# Patient Record
Sex: MALE | Race: BLACK OR AFRICAN AMERICAN | NOT HISPANIC OR LATINO | Employment: OTHER | URBAN - METROPOLITAN AREA
[De-identification: names, ages, dates, MRNs, and addresses within clinical notes are randomized per-mention and may not be internally consistent; named-entity substitution may affect disease eponyms.]

---

## 2020-10-19 ENCOUNTER — HISTORICAL (OUTPATIENT)
Dept: ADMINISTRATIVE | Facility: HOSPITAL | Age: 50
End: 2020-10-19

## 2021-01-15 ENCOUNTER — HISTORICAL (OUTPATIENT)
Dept: RADIOLOGY | Facility: HOSPITAL | Age: 51
End: 2021-01-15

## 2021-03-03 ENCOUNTER — HISTORICAL (OUTPATIENT)
Dept: RADIOLOGY | Facility: HOSPITAL | Age: 51
End: 2021-03-03

## 2021-03-19 ENCOUNTER — HISTORICAL (OUTPATIENT)
Dept: RESPIRATORY THERAPY | Facility: HOSPITAL | Age: 51
End: 2021-03-19

## 2021-04-20 LAB
APPEARANCE, UA: CLEAR
BACTERIA #/AREA URNS AUTO: ABNORMAL /HPF
BILIRUB UR QL STRIP: NEGATIVE
BUN SERPL-MCNC: 13.4 MG/DL (ref 8.4–25.7)
CALCIUM SERPL-MCNC: 9.4 MG/DL (ref 8.4–10.2)
CHLORIDE SERPL-SCNC: 108 MMOL/L (ref 98–107)
CO2 SERPL-SCNC: 25 MMOL/L (ref 22–29)
COLOR UR: YELLOW
CREAT SERPL-MCNC: 0.81 MG/DL (ref 0.73–1.18)
CREAT/UREA NIT SERPL: 17
ERYTHROCYTE [DISTWIDTH] IN BLOOD BY AUTOMATED COUNT: 12.3 % (ref 11.5–17)
GLUCOSE (UA): NEGATIVE
GLUCOSE SERPL-MCNC: 115 MG/DL (ref 74–100)
HCT VFR BLD AUTO: 47.2 % (ref 42–52)
HGB BLD-MCNC: 15.4 GM/DL (ref 14–18)
HGB UR QL STRIP: NEGATIVE
KETONES UR QL STRIP: NEGATIVE
LEUKOCYTE ESTERASE UR QL STRIP: NEGATIVE
MCH RBC QN AUTO: 29.9 PG (ref 27–31)
MCHC RBC AUTO-ENTMCNC: 32.6 GM/DL (ref 33–36)
MCV RBC AUTO: 91.7 FL (ref 80–94)
NITRITE UR QL STRIP.AUTO: NEGATIVE
PH UR STRIP: 6 [PH] (ref 5–9)
PLATELET # BLD AUTO: 239 X10(3)/MCL (ref 130–400)
PMV BLD AUTO: 10.4 FL (ref 9.4–12.4)
POTASSIUM SERPL-SCNC: 4.5 MMOL/L (ref 3.5–5.1)
PROT UR QL STRIP: ABNORMAL
RBC # BLD AUTO: 5.15 X10(6)/MCL (ref 4.7–6.1)
RBC #/AREA URNS HPF: ABNORMAL /[HPF]
SODIUM SERPL-SCNC: 140 MMOL/L (ref 136–145)
SP GR UR STRIP: 1.02 (ref 1–1.03)
SQUAMOUS EPITHELIAL, UA: ABNORMAL /HPF (ref 0–4)
UROBILINOGEN UR STRIP-ACNC: 0.2
WBC # SPEC AUTO: 8.2 X10(3)/MCL (ref 4.5–11.5)
WBC #/AREA URNS AUTO: ABNORMAL /HPF (ref 0–3)

## 2021-04-22 LAB — FINAL CULTURE: NO GROWTH

## 2021-04-29 LAB — SARS-COV-2 RNA RESP QL NAA+PROBE: NOT DETECTED

## 2021-05-04 ENCOUNTER — HOSPITAL ENCOUNTER (OUTPATIENT)
Dept: OCCUPATIONAL THERAPY | Facility: HOSPITAL | Age: 51
End: 2021-05-05
Attending: UROLOGY | Admitting: UROLOGY

## 2021-05-04 LAB
ABS NEUT (OLG): 11.38 X10(3)/MCL (ref 2.1–9.2)
BASOPHILS # BLD AUTO: 0 X10(3)/MCL (ref 0–0.2)
BASOPHILS NFR BLD AUTO: 0 %
EOSINOPHIL # BLD AUTO: 0 X10(3)/MCL (ref 0–0.9)
EOSINOPHIL NFR BLD AUTO: 0 %
ERYTHROCYTE [DISTWIDTH] IN BLOOD BY AUTOMATED COUNT: 12.5 % (ref 11.5–17)
GROUP & RH: NORMAL
HCT VFR BLD AUTO: 42.6 % (ref 42–52)
HGB BLD-MCNC: 14.3 GM/DL (ref 14–18)
LYMPHOCYTES # BLD AUTO: 1.3 X10(3)/MCL (ref 0.6–4.6)
LYMPHOCYTES NFR BLD AUTO: 10 %
MCH RBC QN AUTO: 30.7 PG (ref 27–31)
MCHC RBC AUTO-ENTMCNC: 33.6 GM/DL (ref 33–36)
MCV RBC AUTO: 91.4 FL (ref 80–94)
MONOCYTES # BLD AUTO: 0.1 X10(3)/MCL (ref 0.1–1.3)
MONOCYTES NFR BLD AUTO: 1 %
NEUTROPHILS # BLD AUTO: 11.38 X10(3)/MCL (ref 2.1–9.2)
NEUTROPHILS NFR BLD AUTO: 88 %
PLATELET # BLD AUTO: 202 X10(3)/MCL (ref 130–400)
PMV BLD AUTO: 9.6 FL (ref 9.4–12.4)
RBC # BLD AUTO: 4.66 X10(6)/MCL (ref 4.7–6.1)
WBC # SPEC AUTO: 12.8 X10(3)/MCL (ref 4.5–11.5)

## 2021-05-05 LAB
ABS NEUT (OLG): 8.42 X10(3)/MCL (ref 2.1–9.2)
BASOPHILS # BLD AUTO: 0 X10(3)/MCL (ref 0–0.2)
BASOPHILS NFR BLD AUTO: 0 %
BUN SERPL-MCNC: 9.8 MG/DL (ref 8.4–25.7)
CALCIUM SERPL-MCNC: 8.5 MG/DL (ref 8.4–10.2)
CHLORIDE SERPL-SCNC: 104 MMOL/L (ref 98–107)
CO2 SERPL-SCNC: 21 MMOL/L (ref 22–29)
CREAT SERPL-MCNC: 0.82 MG/DL (ref 0.73–1.18)
CREAT/UREA NIT SERPL: 12
EOSINOPHIL # BLD AUTO: 0 X10(3)/MCL (ref 0–0.9)
EOSINOPHIL NFR BLD AUTO: 0 %
ERYTHROCYTE [DISTWIDTH] IN BLOOD BY AUTOMATED COUNT: 12.5 % (ref 11.5–17)
GLUCOSE SERPL-MCNC: 128 MG/DL (ref 74–100)
HCT VFR BLD AUTO: 41.8 % (ref 42–52)
HGB BLD-MCNC: 13.9 GM/DL (ref 14–18)
LYMPHOCYTES # BLD AUTO: 1.4 X10(3)/MCL (ref 0.6–4.6)
LYMPHOCYTES NFR BLD AUTO: 13 %
MCH RBC QN AUTO: 30.2 PG (ref 27–31)
MCHC RBC AUTO-ENTMCNC: 33.3 GM/DL (ref 33–36)
MCV RBC AUTO: 90.9 FL (ref 80–94)
MONOCYTES # BLD AUTO: 0.6 X10(3)/MCL (ref 0.1–1.3)
MONOCYTES NFR BLD AUTO: 6 %
NEUTROPHILS # BLD AUTO: 8.42 X10(3)/MCL (ref 2.1–9.2)
NEUTROPHILS NFR BLD AUTO: 81 %
PLATELET # BLD AUTO: 218 X10(3)/MCL (ref 130–400)
PMV BLD AUTO: 10.2 FL (ref 9.4–12.4)
POTASSIUM SERPL-SCNC: 4.4 MMOL/L (ref 3.5–5.1)
RBC # BLD AUTO: 4.6 X10(6)/MCL (ref 4.7–6.1)
SODIUM SERPL-SCNC: 135 MMOL/L (ref 136–145)
WBC # SPEC AUTO: 10.4 X10(3)/MCL (ref 4.5–11.5)

## 2021-05-26 ENCOUNTER — HISTORICAL (OUTPATIENT)
Dept: ADMINISTRATIVE | Facility: HOSPITAL | Age: 51
End: 2021-05-26

## 2021-05-26 LAB
APPEARANCE, UA: CLEAR
BACTERIA SPEC CULT: ABNORMAL /HPF
BILIRUB UR QL STRIP: NEGATIVE
COLOR UR: YELLOW
GLUCOSE (UA): NEGATIVE MG/DL
HGB UR QL STRIP: ABNORMAL
KETONES UR QL STRIP: NEGATIVE MG/DL
LEUKOCYTE ESTERASE UR QL STRIP: ABNORMAL
NITRITE UR QL STRIP: NEGATIVE
PH UR STRIP: 6 [PH] (ref 4.6–8)
PROT UR QL STRIP: ABNORMAL
RBC #/AREA URNS HPF: ABNORMAL /HPF
SP GR UR STRIP: 1.02 (ref 1–1.03)
SQUAMOUS EPITHELIAL, UA: ABNORMAL /LPF
UROBILINOGEN UR STRIP-ACNC: 0.2 EU/DL
WBC #/AREA URNS HPF: ABNORMAL /HPF

## 2021-09-08 ENCOUNTER — HISTORICAL (OUTPATIENT)
Dept: RADIOLOGY | Facility: HOSPITAL | Age: 51
End: 2021-09-08

## 2021-10-20 ENCOUNTER — HISTORICAL (OUTPATIENT)
Dept: LAB | Facility: HOSPITAL | Age: 51
End: 2021-10-20

## 2021-11-09 ENCOUNTER — HISTORICAL (OUTPATIENT)
Dept: SURGERY | Facility: HOSPITAL | Age: 51
End: 2021-11-09

## 2021-11-09 LAB
ABS NEUT (OLG): 4.6 X10(3)/MCL (ref 1.5–6.9)
ALBUMIN SERPL-MCNC: 3.8 GM/DL (ref 3.5–5)
ALBUMIN/GLOB SERPL: 1.2 RATIO (ref 1.1–2)
ALP SERPL-CCNC: 72 UNIT/L (ref 40–150)
ALT SERPL-CCNC: 20 UNIT/L (ref 0–55)
APTT PPP: 34.6 SEC (ref 23.4–34.9)
AST SERPL-CCNC: 14 UNIT/L (ref 5–34)
BASOPHILS # BLD AUTO: 0.1 X10(3)/MCL (ref 0–0.1)
BASOPHILS NFR BLD AUTO: 1 % (ref 0–1)
BILIRUB SERPL-MCNC: 0.4 MG/DL
BILIRUBIN DIRECT+TOT PNL SERPL-MCNC: 0.1 MG/DL (ref 0–0.5)
BILIRUBIN DIRECT+TOT PNL SERPL-MCNC: 0.3 MG/DL (ref 0–0.8)
BUN SERPL-MCNC: 11 MG/DL (ref 8.4–25.7)
CALCIUM SERPL-MCNC: 9.8 MG/DL (ref 8.7–10.5)
CHLORIDE SERPL-SCNC: 104 MMOL/L (ref 98–107)
CO2 SERPL-SCNC: 27 MMOL/L (ref 22–29)
CREAT SERPL-MCNC: 0.82 MG/DL (ref 0.73–1.18)
EOSINOPHIL # BLD AUTO: 0.5 X10(3)/MCL (ref 0–0.6)
EOSINOPHIL NFR BLD AUTO: 5 % (ref 0–5)
ERYTHROCYTE [DISTWIDTH] IN BLOOD BY AUTOMATED COUNT: 12.8 % (ref 11.5–17)
GLOBULIN SER-MCNC: 3.3 GM/DL (ref 2.4–3.5)
GLUCOSE SERPL-MCNC: 100 MG/DL (ref 74–100)
HCT VFR BLD AUTO: 43.8 % (ref 42–52)
HGB BLD-MCNC: 14.6 GM/DL (ref 14–18)
IMM GRANULOCYTES # BLD AUTO: 0.03 10*3/UL (ref 0–0.02)
IMM GRANULOCYTES NFR BLD AUTO: 0.3 % (ref 0–0.43)
INR PPP: 1.1 (ref 2–3)
LYMPHOCYTES # BLD AUTO: 2.9 X10(3)/MCL (ref 0.5–4.1)
LYMPHOCYTES NFR BLD AUTO: 34 % (ref 15–40)
MCH RBC QN AUTO: 30 PG (ref 27–34)
MCHC RBC AUTO-ENTMCNC: 33 GM/DL (ref 31–36)
MCV RBC AUTO: 90 FL (ref 80–99)
MONOCYTES # BLD AUTO: 0.6 X10(3)/MCL (ref 0–1.1)
MONOCYTES NFR BLD AUTO: 6 % (ref 4–12)
NEUTROPHILS # BLD AUTO: 4.6 X10(3)/MCL (ref 1.5–6.9)
NEUTROPHILS NFR BLD AUTO: 53 % (ref 43–75)
PLATELET # BLD AUTO: 242 X10(3)/MCL (ref 140–400)
PMV BLD AUTO: 9.7 FL (ref 6.8–10)
POTASSIUM SERPL-SCNC: 4.2 MMOL/L (ref 3.5–5.1)
PROT SERPL-MCNC: 7.1 GM/DL (ref 6.4–8.3)
PROTHROMBIN TIME: 13.7 SEC (ref 11.7–14.5)
RBC # BLD AUTO: 4.86 X10(6)/MCL (ref 4.7–6.1)
SODIUM SERPL-SCNC: 138 MMOL/L (ref 136–145)
WBC # SPEC AUTO: 8.7 X10(3)/MCL (ref 4.5–11.5)

## 2021-11-15 ENCOUNTER — HISTORICAL (OUTPATIENT)
Dept: ANESTHESIOLOGY | Facility: HOSPITAL | Age: 51
End: 2021-11-15

## 2021-12-09 ENCOUNTER — HISTORICAL (OUTPATIENT)
Dept: ADMINISTRATIVE | Facility: HOSPITAL | Age: 51
End: 2021-12-09

## 2022-04-30 NOTE — OP NOTE
Patient:   Alex Reynoso             MRN: [id number]            FIN: [id number]               Age:   51 years     Sex:  Male     :  1970   Associated Diagnoses:   None   Author:   Demarcus Shaver MD          PRE PROCEDURE DIAGNOSIS:  Elevated prostate-specific antigen.    POSTPROCEDURE DIAGNOSIS:  Elevated prostate-specific antigen.    PROCEDURES:  1. Transrectal ultrasound of the prostate.  2. Ultrasound-guided needle biopsy of prostate.    SURGEON:  Demarcus Shaver MD.    ANESTHESIA:  Local.    FINDINGS:  Prostate measured   total volume 22 cubic  centimeters. The prostate, the seminal vesicles, and bladder had otherwise unremarkable  sonographic appearance.    SPECIMENS:  14 cores sent for permanent section.    COMPLICATIONS:  None.    DRAINS:  None.    FLUIDS REPLACED:  None.    BLOOD LOSS:  None.    MEDICATIONS:  1. Cipro 500 mg p.o. b.i.d. x6 doses, the first dose taken 1 day prior to the start of the  procedure.  2. Rocephin 1 g IM just prior to the start of procedure.    DESCRIPTION OF PROCEDURE:  After informed consent was obtained, the patient was brought to the [facility] treatment  room, placed in the left lateral decubitus position. The transrectal ultrasound probe  was inserted in the rectum without difficulty and the prostate was imaged in the axial and  sagittal planes. A periprosthetic block was achieved in standard fashion with a total of 20 mL of  1% plain lidocaine. After adequate anesthesia, we undertook prostate needle biopsy using an  18-gauge Crowd[employer] biopsy gun. We sampled 2 cores from each of the following respective  locations. Left base, left mid, left apex, , right mid, right apex. 4 cores were taken from the Right Base becuase of the MRI findings. The probe was  removed. The patient tolerated procedure well. He was given routine pre and post biopsy  instructions and prophylaxis,

## 2022-04-30 NOTE — OP NOTE
DATE OF SURGERY:        SURGEON:  Samuel Cruz M.D.    PREOPERATIVE DIAGNOSES:    1. Need for age-appropriate screening colonoscopy with blood in his stool.  2. Family history of colon cancer, his father had it at the age of 76.    3. No history of previous colonoscopy.    PROCEDURES:    1. Colonoscopy to the cecum with intubation of ileocecal valve up to 10 cm.  2. Polypectomy with a hot loop snare at 30 cm, clear margin obtained, no ink spot necessary, consistent with a benign adenoma on gross exam.  3. Polypectomy at 15 cm with a cold biopsy forceps, clear margin obtained, consistent with a benign adenoma.    POSTOPERATIVE DIAGNOSES:    1. Normal terminal ileum up to 10 cm.  2. Normal cecum, ascending colon, transverse colon, descending colon.  3. Mild diverticulosis of the rectosigmoid colon with a polyp removed with a hot loop snare about 1 to 2 cm in size consistent with a benign adenoma, clear margin obtained, no ink spot necessary.    4. Polyp at 15 cm removed with a cold biopsy forceps, clear margin obtained, no ink spot necessary, consistent with a benign adenoma on gross exam.    PLAN:    1. Follow up in the office to discuss polypectomy results.  2. Follow up in 2 years recheck stools for blood.  3. Follow up in 3 to 5 years to repeat colonoscopy due to history of polyp formation.    INDICATIONS:  The patient is a 51-year-old,  male with a history of hypercholesterolemia, hypertension, osteoarthritis of both knees and chronic neck and back pain.  The patient needed a colonoscopy, had blood in the stool and a family history of colorectal carcinoma, father had it at the age of 76.  He has had no prior colonoscopy done.    DESCRIPTION OF PROCEDURE:  Patient was brought to the GI suite, underwent sedation, and examination of the colon all the way to the cecum, and intubation of the ileocecal valve with examination of the terminal ileum.     The terminal ileum was normal up  to 10 cm.  Cecum, ascending colon, transverse colon, descending colon were normal.  Rectosigmoid had mild diverticulosis.  There was a polyp at 30 cm.  There was a 1 to 2 cm benign adenoma that was removed with a hot loop snare.  No ink spot was necessary, clear margin was obtained.  The patient then had a 2nd polyp at 15 cm that was removed with cold biopsy forceps.  Clear margin was obtained, no ink spot was necessary.  Also appeared to be consistent with a benign adenoma, less than 1 cm in size.  Overall, the patient did very well, had no problems or difficulties, was awakened and sent to recovery in good condition.     I appreciate the consultation referral from Mr. Glen Rossi, and will notify him of my findings.        ELLIE/SUSIE   DD: 11/15/2021 1102   DT: 11/15/2021 1159  Job # [id number]    cc: Mr. Glen Rossi

## 2022-04-30 NOTE — OP NOTE
Patient:   Alex Reynoso             MRN: [id number]            FIN: [id number]               Age:   51 years     Sex:  Male     :  1970   Associated Diagnoses:   None   Author:   Demarcus Shaver MD      PREOPERATIVE DIAGNOSIS(ES):  Prostate cancer,  [place] 3+4 7=  PSA 4  Clinical stage T1c.    PROCEDURE(S)/OPERATION(S) PERFORMED:  1. Robot assist laparoscopic radical prostatectomy.  2. Robot assist bilateral pelvic lymph node dissection.    FINDINGS:  1. There was no evidence of prostate cancer outside the prostate.  2. ROLLY revealed normal  3. He had a bladder neck sparing procedure.  4. His urethral bladder anastomosis was tested with 120 mL of normal saline. There was no  extravasation seen.  5. Bilateral neurovascular bundles were spared  6. Endopelvic fascia was spared.    SPECIMENS:  1. Prostate plus seminal vesicles en bloc.  2. Anterior bladder neck lymph nodes  3. External iliac lymph nodes, and obturator lymph nodes Bilaterally    FLUIDS:  1500 mL crystalloid.    ESTIMATED BLOOD LOSS:  150 ml     DRAINS:  1. 19-Bah[place] Duong drain.  2. 18-[place] Fleming catheter.    CONDITION:  Stable to PACU.    INDICATION FOR PROCEDURE:  This is a 51 Male, who was found to have an elevated PSA. He then underwent a  prostate biopsy, which confirmed the above mentioned [place] score and clinical stage.  Transrectal ultrasound prostate volume was 25 cubic centimeters. He was apprised of his  options. He elected to proceed with robotic-assisted laparoscopic prostatectomy. Consents  were signed. He understands the risk and complications and would like to proceed with surgery.  PCDs and heparin were used for DVT prophylaxis. Appropriate antibiotics were used for  antibiotic prophylaxis.    SUMMARY:  After adequate general anesthetic, he was carefully positioned in low lithotomy. His arms were  tucked at his sides. All pressure points were carefully padded to prevent neuromuscular injury.  The table was placed in a  steep Trendelenburg position. The abdomen and genitalia were  shaved and prepped and draped sterile fashion. A time-out was performed with two patient identifiers.  A 16-English 2-way Fleming catheter was placed in the bladder with 10 mL of sterile water in the  balloon, and the bladder was drained.    A Veress needle was used to access the peritoneal cavity at the umbilicus. Saline drop test and  advancement tests were negative. The abdomen was insufflated at low insufflation pressures of  CO2 gas. We insufflated with low flow and initial pressures below 4 mmHg and gradually  insufflated up to 15 mmHg. A 1 cm incision was made just above the belly button horizontally  and a 10-mm trocar camera trocar was inserted. Initial laparoscopy revealed findings  as  noted above.    We then placed 2 robotic trocars on either side of the midline measuring 18 cm from the midline  at the pubic bone up to the port sites which were 9 cm lateral of the umbilicus and another robot  trocar was placed in the left lateral abdomen two finger breaths off the left ASIC. Two assistant  ports were placed, one 12 mm Surgiquest trocar in the right lateral abdomen two finger breaths  off the right ASIC and another 5mm trocar in the right subcostal position. All ports were placed  under direct vision.    The robot was then docked.  We then took down some adhesions of the sigmoid colon in  order to fully retract the rectum out of the pelvis.After this, the peritoneum between the bladder   and the rectum was incised to expose the seminal vesicles and vas deferens. The seminal vesicles   were then swept up off denovillier's fascia. We then clipped and divided the vas deferens at the tip of the  seminal vesicles and then clipped and divided the vascular structures on the posterior lateral  edges of the seminal vesicles. The SVs and ampulla of the Vas were then dissected down into  their insertion into the prostate. We then incised through  Denonvilliers fascia with cold scissors  and swept the prostate up off the rectum exposing the vascular bundles laterally and the apex of  the prostate in the midline.    After this was done, we then performed a bilateral extended pelvic lymph node dissection on  both the left and right-hand side. The superior borders of dissection were the lateral border of  the external iliac arter, medially was where the ureter crosses over the external iliac artery, and  lateral where the circumflex iliac vein crosses over the external iliac artery. The distal medial  boundary was the bladder and the distal lateral boundary was the obturator fossa. The inferior  boundary was the endopelvic fascia. During the course of the dissection, the obturator nerve  was identified and kept out of harm's way. The bilateral mercedes packets taken were the external  iliac lymph nodes, internal iliac lymph nodes and obturator lymph nodes. A piece of Nuknit  hemostatic material was placed into the obturator fossa bilaterally.    The bladder was then dropped entering the space of Retzius by incising lateral to the medial  umbilical ligament on either side up until where they joined. The vas deferens were then  clipped and divided. Endopelvic fascia was exposed but not divided. We then cauterized and  divided the superficial dorsal vein and removed the anterior bladder neck fat and lymph nodes  overlying the junction of the bladder and the prostate.    We then switched to a 30-degree telescope and performed the bladder neck dissection by  entering the retrovesical space through the space of Mingo on both the right and left-hand  sides. The bladder neck was then disected away from the prostate in a lateral to medial and  posterior to anterior fashion. The urethra and bladder neck were then isolated, where it funnels  into the prostate. It was then divided. The bladder neck was spared to the size of the 16fr coto.  We then switched back to a 0-degree  telescope and completed the posterior dissection, better  exposing the apex of the prostate and the vascular bundles laterally.    After taking down the prostate pedicle between clips, a Bilateral intrafascial nerve sparing  procedure was performed.   The anterior veil structure were taken down at 10 and 2 O'clock and the DVC was then  exposed. We then stapled the DVC with one application of an Ethicon 45-mm stapling device  using a blue load making sure the coto catheter moved easily to protect the urethra. We then  performed the apical dissection and prepared the urethra.    We then divided the urethra anteriorly and placed a 6 o'clock 2-0 Vicryl stitch into the urethra.  After this was done, we then divided the posterior urethra. The prostate was then bagged and  placed out of harm&#39;s way in the right upper quadrant. We then used two baby laps to hold  pressure in the pelvis for a minute. After this, we then assessed for Hemostasis.Hemostasis was adequate    We then performed a Grant posterior reconstruction with a 3-0 V lock suture. After this was  done, we then placed a 6 o'clock urethral stitch into the 6 o'clock position on the bladder and  then tied this down. We then performed the vesicourethral anastomosis using two 3-0 V lock  sutures looped together running them from the 5 and 7 o'clock position on each side up to the  12 o'clock position. The two were then tied together.We then tested the anastomosis with 120 mL   of normal saline. There was no extravasation seen. The 16-[place] Coto catheter was removed   and a new 18-[place] Coto catheter was placed with 10 mL in the balloon.    The robot was then undocked, and a drain was placed in the pelvis through the left iliac port.  The drain was secured with 3-0 nylon. The right lower quadrant 12-mm port site was closed  with 0-Vicryl interrupted suture using the Avinash-Dc fascial closure device under vision.  We then visualized all the ports as they  were removed. There was no bleeding seen from any of  the sites. We then widened the midline fascia of the camera port site, removed the specimen  and inspected it and sent it to Pathology.    The fascia was reapproximated with a running 0-Vicryl suture. All wounds were infiltrated with  0.5% Marcaine and experell a total of 50 mL. The skin edges were  reapproximated using a running subcuticular 4-0 Monocryl suture. Dermabond dressing was  applied to the skin incisions. All sponge and needle counts were correct x2.    The patient tolerated the procedure well. Catheter and drain were secured to the left leg. He  was extubated and transported to the recovery room in stable condition. His family was  informed of the outcome following the procedure.

## 2022-05-03 NOTE — HISTORICAL [facility] CERNER
This is a historical note converted from Lucia. Formatting and pictures may have been removed.  Please reference Lucia for original formatting and attached multimedia. Mr. Alex Reynoso is a 51-year-old -American male that was admitted to the hospital?5/4/2021 for a scheduled?about assisted laparoscopic radical prostatectomy?and bilateral pelvic lymph node dissection. ?Surgery went?as planned, no complications.? Hospital stay has been unremarkable.??Reports good pain control?with oral pain medications. ?Tolerating?diet without nausea. ?Vital signs remained stable, afebrile.? H&H 13.9 41.8,?BUN/creatinine 9.8?and 0.82.? Preop biopsy results show adenocarcinoma?Edith score 7.? He will go home with a Fleming catheter?and follow-up with Dr. Demarcus Shaver?on 5/13/2021 at 1045. ?He has been provided?pain medication?preoperatively.? Instructed patient to?take stool softener?as long as he is taking?narcotic?and also to increase water intake and?ambulate at home.  ?  Admitting diagnosis: Prostate cancer, [place] 3+4, PSA 4, clinical stage T1c  Discharge diagnosis:?Same   ?  I saw and evaluated the patient.  ?  ?I agree with the findings and the plan of care as documented in the Madie Payne note.

## 2022-09-29 DIAGNOSIS — R22.1 NECK MASS: Primary | ICD-10-CM

## 2022-10-03 ENCOUNTER — HOSPITAL ENCOUNTER (OUTPATIENT)
Dept: RADIOLOGY | Facility: HOSPITAL | Age: 52
Discharge: HOME | End: 2022-10-03
Attending: NURSE PRACTITIONER
Payer: COMMERCIAL

## 2022-10-03 DIAGNOSIS — R22.1 NECK MASS: ICD-10-CM

## 2022-10-03 PROCEDURE — 76536 US EXAM OF HEAD AND NECK: CPT | Mod: TC

## 2022-11-03 ENCOUNTER — HOSPITAL ENCOUNTER (OUTPATIENT)
Dept: RADIOLOGY | Facility: HOSPITAL | Age: 52
Discharge: HOME | End: 2022-11-03
Attending: NURSE PRACTITIONER
Payer: COMMERCIAL

## 2022-11-03 DIAGNOSIS — I10 HYPERTENSION, ESSENTIAL: ICD-10-CM

## 2022-11-03 PROCEDURE — 75571 CT HRT W/O DYE W/CA TEST: CPT | Mod: TC

## 2022-11-04 DIAGNOSIS — R31.9 HEMATURIA: Primary | ICD-10-CM

## 2022-11-07 ENCOUNTER — HOSPITAL ENCOUNTER (OUTPATIENT)
Dept: RADIOLOGY | Facility: HOSPITAL | Age: 52
Discharge: HOME | End: 2022-11-07
Attending: NURSE PRACTITIONER
Payer: COMMERCIAL

## 2022-11-07 DIAGNOSIS — R31.9 HEMATURIA: ICD-10-CM

## 2022-11-07 PROCEDURE — 76770 US EXAM ABDO BACK WALL COMP: CPT | Mod: TC

## 2022-11-10 ENCOUNTER — HOSPITAL ENCOUNTER (OUTPATIENT)
Dept: RADIOLOGY | Facility: HOSPITAL | Age: 52
Discharge: HOME | End: 2022-11-10
Attending: SURGERY
Payer: COMMERCIAL

## 2022-11-10 DIAGNOSIS — Z01.811 PRE-OP CHEST EXAM: ICD-10-CM

## 2022-11-10 PROCEDURE — 71046 X-RAY EXAM CHEST 2 VIEWS: CPT | Mod: TC

## 2022-11-11 ENCOUNTER — HOSPITAL ENCOUNTER (OUTPATIENT)
Dept: RADIOLOGY | Facility: HOSPITAL | Age: 52
Discharge: HOME | End: 2022-11-11
Attending: NURSE PRACTITIONER
Payer: COMMERCIAL

## 2022-11-11 DIAGNOSIS — R51.9 HEADACHE: ICD-10-CM

## 2022-11-11 PROCEDURE — 70220 X-RAY EXAM OF SINUSES: CPT | Mod: TC

## 2022-11-17 ENCOUNTER — HOSPITAL ENCOUNTER (EMERGENCY)
Facility: HOSPITAL | Age: 52
Discharge: HOME | End: 2022-11-17
Attending: INTERNAL MEDICINE
Payer: COMMERCIAL

## 2022-11-17 VITALS
RESPIRATION RATE: 18 BRPM | DIASTOLIC BLOOD PRESSURE: 85 MMHG | BODY MASS INDEX: 37.23 KG/M2 | OXYGEN SATURATION: 97 % | HEART RATE: 67 BPM | HEIGHT: 69 IN | SYSTOLIC BLOOD PRESSURE: 150 MMHG | TEMPERATURE: 98 F | WEIGHT: 251.38 LBS

## 2022-11-17 DIAGNOSIS — R07.9 CHEST PAIN: ICD-10-CM

## 2022-11-17 DIAGNOSIS — K29.70 GASTRITIS WITHOUT BLEEDING, UNSPECIFIED CHRONICITY, UNSPECIFIED GASTRITIS TYPE: Primary | ICD-10-CM

## 2022-11-17 DIAGNOSIS — R07.9 CHEST PAIN, UNSPECIFIED TYPE: ICD-10-CM

## 2022-11-17 LAB
ALBUMIN SERPL-MCNC: 3.4 GM/DL (ref 3.5–5)
ALBUMIN/GLOB SERPL: 1.3 RATIO (ref 1.1–2)
ALP SERPL-CCNC: 78 UNIT/L (ref 40–150)
ALT SERPL-CCNC: 81 UNIT/L (ref 0–55)
AMYLASE SERPL-CCNC: 45 UNIT/L (ref 25–125)
AST SERPL-CCNC: 36 UNIT/L (ref 5–34)
BASOPHILS # BLD AUTO: 0.05 X10(3)/MCL (ref 0–0.2)
BASOPHILS NFR BLD AUTO: 0.6 %
BILIRUBIN DIRECT+TOT PNL SERPL-MCNC: 0.3 MG/DL
BNP BLD-MCNC: 54 PG/ML
BUN SERPL-MCNC: 14 MG/DL (ref 8.4–25.7)
CALCIUM SERPL-MCNC: 8.9 MG/DL (ref 8.4–10.2)
CHLORIDE SERPL-SCNC: 108 MMOL/L (ref 98–107)
CO2 SERPL-SCNC: 26 MMOL/L (ref 22–29)
CREAT SERPL-MCNC: 0.76 MG/DL (ref 0.73–1.18)
EOSINOPHIL # BLD AUTO: 0.35 X10(3)/MCL (ref 0–0.9)
EOSINOPHIL NFR BLD AUTO: 4.3 %
ERYTHROCYTE [DISTWIDTH] IN BLOOD BY AUTOMATED COUNT: 12.3 % (ref 11.5–17)
GFR SERPLBLD CREATININE-BSD FMLA CKD-EPI: >60 MLS/MIN/1.73/M2
GLOBULIN SER-MCNC: 2.7 GM/DL (ref 2.4–3.5)
GLUCOSE SERPL-MCNC: 111 MG/DL (ref 74–100)
HCT VFR BLD AUTO: 40.2 % (ref 42–52)
HGB BLD-MCNC: 13.2 GM/DL (ref 14–18)
IMM GRANULOCYTES # BLD AUTO: 0.03 X10(3)/MCL (ref 0–0.04)
IMM GRANULOCYTES NFR BLD AUTO: 0.4 %
LIPASE SERPL-CCNC: 15 U/L
LYMPHOCYTES # BLD AUTO: 1.64 X10(3)/MCL (ref 0.6–4.6)
LYMPHOCYTES NFR BLD AUTO: 20.2 %
MAGNESIUM SERPL-MCNC: 2 MG/DL (ref 1.6–2.6)
MCH RBC QN AUTO: 30.2 PG (ref 27–31)
MCHC RBC AUTO-ENTMCNC: 32.8 MG/DL (ref 33–36)
MCV RBC AUTO: 92 FL (ref 80–94)
MONOCYTES # BLD AUTO: 0.56 X10(3)/MCL (ref 0.1–1.3)
MONOCYTES NFR BLD AUTO: 6.9 %
NEUTROPHILS # BLD AUTO: 5.5 X10(3)/MCL (ref 2.1–9.2)
NEUTROPHILS NFR BLD AUTO: 67.6 %
PLATELET # BLD AUTO: 203 X10(3)/MCL (ref 130–400)
PMV BLD AUTO: 9.5 FL (ref 7.4–10.4)
POTASSIUM SERPL-SCNC: 4 MMOL/L (ref 3.5–5.1)
PROT SERPL-MCNC: 6.1 GM/DL (ref 6.4–8.3)
RBC # BLD AUTO: 4.37 X10(6)/MCL (ref 4.7–6.1)
SODIUM SERPL-SCNC: 140 MMOL/L (ref 136–145)
TROPONIN I SERPL-MCNC: <0.01 NG/ML (ref 0–0.04)
WBC # SPEC AUTO: 8.1 X10(3)/MCL (ref 4.5–11.5)

## 2022-11-17 PROCEDURE — 85025 COMPLETE CBC W/AUTO DIFF WBC: CPT | Performed by: INTERNAL MEDICINE

## 2022-11-17 PROCEDURE — 93005 ELECTROCARDIOGRAM TRACING: CPT

## 2022-11-17 PROCEDURE — 83880 ASSAY OF NATRIURETIC PEPTIDE: CPT | Performed by: INTERNAL MEDICINE

## 2022-11-17 PROCEDURE — 84484 ASSAY OF TROPONIN QUANT: CPT | Performed by: INTERNAL MEDICINE

## 2022-11-17 PROCEDURE — 93010 EKG 12-LEAD: ICD-10-PCS | Mod: ,,, | Performed by: INTERNAL MEDICINE

## 2022-11-17 PROCEDURE — 82150 ASSAY OF AMYLASE: CPT | Performed by: INTERNAL MEDICINE

## 2022-11-17 PROCEDURE — 80053 COMPREHEN METABOLIC PANEL: CPT | Performed by: INTERNAL MEDICINE

## 2022-11-17 PROCEDURE — 93010 ELECTROCARDIOGRAM REPORT: CPT | Mod: ,,, | Performed by: INTERNAL MEDICINE

## 2022-11-17 PROCEDURE — 99285 EMERGENCY DEPT VISIT HI MDM: CPT | Mod: 25

## 2022-11-17 PROCEDURE — 83690 ASSAY OF LIPASE: CPT | Performed by: INTERNAL MEDICINE

## 2022-11-17 PROCEDURE — 83735 ASSAY OF MAGNESIUM: CPT | Performed by: INTERNAL MEDICINE

## 2022-11-17 PROCEDURE — 25000003 PHARM REV CODE 250: Performed by: INTERNAL MEDICINE

## 2022-11-17 RX ORDER — MAG HYDROX/ALUMINUM HYD/SIMETH 200-200-20
30 SUSPENSION, ORAL (FINAL DOSE FORM) ORAL ONCE
Status: COMPLETED | OUTPATIENT
Start: 2022-11-17 | End: 2022-11-17

## 2022-11-17 RX ORDER — LIDOCAINE HYDROCHLORIDE 20 MG/ML
15 SOLUTION OROPHARYNGEAL ONCE
Status: COMPLETED | OUTPATIENT
Start: 2022-11-17 | End: 2022-11-17

## 2022-11-17 RX ADMIN — LIDOCAINE HYDROCHLORIDE 15 ML: 20 SOLUTION ORAL at 04:11

## 2022-11-17 RX ADMIN — ALUMINUM HYDROXIDE, MAGNESIUM HYDROXIDE, AND DIMETHICONE 30 ML: 200; 20; 200 SUSPENSION ORAL at 04:11

## 2022-11-17 NOTE — ED PROVIDER NOTES
Encounter Date: 11/17/2022       History     Chief Complaint   Patient presents with    Chest Pain     Chest pain and SOB starting last night. Pain radiated to left shoulder. Recently had cardiology appointment, reports some valve blockage     52-year-old black male presents emergency department complaining of chest pain.  He states he was at rest trying to go to sleep when he began having substernal chest pain the lower part of his chest that radiated up the left side of his chest to shoulder down his arm and he was told he had some problems with his heart see wanted to come get checked out.  Currently in the ER he states his chest pain is improved but is still hurting him it is down to a 2/10    Review of patient's allergies indicates:  No Known Allergies  Past Medical History:   Diagnosis Date    Arthritis     Back pain     High cholesterol     HTN (hypertension)     Prostate cancer     Seasonal allergies      Past Surgical History:   Procedure Laterality Date    CHOLECYSTECTOMY      PROSTATECTOMY       Family History   Problem Relation Age of Onset    Hypertension Mother     Diabetes Mother     Prostate cancer Father      Social History     Tobacco Use    Smoking status: Every Day     Packs/day: 0.50     Types: Cigarettes    Smokeless tobacco: Never   Substance Use Topics    Alcohol use: Yes     Review of Systems   Constitutional: Negative.  Negative for activity change, appetite change, chills, diaphoresis, fatigue, fever and unexpected weight change.   HENT: Negative.  Negative for congestion, dental problem, drooling, ear discharge, ear pain, facial swelling, hearing loss, mouth sores, nosebleeds, postnasal drip, rhinorrhea, sinus pressure, sinus pain, sneezing, sore throat, tinnitus, trouble swallowing and voice change.    Eyes: Negative.  Negative for photophobia, pain, discharge, redness, itching and visual disturbance.   Respiratory: Negative.  Negative for apnea, cough, choking, chest tightness,  shortness of breath, wheezing and stridor.    Cardiovascular:  Positive for chest pain. Negative for palpitations and leg swelling.   Gastrointestinal: Negative.  Negative for abdominal distention, abdominal pain, anal bleeding, blood in stool, constipation, diarrhea, nausea, rectal pain and vomiting.   Endocrine: Negative.  Negative for cold intolerance, heat intolerance, polydipsia, polyphagia and polyuria.   Genitourinary: Negative.  Negative for decreased urine volume, difficulty urinating, dysuria, enuresis, flank pain, frequency, genital sores, hematuria, penile discharge, penile pain, penile swelling, scrotal swelling, testicular pain and urgency.   Musculoskeletal: Negative.  Negative for arthralgias, back pain, gait problem, joint swelling, myalgias, neck pain and neck stiffness.   Skin: Negative.  Negative for color change, pallor, rash and wound.   Allergic/Immunologic: Negative.  Negative for environmental allergies, food allergies and immunocompromised state.   Neurological: Negative.  Negative for dizziness, tremors, seizures, syncope, facial asymmetry, speech difficulty, weakness, light-headedness, numbness and headaches.   Hematological: Negative.  Negative for adenopathy. Does not bruise/bleed easily.   Psychiatric/Behavioral: Negative.  Negative for agitation, behavioral problems, confusion, decreased concentration, dysphoric mood, hallucinations, self-injury, sleep disturbance and suicidal ideas. The patient is not nervous/anxious and is not hyperactive.    All other systems reviewed and are negative.    Physical Exam     Initial Vitals [11/17/22 0409]   BP Pulse Resp Temp SpO2   (!) 150/85 67 18 98.1 °F (36.7 °C) 97 %      MAP       --         Physical Exam    Nursing note and vitals reviewed.  Constitutional: He appears well-developed and well-nourished.   HENT:   Head: Normocephalic and atraumatic.   Eyes: Conjunctivae and EOM are normal. Pupils are equal, round, and reactive to light.   Neck:  Neck supple.   Normal range of motion.  Cardiovascular:  Normal rate and regular rhythm.           Pulmonary/Chest: Breath sounds normal.   Abdominal: Abdomen is soft. Bowel sounds are normal.   Tender in the midepigastric region in the substernal xiphoid area   Musculoskeletal:         General: Normal range of motion.      Cervical back: Normal range of motion and neck supple.     Neurological: He is alert and oriented to person, place, and time.   Skin: Skin is warm and dry. Capillary refill takes less than 2 seconds.   Psychiatric: He has a normal mood and affect. His behavior is normal. Judgment and thought content normal.       ED Course   Procedures  Admission on 11/17/2022   Component Date Value Ref Range Status    Sodium Level 11/17/2022 140  136 - 145 mmol/L Final    Potassium Level 11/17/2022 4.0  3.5 - 5.1 mmol/L Final    Chloride 11/17/2022 108 (H)  98 - 107 mmol/L Final    Carbon Dioxide 11/17/2022 26  22 - 29 mmol/L Final    Glucose Level 11/17/2022 111 (H)  74 - 100 mg/dL Final    Blood Urea Nitrogen 11/17/2022 14.0  8.4 - 25.7 mg/dL Final    Creatinine 11/17/2022 0.76  0.73 - 1.18 mg/dL Final    Calcium Level Total 11/17/2022 8.9  8.4 - 10.2 mg/dL Final    Protein Total 11/17/2022 6.1 (L)  6.4 - 8.3 gm/dL Final    Albumin Level 11/17/2022 3.4 (L)  3.5 - 5.0 gm/dL Final    Globulin 11/17/2022 2.7  2.4 - 3.5 gm/dL Final    Albumin/Globulin Ratio 11/17/2022 1.3  1.1 - 2.0 ratio Final    Bilirubin Total 11/17/2022 0.3  <=1.5 mg/dL Final    Alkaline Phosphatase 11/17/2022 78  40 - 150 unit/L Final    Alanine Aminotransferase 11/17/2022 81 (H)  0 - 55 unit/L Final    Aspartate Aminotransferase 11/17/2022 36 (H)  5 - 34 unit/L Final    eGFR 11/17/2022 >60  mls/min/1.73/m2 Final    Troponin-I 11/17/2022 <0.010  0.000 - 0.045 ng/mL Final    Natriuretic Peptide 11/17/2022 54.0  <=100.0 pg/mL Final    Amylase Level 11/17/2022 45  25 - 125 unit/L Final    Lipase Level 11/17/2022 15  <=60 U/L Final    Magnesium  Level 11/17/2022 2.00  1.60 - 2.60 mg/dL Final    WBC 11/17/2022 8.1  4.5 - 11.5 x10(3)/mcL Final    RBC 11/17/2022 4.37 (L)  4.70 - 6.10 x10(6)/mcL Final    Hgb 11/17/2022 13.2 (L)  14.0 - 18.0 gm/dL Final    Hct 11/17/2022 40.2 (L)  42.0 - 52.0 % Final    MCV 11/17/2022 92.0  80.0 - 94.0 fL Final    MCH 11/17/2022 30.2  27.0 - 31.0 pg Final    MCHC 11/17/2022 32.8 (L)  33.0 - 36.0 mg/dL Final    RDW 11/17/2022 12.3  11.5 - 17.0 % Final    Platelet 11/17/2022 203  130 - 400 x10(3)/mcL Final    MPV 11/17/2022 9.5  7.4 - 10.4 fL Final    Neut % 11/17/2022 67.6  % Final    Lymph % 11/17/2022 20.2  % Final    Mono % 11/17/2022 6.9  % Final    Eos % 11/17/2022 4.3  % Final    Basophil % 11/17/2022 0.6  % Final    Lymph # 11/17/2022 1.64  0.6 - 4.6 x10(3)/mcL Final    Neut # 11/17/2022 5.5  2.1 - 9.2 x10(3)/mcL Final    Mono # 11/17/2022 0.56  0.1 - 1.3 x10(3)/mcL Final    Eos # 11/17/2022 0.35  0 - 0.9 x10(3)/mcL Final    Baso # 11/17/2022 0.05  0 - 0.2 x10(3)/mcL Final    IG# 11/17/2022 0.03  0 - 0.04 x10(3)/mcL Final    IG% 11/17/2022 0.4  % Final       Labs Reviewed   COMPREHENSIVE METABOLIC PANEL - Abnormal; Notable for the following components:       Result Value    Chloride 108 (*)     Glucose Level 111 (*)     Protein Total 6.1 (*)     Albumin Level 3.4 (*)     Alanine Aminotransferase 81 (*)     Aspartate Aminotransferase 36 (*)     All other components within normal limits   CBC WITH DIFFERENTIAL - Abnormal; Notable for the following components:    RBC 4.37 (*)     Hgb 13.2 (*)     Hct 40.2 (*)     MCHC 32.8 (*)     All other components within normal limits   TROPONIN I - Normal   B-TYPE NATRIURETIC PEPTIDE - Normal   AMYLASE - Normal   LIPASE - Normal   MAGNESIUM - Normal   CBC W/ AUTO DIFFERENTIAL    Narrative:     The following orders were created for panel order CBC auto differential.  Procedure                               Abnormality         Status                     ---------                                -----------         ------                     CBC with Differential[[id number]]        Abnormal            Final result                 Please view results for these tests on the individual orders.     EKG Readings: (Independently Interpreted)   EKG done at for 7:00 p.m. on November 17, 2022 shows normal sinus rhythm with a rate of 62 beats per minute this is a normal ECG   ECG Results              EKG 12-lead (In process)  Result time 11/17/22 05:00:38      In process by Interface, Lab In [facility] (11/17/22 05:00:38)                   Narrative:    Test Reason : R07.9,    Vent. Rate : 062 BPM     Atrial Rate : 062 BPM     P-R Int : 198 ms          QRS Dur : 082 ms      QT Int : 416 ms       P-R-T Axes : 064 072 012 degrees     QTc Int : 422 ms    Normal sinus rhythm  Normal ECG  No previous ECGs available    Referred By: [email or web address]   SELF           Confirmed By:                                   Imaging Results              X-Ray Chest AP Portable (In process)                      Medications   aluminum-magnesium hydroxide-simethicone 200-200-20 mg/5 mL suspension 30 mL (30 mLs Oral Given 11/17/22 0452)     And   LIDOcaine HCl 2% oral solution 15 mL (15 mLs Oral Given 11/17/22 0452)                 ED Course as of 11/17/22 0523   Thu Nov 17, 2022   0521 Workup for acute coronary syndrome is in negative and patient has complete resolution of symptoms after GI cocktail.  Discussed with him to seek his primary care physician and get a full workup for gastritis [PL]      ED Course User Index  [PL] Andrey Su MD                 Clinical Impression:   Final diagnoses:  [R07.9] Chest pain  [K29.70] Gastritis without bleeding, unspecified chronicity, unspecified gastritis type (Primary)  [R07.9] Chest pain, unspecified type        ED Disposition Condition    Discharge Stable          ED Prescriptions    None       Follow-up Information       Follow up With Specialties Details Why Contact Info    Glen Duffy  NP Family Medicine In 3 days  [address] [place] [id number]  [phone]            Ann-Marie Vang is a certified MA and was present during the entire interaction with this patient      Andrey Su MD  11/17/22 8036

## 2022-11-30 RX ORDER — DIAZEPAM 5 MG/1
10 TABLET ORAL EVERY 6 HOURS PRN
Status: CANCELLED | OUTPATIENT
Start: 2022-12-01

## 2022-12-01 ENCOUNTER — HOSPITAL ENCOUNTER (OUTPATIENT)
Facility: HOSPITAL | Age: 52
Discharge: HOME | End: 2022-12-01
Attending: INTERNAL MEDICINE | Admitting: INTERNAL MEDICINE
Payer: COMMERCIAL

## 2022-12-01 VITALS
HEART RATE: 73 BPM | WEIGHT: 243.81 LBS | OXYGEN SATURATION: 98 % | SYSTOLIC BLOOD PRESSURE: 124 MMHG | HEIGHT: 66 IN | TEMPERATURE: 98 F | BODY MASS INDEX: 39.18 KG/M2 | DIASTOLIC BLOOD PRESSURE: 72 MMHG

## 2022-12-01 DIAGNOSIS — R07.9 CHEST PAIN, UNSPECIFIED: ICD-10-CM

## 2022-12-01 PROCEDURE — 93458 L HRT ARTERY/VENTRICLE ANGIO: CPT | Performed by: INTERNAL MEDICINE

## 2022-12-01 PROCEDURE — 25500020 PHARM REV CODE 255: Performed by: INTERNAL MEDICINE

## 2022-12-01 PROCEDURE — C1769 GUIDE WIRE: HCPCS | Performed by: INTERNAL MEDICINE

## 2022-12-01 PROCEDURE — 25000003 PHARM REV CODE 250: Performed by: INTERNAL MEDICINE

## 2022-12-01 PROCEDURE — 27201423 OPTIME MED/SURG SUP & DEVICES STERILE SUPPLY: Performed by: INTERNAL MEDICINE

## 2022-12-01 PROCEDURE — C1894 INTRO/SHEATH, NON-LASER: HCPCS | Performed by: INTERNAL MEDICINE

## 2022-12-01 PROCEDURE — 99152 MOD SED SAME PHYS/QHP 5/>YRS: CPT | Performed by: INTERNAL MEDICINE

## 2022-12-01 PROCEDURE — 93799 UNLISTED CV SVC/PROCEDURE: CPT | Performed by: INTERNAL MEDICINE

## 2022-12-01 PROCEDURE — C1887 CATHETER, GUIDING: HCPCS | Performed by: INTERNAL MEDICINE

## 2022-12-01 PROCEDURE — 63600175 PHARM REV CODE 636 W HCPCS: Performed by: INTERNAL MEDICINE

## 2022-12-01 RX ORDER — ASPIRIN 81 MG/1
81 TABLET ORAL EVERY MORNING
COMMUNITY

## 2022-12-01 RX ORDER — MIDAZOLAM HYDROCHLORIDE 1 MG/ML
INJECTION INTRAMUSCULAR; INTRAVENOUS
Status: DISCONTINUED | OUTPATIENT
Start: 2022-12-01 | End: 2022-12-01 | Stop reason: HOSPADM

## 2022-12-01 RX ORDER — SODIUM CHLORIDE 0.9 % (FLUSH) 0.9 %
10 SYRINGE (ML) INJECTION
Status: ACTIVE | OUTPATIENT
Start: 2022-12-01

## 2022-12-01 RX ORDER — VERAPAMIL HYDROCHLORIDE 2.5 MG/ML
INJECTION, SOLUTION INTRAVENOUS
Status: DISCONTINUED | OUTPATIENT
Start: 2022-12-01 | End: 2022-12-01 | Stop reason: HOSPADM

## 2022-12-01 RX ORDER — DIAZEPAM 5 MG/1
10 TABLET ORAL
Status: DISCONTINUED | OUTPATIENT
Start: 2022-12-01 | End: 2022-12-01 | Stop reason: HOSPADM

## 2022-12-01 RX ORDER — NITROGLYCERIN 20 MG/100ML
INJECTION INTRAVENOUS
Status: DISCONTINUED | OUTPATIENT
Start: 2022-12-01 | End: 2022-12-01 | Stop reason: HOSPADM

## 2022-12-01 RX ORDER — LIDOCAINE HYDROCHLORIDE 10 MG/ML
INJECTION INFILTRATION; PERINEURAL
Status: DISCONTINUED | OUTPATIENT
Start: 2022-12-01 | End: 2022-12-01 | Stop reason: HOSPADM

## 2022-12-01 RX ORDER — DIPHENHYDRAMINE HCL 25 MG
50 CAPSULE ORAL ONCE
Status: COMPLETED | OUTPATIENT
Start: 2022-12-01 | End: 2022-12-01

## 2022-12-01 RX ORDER — HEPARIN SODIUM 1000 [USP'U]/ML
INJECTION, SOLUTION INTRAVENOUS; SUBCUTANEOUS
Status: DISCONTINUED | OUTPATIENT
Start: 2022-12-01 | End: 2022-12-01 | Stop reason: HOSPADM

## 2022-12-01 RX ORDER — SODIUM CHLORIDE 9 MG/ML
INJECTION, SOLUTION INTRAVENOUS ONCE
Status: COMPLETED | OUTPATIENT
Start: 2022-12-01 | End: 2022-12-01

## 2022-12-01 RX ORDER — FENTANYL CITRATE 50 UG/ML
INJECTION, SOLUTION INTRAMUSCULAR; INTRAVENOUS
Status: DISCONTINUED | OUTPATIENT
Start: 2022-12-01 | End: 2022-12-01 | Stop reason: HOSPADM

## 2022-12-01 RX ADMIN — SODIUM CHLORIDE: 9 INJECTION, SOLUTION INTRAVENOUS at 07:12

## 2022-12-01 RX ADMIN — DIAZEPAM 10 MG: 5 TABLET ORAL at 08:12

## 2022-12-01 RX ADMIN — DIPHENHYDRAMINE HYDROCHLORIDE 50 MG: 25 CAPSULE ORAL at 08:12

## 2022-12-01 NOTE — Clinical Note
The catheter was inserted into the ostium   right coronary artery. Hemodynamics were performed.  An angiography was performed of the right coronary arteries. Multiple views were taken. The angiography was performed via power injection.

## 2022-12-01 NOTE — Clinical Note
The catheter was repositioned into the ostium   left main. Hemodynamics were performed.  An angiography was performed of the left coronary arteries. Multiple views were taken. The angiography was performed via power injection.

## 2022-12-01 NOTE — INTERVAL H&P NOTE
Patient name: Alex Reynoso  MRN: [id number]  : 1970  Cath Lab Procedure H&P Update    Pre-Procedure Assessment:    I saw and examined the patient face to face. The patient has been re-evaluated and his condition is unchanged. The reason for admission, procedure and care is still present.  Based on the patients H&P, pre-procedure physical exam, relevant diagnostic studies, NPO status and information obtained from the patient, I determine the patient is an appropriate candidate for the proposed procedure and anesthesia planned. I further certify the anesthesia risks, benefits and options have been explained to the patient to which he agrees as documented on the procedural consent.

## 2022-12-01 NOTE — DISCHARGE SUMMARY
[facility] - Cath Lab Services  Discharge Note  Short Stay    Procedure(s) (LRB):  CATHETERIZATION, HEART, LEFT (Left)      OUTCOME: Patient tolerated treatment/procedure well without complication and is now ready for discharge.    DISPOSITION: Home or Self Care    FINAL DIAGNOSIS: CAD    FOLLOWUP: In clinic    DISCHARGE INSTRUCTIONS:  No discharge procedures on file.     TIME SPENT ON DISCHARGE: 31 minutes

## 2023-03-27 ENCOUNTER — CLINICAL SUPPORT (OUTPATIENT)
Dept: RESPIRATORY THERAPY | Facility: HOSPITAL | Age: 53
End: 2023-03-27
Attending: SURGERY
Payer: COMMERCIAL

## 2023-03-27 ENCOUNTER — HOSPITAL ENCOUNTER (OUTPATIENT)
Dept: RADIOLOGY | Facility: HOSPITAL | Age: 53
Discharge: HOME | End: 2023-03-27
Attending: SURGERY
Payer: COMMERCIAL

## 2023-03-27 DIAGNOSIS — D17.0 LIPOMA OF SKIN AND SUBCUTANEOUS TISSUE OF FACE: ICD-10-CM

## 2023-03-27 DIAGNOSIS — Z01.811 PRE-OP CHEST EXAM: ICD-10-CM

## 2023-03-27 DIAGNOSIS — D17.0 LIPOMA OF SKIN AND SUBCUTANEOUS TISSUE OF FACE: Primary | ICD-10-CM

## 2023-03-27 PROCEDURE — 93010 ELECTROCARDIOGRAM REPORT: CPT | Mod: ,,, | Performed by: STUDENT IN AN ORGANIZED HEALTH CARE EDUCATION/TRAINING PROGRAM

## 2023-03-27 PROCEDURE — 93005 ELECTROCARDIOGRAM TRACING: CPT

## 2023-03-27 PROCEDURE — 93010 EKG 12-LEAD: ICD-10-PCS | Mod: ,,, | Performed by: STUDENT IN AN ORGANIZED HEALTH CARE EDUCATION/TRAINING PROGRAM

## 2023-03-27 PROCEDURE — 71046 X-RAY EXAM CHEST 2 VIEWS: CPT | Mod: TC

## 2023-03-27 RX ORDER — TAMSULOSIN HYDROCHLORIDE 0.4 MG/1
1 CAPSULE ORAL NIGHTLY
COMMUNITY
End: 2023-05-30 | Stop reason: ALTCHOICE

## 2023-03-27 RX ORDER — PREGABALIN 100 MG/1
100 CAPSULE ORAL 2 TIMES DAILY PRN
COMMUNITY
Start: 2023-03-01

## 2023-03-27 RX ORDER — METOPROLOL SUCCINATE 25 MG/1
12.5 TABLET, EXTENDED RELEASE ORAL EVERY MORNING
Status: ON HOLD | COMMUNITY
Start: 2023-01-16 | End: 2023-07-01 | Stop reason: HOSPADM

## 2023-03-27 NOTE — DISCHARGE INSTRUCTIONS
Nothing to eat or drink after midnight. Stop Aspirin. Take Metoprolol, Lisinopril, and Amlodipine AM of procedure with small sip of water.

## 2023-03-29 ENCOUNTER — ANESTHESIA EVENT (OUTPATIENT)
Dept: SURGERY | Facility: HOSPITAL | Age: 53
End: 2023-03-29
Payer: COMMERCIAL

## 2023-03-29 NOTE — ANESTHESIA PREPROCEDURE EVALUATION
03/29/2023  Alex Reynoso is a 53 y.o., male.      Pre-op Assessment    I have reviewed the Patient Summary Reports.     I have reviewed the Nursing Notes. I have reviewed the NPO Status.   I have reviewed the Medications.     Review of Systems  Anesthesia Hx:  Denies Family Hx of Anesthesia complications.   Denies Personal Hx of Anesthesia complications.   Hematology/Oncology:  Hematology Normal   Oncology Normal     EENT/Dental:EENT/Dental Normal   Cardiovascular:   Hypertension, well controlled    Pulmonary:  Pulmonary Normal    Renal/:  Renal/ Normal     Hepatic/GI:  Hepatic/GI Normal    Musculoskeletal:  Musculoskeletal Normal    Neurological:  Neurology Normal    Endocrine:  Endocrine Normal    Dermatological:  Skin Normal    Psych:  Psychiatric Normal           Physical Exam  General: Cooperative, Alert and Oriented    Airway:  Mallampati: II   Mouth Opening: Normal  TM Distance: Normal  Tongue: Normal  Neck ROM: Normal ROM    Dental:  Intact        Anesthesia Plan  Type of Anesthesia, risks & benefits discussed:    Anesthesia Type: Gen ETT  Intra-op Monitoring Plan: Standard ASA Monitors  Post Op Pain Control Plan: multimodal analgesia  Induction:  IV  Airway Plan: Direct  Informed Consent: Informed consent signed with the Patient and all parties understand the risks and agree with anesthesia plan.  All questions answered. Patient consented to blood products? Yes  ASA Score: 3    Ready For Surgery From Anesthesia Perspective.     .

## 2023-03-30 ENCOUNTER — ANESTHESIA (OUTPATIENT)
Dept: SURGERY | Facility: HOSPITAL | Age: 53
End: 2023-03-30
Payer: COMMERCIAL

## 2023-03-30 ENCOUNTER — HOSPITAL ENCOUNTER (OUTPATIENT)
Facility: HOSPITAL | Age: 53
Discharge: HOME | End: 2023-03-30
Attending: SURGERY | Admitting: SURGERY
Payer: MEDICARE

## 2023-03-30 VITALS
TEMPERATURE: 96 F | SYSTOLIC BLOOD PRESSURE: 164 MMHG | HEART RATE: 65 BPM | WEIGHT: 262 LBS | RESPIRATION RATE: 18 BRPM | OXYGEN SATURATION: 100 % | BODY MASS INDEX: 42.29 KG/M2 | DIASTOLIC BLOOD PRESSURE: 96 MMHG

## 2023-03-30 DIAGNOSIS — D17.0 LIPOMA OF NECK: Primary | ICD-10-CM

## 2023-03-30 PROCEDURE — 37000009 HC ANESTHESIA EA ADD 15 MINS: Performed by: SURGERY

## 2023-03-30 PROCEDURE — 37000008 HC ANESTHESIA 1ST 15 MINUTES: Performed by: SURGERY

## 2023-03-30 PROCEDURE — 25000003 PHARM REV CODE 250: Performed by: SURGERY

## 2023-03-30 PROCEDURE — 63600175 PHARM REV CODE 636 W HCPCS: Performed by: ANESTHESIOLOGY

## 2023-03-30 PROCEDURE — 63600175 PHARM REV CODE 636 W HCPCS

## 2023-03-30 PROCEDURE — 36000706: Performed by: SURGERY

## 2023-03-30 PROCEDURE — 25000003 PHARM REV CODE 250: Performed by: NURSE ANESTHETIST, CERTIFIED REGISTERED

## 2023-03-30 PROCEDURE — 36000707: Performed by: SURGERY

## 2023-03-30 PROCEDURE — 71000033 HC RECOVERY, INTIAL HOUR: Performed by: SURGERY

## 2023-03-30 PROCEDURE — 71000016 HC POSTOP RECOV ADDL HR: Performed by: SURGERY

## 2023-03-30 PROCEDURE — 71000015 HC POSTOP RECOV 1ST HR: Performed by: SURGERY

## 2023-03-30 PROCEDURE — 63600175 PHARM REV CODE 636 W HCPCS: Performed by: NURSE ANESTHETIST, CERTIFIED REGISTERED

## 2023-03-30 PROCEDURE — C1729 CATH, DRAINAGE: HCPCS | Performed by: SURGERY

## 2023-03-30 RX ORDER — SODIUM CHLORIDE 9 MG/ML
INJECTION, SOLUTION INTRAVENOUS CONTINUOUS
Status: DISCONTINUED | OUTPATIENT
Start: 2023-03-30 | End: 2023-03-30 | Stop reason: HOSPADM

## 2023-03-30 RX ORDER — CEFAZOLIN SODIUM 2 G/50ML
2 SOLUTION INTRAVENOUS
Status: DISPENSED | OUTPATIENT
Start: 2023-03-30 | End: 2023-03-30

## 2023-03-30 RX ORDER — ONDANSETRON 2 MG/ML
4 INJECTION INTRAMUSCULAR; INTRAVENOUS EVERY 12 HOURS PRN
Status: DISCONTINUED | OUTPATIENT
Start: 2023-03-30 | End: 2023-03-30 | Stop reason: HOSPADM

## 2023-03-30 RX ORDER — ONDANSETRON HYDROCHLORIDE 2 MG/ML
INJECTION, SOLUTION INTRAMUSCULAR; INTRAVENOUS
Status: DISCONTINUED | OUTPATIENT
Start: 2023-03-30 | End: 2023-03-30

## 2023-03-30 RX ORDER — DEXAMETHASONE SODIUM PHOSPHATE 4 MG/ML
INJECTION, SOLUTION INTRA-ARTICULAR; INTRALESIONAL; INTRAMUSCULAR; INTRAVENOUS; SOFT TISSUE
Status: DISCONTINUED | OUTPATIENT
Start: 2023-03-30 | End: 2023-03-30

## 2023-03-30 RX ORDER — FENTANYL CITRATE 50 UG/ML
INJECTION, SOLUTION INTRAMUSCULAR; INTRAVENOUS
Status: DISCONTINUED | OUTPATIENT
Start: 2023-03-30 | End: 2023-03-30

## 2023-03-30 RX ORDER — SODIUM CHLORIDE, SODIUM LACTATE, POTASSIUM CHLORIDE, CALCIUM CHLORIDE 600; 310; 30; 20 MG/100ML; MG/100ML; MG/100ML; MG/100ML
INJECTION, SOLUTION INTRAVENOUS CONTINUOUS
Status: ACTIVE | OUTPATIENT
Start: 2023-03-30

## 2023-03-30 RX ORDER — PHENYLEPHRINE HYDROCHLORIDE 10 MG/ML
INJECTION INTRAVENOUS
Status: DISCONTINUED | OUTPATIENT
Start: 2023-03-30 | End: 2023-03-30

## 2023-03-30 RX ORDER — SODIUM CHLORIDE 0.9 % (FLUSH) 0.9 %
10 SYRINGE (ML) INJECTION
Status: ACTIVE | OUTPATIENT
Start: 2023-03-30

## 2023-03-30 RX ORDER — HYDROMORPHONE HYDROCHLORIDE 2 MG/ML
0.2 INJECTION, SOLUTION INTRAMUSCULAR; INTRAVENOUS; SUBCUTANEOUS EVERY 5 MIN PRN
Status: ACTIVE | OUTPATIENT
Start: 2023-03-30

## 2023-03-30 RX ORDER — IBUPROFEN 600 MG/1
600 TABLET ORAL EVERY 6 HOURS PRN
Status: DISCONTINUED | OUTPATIENT
Start: 2023-03-30 | End: 2023-03-30 | Stop reason: HOSPADM

## 2023-03-30 RX ORDER — FENTANYL CITRATE 50 UG/ML
25 INJECTION, SOLUTION INTRAMUSCULAR; INTRAVENOUS EVERY 5 MIN PRN
Status: ACTIVE | OUTPATIENT
Start: 2023-03-30

## 2023-03-30 RX ORDER — HYDROMORPHONE HYDROCHLORIDE 2 MG/ML
INJECTION, SOLUTION INTRAMUSCULAR; INTRAVENOUS; SUBCUTANEOUS
Status: DISCONTINUED | OUTPATIENT
Start: 2023-03-30 | End: 2023-03-30

## 2023-03-30 RX ORDER — ROCURONIUM BROMIDE 10 MG/ML
INJECTION, SOLUTION INTRAVENOUS
Status: DISCONTINUED | OUTPATIENT
Start: 2023-03-30 | End: 2023-03-30

## 2023-03-30 RX ORDER — PROPOFOL 10 MG/ML
VIAL (ML) INTRAVENOUS
Status: DISCONTINUED | OUTPATIENT
Start: 2023-03-30 | End: 2023-03-30

## 2023-03-30 RX ORDER — LIDOCAINE HYDROCHLORIDE 20 MG/ML
INJECTION INTRAVENOUS
Status: DISCONTINUED | OUTPATIENT
Start: 2023-03-30 | End: 2023-03-30

## 2023-03-30 RX ADMIN — HYDROMORPHONE HYDROCHLORIDE 0.4 MG: 2 INJECTION INTRAMUSCULAR; INTRAVENOUS; SUBCUTANEOUS at 11:03

## 2023-03-30 RX ADMIN — SODIUM CHLORIDE, POTASSIUM CHLORIDE, SODIUM LACTATE AND CALCIUM CHLORIDE: 600; 310; 30; 20 INJECTION, SOLUTION INTRAVENOUS at 09:03

## 2023-03-30 RX ADMIN — LIDOCAINE HYDROCHLORIDE 100 MG: 20 INJECTION, SOLUTION INTRAVENOUS at 10:03

## 2023-03-30 RX ADMIN — DEXAMETHASONE SODIUM PHOSPHATE 8 MG: 4 INJECTION, SOLUTION INTRA-ARTICULAR; INTRALESIONAL; INTRAMUSCULAR; INTRAVENOUS; SOFT TISSUE at 11:03

## 2023-03-30 RX ADMIN — PROPOFOL 150 MG: 10 INJECTION, EMULSION INTRAVENOUS at 10:03

## 2023-03-30 RX ADMIN — SODIUM CHLORIDE 2 G: 9 INJECTION, SOLUTION INTRAVENOUS at 11:03

## 2023-03-30 RX ADMIN — SODIUM CHLORIDE, POTASSIUM CHLORIDE, SODIUM LACTATE AND CALCIUM CHLORIDE: 600; 310; 30; 20 INJECTION, SOLUTION INTRAVENOUS at 11:03

## 2023-03-30 RX ADMIN — ONDANSETRON HYDROCHLORIDE 4 MG: 2 INJECTION, SOLUTION INTRAMUSCULAR; INTRAVENOUS at 11:03

## 2023-03-30 RX ADMIN — FENTANYL CITRATE 100 MCG: 50 INJECTION, SOLUTION INTRAMUSCULAR; INTRAVENOUS at 10:03

## 2023-03-30 RX ADMIN — PHENYLEPHRINE HYDROCHLORIDE 100 MCG: 10 INJECTION INTRAVENOUS at 11:03

## 2023-03-30 RX ADMIN — ROCURONIUM BROMIDE 50 MG: 50 INJECTION INTRAVENOUS at 10:03

## 2023-03-30 RX ADMIN — SUGAMMADEX 200 MG: 100 INJECTION, SOLUTION INTRAVENOUS at 11:03

## 2023-03-30 RX ADMIN — IBUPROFEN 600 MG: 600 TABLET, FILM COATED ORAL at 01:03

## 2023-03-30 NOTE — DISCHARGE SUMMARY
[facility] - Periop Services  Discharge Note  Short Stay    Procedure(s) (LRB):  EXCISION, LIPOMA (excision of post neck lipoma) (N/A)      OUTCOME: Patient tolerated treatment/procedure well without complication and is now ready for discharge.    DISPOSITION: Home or Self Care    FINAL DIAGNOSIS:  Lipoma of neck    FOLLOWUP: In clinic 1 week    DISCHARGE INSTRUCTIONS:    Discharge Procedure Orders   Diet general        TIME SPENT ON DISCHARGE: 5 minutes

## 2023-03-30 NOTE — OP NOTE
[facility] - Periop Services  Operative Note      Date of Procedure: 3/30/2023     Procedure: Procedure(s) (LRB):  EXCISION, LIPOMA (excision of post neck lipoma) (N/A)     Surgeon(s) and Role:     * Samuel Cruz MD - Primary    Assisting Surgeon: None    Pre-Operative Diagnosis: Lipoma of neck [D17.0]    Post-Operative Diagnosis: Post-Op Diagnosis Codes:     * Lipoma of neck [D17.0] 10 x 5 cm posterior neck area of excision    Anesthesia: General    Operative Findings (including complications, if any):  Patient is a 53-year-old  male with a history of hypercholesterolemia, hypertension, osteoarthritis of both knees and chronic neck pain and back pain.  Patient is on hydrocodone and smokes about a half pack a day for 20 years.  Patient was noted to have a large lipoma on the posterior neck 8 or 9 cm in size ultrasound showed it to be 6.7 x 3.5 x 3.8 cm in size on 10/03/2022 patient was scheduled for excision.      Patient was brought to the operating room underwent excision with a 15 blade scalpel hemostasis Bovie cautery and reapproximation deep layer with 3-0 Vicryl skin was closed with 4 plain gut suture Penrose drain was placed in the deep space for drainage patient area of excision was 10 x 5 x 5 cm deep .  Patient tolerated procedure well had no difficulties.            Description of Technical Procedures:  Noted above       Significant Surgical Tasks Conducted by the Assistant(s), if Applicable:  None       Estimated Blood Loss (EBL): * No values recorded between 3/30/2023 11:28 AM and 3/30/2023 11:57 AM *           Implants: * No implants in log *    Specimens:   Specimen (24h ago, onward)       Start     Ordered    03/30/23 1131  Specimen to Pathology  RELEASE UPON ORDERING        References:    Click here for ordering Quick Tip   Question:  Release to patient  Answer:  Immediate    03/30/23 1131                            Condition: Good    Disposition: PACU -  hemodynamically stable.    Attestation: I was present and scrubbed for the entire procedure.    Discharge Note    OUTCOME: Patient tolerated treatment/procedure well without complication and is now ready for discharge.    DISPOSITION: Home or Self Care    FINAL DIAGNOSIS:  Lipoma of neck posterior neck    FOLLOWUP: In clinic one-week    DISCHARGE INSTRUCTIONS:    Discharge Procedure Orders   Diet general

## 2023-03-30 NOTE — ANESTHESIA PROCEDURE NOTES
Intubation    Date/Time: 3/30/2023 11:01 AM  Performed by: Chris Morales CRNA  Authorized by: Noah Dumont DO     Intubation:     Induction:  Intravenous    Intubated:  Postinduction    Mask Ventilation:  Easy mask    Attempts:  1    Attempted By:  CRNA    Method of Intubation:  Direct    Blade:  Glidescope 3    Laryngeal View Grade: Grade I - full view of cords      Difficult Airway Encountered?: No      Complications:  None    Airway Device:  Oral endotracheal tube    Airway Device Size:  7.5    Style/Cuff Inflation:  Cuffed (inflated to minimal occlusive pressure)    Inflation Amount (mL):  8    Tube secured:  22    Secured at:  The lips    Placement Verified By:  Capnometry and Colorimetric ETCO2 device    Complicating Factors:  None    Findings Post-Intubation:  BS equal bilateral and atraumatic/condition of teeth unchanged  Notes:      Uneventful intubation and prone placement. Once patient was prone, and audible leak was heard coming from around the ETT. Tidal voumes unchanged, oxygen saturations stable. Decision was made to return patient supine and change out ETT. Replacement of ETT was uneventful. Patient prone. Airway stable.

## 2023-03-30 NOTE — ANESTHESIA POSTPROCEDURE EVALUATION
Anesthesia Post Evaluation    Patient: Alex Reynoso    Procedure(s) Performed: Procedure(s) (LRB):  EXCISION, LIPOMA (excision of post neck lipoma) (N/A)    Final Anesthesia Type: general      Patient location during evaluation: PACU  Patient participation: Yes- Able to Participate  Level of consciousness: awake and alert  Post-procedure vital signs: reviewed and stable  Pain management: adequate  Airway patency: patent  DEANA mitigation strategies: Multimodal analgesia, Verification of full reversal of neuromuscular block and Extubation and recovery carried out in lateral, semiupright, or other nonsupine position  PONV status at discharge: No PONV  Anesthetic complications: no      Cardiovascular status: hemodynamically stable  Respiratory status: unassisted, spontaneous ventilation and room air  Hydration status: euvolemic  Follow-up not needed.          Vitals Value Taken Time   /69 03/30/23 1210     03/30/23 1210   Pulse 67 03/30/23 1210   Resp 14 03/30/23 1210   SpO2 97 % 03/30/23 1210   Vitals shown include unvalidated device data.      No case tracking events are documented in the log.      Pain/Sil Score: No data recorded

## 2023-03-30 NOTE — TRANSFER OF CARE
Anesthesia Transfer of Care Note    Patient: Alex Reynoso    Procedure(s) Performed: Procedure(s) (LRB):  EXCISION, LIPOMA (excision of post neck lipoma) (N/A)    Patient location: PACU    Anesthesia Type: general    Transport from OR: Transported from OR on room air with adequate spontaneous ventilation    Post pain: adequate analgesia    Post assessment: no apparent anesthetic complications    Post vital signs: stable    Level of consciousness: responds to stimulation and sedated    Nausea/Vomiting: no nausea/vomiting    Complications: none    Transfer of care protocol was followed      Last vitals:   Visit Vitals  /72   Pulse 63   Temp 36.5 °C (97.7 °F) (Oral)   Wt 118.8 kg (262 lb)   SpO2 98%   BMI 42.29 kg/m²

## 2023-04-03 LAB — PSYCHE PATHOLOGY RESULT: NORMAL

## 2023-04-23 ENCOUNTER — HOSPITAL ENCOUNTER (EMERGENCY)
Facility: HOSPITAL | Age: 53
Discharge: HOME | End: 2023-04-23
Attending: EMERGENCY MEDICINE
Payer: COMMERCIAL

## 2023-04-23 VITALS
SYSTOLIC BLOOD PRESSURE: 124 MMHG | OXYGEN SATURATION: 98 % | RESPIRATION RATE: 16 BRPM | HEART RATE: 67 BPM | WEIGHT: 260 LBS | TEMPERATURE: 98 F | DIASTOLIC BLOOD PRESSURE: 70 MMHG | BODY MASS INDEX: 41.97 KG/M2

## 2023-04-23 DIAGNOSIS — K21.9 GASTROESOPHAGEAL REFLUX DISEASE, UNSPECIFIED WHETHER ESOPHAGITIS PRESENT: Primary | ICD-10-CM

## 2023-04-23 DIAGNOSIS — Z85.46 PERSONAL HISTORY OF PROSTATE CANCER: ICD-10-CM

## 2023-04-23 DIAGNOSIS — E78.00 HYPERCHOLESTEROLEMIA: ICD-10-CM

## 2023-04-23 DIAGNOSIS — R07.9 CHEST PAIN: ICD-10-CM

## 2023-04-23 DIAGNOSIS — Z72.0 TOBACCO USE: ICD-10-CM

## 2023-04-23 DIAGNOSIS — I25.10 CORONARY ARTERY DISEASE INVOLVING NATIVE HEART WITHOUT ANGINA PECTORIS, UNSPECIFIED VESSEL OR LESION TYPE: ICD-10-CM

## 2023-04-23 LAB
ALBUMIN SERPL-MCNC: 3.8 G/DL (ref 3.5–5)
ALBUMIN/GLOB SERPL: 1.1 RATIO (ref 1.1–2)
ALP SERPL-CCNC: 96 UNIT/L (ref 40–150)
ALT SERPL-CCNC: 30 UNIT/L (ref 0–55)
AST SERPL-CCNC: 21 UNIT/L (ref 5–34)
BASOPHILS # BLD AUTO: 0.09 X10(3)/MCL (ref 0–0.2)
BASOPHILS NFR BLD AUTO: 1.2 %
BILIRUBIN DIRECT+TOT PNL SERPL-MCNC: 0.2 MG/DL
BNP BLD-MCNC: 11.8 PG/ML
BUN SERPL-MCNC: 16 MG/DL (ref 8.4–25.7)
CALCIUM SERPL-MCNC: 10.1 MG/DL (ref 8.4–10.2)
CHLORIDE SERPL-SCNC: 108 MMOL/L (ref 98–107)
CO2 SERPL-SCNC: 24 MMOL/L (ref 22–29)
CREAT SERPL-MCNC: 0.85 MG/DL (ref 0.73–1.18)
EOSINOPHIL # BLD AUTO: 0.49 X10(3)/MCL (ref 0–0.9)
EOSINOPHIL NFR BLD AUTO: 6.6 %
ERYTHROCYTE [DISTWIDTH] IN BLOOD BY AUTOMATED COUNT: 12.5 % (ref 11.5–17)
GFR SERPLBLD CREATININE-BSD FMLA CKD-EPI: >60 MLS/MIN/1.73/M2
GLOBULIN SER-MCNC: 3.6 GM/DL (ref 2.4–3.5)
GLUCOSE SERPL-MCNC: 117 MG/DL (ref 74–100)
HCT VFR BLD AUTO: 43.6 % (ref 42–52)
HGB BLD-MCNC: 14.5 G/DL (ref 14–18)
IMM GRANULOCYTES # BLD AUTO: 0.02 X10(3)/MCL (ref 0–0.04)
IMM GRANULOCYTES NFR BLD AUTO: 0.3 %
LYMPHOCYTES # BLD AUTO: 1.92 X10(3)/MCL (ref 0.6–4.6)
LYMPHOCYTES NFR BLD AUTO: 25.7 %
MAGNESIUM SERPL-MCNC: 2.2 MG/DL (ref 1.6–2.6)
MCH RBC QN AUTO: 30.1 PG (ref 27–31)
MCHC RBC AUTO-ENTMCNC: 33.3 G/DL (ref 33–36)
MCV RBC AUTO: 90.5 FL (ref 80–94)
MONOCYTES # BLD AUTO: 0.65 X10(3)/MCL (ref 0.1–1.3)
MONOCYTES NFR BLD AUTO: 8.7 %
NEUTROPHILS # BLD AUTO: 4.31 X10(3)/MCL (ref 2.1–9.2)
NEUTROPHILS NFR BLD AUTO: 57.5 %
PLATELET # BLD AUTO: 253 X10(3)/MCL (ref 130–400)
PMV BLD AUTO: 9.8 FL (ref 7.4–10.4)
POTASSIUM SERPL-SCNC: 4.5 MMOL/L (ref 3.5–5.1)
PROT SERPL-MCNC: 7.4 GM/DL (ref 6.4–8.3)
RBC # BLD AUTO: 4.82 X10(6)/MCL (ref 4.7–6.1)
SODIUM SERPL-SCNC: 140 MMOL/L (ref 136–145)
TROPONIN I SERPL-MCNC: 0.02 NG/ML (ref 0–0.04)
TROPONIN I SERPL-MCNC: 0.03 NG/ML (ref 0–0.04)
TSH SERPL-ACNC: 1.13 UIU/ML (ref 0.35–4.94)
WBC # SPEC AUTO: 7.5 X10(3)/MCL (ref 4.5–11.5)

## 2023-04-23 PROCEDURE — 85025 COMPLETE CBC W/AUTO DIFF WBC: CPT | Performed by: EMERGENCY MEDICINE

## 2023-04-23 PROCEDURE — 25000003 PHARM REV CODE 250: Performed by: EMERGENCY MEDICINE

## 2023-04-23 PROCEDURE — 93010 ELECTROCARDIOGRAM REPORT: CPT | Mod: ,,, | Performed by: INTERNAL MEDICINE

## 2023-04-23 PROCEDURE — 80053 COMPREHEN METABOLIC PANEL: CPT | Performed by: EMERGENCY MEDICINE

## 2023-04-23 PROCEDURE — 84443 ASSAY THYROID STIM HORMONE: CPT | Performed by: EMERGENCY MEDICINE

## 2023-04-23 PROCEDURE — 96374 THER/PROPH/DIAG INJ IV PUSH: CPT

## 2023-04-23 PROCEDURE — 84484 ASSAY OF TROPONIN QUANT: CPT | Performed by: EMERGENCY MEDICINE

## 2023-04-23 PROCEDURE — 93010 EKG 12-LEAD: ICD-10-PCS | Mod: ,,, | Performed by: INTERNAL MEDICINE

## 2023-04-23 PROCEDURE — 25000242 PHARM REV CODE 250 ALT 637 W/ HCPCS: Performed by: EMERGENCY MEDICINE

## 2023-04-23 PROCEDURE — 93005 ELECTROCARDIOGRAM TRACING: CPT

## 2023-04-23 PROCEDURE — 83880 ASSAY OF NATRIURETIC PEPTIDE: CPT | Performed by: EMERGENCY MEDICINE

## 2023-04-23 PROCEDURE — 99285 EMERGENCY DEPT VISIT HI MDM: CPT | Mod: 25

## 2023-04-23 PROCEDURE — 83735 ASSAY OF MAGNESIUM: CPT | Performed by: EMERGENCY MEDICINE

## 2023-04-23 PROCEDURE — 63600175 PHARM REV CODE 636 W HCPCS: Performed by: EMERGENCY MEDICINE

## 2023-04-23 RX ORDER — NAPROXEN SODIUM 220 MG/1
243 TABLET, FILM COATED ORAL ONCE
Status: COMPLETED | OUTPATIENT
Start: 2023-04-23 | End: 2023-04-23

## 2023-04-23 RX ORDER — LIDOCAINE HYDROCHLORIDE 20 MG/ML
15 SOLUTION OROPHARYNGEAL ONCE
Status: COMPLETED | OUTPATIENT
Start: 2023-04-23 | End: 2023-04-23

## 2023-04-23 RX ORDER — MAG HYDROX/ALUMINUM HYD/SIMETH 200-200-20
30 SUSPENSION, ORAL (FINAL DOSE FORM) ORAL ONCE
Status: COMPLETED | OUTPATIENT
Start: 2023-04-23 | End: 2023-04-23

## 2023-04-23 RX ORDER — LISINOPRIL 10 MG/1
20 TABLET ORAL
Status: COMPLETED | OUTPATIENT
Start: 2023-04-23 | End: 2023-04-23

## 2023-04-23 RX ORDER — ONDANSETRON 2 MG/ML
4 INJECTION INTRAMUSCULAR; INTRAVENOUS
Status: COMPLETED | OUTPATIENT
Start: 2023-04-23 | End: 2023-04-23

## 2023-04-23 RX ORDER — NITROGLYCERIN 0.4 MG/1
0.4 TABLET SUBLINGUAL EVERY 5 MIN PRN
Status: DISCONTINUED | OUTPATIENT
Start: 2023-04-23 | End: 2023-04-23 | Stop reason: HOSPADM

## 2023-04-23 RX ORDER — PANTOPRAZOLE SODIUM 40 MG/1
40 TABLET, DELAYED RELEASE ORAL DAILY
Qty: 15 TABLET | Refills: 0 | Status: SHIPPED | OUTPATIENT
Start: 2023-04-23 | End: 2023-05-30 | Stop reason: ALTCHOICE

## 2023-04-23 RX ORDER — AMLODIPINE BESYLATE 5 MG/1
10 TABLET ORAL
Status: COMPLETED | OUTPATIENT
Start: 2023-04-23 | End: 2023-04-23

## 2023-04-23 RX ADMIN — ASPIRIN 81 MG 243 MG: 81 TABLET ORAL at 08:04

## 2023-04-23 RX ADMIN — LISINOPRIL 20 MG: 10 TABLET ORAL at 08:04

## 2023-04-23 RX ADMIN — LIDOCAINE HYDROCHLORIDE 15 ML: 20 SOLUTION ORAL; TOPICAL at 08:04

## 2023-04-23 RX ADMIN — NITROGLYCERIN 0.4 MG: 0.4 TABLET SUBLINGUAL at 08:04

## 2023-04-23 RX ADMIN — NITROGLYCERIN 1 INCH: 20 OINTMENT TOPICAL at 08:04

## 2023-04-23 RX ADMIN — AMLODIPINE BESYLATE 10 MG: 5 TABLET ORAL at 08:04

## 2023-04-23 RX ADMIN — ONDANSETRON 4 MG: 2 INJECTION INTRAMUSCULAR; INTRAVENOUS at 08:04

## 2023-04-23 RX ADMIN — ALUMINUM HYDROXIDE, MAGNESIUM HYDROXIDE, AND SIMETHICONE 30 ML: 200; 200; 20 SUSPENSION ORAL at 08:04

## 2023-04-23 NOTE — ED PROVIDER NOTES
Encounter Date: 4/23/2023       History     Chief Complaint   Patient presents with    Chest Pain     C/o chest pain & burning that started around 0400 this morning. Pt also reports Lt arm numbness.     Results Dec 22 Heart Cat[facility]  Chest pain, unspecified [R07.9 (ICD-10-CM)]    Summary       ·  Moderate noncritical multivessel CAD with stenosis up to 60%  ·  Ejection fraction was 70% with EDP of 10-15 mmHg.  ·  Right radial approach.  ·  The estimated blood loss was less than 10cc.     The procedure was documented by Documenter: Tacos Cobian and verified by Shaheen Felder MD.     Date: 12/1/2022  Time: 10:02 AM    53-year-old obese male who presents emergency department complaining of a burning pain in his chest sometime between 2 and 4 in the morning.  Said he would [place] he had not gone to sleep yet he said at 3:00 a.m. he took 1 baby aspirin did not take any of his other heart or cardiac medications this morning he has a history of hypertension prostate cancer status post prostatectomy hypercholesterolemia and noncritical coronary artery disease without stents or balloons or myocardial infarction in the past.  Most recent catheterizations 1 December 2022 please see results above.  Patient says that this burning down his left arm and numbness down his left arm associated with the burning in his chest he said it made him short of breath but no nausea or vomiting said there has been no significant change in it is still about the same did not seek care from when it started till now.  Past medical history as above the prostate cancer the hypertension high cholesterol the coronary noncritical stenosis coronary artery disease.    Surgical history heart catheterization 1 January lipoma removed without complications 03/30/2023.  Prostatectomy for prostate cancer cholecystectomy.      Social history still smokes since vital being told that that was bad for his heart occasional alcohol had 2  beers last night at the [place] he says.  No street drugs.  Vaccinations patient says he is had COVID shots no pneumonia no flu no tetanus shots.      He sees Glen reina at the [facility].  He sees Dr. Francesco Cruz is a local surgeon Wilfred Felder's the cardiologist's he seen.      He is disabled  lives home with the wife.  Mother is dead with diabetes and hypertension.  Father is dead with myocardial infarction.    Patient denies any recent weight loss or weight gain he says occasionally this bright red blood when he wipes himself.  After a bowel movement.  He denies any melena or hematochezia.  Patient denies chest pain with exertion or unusual shortness of breath unusual fatigability her any recent changes in his exercise tolerance.        Review of patient's allergies indicates:  No Known Allergies  Past Medical History:   Diagnosis Date    Arthritis     Back pain     High cholesterol     HTN (hypertension)     Prostate cancer     Seasonal allergies      Past Surgical History:   Procedure Laterality Date    CHOLECYSTECTOMY      LEFT HEART CATHETERIZATION Left 12/1/2022    Procedure: CATHETERIZATION, HEART, LEFT;  Surgeon: Shaheen Felder MD;  Location: [facility] CATH LAB;  Service: Cardiology;  Laterality: Left;  [facility] +/- PCI    LIPOMA RESECTION N/A 3/30/2023    Procedure: EXCISION, LIPOMA (excision of post neck lipoma);  Surgeon: Samuel Cruz MD;  Location: [facility];  Service: General;  Laterality: N/A;  10 x 5 lipoma     PROSTATECTOMY       Family History   Problem Relation Age of Onset    Hypertension Mother     Diabetes Mother     Prostate cancer Father      Social History     Tobacco Use    Smoking status: Every Day     Packs/day: 0.50     Types: Cigarettes    Smokeless tobacco: Never   Substance Use Topics    Alcohol use: Yes     Review of Systems   Constitutional:  Negative for fever.   HENT:  Negative for dental problem, sinus pain and sore throat.    Eyes: Negative.    Respiratory:   Positive for shortness of breath.    Cardiovascular:  Positive for chest pain (Burning type chest discomfort and numbness and burning in the left arm).   Gastrointestinal:  Positive for anal bleeding and constipation. Negative for nausea and vomiting.   Endocrine: Negative.    Genitourinary:  Positive for flank pain. Negative for dysuria and hematuria.   Musculoskeletal:  Negative for back pain and neck stiffness.   Skin: Negative.  Negative for rash.   Allergic/Immunologic: Negative.    Neurological:  Negative for weakness.   Hematological:  Does not bruise/bleed easily.   Psychiatric/Behavioral: Negative.     All other systems reviewed and are negative.    Physical Exam     Initial Vitals [04/23/23 0815]   BP Pulse Resp Temp SpO2   (!) 158/82 71 20 97.9 °F (36.6 °C) 98 %      MAP       --         Physical Exam    Nursing note and vitals reviewed.  Constitutional: He appears well-developed and well-nourished.   Obese male protuberant abdomen very flat affect awake oriented wife at bedside   HENT:   Head: Normocephalic and atraumatic.   Right Ear: Tympanic membrane and external ear normal.   Left Ear: Tympanic membrane and external ear normal.   Nose: Nose normal.   Mouth/Throat: Oropharynx is clear and moist and mucous membranes are normal. Oral lesions: moist muc memb.   Eyes: Conjunctivae and EOM are normal. Pupils are equal, round, and reactive to light.   Neck: Neck supple. No thyromegaly present. No tracheal deviation present. No JVD present.   Normal range of motion.  Cardiovascular:  Normal rate, regular rhythm, normal heart sounds and intact distal pulses.     Exam reveals no gallop and no friction rub.       No murmur heard.  Pulmonary/Chest: Breath sounds normal. No stridor. No respiratory distress. He has no wheezes. He has no rhonchi. He has no rales. He exhibits no tenderness.   Lungs are clear no dullness to percussion or auscultation   Abdominal: Abdomen is soft. Bowel sounds are normal. He exhibits  no distension and no mass. No signs of injury. There is no abdominal tenderness.   Genitourinary:    Genitourinary Comments: No CVA tenderness     Musculoskeletal:         General: No tenderness or edema (No peripheral edema hands, pedal or pretibial). Normal range of motion.      Cervical back: Normal range of motion and neck supple.     Lymphadenopathy:     He has no cervical adenopathy.   Neurological: He is alert and oriented to person, place, and time. He has normal strength. No cranial nerve deficit or sensory deficit. GCS score is 15. GCS eye subscore is 4. GCS verbal subscore is 5. GCS motor subscore is 6.   Skin: Skin is warm and dry. Capillary refill takes 2 to 3 seconds. No rash noted.   Psychiatric: His behavior is normal. Judgment and thought content normal.   Somewhat of a flat affect       ED Course   Procedures  Labs Reviewed   COMPREHENSIVE METABOLIC PANEL - Abnormal; Notable for the following components:       Result Value    Chloride 108 (*)     Glucose Level 117 (*)     Globulin 3.6 (*)     All other components within normal limits   TROPONIN I - Normal   B-TYPE NATRIURETIC PEPTIDE - Normal   TSH - Normal   MAGNESIUM - Normal   TROPONIN I - Normal   CBC W/ AUTO DIFFERENTIAL    Narrative:     The following orders were created for panel order CBC auto differential.  Procedure                               Abnormality         Status                     ---------                               -----------         ------                     CBC with Differential[[id number]]                            Final result                 Please view results for these tests on the individual orders.   CBC WITH DIFFERENTIAL     EKG Readings: (Independently Interpreted)   Initial Reading: No STEMI. Previous EKG: Compared with most recent EKG Previous EKG Date: 3/27/2023. Heart Rate: 69. Ectopy: No Ectopy. T Waves Flipped: II and III. Other Impression: Subtle changes compared to an EKG done 03/27/2023 no diagnostic  elevation for myocardial infarction   Heart rate 69 per.  Normal sinus rhythm there is T-wave abnormalities inversion in leads V3 V4 V5 in 6 this is this same as it was on an EKG done 03/27/2023 compared to that EKG T-waves now flat in leads 2 3 and AVF.  And the V3 T wave is somewhat different than it was then.  There is no marked ST segment elevation no diagnostic ST segment elevation myocardial infarction changes     Imaging Results              X-Ray Chest AP Portable (Final result)  Result time 04/23/23 09:30:46      Final result by Bernardino Chilel MD (04/23/23 09:30:46)                   Impression:      No acute cardiopulmonary process      Electronically signed by: Bernardino Chilel MD  Date:    04/23/2023  Time:    09:30               Narrative:    EXAMINATION:  Chest one view    CLINICAL HISTORY:  Chest pain    COMPARISON:  03/27/2023    FINDINGS:  Cardiac silhouette is normal size.  Central vessels are within normal limits.  No confluent airspace disease.  No visible pneumothorax or pleural effusion.  No acute osseous abnormality                                       Medications   nitroGLYCERIN SL tablet 0.4 mg (0.4 mg Sublingual Given 4/23/23 0858)   nitroGLYCERIN 2% TD oint ointment 1 inch (1 inch Topical (Top) Given 4/23/23 0848)   ondansetron injection 4 mg (4 mg Intravenous Given 4/23/23 0840)   aspirin chewable tablet 243 mg (243 mg Oral Given 4/23/23 0841)   amLODIPine tablet 10 mg (10 mg Oral Given 4/23/23 0841)   lisinopriL tablet 20 mg (20 mg Oral Given 4/23/23 0841)   aluminum-magnesium hydroxide-simethicone 200-200-20 mg/5 mL suspension 30 mL (30 mLs Oral Given 4/23/23 0847)     And   LIDOcaine HCl 2% oral solution 15 mL (15 mLs Oral Given 4/23/23 0847)     Medical Decision Making:   Initial Assessment:   53-year-old male with diffuse coronary disease but noncritical heart catheterization done 1 December 2022 presents complaining of a burning-type chest pain onset perhaps up to 6 hours ago now.   At least 4 hours ago.  Pain does radiate down left arm with numbness and burning some shortness of breath no nausea no vomiting no incontinence no syncope.  Patient is still smokes patient had a couple of beers last night patient [place] when all this initiated.  No change in the pain over these 4 - 6  hours only medication he took was 181 mg aspirin at the house 3:00 a.m. he states  Differential Diagnosis:   Unstable angina, STEMI, non-STEMI, esophagitis, esophageal spasm, noncardiac chest pain, angina pain,  Independently Interpreted Test(s):   I have ordered and independently interpreted EKG Reading(s) - see summary below       <> Summary of EKG Reading(s): Compared to 27 of March there subtle changes in leads 2 3 and AVF.  There is no ST segment elevation no evidence of ST segment elevation myocardial infarction  ED Management:  Sublingual nitroglycerin as well as topical nitrates ordered his dose of amlodipine and lisinopril is ordered.  Cardiac enzymes with a delta troponin ordered and 3 hours.  Chest x-ray.  Maintained on monitor.  Saline lock ordered to be established.  MDM  Problems addressed  Co-morbidities and/or factors adding to the complexity or risk for the patient:  Ongoing tobacco abuse nonobstructive coronary artery disease obesity hypertension hypercholesterolemia  Problems addressed:  Burning type chest pain radiating into left arm  Acute problem/illness or progression/exacerbation of chronic problem with potential threat to life/bodily dysfunction?:  Chest pain in a man who smokes has high blood pressure and high cholesterol noncritical coronary stenosis diagnose heart catheterization approximately 5 months ago  Differential diagnoses/problems considered: see above     Amount and/or Complexity of Data Reviewed  Independent Historian: none (see above for summary)  External Data Reviewed: notes from previous admissions, notes from previous ED visits, prior EKGs, and prior  imaging (see above for summary)  Risk and benefits of testing: discussed   Labs: Labs: ordered and reviewed  Radiology:Radiology: ordered and independent interpretation performed (see above or ED course)  ECG/medicine tests:Radiology: ordered and independent interpretation performed (see above or ED course)  none    Risk  Prescription drug management   Shared decision making     Critical Care  none            ED Course as of 04/23/23 1158   Sun Apr 23, 2023   0845 I did discuss with the patient and the wife that we would need to work this up that his heart catheterization was not bad back on the 1st of December.  We would go from there [DM]   1147 Discussed with the patient and the wife the negative troponins.  Troponin actually went down slightly on the delta troponin.  I feel he can safely go home and follow-up with his regular doctors this week.  I did talk to them in regard to the PP inhibitors.  They were agreeable to try him for about 2 weeks instructions to return for any emergency emergency problems they understand ready to go home he has been resting comfortably since the GI cocktail [DM]      ED Course User Index  [DM] Valeriy Herman MD                   Clinical Impression:   Final diagnoses:  [R07.9] Chest pain  [K21.9] Gastroesophageal reflux disease, unspecified whether esophagitis present (Primary)  [Z72.0] Tobacco use  [E78.00] Hypercholesterolemia  [Z85.46] Personal history of prostate cancer  [I25.10] Coronary artery disease involving native heart without angina pectoris, unspecified vessel or lesion type - Nonobstructing by heart catheterization done 1 December 2022        ED Disposition Condition    Discharge Stable          ED Prescriptions       Medication Sig Dispense Start Date End Date Auth. Provider    pantoprazole (PROTONIX) 40 MG tablet Take 1 tablet (40 mg total) by mouth once daily. for 15 days 15 tablet 4/23/2023 5/8/2023 Valeriy Herman MD          Follow-up Information        Follow up With Specialties Details Why Contact Info    Glen Duffy NP Family Medicine In 2 days If symptoms worsen [address] [id number] [phone]               Valeriy Herman MD  04/23/23 2767

## 2023-04-23 NOTE — DISCHARGE INSTRUCTIONS
If symptoms worsen do not hesitate to return    Take the 2 weeks of antacid medication see if we can prevent this    Be smart with what you eat    Given your history of nonobstructive coronary artery disease high blood pressure  Please HELP YOURSELF  by stopping smoking

## 2023-04-30 ENCOUNTER — HOSPITAL ENCOUNTER (EMERGENCY)
Facility: HOSPITAL | Age: 53
Discharge: HOME | End: 2023-05-01
Attending: INTERNAL MEDICINE
Payer: COMMERCIAL

## 2023-04-30 DIAGNOSIS — R07.9 CHEST PAIN, UNSPECIFIED TYPE: Primary | ICD-10-CM

## 2023-04-30 DIAGNOSIS — R07.9 CHEST PAIN: ICD-10-CM

## 2023-04-30 LAB
ALBUMIN SERPL-MCNC: 3.8 G/DL (ref 3.5–5)
ALBUMIN/GLOB SERPL: 1.2 RATIO (ref 1.1–2)
ALP SERPL-CCNC: 80 UNIT/L (ref 40–150)
ALT SERPL-CCNC: 55 UNIT/L (ref 0–55)
AST SERPL-CCNC: 29 UNIT/L (ref 5–34)
BASOPHILS # BLD AUTO: 0.07 X10(3)/MCL (ref 0–0.2)
BASOPHILS NFR BLD AUTO: 0.8 %
BILIRUBIN DIRECT+TOT PNL SERPL-MCNC: 0.2 MG/DL
BNP BLD-MCNC: 11.4 PG/ML
BUN SERPL-MCNC: 20 MG/DL (ref 8.4–25.7)
CALCIUM SERPL-MCNC: 9.8 MG/DL (ref 8.4–10.2)
CHLORIDE SERPL-SCNC: 104 MMOL/L (ref 98–107)
CO2 SERPL-SCNC: 25 MMOL/L (ref 22–29)
CREAT SERPL-MCNC: 0.9 MG/DL (ref 0.73–1.18)
EOSINOPHIL # BLD AUTO: 0.34 X10(3)/MCL (ref 0–0.9)
EOSINOPHIL NFR BLD AUTO: 3.7 %
ERYTHROCYTE [DISTWIDTH] IN BLOOD BY AUTOMATED COUNT: 12.4 % (ref 11.5–17)
GFR SERPLBLD CREATININE-BSD FMLA CKD-EPI: >60 MLS/MIN/1.73/M2
GLOBULIN SER-MCNC: 3.2 GM/DL (ref 2.4–3.5)
GLUCOSE SERPL-MCNC: 152 MG/DL (ref 74–100)
HCT VFR BLD AUTO: 40.3 % (ref 42–52)
HGB BLD-MCNC: 13.6 G/DL (ref 14–18)
IMM GRANULOCYTES # BLD AUTO: 0.03 X10(3)/MCL (ref 0–0.04)
IMM GRANULOCYTES NFR BLD AUTO: 0.3 %
LYMPHOCYTES # BLD AUTO: 1.17 X10(3)/MCL (ref 0.6–4.6)
LYMPHOCYTES NFR BLD AUTO: 12.7 %
MCH RBC QN AUTO: 30.4 PG (ref 27–31)
MCHC RBC AUTO-ENTMCNC: 33.7 G/DL (ref 33–36)
MCV RBC AUTO: 90.2 FL (ref 80–94)
MONOCYTES # BLD AUTO: 0.53 X10(3)/MCL (ref 0.1–1.3)
MONOCYTES NFR BLD AUTO: 5.7 %
NEUTROPHILS # BLD AUTO: 7.08 X10(3)/MCL (ref 2.1–9.2)
NEUTROPHILS NFR BLD AUTO: 76.8 %
PLATELET # BLD AUTO: 210 X10(3)/MCL (ref 130–400)
PMV BLD AUTO: 9.7 FL (ref 7.4–10.4)
POTASSIUM SERPL-SCNC: 3.9 MMOL/L (ref 3.5–5.1)
PROT SERPL-MCNC: 7 GM/DL (ref 6.4–8.3)
RBC # BLD AUTO: 4.47 X10(6)/MCL (ref 4.7–6.1)
SODIUM SERPL-SCNC: 138 MMOL/L (ref 136–145)
TROPONIN I SERPL-MCNC: 0.03 NG/ML (ref 0–0.04)
WBC # SPEC AUTO: 9.2 X10(3)/MCL (ref 4.5–11.5)

## 2023-04-30 PROCEDURE — 93010 ELECTROCARDIOGRAM REPORT: CPT | Mod: ,,, | Performed by: INTERNAL MEDICINE

## 2023-04-30 PROCEDURE — 83880 ASSAY OF NATRIURETIC PEPTIDE: CPT | Performed by: INTERNAL MEDICINE

## 2023-04-30 PROCEDURE — 25000003 PHARM REV CODE 250: Performed by: INTERNAL MEDICINE

## 2023-04-30 PROCEDURE — 93005 ELECTROCARDIOGRAM TRACING: CPT

## 2023-04-30 PROCEDURE — 93010 EKG 12-LEAD: ICD-10-PCS | Mod: ,,, | Performed by: INTERNAL MEDICINE

## 2023-04-30 PROCEDURE — 85025 COMPLETE CBC W/AUTO DIFF WBC: CPT | Performed by: INTERNAL MEDICINE

## 2023-04-30 PROCEDURE — 84484 ASSAY OF TROPONIN QUANT: CPT | Performed by: INTERNAL MEDICINE

## 2023-04-30 PROCEDURE — 80053 COMPREHEN METABOLIC PANEL: CPT | Performed by: INTERNAL MEDICINE

## 2023-04-30 PROCEDURE — 99285 EMERGENCY DEPT VISIT HI MDM: CPT | Mod: 25

## 2023-04-30 RX ORDER — NAPROXEN SODIUM 220 MG/1
324 TABLET, FILM COATED ORAL
Status: COMPLETED | OUTPATIENT
Start: 2023-04-30 | End: 2023-04-30

## 2023-04-30 RX ADMIN — ASPIRIN 81 MG 324 MG: 81 TABLET ORAL at 10:04

## 2023-05-01 VITALS
DIASTOLIC BLOOD PRESSURE: 81 MMHG | SYSTOLIC BLOOD PRESSURE: 142 MMHG | RESPIRATION RATE: 18 BRPM | OXYGEN SATURATION: 98 % | TEMPERATURE: 98 F | BODY MASS INDEX: 43 KG/M2 | WEIGHT: 266.38 LBS | HEART RATE: 70 BPM

## 2023-05-01 NOTE — DISCHARGE INSTRUCTIONS
See a cardiologist/Heart Doctor to do a stress test and evaluate further as soon as possible,    Avoid any strenuous activity till you see the cardiologist.    If you do not have a cardiologist talk to your family doctor to refer you to one today.    Take an aspirin every day if not allergic to it.      Take medicines as prescribed    See your family doctor in one to 2 days for further evaluation, workup, and treatment as necessary    Avoid driving or operating machinery while taking medicines as some medicines might cause drowsiness and may cause problems. Also pain medicines have potential of being addictive  so use Pain meds specially Narcotics Sparingly.    The exam and treatment you received in Emergency Room was for an urgent problem and NOT INTENDED AS COMPLETE CARE. It is important that you FOLLOW UP with a doctor for ongoing care. If your symptoms become WORSE or you DO NOT IMPROVE and you are unable to reach your health care provider, you should RETURN to the emergency department. The Emergency Room doctor has provided a PRELIMINARY INTERPRETATION of all your STUDIES. A final interpretation may be done after you are discharged. IF A CHANGE in your diagnosis or treatment is needed WE WILL CONTACT YOU. It is critical that we have a CURRENT PHONE NUMBER FOR YOU.

## 2023-05-01 NOTE — ED PROVIDER NOTES
05/01/2023         10:51 PM    Source of History:  History obtained from the patient.     Chief complaint:  From Nurse Triage:  Chest Pain (C/o continued mid chest pain. Seen in this ED this week and dx with acid reflux. )    HISTORY OF PRESENT ILLNES:  Alex Reynoso is a 53 y.o. male  has a past medical history of Acid reflux, Arthritis, Back pain, High cholesterol, HTN (hypertension), Prostate cancer, and Seasonal allergies. presenting with Chest Pain (C/o continued mid chest pain. Seen in this ED this week and dx with acid reflux. )      REVIEW OF SYSTEMS:   Constitutional symptoms:  No Fever. No Chills    Skin symptoms:  No Rash.    Eye symptoms:  No Visual disturbance reported.   ENMT symptoms:  No Sore throat,    Respiratory symptoms:  No Shortness of Breath, no Cough, no Wheezing.    Cardiovascular symptoms:  Chest Pain, No Palpitations.   Gastrointestinal symptoms:  No Abdominal Pain, No Nausea, No Vomiting, No Diarrhea, No Constipation.    Genitourinary symptoms:  No Dysuria,    Musculoskeletal symptoms:  No Back pain,    Neurologic symptoms:  No Headache, No Dizziness.    Psychiatric symptoms:  No Anxiety, No Depression, No Substance Abuse.              Additional review of systems information: Patient Denies Any Other Complaints.    All Other Systems Reviewed With Patient And Negative.    ALLEGIES:  Review of patient's allergies indicates:  No Known Allergies    MEDICINE LIST:  Current Outpatient Medications   Medication Instructions    albuterol (PROVENTIL/VENTOLIN HFA) 90 mcg/actuation inhaler 2 puffs, Inhalation, Every 6 hours PRN, Rescue    amLODIPine (NORVASC) 10 mg, Oral, Every morning    aspirin (ECOTRIN) 81 mg, Oral, Every morning, Stopped 3/26/23    atorvastatin (LIPITOR) 80 mg, Oral, Nightly    diclofenac (VOLTAREN) 50 mg, Oral, 2 times daily PRN    ezetimibe (ZETIA) 10 mg, Oral, Nightly    fluticasone propionate (FLONASE) 50 mcg/actuation nasal spray 1 spray, Each Nostril, Daily PRN     hydroCHLOROthiazide (HYDRODIURIL) 12.5 mg, Oral, Every morning    lisinopriL (PRINIVIL,ZESTRIL) 20 mg, Oral, Every morning    loratadine (CLARITIN) 10 mg, Oral, Every morning    metoprolol succinate (TOPROL-XL) 12.5 mg, Oral, Every morning    oxyCODONE-acetaminophen (PERCOCET)  mg per tablet 1 tablet, Oral, Every 6 hours PRN    pantoprazole (PROTONIX) 40 mg, Oral, Daily    pregabalin (LYRICA) 100 MG capsule 1 capsule, Oral, 2 times daily PRN    tamsulosin (FLOMAX) 0.4 mg Cap 1 capsule, Oral, Nightly    tiZANidine (ZANAFLEX) 4 mg, Oral, 3 times daily PRN        PMH:  As per HPI and below:    Reviewed and updated in chart.    PAST MEDICAL HISTORY:  Past Medical History:   Diagnosis Date    Acid reflux     Arthritis     Back pain     High cholesterol     HTN (hypertension)     Prostate cancer     Seasonal allergies         PAST SURGICAL HISTORY:  Past Surgical History:   Procedure Laterality Date    CHOLECYSTECTOMY      LEFT HEART CATHETERIZATION Left 12/01/2022      Moderate noncritical multivessel CAD with stenosis up to 60%    LIPOMA RESECTION N/A 03/30/2023    Procedure: EXCISION, LIPOMA (excision of post neck lipoma);  Surgeon: Samuel Cruz MD;  Location: [facility] OR;  Service: General;  Laterality: N/A;  10 x 5 lipoma     PROSTATECTOMY         SOCIAL HISTORY:  Social History     Tobacco Use    Smoking status: Every Day     Packs/day: 0.50     Types: Cigarettes    Smokeless tobacco: Never   Substance Use Topics    Alcohol use: Yes       FAMILY HISTORY:  Family History   Problem Relation Age of Onset    Hypertension Mother     Diabetes Mother     Prostate cancer Father         PROBLEM LIST:  Patient Active Problem List   Diagnosis    Lipoma of neck        PHYSICAL EXAM:      ED Triage Vitals [04/30/23 2242]   BP (!) 160/100   Pulse 77   Resp 18   Temp 98.1 °F (36.7 °C)   SpO2 98 %        Vital Signs: Reviewed As In Chart.  General:  Alert, No Cardiorespiratory Distress Noted.   Eye:  Extraocular Movements  Are Intact.   ENT: Mucus membranes are moist.   Cardiovascular:  Regular Rate And Rhythm, No Murmur, No Pedal Edema.    Respiratory:  Respirations Nonlabored, No Respiratory Distress, Good Bilateral Air Entry, No Rales, No Rhonchi.    Gastrointestinal:  Soft, Non Distended, Non Tenderness, Normal Bowel Sounds.    Neurological:  Alert And Oriented To Person, Place, Time, And Situation, Normal Motor Observed, Normal Speech Observed.  Musculoskeletal:  No Gross Deformity Noted.     Psychiatric:  Cooperative.      ED WORKUP FOR MEDICAL DECISION MAKING:    ED ORDERS:  Orders Placed This Encounter   Procedures    X-ray Chest AP Portable    Comprehensive metabolic panel    CBC auto differential    Troponin I    Brain natriuretic peptide    CBC with Differential    Diet NPO    Vital signs    Cardiac Monitoring - Adult    Oxygen Continuous    Pulse Oximetry Continuous    EKG 12-LEAD    Insert saline lock       ED MEDICINES:  Medications   aspirin chewable tablet 324 mg (324 mg Oral Given 4/30/23 2253)            ECG Results              EKG 12-LEAD (Preliminary result)  Result time 04/30/23 22:53:50      Wet Read by Jordan Gamez MD (04/30/23 22:53:50, [facility] - Emergency Dept, Emergency Medicine)    EKG: Independently reviewed and / or Interpreted by Jordan Gamez MD. independently as Normal Sinus Rhythm, Rate 71, Normal Axis, Normal Intervals., Non Specific T wave Changes, No STEMI,  small Q-waves in inferior leads                                     ED LABS ORDERED AND REVIEWED:  Admission on 04/30/2023   Component Date Value Ref Range Status    Sodium Level 04/30/2023 138  136 - 145 mmol/L Final    Potassium Level 04/30/2023 3.9  3.5 - 5.1 mmol/L Final    Chloride 04/30/2023 104  98 - 107 mmol/L Final    Carbon Dioxide 04/30/2023 25  22 - 29 mmol/L Final    Glucose Level 04/30/2023 152 (H)  74 - 100 mg/dL Final    Blood Urea Nitrogen 04/30/2023 20.0  8.4 - 25.7 mg/dL Final    Creatinine 04/30/2023  0.90  0.73 - 1.18 mg/dL Final    Calcium Level Total 04/30/2023 9.8  8.4 - 10.2 mg/dL Final    Protein Total 04/30/2023 7.0  6.4 - 8.3 gm/dL Final    Albumin Level 04/30/2023 3.8  3.5 - 5.0 g/dL Final    Globulin 04/30/2023 3.2  2.4 - 3.5 gm/dL Final    Albumin/Globulin Ratio 04/30/2023 1.2  1.1 - 2.0 ratio Final    Bilirubin Total 04/30/2023 0.2  <=1.5 mg/dL Final    Alkaline Phosphatase 04/30/2023 80  40 - 150 unit/L Final    Alanine Aminotransferase 04/30/2023 55  0 - 55 unit/L Final    Aspartate Aminotransferase 04/30/2023 29  5 - 34 unit/L Final    eGFR 04/30/2023 >60  mls/min/1.73/m2 Final    Troponin-I 04/30/2023 0.032  0.000 - 0.045 ng/mL Final    Natriuretic Peptide 04/30/2023 11.4  <=100.0 pg/mL Final    WBC 04/30/2023 9.2  4.5 - 11.5 x10(3)/mcL Final    RBC 04/30/2023 4.47 (L)  4.70 - 6.10 x10(6)/mcL Final    Hgb 04/30/2023 13.6 (L)  14.0 - 18.0 g/dL Final    Hct 04/30/2023 40.3 (L)  42.0 - 52.0 % Final    MCV 04/30/2023 90.2  80.0 - 94.0 fL Final    MCH 04/30/2023 30.4  27.0 - 31.0 pg Final    MCHC 04/30/2023 33.7  33.0 - 36.0 g/dL Final    RDW 04/30/2023 12.4  11.5 - 17.0 % Final    Platelet 04/30/2023 210  130 - 400 x10(3)/mcL Final    MPV 04/30/2023 9.7  7.4 - 10.4 fL Final    Neut % 04/30/2023 76.8  % Final    Lymph % 04/30/2023 12.7  % Final    Mono % 04/30/2023 5.7  % Final    Eos % 04/30/2023 3.7  % Final    Basophil % 04/30/2023 0.8  % Final    Lymph # 04/30/2023 1.17  0.6 - 4.6 x10(3)/mcL Final    Neut # 04/30/2023 7.08  2.1 - 9.2 x10(3)/mcL Final    Mono # 04/30/2023 0.53  0.1 - 1.3 x10(3)/mcL Final    Eos # 04/30/2023 0.34  0 - 0.9 x10(3)/mcL Final    Baso # 04/30/2023 0.07  0 - 0.2 x10(3)/mcL Final    IG# 04/30/2023 0.03  0 - 0.04 x10(3)/mcL Final    IG% 04/30/2023 0.3  % Final       RADIOLOGY STUDIES ORDERED AND REVIEWED:  Imaging Results              X-ray Chest AP Portable (In process)                     MEDICAL DECISION MAKING:      Reviewed Nurses Note. Reviewed Vital Signs.      Reviewed Pertinent old records, History and updated as necessary.    Vitals:    04/30/23 2242   BP: (!) 160/100   Pulse: 77   Resp: 18   Temp: 98.1 °F (36.7 °C)        Medical Decision Making  53 y.o. male  has a past medical history of Acid reflux, Arthritis, Back pain, High cholesterol, HTN (hypertension), Prostate cancer, and Seasonal allergies. presenting with Chest Pain (C/o continued mid chest pain. Seen in this ED this week and dx with acid reflux. )    After eating fish, salad and drinking lemonade it started having burning in the chest, he says that he had a catheterization done and they told him that he does not have an of blockage to put the stent in there but he has problem with the heart, so he decided to come to the emergency room.  He was seen last week also in the emergency room but he has not gone back to see the cardiologist yet.    On chart review of found out that patient Had a left heart catheterization on 12/1/2022, with some effort I was able to find the result of the cardiac catheterization     ·  Moderate noncritical multivessel CAD with stenosis up to 60%  ·  Ejection fraction was 70% with EDP of 10-15 mmHg.    Exact report of the cath is impossible to find in this system.        Amount and/or Complexity of Data Reviewed  Labs: ordered. Decision-making details documented in ED Course.  Radiology: ordered and independent interpretation performed. Decision-making details documented in ED Course.  ECG/medicine tests: ordered and independent interpretation performed. Decision-making details documented in ED Course.              ED Course as of 05/01/23 0005   Mon May 01, 2023   0004 Patient's workup in the emergency room is negative for acute coronary syndrome, I will discharge him to go and see his cardiologist.  He says he has an appointment day after tomorrow with the cardiologist. [GQ]      ED Course User Index  [GQ] Jordan Gamez MD            PROCEDURES PERFORMED IN  ED:  Procedures    DIAGNOSTIC IMPRESSION:        ICD-10-CM ICD-9-CM   1. Chest pain, unspecified type  R07.9 786.50   2. Chest pain  R07.9 786.50         ED Disposition Condition    Discharge Stable               Medication List        CONTINUE taking these medications      albuterol 90 mcg/actuation inhaler  Commonly known as: PROVENTIL/VENTOLIN HFA     amLODIPine 10 MG tablet  Commonly known as: NORVASC     aspirin 81 MG EC tablet  Commonly known as: ECOTRIN     atorvastatin 80 MG tablet  Commonly known as: LIPITOR     diclofenac 50 MG EC tablet  Commonly known as: VOLTAREN     ezetimibe 10 mg tablet  Commonly known as: ZETIA     fluticasone propionate 50 mcg/actuation nasal spray  Commonly known as: FLONASE     hydroCHLOROthiazide 12.5 MG Tab  Commonly known as: HYDRODIURIL     lisinopriL 20 MG tablet  Commonly known as: PRINIVIL,ZESTRIL     loratadine 10 mg tablet  Commonly known as: CLARITIN     metoprolol succinate 25 MG 24 hr tablet  Commonly known as: TOPROL-XL     oxyCODONE-acetaminophen  mg per tablet  Commonly known as: PERCOCET     pantoprazole 40 MG tablet  Commonly known as: PROTONIX  Take 1 tablet (40 mg total) by mouth once daily. for 15 days     pregabalin 100 MG capsule  Commonly known as: LYRICA     tamsulosin 0.4 mg Cap  Commonly known as: FLOMAX     tiZANidine 4 MG tablet  Commonly known as: ZANAFLEX                Follow-up Information       Glen Duffy NP In 1 day.    Specialty: Family Medicine  Contact information:  [address] [place] [address]  [phone]               Cardiologist.                              ED Prescriptions    None       Follow-up Information       Follow up With Specialties Details Why Contact Info    Glen Duffy NP Family Medicine In 1 day  [address] [address]  [phone]      Cardiologist                   Jordan Gamez MD  05/01/23 0005

## 2023-06-20 ENCOUNTER — PATIENT MESSAGE (OUTPATIENT)
Dept: ADMINISTRATIVE | Facility: OTHER | Age: 53
End: 2023-06-20
Payer: MEDICARE

## 2023-06-21 PROBLEM — I20.89 STABLE ANGINA PECTORIS: Status: ACTIVE | Noted: 2023-06-21

## 2023-06-21 PROBLEM — R07.9 CHEST PAIN, UNSPECIFIED: Status: ACTIVE | Noted: 2023-06-21

## 2023-06-22 DIAGNOSIS — I25.10 CORONARY ARTERY DISEASE, UNSPECIFIED VESSEL OR LESION TYPE, UNSPECIFIED WHETHER ANGINA PRESENT, UNSPECIFIED WHETHER NATIVE OR TRANSPLANTED HEART: Primary | ICD-10-CM

## 2023-06-22 DIAGNOSIS — Z51.81 ENCOUNTER FOR MONITORING NSAID THERAPY: ICD-10-CM

## 2023-06-22 DIAGNOSIS — Z79.1 ENCOUNTER FOR MONITORING NSAID THERAPY: ICD-10-CM

## 2023-06-22 RX ORDER — HYDROCODONE BITARTRATE AND ACETAMINOPHEN 500; 5 MG/1; MG/1
TABLET ORAL
Status: CANCELLED | OUTPATIENT
Start: 2023-06-22

## 2023-06-22 RX ORDER — MUPIROCIN 20 MG/G
OINTMENT TOPICAL
Status: CANCELLED | OUTPATIENT
Start: 2023-06-22 | End: 2023-06-22

## 2023-06-23 ENCOUNTER — ANESTHESIA EVENT (OUTPATIENT)
Dept: SURGERY | Facility: HOSPITAL | Age: 53
DRG: 234 | End: 2023-06-23
Payer: MEDICARE

## 2023-06-23 ENCOUNTER — HOSPITAL ENCOUNTER (INPATIENT)
Facility: HOSPITAL | Age: 53
LOS: 9 days | Discharge: HOME HEALTH | DRG: 234 | End: 2023-07-02
Attending: EMERGENCY MEDICINE | Admitting: THORACIC SURGERY (CARDIOTHORACIC VASCULAR SURGERY)
Payer: MEDICARE

## 2023-06-23 DIAGNOSIS — R53.1 WEAKNESS: ICD-10-CM

## 2023-06-23 DIAGNOSIS — I25.10 CAD (CORONARY ARTERY DISEASE): ICD-10-CM

## 2023-06-23 DIAGNOSIS — I25.10 CORONARY ARTERY DISEASE: ICD-10-CM

## 2023-06-23 DIAGNOSIS — I25.10 CORONARY ARTERY DISEASE, UNSPECIFIED VESSEL OR LESION TYPE, UNSPECIFIED WHETHER ANGINA PRESENT, UNSPECIFIED WHETHER NATIVE OR TRANSPLANTED HEART: ICD-10-CM

## 2023-06-23 DIAGNOSIS — I24.9 ACUTE CORONARY SYNDROME: Primary | ICD-10-CM

## 2023-06-23 DIAGNOSIS — I25.10 CORONARY ARTERY DISEASE, UNSPECIFIED VESSEL OR LESION TYPE, UNSPECIFIED WHETHER ANGINA PRESENT, UNSPECIFIED WHETHER NATIVE OR TRANSPLANTED HEART: Primary | ICD-10-CM

## 2023-06-23 DIAGNOSIS — I25.10 CORONARY ARTERY DISEASE INVOLVING NATIVE CORONARY ARTERY OF NATIVE HEART, UNSPECIFIED WHETHER ANGINA PRESENT: ICD-10-CM

## 2023-06-23 DIAGNOSIS — R07.9 CHEST PAIN: ICD-10-CM

## 2023-06-23 LAB
ALBUMIN SERPL-MCNC: 3.6 G/DL (ref 3.5–5)
ALBUMIN/GLOB SERPL: 1.3 RATIO (ref 1.1–2)
ALP SERPL-CCNC: 89 UNIT/L (ref 40–150)
ALT SERPL-CCNC: 49 UNIT/L (ref 0–55)
APTT PPP: 32.8 SECONDS (ref 23.2–33.7)
APTT PPP: 72.2 SECONDS (ref 23.2–33.7)
AST SERPL-CCNC: 22 UNIT/L (ref 5–34)
BASOPHILS # BLD AUTO: 0.05 X10(3)/MCL
BASOPHILS # BLD AUTO: 0.05 X10(3)/MCL
BASOPHILS NFR BLD AUTO: 0.6 %
BASOPHILS NFR BLD AUTO: 0.7 %
BILIRUBIN DIRECT+TOT PNL SERPL-MCNC: 0.2 MG/DL
BNP BLD-MCNC: 15.5 PG/ML
BUN SERPL-MCNC: 11.5 MG/DL (ref 8.4–25.7)
CALCIUM SERPL-MCNC: 9.4 MG/DL (ref 8.4–10.2)
CHLORIDE SERPL-SCNC: 107 MMOL/L (ref 98–107)
CO2 SERPL-SCNC: 26 MMOL/L (ref 22–29)
CREAT SERPL-MCNC: 0.81 MG/DL (ref 0.73–1.18)
EOSINOPHIL # BLD AUTO: 0.32 X10(3)/MCL (ref 0–0.9)
EOSINOPHIL # BLD AUTO: 0.33 X10(3)/MCL (ref 0–0.9)
EOSINOPHIL NFR BLD AUTO: 4.1 %
EOSINOPHIL NFR BLD AUTO: 4.6 %
ERYTHROCYTE [DISTWIDTH] IN BLOOD BY AUTOMATED COUNT: 12.8 % (ref 11.5–17)
ERYTHROCYTE [DISTWIDTH] IN BLOOD BY AUTOMATED COUNT: 12.8 % (ref 11.5–17)
GFR SERPLBLD CREATININE-BSD FMLA CKD-EPI: >60 MLS/MIN/1.73/M2
GLOBULIN SER-MCNC: 2.8 GM/DL (ref 2.4–3.5)
GLUCOSE SERPL-MCNC: 112 MG/DL (ref 74–100)
HCT VFR BLD AUTO: 40.4 % (ref 42–52)
HCT VFR BLD AUTO: 41 % (ref 42–52)
HGB BLD-MCNC: 13.6 G/DL (ref 14–18)
HGB BLD-MCNC: 13.7 G/DL (ref 14–18)
IMM GRANULOCYTES # BLD AUTO: 0.02 X10(3)/MCL (ref 0–0.04)
IMM GRANULOCYTES # BLD AUTO: 0.02 X10(3)/MCL (ref 0–0.04)
IMM GRANULOCYTES NFR BLD AUTO: 0.3 %
IMM GRANULOCYTES NFR BLD AUTO: 0.3 %
INR BLD: 0.99 (ref 0–1.3)
LYMPHOCYTES # BLD AUTO: 1.25 X10(3)/MCL (ref 0.6–4.6)
LYMPHOCYTES # BLD AUTO: 1.55 X10(3)/MCL (ref 0.6–4.6)
LYMPHOCYTES NFR BLD AUTO: 15.7 %
LYMPHOCYTES NFR BLD AUTO: 22.2 %
MCH RBC QN AUTO: 30.1 PG (ref 27–31)
MCH RBC QN AUTO: 30.6 PG (ref 27–31)
MCHC RBC AUTO-ENTMCNC: 33.4 G/DL (ref 33–36)
MCHC RBC AUTO-ENTMCNC: 33.7 G/DL (ref 33–36)
MCV RBC AUTO: 89.4 FL (ref 80–94)
MCV RBC AUTO: 91.5 FL (ref 80–94)
MONOCYTES # BLD AUTO: 0.52 X10(3)/MCL (ref 0.1–1.3)
MONOCYTES # BLD AUTO: 0.61 X10(3)/MCL (ref 0.1–1.3)
MONOCYTES NFR BLD AUTO: 7.4 %
MONOCYTES NFR BLD AUTO: 7.7 %
NEUTROPHILS # BLD AUTO: 4.52 X10(3)/MCL (ref 2.1–9.2)
NEUTROPHILS # BLD AUTO: 5.71 X10(3)/MCL (ref 2.1–9.2)
NEUTROPHILS NFR BLD AUTO: 64.8 %
NEUTROPHILS NFR BLD AUTO: 71.6 %
NRBC BLD AUTO-RTO: 0 %
NRBC BLD AUTO-RTO: 0 %
PLATELET # BLD AUTO: 219 X10(3)/MCL (ref 130–400)
PLATELET # BLD AUTO: 222 X10(3)/MCL (ref 130–400)
PMV BLD AUTO: 9.4 FL (ref 7.4–10.4)
PMV BLD AUTO: 9.6 FL (ref 7.4–10.4)
POTASSIUM SERPL-SCNC: 4.2 MMOL/L (ref 3.5–5.1)
PROT SERPL-MCNC: 6.4 GM/DL (ref 6.4–8.3)
PROTHROMBIN TIME: 13 SECONDS (ref 12.5–14.5)
RBC # BLD AUTO: 4.48 X10(6)/MCL (ref 4.7–6.1)
RBC # BLD AUTO: 4.52 X10(6)/MCL (ref 4.7–6.1)
SODIUM SERPL-SCNC: 140 MMOL/L (ref 136–145)
TROPONIN I SERPL-MCNC: 0.01 NG/ML (ref 0–0.04)
TROPONIN I SERPL-MCNC: <0.01 NG/ML (ref 0–0.04)
WBC # SPEC AUTO: 6.98 X10(3)/MCL (ref 4.5–11.5)
WBC # SPEC AUTO: 7.97 X10(3)/MCL (ref 4.5–11.5)

## 2023-06-23 PROCEDURE — 85610 PROTHROMBIN TIME: CPT | Performed by: EMERGENCY MEDICINE

## 2023-06-23 PROCEDURE — 84484 ASSAY OF TROPONIN QUANT: CPT | Performed by: EMERGENCY MEDICINE

## 2023-06-23 PROCEDURE — 25000003 PHARM REV CODE 250: Performed by: THORACIC SURGERY (CARDIOTHORACIC VASCULAR SURGERY)

## 2023-06-23 PROCEDURE — 63600175 PHARM REV CODE 636 W HCPCS: Performed by: EMERGENCY MEDICINE

## 2023-06-23 PROCEDURE — 93010 EKG 12-LEAD: ICD-10-PCS | Mod: ,,, | Performed by: INTERNAL MEDICINE

## 2023-06-23 PROCEDURE — 85025 COMPLETE CBC W/AUTO DIFF WBC: CPT | Performed by: EMERGENCY MEDICINE

## 2023-06-23 PROCEDURE — 85730 THROMBOPLASTIN TIME PARTIAL: CPT | Performed by: EMERGENCY MEDICINE

## 2023-06-23 PROCEDURE — 80053 COMPREHEN METABOLIC PANEL: CPT | Performed by: EMERGENCY MEDICINE

## 2023-06-23 PROCEDURE — 25000003 PHARM REV CODE 250: Performed by: EMERGENCY MEDICINE

## 2023-06-23 PROCEDURE — 85730 THROMBOPLASTIN TIME PARTIAL: CPT | Performed by: THORACIC SURGERY (CARDIOTHORACIC VASCULAR SURGERY)

## 2023-06-23 PROCEDURE — 93005 ELECTROCARDIOGRAM TRACING: CPT

## 2023-06-23 PROCEDURE — 93010 ELECTROCARDIOGRAM REPORT: CPT | Mod: ,,, | Performed by: INTERNAL MEDICINE

## 2023-06-23 PROCEDURE — 21400001 HC TELEMETRY ROOM

## 2023-06-23 PROCEDURE — 99291 CRITICAL CARE FIRST HOUR: CPT

## 2023-06-23 PROCEDURE — 27000221 HC OXYGEN, UP TO 24 HOURS

## 2023-06-23 PROCEDURE — 25000242 PHARM REV CODE 250 ALT 637 W/ HCPCS: Performed by: EMERGENCY MEDICINE

## 2023-06-23 PROCEDURE — 96374 THER/PROPH/DIAG INJ IV PUSH: CPT

## 2023-06-23 PROCEDURE — 83880 ASSAY OF NATRIURETIC PEPTIDE: CPT | Performed by: EMERGENCY MEDICINE

## 2023-06-23 RX ORDER — NITROGLYCERIN 20 MG/100ML
0-400 INJECTION INTRAVENOUS CONTINUOUS
Status: DISCONTINUED | OUTPATIENT
Start: 2023-06-23 | End: 2023-06-27

## 2023-06-23 RX ORDER — MUPIROCIN 20 MG/G
OINTMENT TOPICAL 2 TIMES DAILY
Qty: 1 EACH | Refills: 0 | Status: ON HOLD | OUTPATIENT
Start: 2023-06-23 | End: 2023-07-01 | Stop reason: HOSPADM

## 2023-06-23 RX ORDER — HEPARIN SODIUM,PORCINE/D5W 25000/250
0-40 INTRAVENOUS SOLUTION INTRAVENOUS CONTINUOUS
Status: DISPENSED | OUTPATIENT
Start: 2023-06-23 | End: 2023-06-26

## 2023-06-23 RX ORDER — METOPROLOL TARTRATE 1 MG/ML
5 INJECTION, SOLUTION INTRAVENOUS
Status: DISPENSED | OUTPATIENT
Start: 2023-06-23 | End: 2023-06-23

## 2023-06-23 RX ORDER — NITROGLYCERIN 0.4 MG/1
0.4 TABLET SUBLINGUAL EVERY 5 MIN PRN
Status: DISCONTINUED | OUTPATIENT
Start: 2023-06-23 | End: 2023-07-02 | Stop reason: HOSPADM

## 2023-06-23 RX ORDER — MUPIROCIN 20 MG/G
OINTMENT TOPICAL 2 TIMES DAILY
COMMUNITY
End: 2023-06-23 | Stop reason: SDUPTHER

## 2023-06-23 RX ORDER — ASPIRIN 325 MG
325 TABLET ORAL
Status: ACTIVE | OUTPATIENT
Start: 2023-06-23 | End: 2023-06-23

## 2023-06-23 RX ORDER — OXYCODONE AND ACETAMINOPHEN 10; 325 MG/1; MG/1
1 TABLET ORAL EVERY 6 HOURS PRN
Status: DISCONTINUED | OUTPATIENT
Start: 2023-06-23 | End: 2023-07-02 | Stop reason: HOSPADM

## 2023-06-23 RX ADMIN — NITROGLYCERIN 0.4 MG: 0.4 TABLET, ORALLY DISINTEGRATING SUBLINGUAL at 11:06

## 2023-06-23 RX ADMIN — HEPARIN SODIUM 12 UNITS/KG/HR: 10000 INJECTION, SOLUTION INTRAVENOUS at 02:06

## 2023-06-23 RX ADMIN — NITROGLYCERIN 5 MCG/MIN: 20 INJECTION INTRAVENOUS at 02:06

## 2023-06-23 RX ADMIN — OXYCODONE AND ACETAMINOPHEN 1 TABLET: 10; 325 TABLET ORAL at 06:06

## 2023-06-23 RX ADMIN — NITROGLYCERIN 5 MCG/MIN: 20 INJECTION INTRAVENOUS at 01:06

## 2023-06-23 NOTE — NURSING
Nurses Note -- 4 Eyes      6/23/2023   6:47 PM      Skin assessed during: Admit      [x] No Altered Skin Integrity Present    [x]Prevention Measures Documented      [] Yes- Altered Skin Integrity Present or Discovered   [] LDA Added if Not in Epic (Describe Wound)   [] New Altered Skin Integrity was Present on Admit and Documented in LDA   [] Wound Image Taken    Wound Care Consulted? No    Attending Nurse:  Surekha Carson RN     Second RN/Staff Member:  Alicia Laureano RN

## 2023-06-23 NOTE — ED PROVIDER NOTES
Encounter Date: 6/23/2023    SCRIBE #1 NOTE: I, Avinash Sagastume, am scribing for, and in the presence of,  Dr. Cruz. I have scribed the following portions of the note - the EKG reading. Other sections scribed: HPI, ROS, Physical Exam, MDM, Attending.     History     Chief Complaint   Patient presents with    Chest Pain     Pt to ER via [facility] for CP.  Pt with recent angiogram, blockage requiring CABG that is scheduled for next week.  EMS gave nitro x1,  and Fentanyl 125 mcg     52 y/o male with history of HTN, HLD and CAD presents to ED via EMS for achy/burning chest pain onset this morning.  Pt says he has had similar intermittent chest pain for a few months and had angiogram 2 days ago.  He is scheduled for CABG with Dr. Sharma next week.  Pt also complains of numbness and tingling to his L arm.  He was given 125mcg fentanyl, 324mg ASA and 1x NTG en route.  Pt is former tobacco smoker, but he has never used drugs.      The history is provided by the patient.   Chest Pain  Illness onset: this morning. Chest pain occurs constantly. The chest pain is unchanged. The quality of the pain is described as aching and burning.   Associated symptoms include numbness (L arm). He tried nitroglycerin, aspirin and narcotics for the symptoms. Risk factors include male gender, obesity and smoking/tobacco exposure.   His past medical history is significant for CAD, hyperlipidemia and hypertension.   Procedure history is positive for cardiac catheterization.   Review of patient's allergies indicates:  No Known Allergies  Past Medical History:   Diagnosis Date    Acid reflux     Arthritis     Back pain     CAD (coronary artery disease), native coronary artery     Coronary artery disease     Coronary artery disease involving native coronary artery of native heart, unspecified whether angina present     High cholesterol     HTN (hypertension)     Obesity     Prostate cancer     Seasonal allergies     Shortness of breath      Past  Surgical History:   Procedure Laterality Date    CHOLECYSTECTOMY      COLONOSCOPY      CORONARY ANGIOGRAPHY N/A 06/21/2023    Procedure: ANGIOGRAM, CORONARY ARTERY;  Surgeon: Raji Hughes MD;  Location: [facility] CATH LAB;  Service: Cardiology;  Laterality: N/A;    LEFT HEART CATHETERIZATION Left 12/01/2022      Moderate noncritical multivessel CAD with stenosis up to 60%    LEFT HEART CATHETERIZATION Left 06/21/2023    Procedure: Left heart cath;  Surgeon: Raji Hughes MD;  Location: [facility] CATH LAB;  Service: Cardiology;  Laterality: Left;  LHC +/- PCI    LIPOMA RESECTION N/A 03/30/2023    Procedure: EXCISION, LIPOMA (excision of post neck lipoma);  Surgeon: Samuel Cruz MD;  Location: [facility] OR;  Service: General;  Laterality: N/A;  10 x 5 lipoma     PROSTATECTOMY      VENTRICULOGRAM, LEFT  06/21/2023    Procedure: Ventriculogram, Left;  Surgeon: Raji Hughes MD;  Location: [facility] CATH LAB;  Service: Cardiology;;     Family History   Problem Relation Age of Onset    Hypertension Mother     Diabetes Mother     Prostate cancer Father      Social History     Tobacco Use    Smoking status: Every Day     Packs/day: 0.50     Types: Cigarettes    Smokeless tobacco: Never   Substance Use Topics    Alcohol use: Not Currently     Comment: socially    Drug use: Not Currently     Review of Systems   Cardiovascular:  Positive for chest pain.   Neurological:  Positive for numbness (L arm).     Physical Exam     Initial Vitals [06/23/23 1009]   BP Pulse Resp Temp SpO2   (!) 177/93 71 16 97.7 °F (36.5 °C) 96 %      MAP       --         Physical Exam    Nursing note and vitals reviewed.  Constitutional: No distress.   HENT:   Head: Normocephalic and atraumatic.   Neck: Trachea normal.   Cardiovascular:  Normal rate and regular rhythm.           No murmur heard.  Pulmonary/Chest: Breath sounds normal. No respiratory distress.   Abdominal: Abdomen is soft. Bowel sounds are normal. He exhibits no distension. There is no abdominal  tenderness.   Obese    Musculoskeletal:         General: Edema (1+ LE) present. Normal range of motion.      Lumbar back: Normal.     Neurological: He is alert and oriented to person, place, and time. He has normal strength. No cranial nerve deficit.   Neurovascularly intact    Skin: Skin is warm and dry. No rash noted.   Coban dressing to R wrist   Psychiatric: He has a normal mood and affect. Judgment normal.       ED Course   Critical Care    Date/Time: 6/23/2023 1:59 PM  Performed by: Edgar Cruz III, MD  Authorized by: Edgar Cruz III, MD   Direct patient critical care time: 25 minutes  Documentation critical care time: 5 minutes  Consulting other physicians critical care time: 5 minutes  Total critical care time (exclusive of procedural time) : 35 minutes  Critical care was necessary to treat or prevent imminent or life-threatening deterioration of the following conditions: cardiac failure.  Critical care was time spent personally by me on the following activities: discussions with primary provider, discussions with consultants, examination of patient, re-evaluation of patient's condition, review of old charts, ordering and review of laboratory studies and ordering and performing treatments and interventions.      Labs Reviewed   COMPREHENSIVE METABOLIC PANEL - Abnormal; Notable for the following components:       Result Value    Glucose Level 112 (*)     All other components within normal limits   CBC WITH DIFFERENTIAL - Abnormal; Notable for the following components:    RBC 4.52 (*)     Hgb 13.6 (*)     Hct 40.4 (*)     All other components within normal limits   CBC WITH DIFFERENTIAL - Abnormal; Notable for the following components:    RBC 4.48 (*)     Hgb 13.7 (*)     Hct 41.0 (*)     All other components within normal limits   TROPONIN I - Normal   TROPONIN I - Normal   B-TYPE NATRIURETIC PEPTIDE - Normal   APTT - Normal   PROTIME-INR - Normal   CBC W/ AUTO DIFFERENTIAL    Narrative:     The  following orders were created for panel order CBC auto differential.  Procedure                               Abnormality         Status                     ---------                               -----------         ------                     CBC with Differential[[id number]]        Abnormal            Final result                 Please view results for these tests on the individual orders.   CBC W/ AUTO DIFFERENTIAL    Narrative:     The following orders were created for panel order CBC auto differential.  Procedure                               Abnormality         Status                     ---------                               -----------         ------                     CBC with Differential[[id number]]        Abnormal            Final result                 Please view results for these tests on the individual orders.     EKG Readings: (Independently Interpreted)   Initial Reading: No STEMI. Rhythm: Normal Sinus Rhythm. Heart Rate: 63. Ectopy: No Ectopy. Conduction: Normal. ST Segments: Normal ST Segments. T Waves: Normal. Axis: Normal.   1017     Imaging Results    None          Medications   nitroGLYCERIN SL tablet 0.4 mg (0.4 mg Sublingual Given 6/23/23 1102)   aspirin tablet 325 mg (325 mg Oral Not Given 6/23/23 1045)   metoprolol injection 5 mg (has no administration in time range)   heparin 25,000 units in dextrose 5% (100 units/ml) IV bolus from bag INITIAL BOLUS (max bolus 4000 units) (has no administration in time range)   heparin 25,000 units in dextrose 5% 250 mL (100 units/mL) infusion LOW INTENSITY nomogram - LAF (has no administration in time range)   heparin 25,000 units in dextrose 5% (100 units/ml) IV bolus from bag - ADDITIONAL PRN BOLUS - 60 units/kg (max bolus 4000 units) (has no administration in time range)   heparin 25,000 units in dextrose 5% (100 units/ml) IV bolus from bag - ADDITIONAL PRN BOLUS - 30 units/kg (max bolus 4000 units) (has no administration in time range)    nitroGLYCERIN in 5 % dextrose 50 mg/250 mL (200 mcg/mL) infusion (5 mcg/min Intravenous New Bag 6/23/23 6988)              Scribe Attestation:   Scribe #1: I performed the above scribed service and the documentation accurately describes the services I performed. I attest to the accuracy of the note.    Attending Attestation:           Physician Attestation for Scribe:  Physician Attestation Statement for Scribe #1: I, reviewed documentation, as scribed by Avinash Sagastume in my presence, and it is both accurate and complete.         Medical Decision Making  Differential diagnoses include, but are not limited to: STEMI, NSTEMI, acute coronary syndrome     CBC chemistry without abnormality patient with active chest pain coronary angiogram done within last 48 hours showing need for coronary bypass.  Patient started on nitro patient declined metoprolol discussed case with CV surgery Dr. Medina who states admit to Dr. Sharma start on heparin drip and Tridil drip non titratable okay to go to the floor    Amount and/or Complexity of Data Reviewed  Labs: ordered.  Radiology: ordered.  ECG/medicine tests: ordered.            ED Course as of 06/23/23 3087   Fri Jun 23, 2023   1209 Pt says he does not have chest pain currently  [CY]   1209 Paged Dr. Sharma [CY]   9698 Discussed case with Dr. Medina will admit on heparin and tridil [FK]      ED Course User Index  [CY] Avinash Sagastume  [FK] Edgar Cruz III, MD                 Clinical Impression:   Final diagnoses:  [R07.9] Chest pain  [I24.9] Acute coronary syndrome (Primary)        ED Disposition Condition    Admit Stable                Edgar Cruz III, MD  06/23/23 3561

## 2023-06-24 LAB
APTT PPP: 64.4 SECONDS (ref 23.2–33.7)
APTT PPP: 67.9 SECONDS (ref 23.2–33.7)
BASOPHILS # BLD AUTO: 0.05 X10(3)/MCL
BASOPHILS NFR BLD AUTO: 0.7 %
EOSINOPHIL # BLD AUTO: 0.39 X10(3)/MCL (ref 0–0.9)
EOSINOPHIL NFR BLD AUTO: 5.7 %
ERYTHROCYTE [DISTWIDTH] IN BLOOD BY AUTOMATED COUNT: 12.8 % (ref 11.5–17)
HCT VFR BLD AUTO: 40.7 % (ref 42–52)
HGB BLD-MCNC: 13.8 G/DL (ref 14–18)
IMM GRANULOCYTES # BLD AUTO: 0.02 X10(3)/MCL (ref 0–0.04)
IMM GRANULOCYTES NFR BLD AUTO: 0.3 %
LYMPHOCYTES # BLD AUTO: 1.6 X10(3)/MCL (ref 0.6–4.6)
LYMPHOCYTES NFR BLD AUTO: 23.4 %
MCH RBC QN AUTO: 31.2 PG (ref 27–31)
MCHC RBC AUTO-ENTMCNC: 33.9 G/DL (ref 33–36)
MCV RBC AUTO: 91.9 FL (ref 80–94)
MONOCYTES # BLD AUTO: 0.48 X10(3)/MCL (ref 0.1–1.3)
MONOCYTES NFR BLD AUTO: 7 %
NEUTROPHILS # BLD AUTO: 4.3 X10(3)/MCL (ref 2.1–9.2)
NEUTROPHILS NFR BLD AUTO: 62.9 %
NRBC BLD AUTO-RTO: 0 %
PLATELET # BLD AUTO: 216 X10(3)/MCL (ref 130–400)
PMV BLD AUTO: 9.8 FL (ref 7.4–10.4)
RBC # BLD AUTO: 4.43 X10(6)/MCL (ref 4.7–6.1)
WBC # SPEC AUTO: 6.84 X10(3)/MCL (ref 4.5–11.5)

## 2023-06-24 PROCEDURE — 63600175 PHARM REV CODE 636 W HCPCS: Performed by: EMERGENCY MEDICINE

## 2023-06-24 PROCEDURE — 25000003 PHARM REV CODE 250: Performed by: THORACIC SURGERY (CARDIOTHORACIC VASCULAR SURGERY)

## 2023-06-24 PROCEDURE — 85025 COMPLETE CBC W/AUTO DIFF WBC: CPT | Performed by: EMERGENCY MEDICINE

## 2023-06-24 PROCEDURE — 85730 THROMBOPLASTIN TIME PARTIAL: CPT | Performed by: THORACIC SURGERY (CARDIOTHORACIC VASCULAR SURGERY)

## 2023-06-24 PROCEDURE — 21400001 HC TELEMETRY ROOM

## 2023-06-24 PROCEDURE — 85730 THROMBOPLASTIN TIME PARTIAL: CPT | Performed by: EMERGENCY MEDICINE

## 2023-06-24 RX ADMIN — OXYCODONE AND ACETAMINOPHEN 1 TABLET: 10; 325 TABLET ORAL at 10:06

## 2023-06-24 RX ADMIN — OXYCODONE AND ACETAMINOPHEN 1 TABLET: 10; 325 TABLET ORAL at 11:06

## 2023-06-24 RX ADMIN — HEPARIN SODIUM 12 UNITS/KG/HR: 10000 INJECTION, SOLUTION INTRAVENOUS at 11:06

## 2023-06-24 NOTE — H&P
Alex Reynoso is a 53 y.o. male  with PMH of CAD, HTN, HLD, who is scheduled for a CABG on Tuesday, 5/27/2023. He presented to the ED yesterday c/o chest pain and tingling in his left arm, similar to the intermittent chest pain he has had over the last few months.    Mr. Reynoso was admitted and is on a heparin and tridel gtt.  This morning he is awake, alert and sitting up in the chair. Denies any chest pain or shortness of breath.     1. Acute coronary syndrome    2. Chest pain      Past Medical History:   Diagnosis Date    Acid reflux     Arthritis     Back pain     CAD (coronary artery disease), native coronary artery     Coronary artery disease     Coronary artery disease involving native coronary artery of native heart, unspecified whether angina present     High cholesterol     HTN (hypertension)     Obesity     Prostate cancer     Seasonal allergies     Shortness of breath      No past surgical history pertinent negatives on file.  Scheduled Meds:  Continuous Infusions:   heparin (porcine) in D5W 12 Units/kg/hr (06/23/23 1409)    nitroGLYCERIN 5 mcg/min (06/23/23 1401)     PRN Meds:heparin (PORCINE), heparin (PORCINE), nitroGLYCERIN, oxyCODONE-acetaminophen    Review of patient's allergies indicates:  No Known Allergies  There are no hospital problems to display for this patient.    ROS:  Constitutional: Denies fatigue or fever  HENT: Negative  Heart: chest pain radiating to left arm  Lungs: denies sob  GI: denies n/v/d  : neg  Musculoskeletal: neg  Neuro: neg    Blood pressure (!) 142/79, pulse 64, temperature 97.9 °F (36.6 °C), temperature source Oral, resp. rate 18, weight 121.6 kg (268 lb), SpO2 98 %.    Physical Exam:  Constitutional: Awake. Alert. Nontoxic. In no distress  Heart: RRR  Lungs: respirations nonlabored, clear  Abd: soft, ntnd  Bilateral Les warm. Motor intact  Neuro: nonfocal  Psych: appropriate mood and affect    Assesment/Plan:    Severe CAD  HTN  HLD    Planning CAB on Tuesday,  5/27/2023  Continue heparin and tridel gtt            CASEY Alcantara  6/24/2023

## 2023-06-25 LAB
APTT PPP: 64.3 SECONDS (ref 23.2–33.7)
BASOPHILS # BLD AUTO: 0.05 X10(3)/MCL
BASOPHILS NFR BLD AUTO: 0.7 %
EOSINOPHIL # BLD AUTO: 0.41 X10(3)/MCL (ref 0–0.9)
EOSINOPHIL NFR BLD AUTO: 5.9 %
ERYTHROCYTE [DISTWIDTH] IN BLOOD BY AUTOMATED COUNT: 12.6 % (ref 11.5–17)
HCT VFR BLD AUTO: 40.5 % (ref 42–52)
HGB BLD-MCNC: 13.7 G/DL (ref 14–18)
IMM GRANULOCYTES # BLD AUTO: 0.03 X10(3)/MCL (ref 0–0.04)
IMM GRANULOCYTES NFR BLD AUTO: 0.4 %
LYMPHOCYTES # BLD AUTO: 1.64 X10(3)/MCL (ref 0.6–4.6)
LYMPHOCYTES NFR BLD AUTO: 23.5 %
MCH RBC QN AUTO: 30.5 PG (ref 27–31)
MCHC RBC AUTO-ENTMCNC: 33.8 G/DL (ref 33–36)
MCV RBC AUTO: 90.2 FL (ref 80–94)
MONOCYTES # BLD AUTO: 0.56 X10(3)/MCL (ref 0.1–1.3)
MONOCYTES NFR BLD AUTO: 8 %
NEUTROPHILS # BLD AUTO: 4.3 X10(3)/MCL (ref 2.1–9.2)
NEUTROPHILS NFR BLD AUTO: 61.5 %
NRBC BLD AUTO-RTO: 0 %
PLATELET # BLD AUTO: 220 X10(3)/MCL (ref 130–400)
PMV BLD AUTO: 9.6 FL (ref 7.4–10.4)
RBC # BLD AUTO: 4.49 X10(6)/MCL (ref 4.7–6.1)
WBC # SPEC AUTO: 6.99 X10(3)/MCL (ref 4.5–11.5)

## 2023-06-25 PROCEDURE — 85025 COMPLETE CBC W/AUTO DIFF WBC: CPT | Performed by: EMERGENCY MEDICINE

## 2023-06-25 PROCEDURE — 63600175 PHARM REV CODE 636 W HCPCS: Performed by: EMERGENCY MEDICINE

## 2023-06-25 PROCEDURE — 21400001 HC TELEMETRY ROOM

## 2023-06-25 PROCEDURE — 25000003 PHARM REV CODE 250: Performed by: THORACIC SURGERY (CARDIOTHORACIC VASCULAR SURGERY)

## 2023-06-25 PROCEDURE — 85730 THROMBOPLASTIN TIME PARTIAL: CPT | Performed by: EMERGENCY MEDICINE

## 2023-06-25 RX ADMIN — HEPARIN SODIUM 12 UNITS/KG/HR: 10000 INJECTION, SOLUTION INTRAVENOUS at 01:06

## 2023-06-25 RX ADMIN — DOCUSATE SODIUM 50 MG: 50 CAPSULE, LIQUID FILLED ORAL at 08:06

## 2023-06-25 RX ADMIN — DOCUSATE SODIUM 50 MG: 50 CAPSULE, LIQUID FILLED ORAL at 01:06

## 2023-06-25 NOTE — PROGRESS NOTES
"Alex Reynoso is a 53 y.o. male patient.   1. Acute coronary syndrome    2. Chest pain      Past Medical History:   Diagnosis Date    Acid reflux     Arthritis     Back pain     CAD (coronary artery disease), native coronary artery     Coronary artery disease     Coronary artery disease involving native coronary artery of native heart, unspecified whether angina present     High cholesterol     HTN (hypertension)     Obesity     Prostate cancer     Seasonal allergies     Shortness of breath      No past surgical history pertinent negatives on file.  Scheduled Meds:  Continuous Infusions:   heparin (porcine) in D5W 12 Units/kg/hr (06/24/23 1121)    nitroGLYCERIN 5 mcg/min (06/23/23 1401)     PRN Meds:heparin (PORCINE), heparin (PORCINE), nitroGLYCERIN, oxyCODONE-acetaminophen    Review of patient's allergies indicates:  No Known Allergies  There are no hospital problems to display for this patient.    Blood pressure (!) 143/81, pulse 62, temperature 97.9 °F (36.6 °C), temperature source Oral, resp. rate 18, height 5' 6" (1.676 m), weight 121.6 kg (268 lb), SpO2 96 %.    Subjective:    Awake. Alert.  Sitting up in chair visiting with friends  On heparin and tridel gtts  Denies chest pain or sob    Objective:   AFVSS  Heart: RRR  Lungs: respirations nonlabored, clear  Abd: soft, ntnd  Neuro: nonfocal; EFRAIN    Assesment/Plan:    A/p: severe multivessel CAD    Planning for CAB Tuesday, 6/27  Continue heparin and tridel gtts  Preop orders in            CASEY Alcantara  6/25/2023    "

## 2023-06-25 NOTE — PLAN OF CARE
Problem: Adult Inpatient Plan of Care  Goal: Plan of Care Review  Outcome: Ongoing, Progressing  Goal: Absence of Hospital-Acquired Illness or Injury  Outcome: Ongoing, Progressing  Goal: Optimal Comfort and Wellbeing  Outcome: Ongoing, Progressing  Goal: Readiness for Transition of Care  Outcome: Ongoing, Progressing     Problem: Bariatric Environmental Safety  Goal: Safety Maintained with Care  Outcome: Ongoing, Progressing     Problem: Fall Injury Risk  Goal: Absence of Fall and Fall-Related Injury  Outcome: Ongoing, Progressing

## 2023-06-25 NOTE — PLAN OF CARE
Problem: Adult Inpatient Plan of Care  Goal: Plan of Care Review  6/25/2023 1640 by Alicia Laureano RN  Outcome: Ongoing, Progressing  6/25/2023 0749 by Alicia Laureano RN  Outcome: Ongoing, Progressing  Goal: Absence of Hospital-Acquired Illness or Injury  6/25/2023 1640 by Alicia Laureano RN  Outcome: Ongoing, Progressing  6/25/2023 0749 by Alicia Laureano RN  Outcome: Ongoing, Progressing  Goal: Optimal Comfort and Wellbeing  6/25/2023 1640 by Alicia Laurenao RN  Outcome: Ongoing, Progressing  6/25/2023 0749 by Alicia Laureano RN  Outcome: Ongoing, Progressing  Goal: Readiness for Transition of Care  6/25/2023 1640 by Alicia Laureano RN  Outcome: Ongoing, Progressing  6/25/2023 0749 by Alicia Laureano RN  Outcome: Ongoing, Progressing     Problem: Bariatric Environmental Safety  Goal: Safety Maintained with Care  6/25/2023 1640 by Alicia Laureano RN  Outcome: Ongoing, Progressing  6/25/2023 0749 by Alicia Laureano RN  Outcome: Ongoing, Progressing     Problem: Fall Injury Risk  Goal: Absence of Fall and Fall-Related Injury  6/25/2023 1640 by Alicia Laureano RN  Outcome: Ongoing, Progressing  6/25/2023 0749 by Alicia Laureano RN  Outcome: Ongoing, Progressing  Intervention: Promote Injury-Free Environment  Flowsheets (Taken 6/25/2023 1640)  Safety Promotion/Fall Prevention:   assistive device/personal item within reach   Fall Risk reviewed with patient/family   Fall Risk signage in place   in recliner, wheels locked   side rails raised x 2   instructed to call staff for mobility   nonskid shoes/socks when out of bed

## 2023-06-26 LAB
APPEARANCE UR: ABNORMAL
APTT PPP: 30.8 SECONDS (ref 23.2–33.7)
APTT PPP: 59.4 SECONDS (ref 23.2–33.7)
APTT PPP: 64.6 SECONDS (ref 23.2–33.7)
BACTERIA #/AREA URNS AUTO: NORMAL /HPF
BASOPHILS # BLD AUTO: 0.05 X10(3)/MCL
BASOPHILS NFR BLD AUTO: 0.8 %
BILIRUB UR QL STRIP.AUTO: NEGATIVE MG/DL
COLOR UR: YELLOW
EOSINOPHIL # BLD AUTO: 0.37 X10(3)/MCL (ref 0–0.9)
EOSINOPHIL NFR BLD AUTO: 5.9 %
ERYTHROCYTE [DISTWIDTH] IN BLOOD BY AUTOMATED COUNT: 12.5 % (ref 11.5–17)
GLUCOSE UR QL STRIP.AUTO: NEGATIVE MG/DL
GROUP & RH: NORMAL
HCT VFR BLD AUTO: 40.1 % (ref 42–52)
HGB BLD-MCNC: 13.5 G/DL (ref 14–18)
IMM GRANULOCYTES # BLD AUTO: 0.02 X10(3)/MCL (ref 0–0.04)
IMM GRANULOCYTES NFR BLD AUTO: 0.3 %
INDIRECT COOMBS GEL: NORMAL
INR BLD: 0.92 (ref 0–1.3)
KETONES UR QL STRIP.AUTO: NEGATIVE MG/DL
LEUKOCYTE ESTERASE UR QL STRIP.AUTO: NEGATIVE UNIT/L
LYMPHOCYTES # BLD AUTO: 1.74 X10(3)/MCL (ref 0.6–4.6)
LYMPHOCYTES NFR BLD AUTO: 27.8 %
MCH RBC QN AUTO: 30.5 PG (ref 27–31)
MCHC RBC AUTO-ENTMCNC: 33.7 G/DL (ref 33–36)
MCV RBC AUTO: 90.5 FL (ref 80–94)
MONOCYTES # BLD AUTO: 0.45 X10(3)/MCL (ref 0.1–1.3)
MONOCYTES NFR BLD AUTO: 7.2 %
MRSA PCR SCRN (OHS): NOT DETECTED
NEUTROPHILS # BLD AUTO: 3.64 X10(3)/MCL (ref 2.1–9.2)
NEUTROPHILS NFR BLD AUTO: 58 %
NITRITE UR QL STRIP.AUTO: NEGATIVE
NRBC BLD AUTO-RTO: 0 %
PH UR STRIP.AUTO: 7 [PH]
PLATELET # BLD AUTO: 218 X10(3)/MCL (ref 130–400)
PMV BLD AUTO: 9.8 FL (ref 7.4–10.4)
PROT UR QL STRIP.AUTO: ABNORMAL MG/DL
PROTHROMBIN TIME: 12.3 SECONDS (ref 12.5–14.5)
RBC # BLD AUTO: 4.43 X10(6)/MCL (ref 4.7–6.1)
RBC #/AREA URNS AUTO: <5 /HPF
RBC UR QL AUTO: NEGATIVE UNIT/L
SP GR UR STRIP.AUTO: 1.02 (ref 1–1.03)
SPECIMEN OUTDATE: NORMAL
SQUAMOUS #/AREA URNS AUTO: <5 /HPF
UROBILINOGEN UR STRIP-ACNC: 1 MG/DL
WBC # SPEC AUTO: 6.27 X10(3)/MCL (ref 4.5–11.5)
WBC #/AREA URNS AUTO: <5 /HPF

## 2023-06-26 PROCEDURE — 81001 URINALYSIS AUTO W/SCOPE: CPT | Performed by: PHYSICIAN ASSISTANT

## 2023-06-26 PROCEDURE — 86923 COMPATIBILITY TEST ELECTRIC: CPT | Performed by: PHYSICIAN ASSISTANT

## 2023-06-26 PROCEDURE — 25000003 PHARM REV CODE 250: Performed by: THORACIC SURGERY (CARDIOTHORACIC VASCULAR SURGERY)

## 2023-06-26 PROCEDURE — 85730 THROMBOPLASTIN TIME PARTIAL: CPT | Performed by: EMERGENCY MEDICINE

## 2023-06-26 PROCEDURE — 85025 COMPLETE CBC W/AUTO DIFF WBC: CPT | Performed by: EMERGENCY MEDICINE

## 2023-06-26 PROCEDURE — 85610 PROTHROMBIN TIME: CPT | Performed by: PHYSICIAN ASSISTANT

## 2023-06-26 PROCEDURE — 85730 THROMBOPLASTIN TIME PARTIAL: CPT | Performed by: THORACIC SURGERY (CARDIOTHORACIC VASCULAR SURGERY)

## 2023-06-26 PROCEDURE — 87641 MR-STAPH DNA AMP PROBE: CPT | Performed by: THORACIC SURGERY (CARDIOTHORACIC VASCULAR SURGERY)

## 2023-06-26 PROCEDURE — 86900 BLOOD TYPING SEROLOGIC ABO: CPT | Performed by: PHYSICIAN ASSISTANT

## 2023-06-26 PROCEDURE — 63600175 PHARM REV CODE 636 W HCPCS: Performed by: THORACIC SURGERY (CARDIOTHORACIC VASCULAR SURGERY)

## 2023-06-26 PROCEDURE — 99024 POSTOP FOLLOW-UP VISIT: CPT | Mod: ,,, | Performed by: PHYSICIAN ASSISTANT

## 2023-06-26 PROCEDURE — 21400001 HC TELEMETRY ROOM

## 2023-06-26 PROCEDURE — 99024 PR POST-OP FOLLOW-UP VISIT: ICD-10-PCS | Mod: ,,, | Performed by: PHYSICIAN ASSISTANT

## 2023-06-26 RX ORDER — CEFAZOLIN SODIUM 2 G/50ML
2 SOLUTION INTRAVENOUS
Status: DISCONTINUED | OUTPATIENT
Start: 2023-06-26 | End: 2023-06-27 | Stop reason: HOSPADM

## 2023-06-26 RX ORDER — SODIUM CHLORIDE 0.9 % (FLUSH) 0.9 %
10 SYRINGE (ML) INJECTION
Status: DISCONTINUED | OUTPATIENT
Start: 2023-06-26 | End: 2023-07-02 | Stop reason: HOSPADM

## 2023-06-26 RX ORDER — MUPIROCIN 20 MG/G
OINTMENT TOPICAL
Status: COMPLETED | OUTPATIENT
Start: 2023-06-26 | End: 2023-06-26

## 2023-06-26 RX ORDER — ASPIRIN 325 MG
325 TABLET ORAL DAILY
Status: DISCONTINUED | OUTPATIENT
Start: 2023-06-27 | End: 2023-07-01

## 2023-06-26 RX ORDER — HYDROCODONE BITARTRATE AND ACETAMINOPHEN 500; 5 MG/1; MG/1
TABLET ORAL
Status: DISCONTINUED | OUTPATIENT
Start: 2023-06-26 | End: 2023-06-27 | Stop reason: HOSPADM

## 2023-06-26 RX ORDER — MESALAMINE 1000 MG/1
1000 SUPPOSITORY RECTAL NIGHTLY
Status: DISCONTINUED | OUTPATIENT
Start: 2023-06-26 | End: 2023-07-02 | Stop reason: HOSPADM

## 2023-06-26 RX ORDER — MUPIROCIN 20 MG/G
OINTMENT TOPICAL
Status: DISCONTINUED | OUTPATIENT
Start: 2023-06-26 | End: 2023-06-27 | Stop reason: HOSPADM

## 2023-06-26 RX ORDER — ONDANSETRON 2 MG/ML
4 INJECTION INTRAMUSCULAR; INTRAVENOUS EVERY 8 HOURS PRN
Status: DISCONTINUED | OUTPATIENT
Start: 2023-06-26 | End: 2023-06-27

## 2023-06-26 RX ADMIN — DOCUSATE SODIUM 50 MG: 50 CAPSULE, LIQUID FILLED ORAL at 10:06

## 2023-06-26 RX ADMIN — MUPIROCIN: 20 OINTMENT TOPICAL at 10:06

## 2023-06-26 RX ADMIN — HEPARIN SODIUM 12 UNITS/KG/HR: 10000 INJECTION, SOLUTION INTRAVENOUS at 08:06

## 2023-06-26 RX ADMIN — OXYCODONE AND ACETAMINOPHEN 1 TABLET: 10; 325 TABLET ORAL at 01:06

## 2023-06-26 RX ADMIN — DOCUSATE SODIUM 50 MG: 50 CAPSULE, LIQUID FILLED ORAL at 08:06

## 2023-06-26 RX ADMIN — OXYCODONE AND ACETAMINOPHEN 1 TABLET: 10; 325 TABLET ORAL at 10:06

## 2023-06-26 NOTE — CONSULTS
Consult Note    Reason for Consult:      We were consulted by Dr. Sharma to evaluate this patient for BRB in stool, clearance for CABG.    HPI:     53-year-old AA male unknown to our group with a PMH of HTN, HLD, obesity, prostate cancer s/p prostatectmy and radiation around 4/2022, CAD.  On ASA 81 mg.  Patient presented to ED on 06/23 with complaints of chest pain and tingling in his LEs arm, similar to intermittent chest pain that he is had for the last few months.  He was scheduled already for a CABG on Tuesday, 06/27/2023, after having an LHC on 06/19/2023 which found severe multivessel coronary artery disease.  Troponins wnl.  He was admitted and started on heparin drip.  CT surgery was planning for CABG tomorrow, however patient had an episode of hematochezia today.  Thus, heparin gtts was held and GI was consulted.     Patient states he had one episode of BRB today and one episode a couple of days ago during this admisssion.  He reports he has been constipated throughout the admit because at home he usually takes stool softeners twice daily but he did not take them for the first few days of the admission.  He got them yesterday or today.  His stool today was still hard when he passed the blood. He has no anorectal pain.  He has intermittent lower abdominal cramping.    Hgb was 13.6 on presentation and remains stable today at 13.5.    He has an isolated episode of hematochezia about a year ago.  No intervention was performed.      He reports having a colonoscopy about 2 years ago with Dr. Francesco Cruz and states he had polyps that were removed.  No records available to review.     Denies prior hx of anemia or transfusion.  No prior EGD.  No melena. No NSAIDs.    No fam hx of GI neoplasia.     Previous records reviewed. Collateral information obtained from family member present at bedside.    PCP:  Glen Duffy NP    Review of patient's allergies indicates:  No Known Allergies     Current  Facility-Administered Medications   Medication Dose Route Frequency Provider Last Rate Last Admin    docusate sodium capsule 50 mg  50 mg Oral BID Jhony Sharma MD   50 mg at 06/26/23 1016    heparin 25,000 units in dextrose 5% (100 units/ml) IV bolus from bag - ADDITIONAL PRN BOLUS - 30 units/kg (max bolus 4000 units)  30 Units/kg (Adjusted) Intravenous PRN Edgar Cruz III, MD        heparin 25,000 units in dextrose 5% (100 units/ml) IV bolus from bag - ADDITIONAL PRN BOLUS - 60 units/kg (max bolus 4000 units)  46 Units/kg (Adjusted) Intravenous PRN Edgar Cruz III, MD        heparin 25,000 units in dextrose 5% 250 mL (100 units/mL) infusion LOW INTENSITY nomogram - LAF  0-40 Units/kg/hr (Adjusted) Intravenous Continuous Jhony Sharma MD 10.4 mL/hr at 06/26/23 1026 12 Units/kg/hr at 06/26/23 1026    nitroGLYCERIN in 5 % dextrose 50 mg/250 mL (200 mcg/mL) infusion  0-400 mcg/min Intravenous Continuous Edgar Cruz III, MD 1.5 mL/hr at 06/23/23 1401 5 mcg/min at 06/23/23 1401    nitroGLYCERIN SL tablet 0.4 mg  0.4 mg Sublingual Q5 Min PRN Edgar Cruz III, MD   0.4 mg at 06/23/23 1102    oxyCODONE-acetaminophen  mg per tablet 1 tablet  1 tablet Oral Q6H PRN Jhony Sharma MD   1 tablet at 06/26/23 0119     Facility-Administered Medications Ordered in Other Encounters   Medication Dose Route Frequency Provider Last Rate Last Admin    diazePAM tablet 10 mg  10 mg Oral On Call Procedure Raji Hughes MD   10 mg at 06/21/23 0709    diphenhydrAMINE capsule 50 mg  50 mg Oral On Call Procedure Raji Hughes MD   50 mg at 06/21/23 0709    fentaNYL 50 mcg/mL injection 25 mcg  25 mcg Intravenous Q5 Min PRN Noah Dumont DO        HYDROmorphone (PF) injection 0.2 mg  0.2 mg Intravenous Q5 Min PRN Noah Dumont DO        lactated ringers infusion   Intravenous Continuous Noah Dumont DO   Stopped at 03/30/23 1158    sodium chloride 0.9% flush 10 mL  10 mL Intravenous PRN Shaheen Felder MD         sodium chloride 0.9% flush 10 mL  10 mL Intravenous PRN Noah Dumont, DO         Medications Prior to Admission   Medication Sig Dispense Refill Last Dose    amLODIPine (NORVASC) 10 MG tablet Take 10 mg by mouth every morning.       aspirin (ECOTRIN) 81 MG EC tablet Take 81 mg by mouth every morning. Stopped 3/26/23       atorvastatin (LIPITOR) 80 MG tablet Take 80 mg by mouth every evening.       ezetimibe (ZETIA) 10 mg tablet Take 10 mg by mouth every evening.       gabapentin (NEURONTIN) 100 MG capsule Take 100 mg by mouth 3 (three) times daily as needed.       hydroCHLOROthiazide (HYDRODIURIL) 12.5 MG Tab Take 12.5 mg by mouth once daily.       isosorbide mononitrate (IMDUR) 30 MG 24 hr tablet Take 30 mg by mouth once daily.       lisinopriL (PRINIVIL,ZESTRIL) 20 MG tablet Take 20 mg by mouth once daily.       metoprolol succinate (TOPROL-XL) 25 MG 24 hr tablet Take 12.5 mg by mouth every morning.       mupirocin (BACTROBAN) 2 % ointment Apply topically 2 (two) times daily. Apply to inside of both nostrils the night before surgery and the morning of surgery. 1 each 0     oxyCODONE-acetaminophen (PERCOCET)  mg per tablet Take 1 tablet by mouth every 6 (six) hours as needed.       pantoprazole (PROTONIX) 40 MG tablet Take 40 mg by mouth once daily.       pregabalin (LYRICA) 100 MG capsule Take 1 capsule by mouth 2 (two) times daily.          Past Medical History:  Past Medical History:   Diagnosis Date    Acid reflux     Arthritis     Back pain     CAD (coronary artery disease), native coronary artery     Coronary artery disease     Coronary artery disease involving native coronary artery of native heart, unspecified whether angina present     High cholesterol     HTN (hypertension)     Obesity     Prostate cancer     Seasonal allergies     Shortness of breath       Past Surgical History:  Past Surgical History:   Procedure Laterality Date    CHOLECYSTECTOMY      COLONOSCOPY      CORONARY ANGIOGRAPHY N/A  06/21/2023    Procedure: ANGIOGRAM, CORONARY ARTERY;  Surgeon: Raji Hughes MD;  Location: [facility] CATH LAB;  Service: Cardiology;  Laterality: N/A;    LEFT HEART CATHETERIZATION Left 12/01/2022      Moderate noncritical multivessel CAD with stenosis up to 60%    LEFT HEART CATHETERIZATION Left 06/21/2023    Procedure: Left heart cath;  Surgeon: Raji Hughes MD;  Location: [facility] CATH LAB;  Service: Cardiology;  Laterality: Left;  LHC +/- PCI    LIPOMA RESECTION N/A 03/30/2023    Procedure: EXCISION, LIPOMA (excision of post neck lipoma);  Surgeon: Samuel Cruz MD;  Location: [facility] OR;  Service: General;  Laterality: N/A;  10 x 5 lipoma     PROSTATECTOMY      VENTRICULOGRAM, LEFT  06/21/2023    Procedure: Ventriculogram, Left;  Surgeon: Raji Hughes MD;  Location: [facility] CATH LAB;  Service: Cardiology;;      Family History:  Family History   Problem Relation Age of Onset    Hypertension Mother     Diabetes Mother     Prostate cancer Father      Social History:  Social History     Tobacco Use    Smoking status: Every Day     Packs/day: 0.50     Types: Cigarettes    Smokeless tobacco: Never   Substance Use Topics    Alcohol use: Not Currently     Comment: socially       Review of Systems:     Review of Systems   Constitutional:  Negative for appetite change, chills, fatigue, fever and unexpected weight change.   HENT:  Negative for trouble swallowing.    Respiratory:  Negative for cough, shortness of breath and wheezing.    Cardiovascular:  Positive for chest pain. Negative for palpitations and leg swelling.   Gastrointestinal:  Positive for blood in stool and constipation. Negative for abdominal distention, abdominal pain, diarrhea, nausea and vomiting.   Musculoskeletal:  Negative for arthralgias and back pain.   Skin:  Negative for color change and pallor.   Neurological:  Negative for dizziness, syncope, weakness, light-headedness and headaches.   Psychiatric/Behavioral:  Negative for agitation and confusion.  The patient is not nervous/anxious.      Objective:     VITAL SIGNS: 24 HR MIN & MAX LAST    Temp  Min: 97.7 °F (36.5 °C)  Max: 98.9 °F (37.2 °C)  98.2 °F (36.8 °C)        BP  Min: 118/63  Max: 164/77  130/79     Pulse  Min: 60  Max: 70  61     Resp  Min: 16  Max: 21  (!) 21    SpO2  Min: 95 %  Max: 99 %  98 %        Intake/Output Summary (Last 24 hours) at 6/26/2023 1623  Last data filed at 6/26/2023 1410  Gross per 24 hour   Intake 1120 ml   Output --   Net 1120 ml       Physical Exam  Constitutional:       General: He is not in acute distress.     Appearance: He is obese. He is not ill-appearing.   HENT:      Head: Normocephalic and atraumatic.   Eyes:      General: No scleral icterus.     Extraocular Movements: Extraocular movements intact.   Cardiovascular:      Rate and Rhythm: Normal rate and regular rhythm.   Pulmonary:      Effort: Pulmonary effort is normal. No respiratory distress.   Abdominal:      General: Bowel sounds are normal. There is no distension.      Palpations: Abdomen is soft. There is no mass.      Tenderness: There is no abdominal tenderness. There is no guarding or rebound.   Genitourinary:     Comments: ROLLY: (chaperoned by nurse) no external hemorrhoids, no tenderness, no masses appreciated, no blood or stool in vault.   Musculoskeletal:      Right lower leg: No edema.      Left lower leg: No edema.   Skin:     General: Skin is warm and dry.   Neurological:      Mental Status: He is alert and oriented to person, place, and time.   Psychiatric:         Mood and Affect: Mood normal.         Behavior: Behavior normal.         Recent Results (from the past 48 hour(s))   APTT    Collection Time: 06/25/23  3:58 AM   Result Value Ref Range    PTT 64.3 (H) 23.2 - 33.7 seconds   CBC with Differential    Collection Time: 06/25/23  3:58 AM   Result Value Ref Range    WBC 6.99 4.50 - 11.50 x10(3)/mcL    RBC 4.49 (L) 4.70 - 6.10 x10(6)/mcL    Hgb 13.7 (L) 14.0 - 18.0 g/dL    Hct 40.5 (L) 42.0 - 52.0 %     MCV 90.2 80.0 - 94.0 fL    MCH 30.5 27.0 - 31.0 pg    MCHC 33.8 33.0 - 36.0 g/dL    RDW 12.6 11.5 - 17.0 %    Platelet 220 130 - 400 x10(3)/mcL    MPV 9.6 7.4 - 10.4 fL    Neut % 61.5 %    Lymph % 23.5 %    Mono % 8.0 %    Eos % 5.9 %    Basophil % 0.7 %    Lymph # 1.64 0.6 - 4.6 x10(3)/mcL    Neut # 4.30 2.1 - 9.2 x10(3)/mcL    Mono # 0.56 0.1 - 1.3 x10(3)/mcL    Eos # 0.41 0 - 0.9 x10(3)/mcL    Baso # 0.05 <=0.2 x10(3)/mcL    IG# 0.03 0 - 0.04 x10(3)/mcL    IG% 0.4 %    NRBC% 0.0 %   APTT    Collection Time: 06/26/23  4:09 AM   Result Value Ref Range    PTT 59.4 (H) 23.2 - 33.7 seconds   CBC with Differential    Collection Time: 06/26/23  4:09 AM   Result Value Ref Range    WBC 6.27 4.50 - 11.50 x10(3)/mcL    RBC 4.43 (L) 4.70 - 6.10 x10(6)/mcL    Hgb 13.5 (L) 14.0 - 18.0 g/dL    Hct 40.1 (L) 42.0 - 52.0 %    MCV 90.5 80.0 - 94.0 fL    MCH 30.5 27.0 - 31.0 pg    MCHC 33.7 33.0 - 36.0 g/dL    RDW 12.5 11.5 - 17.0 %    Platelet 218 130 - 400 x10(3)/mcL    MPV 9.8 7.4 - 10.4 fL    Neut % 58.0 %    Lymph % 27.8 %    Mono % 7.2 %    Eos % 5.9 %    Basophil % 0.8 %    Lymph # 1.74 0.6 - 4.6 x10(3)/mcL    Neut # 3.64 2.1 - 9.2 x10(3)/mcL    Mono # 0.45 0.1 - 1.3 x10(3)/mcL    Eos # 0.37 0 - 0.9 x10(3)/mcL    Baso # 0.05 <=0.2 x10(3)/mcL    IG# 0.02 0 - 0.04 x10(3)/mcL    IG% 0.3 %    NRBC% 0.0 %   PTT Heparin Monitoring    Collection Time: 06/26/23  8:41 AM   Result Value Ref Range    PTT Heparin Monitor 64.6 (H) 23.2 - 33.7 seconds       Cardiac catheterization    Result Date: 6/21/2023    Severe multivessel coronary artery disease (ostial/proximal LAD, proximal obtuse marginal 1, mid RCA and subtotally occluded rPDA which fills via left-to-right collaterals)   Preserved LV systolic function   Normal LVEDP The procedure log was verified by Raji Hughes MD. Date: 6/21/2023  Time: 10:55 AM Procedure: Selective coronary angiography Left ventriculography Left heart catheterization Moderate (Conscious) Sedation  Indication:  Escalating angina.  On GDMT.  Requiring multiple doses of nitroglycerin. Consent: The patient was brought to the cardiac catheterization lab. Was instructed and explained about the risk, benefit and alternatives of the procedure included but not limited to sudden cardiac death, myocardial infarction, bleeding, vascular injury, renal failure, stroke, contrast allergy, risk of conscious sedation and need for emergent bypass surgery.  the patient was agreeable to proceed.  Signed the consent form. Access:  The patient was prepped using the usual sterile fashion. Right radial artery  was accessed with micropuncture technique, ultrasound guidance.  An image of the ultrasound was put in the paper chart for purpose of documentation. Sheath size:6F Roman test:  Verified with pulse oximetry deemed to be adequate for radial artery procedure. Diagnostic catheters :  5 Micronesian JR4 diagnostic catheter.  Six Micronesian EBU 3.5 guide catheter.  Five Micronesian pigtail catheter Coronary findings: Dominance: right Left main: no obstructive disease with <10% epicardial stenosis Left anterior descending artery:  Ostial/proximal 80% stenosis.  Diffuse luminal irregularities in the mid to distal LAD.  Diagonal branch is free of any obstructive disease.  Distal LAD is noted to fill the right PDA. Circumflex artery:  70-80% disease in the proximal obtuse marginal 1 branch which is a medium to large caliber vessel. Right coronary artery:  95% mid RCA lesion.  The RPDA appears to be subtotally occluded with competitive flow noted.  Collaterals noted from the distal LAD that fills the RPDA. Left ventriculography:  EF 65% Hemodynamics:  LVEDP = 13 Access Closure :  At the end of the procedure the sheath was removed. A TR band applied to the right radial artery . Excellent hemostasis achieved. Impression - Multivessel coronary artery disease (ostial/proximal LAD, proximal obtuse marginal 1, mid RCA and subtotally occluded rPDA which fills  via left-to-right collaterals) - preserved LV systolic function - normal LVEDP Plan - referral to CT surgery for CABG evaluation Raji Hughes MD     Assessment / Plan:     53-year-old AA male unknown to our group with a PMH of HTN, HLD, obesity, prostate cancer s/p prostatectmy and radiation around 4/2022, and known severe CAD scheduled for CABG on 6/27/23.  Presented on 06/23 with complaints of chest pain and tingling in his LEs arm.  Started on heparin gtts.  GI consulted for hematochezia.     Hematochezia  Hgb stable at 13.6 --> 13.5  Associated with constipation.  H/O prostate radiation.  DDX: hemorrhoids vs radiation proctitis.   - monitor for bleeding.  - avoid constipation.   - start anusol.  - Ok to proceed with CABG as planned given risk/benefit ratio.  if patient has further bleeding, can perform colonoscopy at that time.        Thank you for allowing us to participate in this patient's care.

## 2023-06-26 NOTE — PLAN OF CARE
06/26/23 1526   Discharge Assessment   Assessment Type Discharge Planning Assessment   Confirmed/corrected address, phone number and insurance Yes   Confirmed Demographics Correct on Facesheet   Source of Information patient;family   Communicated EZEQUIEL with patient/caregiver Date not available/Unable to determine   Reason For Admission Acute Coronary Syndrome   People in Home spouse   Do you expect to return to your current living situation? Yes   Do you have help at home or someone to help you manage your care at home? Yes   Who are your caregiver(s) and their phone number(s)? Spouse Vanessachu Reynoso [phone]   Prior to hospitilization cognitive status: Alert/Oriented   Current cognitive status: Alert/Oriented   Home Accessibility stairs to enter home   Number of Stairs, Main Entrance four   Home Layout Able to live on 1st floor   Equipment Currently Used at Home cane, quad   Patient currently being followed by outpatient case management? No   Do you currently have service(s) that help you manage your care at home? No   Do you take prescription medications? Yes   Do you have prescription coverage? Yes   Coverage [facility] managed mcare   Do you have any problems affording any of your prescribed medications? No   Who is going to help you get home at discharge? Spouse Vanessa   How do you get to doctors appointments? family or friend will provide;car, drives self   Are you on dialysis? No   Do you take coumadin? No   Discharge Plan A Home Health   Discharge Plan B Home with family   DME Needed Upon Discharge  none   Discharge Plan discussed with: Spouse/sig other;Patient   Transition of Care Barriers None   Physical Activity   On average, how many days per week do you engage in moderate to strenuous exercise (like a brisk walk)? 0 days   On average, how many minutes do you engage in exercise at this level? 0 min   Financial Resource Strain   How hard is it for you to pay for the very basics like food, housing, medical care,  and heating? Not hard   Housing Stability   In the last 12 months, was there a time when you were not able to pay the mortgage or rent on time? N   In the last 12 months, how many places have you lived? 1   In the last 12 months, was there a time when you did not have a steady place to sleep or slept in a shelter (including now)? N   Transportation Needs   In the past 12 months, has lack of transportation kept you from medical appointments or from getting medications? no   In the past 12 months, has lack of transportation kept you from meetings, work, or from getting things needed for daily living? No   Food Insecurity   Within the past 12 months, you worried that your food would run out before you got the money to buy more. Never true   Within the past 12 months, the food you bought just didn't last and you didn't have money to get more. Never true   Stress   Do you feel stress - tense, restless, nervous, or anxious, or unable to sleep at night because your mind is troubled all the time - these days? Not at all   Social Connections   In a typical week, how many times do you talk on the phone with family, friends, or neighbors? More than 3   How often do you get together with friends or relatives? More than 3   How often do you attend Rastafarian or Jehovah's witness services? 1 to 4   Do you belong to any clubs or organizations such as Rastafarian groups, unions, fraternal or athletic groups, or school groups? No   How often do you attend meetings of the clubs or organizations you belong to? Never   Are you , , , , never , or living with a partner?    Alcohol Use   Q1: How often do you have a drink containing alcohol? Monthly or l   Q2: How many drinks containing alcohol do you have on a typical day when you are drinking? 1 or 2   Q3: How often do you have six or more drinks on one occasion? Never   OTHER   Name(s) of People in Home Vanessa Reynoso     Patient is expecting to have a CABG on  tomorrow. Will determine discharge destination closer to time of discharge. Will be looking for PT/OT recommendations in chart.

## 2023-06-26 NOTE — PROGRESS NOTES
CT SURGERY PROGRESS NOTE  Alex Reynoso  53 y.o.  1970    Patients Procedure: * No surgery found *    Subjective  Interval History: NAD. No CP    ROS    Medication List  Infusions   heparin (porcine) in D5W 12 Units/kg/hr (06/25/23 1346)    nitroGLYCERIN 5 mcg/min (06/23/23 1401)     Scheduled   docusate sodium  50 mg Oral BID       Objective:  Recent Vitals:  Temp:  [97.7 °F (36.5 °C)-98.9 °F (37.2 °C)] 98.9 °F (37.2 °C)  Pulse:  [60-70] 70  Resp:  [16-20] 18  SpO2:  [95 %-99 %] 95 %  BP: (118-148)/(62-87) 146/87    Physical Exam     I/O last 24 hrs:  Intake/Output - Last 3 Shifts         06/24 0700  06/25 0659 06/25 0700 06/26 0659 06/26 0700  06/27 0659    P.O. 240 1420     Total Intake(mL/kg) 240 (2) 1420 (11.7)     Net +240 +1420            Urine Occurrence 2 x 7 x     Stool Occurrence 0 x 0 x             Labs  ABGs: No results for input(s): PH, PCO2, PO2, HCO3, POCSATURATED, BE in the last 48 hours.  BMP: No results for input(s): GLU, NA, K, CL, CO2, BUN, CREATININE, CALCIUM, MG in the last 48 hours.  CBC:   Recent Labs   Lab 06/26/23  0409   WBC 6.27   RBC 4.43*   HGB 13.5*   HCT 40.1*      MCV 90.5   MCH 30.5   MCHC 33.7     CMP: No results for input(s): GLU, CALCIUM, ALBUMIN, PROT, NA, K, CO2, CL, BUN, CREATININE, ALKPHOS, ALT, AST, BILITOT in the last 48 hours.  Coagulation: No results for input(s): PT, INR, APTT in the last 48 hours.      Imaging:   CXR: No results found in the last 24 hours.        ASSESSMENT/PLAN:    DC IV heparin at [facility]  CABG in am Dr Sharma     Case and plan of care discussed with MD Jaswinder Jauregui, MARLENY

## 2023-06-27 ENCOUNTER — ANESTHESIA (OUTPATIENT)
Dept: SURGERY | Facility: HOSPITAL | Age: 53
DRG: 234 | End: 2023-06-27
Payer: MEDICARE

## 2023-06-27 LAB
ABO + RH BLD: NORMAL
ALLENS TEST BLOOD GAS (OHS): ABNORMAL
ALLENS TEST BLOOD GAS (OHS): ABNORMAL
ANION GAP SERPL CALC-SCNC: 10 MEQ/L
ANION GAP SERPL CALC-SCNC: 12 MEQ/L
ANION GAP SERPL CALC-SCNC: 8 MEQ/L
APTT PPP: 33 SECONDS (ref 23.2–33.7)
APTT PPP: 35.5 SECONDS (ref 23.2–33.7)
APTT PPP: 41.9 SECONDS (ref 23.2–33.7)
BASE EXCESS BLD CALC-SCNC: -1.4 MMOL/L
BASE EXCESS BLD CALC-SCNC: 0.5 MMOL/L (ref -2–2)
BASOPHILS # BLD AUTO: 0.04 X10(3)/MCL
BASOPHILS # BLD AUTO: 0.04 X10(3)/MCL
BASOPHILS NFR BLD AUTO: 0.4 %
BASOPHILS NFR BLD AUTO: 0.4 %
BLD PROD TYP BPU: NORMAL
BLOOD GAS SAMPLE TYPE (OHS): ABNORMAL
BLOOD GAS SAMPLE TYPE (OHS): ABNORMAL
BLOOD UNIT EXPIRATION DATE: NORMAL
BLOOD UNIT TYPE CODE: 2800
BSA FOR ECHO PROCEDURE: 2.38 M2
BUN SERPL-MCNC: 10 MG/DL (ref 8.4–25.7)
BUN SERPL-MCNC: 11 MG/DL (ref 8.4–25.7)
BUN SERPL-MCNC: 9 MG/DL (ref 8.4–25.7)
CA-I BLD-SCNC: 1.22 MMOL/L (ref 1.12–1.23)
CA-I BLD-SCNC: 1.28 MMOL/L (ref 1.12–1.23)
CALCIUM SERPL-MCNC: 8.7 MG/DL (ref 8.4–10.2)
CALCIUM SERPL-MCNC: 9 MG/DL (ref 8.4–10.2)
CALCIUM SERPL-MCNC: 9.5 MG/DL (ref 8.4–10.2)
CHLORIDE SERPL-SCNC: 104 MMOL/L (ref 98–107)
CHLORIDE SERPL-SCNC: 109 MMOL/L (ref 98–107)
CHLORIDE SERPL-SCNC: 110 MMOL/L (ref 98–107)
CO2 BLDA-SCNC: 26.7 MMOL/L
CO2 BLDA-SCNC: 27.2 MMOL/L
CO2 SERPL-SCNC: 21 MMOL/L (ref 22–29)
CO2 SERPL-SCNC: 23 MMOL/L (ref 22–29)
CO2 SERPL-SCNC: 23 MMOL/L (ref 22–29)
COHGB MFR BLDA: 1.6 % (ref 0.5–1.5)
CPAP BLOOD GAS (OHS): 5 CM H2O
CREAT SERPL-MCNC: 0.79 MG/DL (ref 0.73–1.18)
CREAT SERPL-MCNC: 0.81 MG/DL (ref 0.73–1.18)
CREAT SERPL-MCNC: 0.85 MG/DL (ref 0.73–1.18)
CREAT/UREA NIT SERPL: 11
CREAT/UREA NIT SERPL: 13
CREAT/UREA NIT SERPL: 14
CROSSMATCH INTERPRETATION: NORMAL
DISPENSE STATUS: NORMAL
DRAWN BY BLOOD GAS (OHS): ABNORMAL
DRAWN BY BLOOD GAS (OHS): ABNORMAL
EOSINOPHIL # BLD AUTO: 0.2 X10(3)/MCL (ref 0–0.9)
EOSINOPHIL # BLD AUTO: 0.25 X10(3)/MCL (ref 0–0.9)
EOSINOPHIL NFR BLD AUTO: 1.8 %
EOSINOPHIL NFR BLD AUTO: 2.5 %
ERYTHROCYTE [DISTWIDTH] IN BLOOD BY AUTOMATED COUNT: 12.6 % (ref 11.5–17)
ERYTHROCYTE [DISTWIDTH] IN BLOOD BY AUTOMATED COUNT: 12.6 % (ref 11.5–17)
FIO2 BLOOD GAS (OHS): 21 %
FIO2 BLOOD GAS (OHS): 40 %
FIO2: 100
GFR SERPLBLD CREATININE-BSD FMLA CKD-EPI: >60 MLS/MIN/1.73/M2
GLUCOSE SERPL-MCNC: 113 MG/DL (ref 70–110)
GLUCOSE SERPL-MCNC: 165 MG/DL (ref 70–110)
GLUCOSE SERPL-MCNC: 165 MG/DL (ref 70–110)
GLUCOSE SERPL-MCNC: 173 MG/DL (ref 74–100)
GLUCOSE SERPL-MCNC: 180 MG/DL (ref 70–110)
GLUCOSE SERPL-MCNC: 181 MG/DL (ref 74–100)
GLUCOSE SERPL-MCNC: 199 MG/DL (ref 74–100)
HCO3 BLDA-SCNC: 25.4 MMOL/L (ref 22–26)
HCO3 BLDA-SCNC: 25.6 MMOL/L
HCO3 UR-SCNC: 24 MMOL/L (ref 24–28)
HCO3 UR-SCNC: 24 MMOL/L (ref 24–28)
HCO3 UR-SCNC: 24.1 MMOL/L (ref 24–28)
HCO3 UR-SCNC: 25.8 MMOL/L (ref 24–28)
HCT VFR BLD AUTO: 29.5 % (ref 42–52)
HCT VFR BLD AUTO: 29.5 % (ref 42–52)
HCT VFR BLD AUTO: 34 % (ref 42–52)
HCT VFR BLD CALC: 26 %PCV (ref 36–54)
HCT VFR BLD CALC: 26 %PCV (ref 36–54)
HCT VFR BLD CALC: 28 %PCV (ref 36–54)
HCT VFR BLD CALC: 40 %PCV (ref 36–54)
HGB BLD-MCNC: 10 G/DL
HGB BLD-MCNC: 11.8 G/DL (ref 14–18)
HGB BLD-MCNC: 14 G/DL
HGB BLD-MCNC: 9 G/DL
HGB BLD-MCNC: 9 G/DL
HGB BLD-MCNC: 9.7 G/DL (ref 14–18)
HGB BLD-MCNC: 9.8 G/DL (ref 14–18)
IMM GRANULOCYTES # BLD AUTO: 0.07 X10(3)/MCL (ref 0–0.04)
IMM GRANULOCYTES # BLD AUTO: 0.07 X10(3)/MCL (ref 0–0.04)
IMM GRANULOCYTES NFR BLD AUTO: 0.6 %
IMM GRANULOCYTES NFR BLD AUTO: 0.7 %
INR BLD: 1.45 (ref 0–1.3)
INR BLD: 1.58 (ref 0–1.3)
LYMPHOCYTES # BLD AUTO: 1.26 X10(3)/MCL (ref 0.6–4.6)
LYMPHOCYTES # BLD AUTO: 1.63 X10(3)/MCL (ref 0.6–4.6)
LYMPHOCYTES NFR BLD AUTO: 12.6 %
LYMPHOCYTES NFR BLD AUTO: 14.8 %
MAGNESIUM SERPL-MCNC: 2 MG/DL (ref 1.6–2.6)
MCH RBC QN AUTO: 30.7 PG (ref 27–31)
MCH RBC QN AUTO: 31.1 PG (ref 27–31)
MCHC RBC AUTO-ENTMCNC: 32.9 G/DL (ref 33–36)
MCHC RBC AUTO-ENTMCNC: 33.2 G/DL (ref 33–36)
MCV RBC AUTO: 93.4 FL (ref 80–94)
MCV RBC AUTO: 93.7 FL (ref 80–94)
METHGB MFR BLDA: 0.9 % (ref 0.4–1.5)
MODE (OHS): ABNORMAL
MONOCYTES # BLD AUTO: 0.32 X10(3)/MCL (ref 0.1–1.3)
MONOCYTES # BLD AUTO: 0.56 X10(3)/MCL (ref 0.1–1.3)
MONOCYTES NFR BLD AUTO: 3.2 %
MONOCYTES NFR BLD AUTO: 5.1 %
NEUTROPHILS # BLD AUTO: 8.03 X10(3)/MCL (ref 2.1–9.2)
NEUTROPHILS # BLD AUTO: 8.48 X10(3)/MCL (ref 2.1–9.2)
NEUTROPHILS NFR BLD AUTO: 77.3 %
NEUTROPHILS NFR BLD AUTO: 80.6 %
NRBC BLD AUTO-RTO: 0 %
NRBC BLD AUTO-RTO: 0 %
O2 HB BLOOD GAS (OHS): 96.3 % (ref 94–97)
OXYGEN DEVICE BLOOD GAS (OHS): ABNORMAL
OXYHGB MFR BLDA: 13.9 G/DL (ref 12–16)
PCO2 BLDA: 41 MMHG (ref 35–45)
PCO2 BLDA: 46.4 MMHG (ref 35–45)
PCO2 BLDA: 46.6 MMHG (ref 35–45)
PCO2 BLDA: 51 MMHG (ref 35–45)
PCO2 BLDA: 52 MMHG (ref 35–45)
PCO2 BLDA: 52.1 MMHG (ref 35–45)
PH BLDA: 7.3 [PH] (ref 7.35–7.45)
PH BLDA: 7.4 [PH] (ref 7.35–7.45)
PH SMN: 7.28 [PH] (ref 7.35–7.45)
PH SMN: 7.3 [PH] (ref 7.35–7.45)
PH SMN: 7.32 [PH] (ref 7.35–7.45)
PH SMN: 7.32 [PH] (ref 7.35–7.45)
PHOSPHATE SERPL-MCNC: 2.8 MG/DL (ref 2.3–4.7)
PLATELET # BLD AUTO: 128 X10(3)/MCL (ref 130–400)
PLATELET # BLD AUTO: 153 X10(3)/MCL (ref 130–400)
PMV BLD AUTO: 9.5 FL (ref 7.4–10.4)
PMV BLD AUTO: 9.8 FL (ref 7.4–10.4)
PO2 BLDA: 244 MMHG (ref 80–100)
PO2 BLDA: 306 MMHG (ref 80–100)
PO2 BLDA: 42 MMHG (ref 40–60)
PO2 BLDA: 58 MMHG (ref 40–60)
PO2 BLDA: 90 MMHG (ref 80–100)
PO2 BLDA: 94 MMHG (ref 80–100)
POC BE: -1 MMOL/L
POC BE: -2 MMOL/L
POC BE: -2 MMOL/L
POC BE: -3 MMOL/L
POC IONIZED CALCIUM: 1.1 MMOL/L (ref 1.06–1.42)
POC IONIZED CALCIUM: 1.13 MMOL/L (ref 1.06–1.42)
POC IONIZED CALCIUM: 1.25 MMOL/L (ref 1.06–1.42)
POC IONIZED CALCIUM: 1.41 MMOL/L (ref 1.06–1.42)
POC PCO2 TEMP: 46.4 MMHG
POC PH TEMP: 7.32
POC PO2 TEMP: 244 MMHG
POC SATURATED O2: 100 % (ref 95–100)
POC SATURATED O2: 100 % (ref 95–100)
POC SATURATED O2: 71 % (ref 95–100)
POC SATURATED O2: 86 % (ref 95–100)
POC TCO2: 25 MMOL/L (ref 23–27)
POC TCO2: 25 MMOL/L (ref 23–27)
POC TCO2: 25 MMOL/L (ref 24–29)
POC TCO2: 27 MMOL/L (ref 24–29)
POC TEMPERATURE: ABNORMAL
POCT GLUCOSE: 133 MG/DL (ref 70–110)
POCT GLUCOSE: 136 MG/DL (ref 70–110)
POCT GLUCOSE: 153 MG/DL (ref 70–110)
POCT GLUCOSE: 165 MG/DL (ref 70–110)
POCT GLUCOSE: 166 MG/DL (ref 70–110)
POCT GLUCOSE: 176 MG/DL (ref 70–110)
POCT GLUCOSE: 180 MG/DL (ref 70–110)
POCT GLUCOSE: 188 MG/DL (ref 70–110)
POCT GLUCOSE: 98 MG/DL (ref 70–110)
POTASSIUM BLD-SCNC: 3.6 MMOL/L (ref 3.5–5.1)
POTASSIUM BLD-SCNC: 4.1 MMOL/L (ref 3.5–5.1)
POTASSIUM BLD-SCNC: 4.6 MMOL/L (ref 3.5–5.1)
POTASSIUM BLD-SCNC: 4.6 MMOL/L (ref 3.5–5.1)
POTASSIUM BLOOD GAS (OHS): 4.3 MMOL/L (ref 3.5–5)
POTASSIUM SERPL-SCNC: 3.8 MMOL/L (ref 3.5–5.1)
POTASSIUM SERPL-SCNC: 4.2 MMOL/L (ref 3.5–5.1)
POTASSIUM SERPL-SCNC: 4.3 MMOL/L (ref 3.5–5.1)
PROTHROMBIN TIME: 17.5 SECONDS (ref 12.5–14.5)
PROTHROMBIN TIME: 18.7 SECONDS (ref 12.5–14.5)
PS (OHS): 10 CMH2O
RBC # BLD AUTO: 3.15 X10(6)/MCL (ref 4.7–6.1)
RBC # BLD AUTO: 3.16 X10(6)/MCL (ref 4.7–6.1)
SAMPLE SITE BLOOD GAS (OHS): ABNORMAL
SAMPLE SITE BLOOD GAS (OHS): ABNORMAL
SAMPLE: ABNORMAL
SAO2 % BLDA: 96 %
SAO2 % BLDA: 97.3 %
SODIUM BLD-SCNC: 135 MMOL/L (ref 136–145)
SODIUM BLD-SCNC: 137 MMOL/L (ref 136–145)
SODIUM BLD-SCNC: 137 MMOL/L (ref 136–145)
SODIUM BLD-SCNC: 138 MMOL/L (ref 136–145)
SODIUM BLOOD GAS (OHS): 134 MMOL/L (ref 137–145)
SODIUM BLOOD GAS (OHS): 137 MMOL/L (ref 137–145)
SODIUM SERPL-SCNC: 137 MMOL/L (ref 136–145)
SODIUM SERPL-SCNC: 141 MMOL/L (ref 136–145)
SODIUM SERPL-SCNC: 142 MMOL/L (ref 136–145)
UNIT NUMBER: NORMAL
WBC # SPEC AUTO: 10.98 X10(3)/MCL (ref 4.5–11.5)
WBC # SPEC AUTO: 9.97 X10(3)/MCL (ref 4.5–11.5)

## 2023-06-27 PROCEDURE — 63600175 PHARM REV CODE 636 W HCPCS: Performed by: NURSE ANESTHETIST, CERTIFIED REGISTERED

## 2023-06-27 PROCEDURE — 99900035 HC TECH TIME PER 15 MIN (STAT)

## 2023-06-27 PROCEDURE — D9220A PRA ANESTHESIA: Mod: CRNA,,, | Performed by: NURSE ANESTHETIST, CERTIFIED REGISTERED

## 2023-06-27 PROCEDURE — C1768 GRAFT, VASCULAR: HCPCS | Performed by: THORACIC SURGERY (CARDIOTHORACIC VASCULAR SURGERY)

## 2023-06-27 PROCEDURE — 94002 VENT MGMT INPAT INIT DAY: CPT

## 2023-06-27 PROCEDURE — 37799 UNLISTED PX VASCULAR SURGERY: CPT

## 2023-06-27 PROCEDURE — 85025 COMPLETE CBC W/AUTO DIFF WBC: CPT | Performed by: THORACIC SURGERY (CARDIOTHORACIC VASCULAR SURGERY)

## 2023-06-27 PROCEDURE — 33508 ENDOSCOPIC VEIN HARVEST: CPT | Mod: 59,,, | Performed by: THORACIC SURGERY (CARDIOTHORACIC VASCULAR SURGERY)

## 2023-06-27 PROCEDURE — 63600175 PHARM REV CODE 636 W HCPCS

## 2023-06-27 PROCEDURE — 33518 PR CABG, ARTERY-VEIN, TWO: ICD-10-PCS | Mod: AS,,, | Performed by: PHYSICIAN ASSISTANT

## 2023-06-27 PROCEDURE — P9045 ALBUMIN (HUMAN), 5%, 250 ML: HCPCS | Mod: JG

## 2023-06-27 PROCEDURE — 85025 COMPLETE CBC W/AUTO DIFF WBC: CPT | Performed by: PHYSICIAN ASSISTANT

## 2023-06-27 PROCEDURE — 83735 ASSAY OF MAGNESIUM: CPT | Performed by: PHYSICIAN ASSISTANT

## 2023-06-27 PROCEDURE — C1729 CATH, DRAINAGE: HCPCS | Performed by: THORACIC SURGERY (CARDIOTHORACIC VASCULAR SURGERY)

## 2023-06-27 PROCEDURE — C1713 ANCHOR/SCREW BN/BN,TIS/BN: HCPCS | Performed by: THORACIC SURGERY (CARDIOTHORACIC VASCULAR SURGERY)

## 2023-06-27 PROCEDURE — 33533 CABG ARTERIAL SINGLE: CPT | Mod: AS,,, | Performed by: PHYSICIAN ASSISTANT

## 2023-06-27 PROCEDURE — 36620 INSERTION CATHETER ARTERY: CPT | Mod: 59,,, | Performed by: ANESTHESIOLOGY

## 2023-06-27 PROCEDURE — 82803 BLOOD GASES ANY COMBINATION: CPT

## 2023-06-27 PROCEDURE — P9037 PLATE PHERES LEUKOREDU IRRAD: HCPCS | Performed by: THORACIC SURGERY (CARDIOTHORACIC VASCULAR SURGERY)

## 2023-06-27 PROCEDURE — 80048 BASIC METABOLIC PNL TOTAL CA: CPT | Performed by: PHYSICIAN ASSISTANT

## 2023-06-27 PROCEDURE — D9220A PRA ANESTHESIA: ICD-10-PCS | Mod: CRNA,,, | Performed by: NURSE ANESTHETIST, CERTIFIED REGISTERED

## 2023-06-27 PROCEDURE — 84100 ASSAY OF PHOSPHORUS: CPT | Performed by: PHYSICIAN ASSISTANT

## 2023-06-27 PROCEDURE — 33533 PR CABG, ARTERIAL, SINGLE: ICD-10-PCS | Mod: AS,,, | Performed by: PHYSICIAN ASSISTANT

## 2023-06-27 PROCEDURE — 85730 THROMBOPLASTIN TIME PARTIAL: CPT | Performed by: EMERGENCY MEDICINE

## 2023-06-27 PROCEDURE — 36620 ARTERIAL: ICD-10-PCS | Mod: 59,,, | Performed by: ANESTHESIOLOGY

## 2023-06-27 PROCEDURE — 25000003 PHARM REV CODE 250

## 2023-06-27 PROCEDURE — 27201423 OPTIME MED/SURG SUP & DEVICES STERILE SUPPLY: Performed by: THORACIC SURGERY (CARDIOTHORACIC VASCULAR SURGERY)

## 2023-06-27 PROCEDURE — 63600175 PHARM REV CODE 636 W HCPCS: Performed by: THORACIC SURGERY (CARDIOTHORACIC VASCULAR SURGERY)

## 2023-06-27 PROCEDURE — S5010 5% DEXTROSE AND 0.45% SALINE: HCPCS | Performed by: PHYSICIAN ASSISTANT

## 2023-06-27 PROCEDURE — 36556 INSERT NON-TUNNEL CV CATH: CPT | Mod: 59,RT,, | Performed by: ANESTHESIOLOGY

## 2023-06-27 PROCEDURE — D9220A PRA ANESTHESIA: Mod: ANES,,, | Performed by: ANESTHESIOLOGY

## 2023-06-27 PROCEDURE — 37000009 HC ANESTHESIA EA ADD 15 MINS: Performed by: THORACIC SURGERY (CARDIOTHORACIC VASCULAR SURGERY)

## 2023-06-27 PROCEDURE — 25000003 PHARM REV CODE 250: Performed by: THORACIC SURGERY (CARDIOTHORACIC VASCULAR SURGERY)

## 2023-06-27 PROCEDURE — C1894 INTRO/SHEATH, NON-LASER: HCPCS | Performed by: THORACIC SURGERY (CARDIOTHORACIC VASCULAR SURGERY)

## 2023-06-27 PROCEDURE — 33508 PR ENDOSCOPY W/VIDEO-ASST VEIN HARVEST,CABG: ICD-10-PCS | Mod: 59,,, | Performed by: THORACIC SURGERY (CARDIOTHORACIC VASCULAR SURGERY)

## 2023-06-27 PROCEDURE — 33518 PR CABG, ARTERY-VEIN, TWO: ICD-10-PCS | Mod: ,,, | Performed by: THORACIC SURGERY (CARDIOTHORACIC VASCULAR SURGERY)

## 2023-06-27 PROCEDURE — 93312 PR ECHO HEART,TRANSESOPHAGEAL: ICD-10-PCS | Mod: 26,59,, | Performed by: ANESTHESIOLOGY

## 2023-06-27 PROCEDURE — 63600175 PHARM REV CODE 636 W HCPCS: Performed by: PHYSICIAN ASSISTANT

## 2023-06-27 PROCEDURE — 20000000 HC ICU ROOM

## 2023-06-27 PROCEDURE — C1751 CATH, INF, PER/CENT/MIDLINE: HCPCS | Performed by: THORACIC SURGERY (CARDIOTHORACIC VASCULAR SURGERY)

## 2023-06-27 PROCEDURE — 36000713 HC OR TIME LEV V EA ADD 15 MIN: Performed by: THORACIC SURGERY (CARDIOTHORACIC VASCULAR SURGERY)

## 2023-06-27 PROCEDURE — 33518 CABG ARTERY-VEIN TWO: CPT | Mod: AS,,, | Performed by: PHYSICIAN ASSISTANT

## 2023-06-27 PROCEDURE — 37000008 HC ANESTHESIA 1ST 15 MINUTES: Performed by: THORACIC SURGERY (CARDIOTHORACIC VASCULAR SURGERY)

## 2023-06-27 PROCEDURE — 85610 PROTHROMBIN TIME: CPT | Performed by: THORACIC SURGERY (CARDIOTHORACIC VASCULAR SURGERY)

## 2023-06-27 PROCEDURE — 93325 PR DOPPLER COLOR FLOW VELOCITY MAP: ICD-10-PCS | Mod: 26,59,, | Performed by: ANESTHESIOLOGY

## 2023-06-27 PROCEDURE — P9045 ALBUMIN (HUMAN), 5%, 250 ML: HCPCS | Mod: JZ,JG | Performed by: PHYSICIAN ASSISTANT

## 2023-06-27 PROCEDURE — 25000003 PHARM REV CODE 250: Performed by: INTERNAL MEDICINE

## 2023-06-27 PROCEDURE — 85014 HEMATOCRIT: CPT | Performed by: THORACIC SURGERY (CARDIOTHORACIC VASCULAR SURGERY)

## 2023-06-27 PROCEDURE — 85730 THROMBOPLASTIN TIME PARTIAL: CPT | Performed by: THORACIC SURGERY (CARDIOTHORACIC VASCULAR SURGERY)

## 2023-06-27 PROCEDURE — 36000712 HC OR TIME LEV V 1ST 15 MIN: Performed by: THORACIC SURGERY (CARDIOTHORACIC VASCULAR SURGERY)

## 2023-06-27 PROCEDURE — P9047 ALBUMIN (HUMAN), 25%, 50ML: HCPCS | Mod: JG

## 2023-06-27 PROCEDURE — 93325 DOPPLER ECHO COLOR FLOW MAPG: CPT | Mod: 26,59,, | Performed by: ANESTHESIOLOGY

## 2023-06-27 PROCEDURE — 25000003 PHARM REV CODE 250: Performed by: EMERGENCY MEDICINE

## 2023-06-27 PROCEDURE — 25000003 PHARM REV CODE 250: Performed by: PHYSICIAN ASSISTANT

## 2023-06-27 PROCEDURE — 33518 CABG ARTERY-VEIN TWO: CPT | Mod: ,,, | Performed by: THORACIC SURGERY (CARDIOTHORACIC VASCULAR SURGERY)

## 2023-06-27 PROCEDURE — 25000003 PHARM REV CODE 250: Performed by: NURSE ANESTHETIST, CERTIFIED REGISTERED

## 2023-06-27 PROCEDURE — P9047 ALBUMIN (HUMAN), 25%, 50ML: HCPCS | Mod: JG | Performed by: NURSE ANESTHETIST, CERTIFIED REGISTERED

## 2023-06-27 PROCEDURE — C9248 INJ, CLEVIDIPINE BUTYRATE: HCPCS | Mod: JZ,JG | Performed by: THORACIC SURGERY (CARDIOTHORACIC VASCULAR SURGERY)

## 2023-06-27 PROCEDURE — 33533 CABG ARTERIAL SINGLE: CPT | Mod: ,,, | Performed by: THORACIC SURGERY (CARDIOTHORACIC VASCULAR SURGERY)

## 2023-06-27 PROCEDURE — 94799 UNLISTED PULMONARY SVC/PX: CPT

## 2023-06-27 PROCEDURE — 33533 PR CABG, ARTERIAL, SINGLE: ICD-10-PCS | Mod: ,,, | Performed by: THORACIC SURGERY (CARDIOTHORACIC VASCULAR SURGERY)

## 2023-06-27 PROCEDURE — 36620 INSERTION CATHETER ARTERY: CPT | Performed by: NURSE ANESTHETIST, CERTIFIED REGISTERED

## 2023-06-27 PROCEDURE — 85610 PROTHROMBIN TIME: CPT | Performed by: PHYSICIAN ASSISTANT

## 2023-06-27 PROCEDURE — D9220A PRA ANESTHESIA: ICD-10-PCS | Mod: ANES,,, | Performed by: ANESTHESIOLOGY

## 2023-06-27 PROCEDURE — 80048 BASIC METABOLIC PNL TOTAL CA: CPT | Performed by: THORACIC SURGERY (CARDIOTHORACIC VASCULAR SURGERY)

## 2023-06-27 PROCEDURE — 94761 N-INVAS EAR/PLS OXIMETRY MLT: CPT

## 2023-06-27 PROCEDURE — 99900026 HC AIRWAY MAINTENANCE (STAT)

## 2023-06-27 PROCEDURE — 27000221 HC OXYGEN, UP TO 24 HOURS

## 2023-06-27 PROCEDURE — 93312 ECHO TRANSESOPHAGEAL: CPT | Mod: 26,59,, | Performed by: ANESTHESIOLOGY

## 2023-06-27 PROCEDURE — 36556 PR INSERT NON-TUNNEL CV CATH 5+ YRS OLD: ICD-10-PCS | Mod: 59,RT,, | Performed by: ANESTHESIOLOGY

## 2023-06-27 DEVICE — SCREW SD LOCKING 2.3X14MM: Type: IMPLANTABLE DEVICE | Site: STERNUM | Status: FUNCTIONAL

## 2023-06-27 DEVICE — PLATE LOW PROFILE X-PLATE: Type: IMPLANTABLE DEVICE | Site: STERNUM | Status: FUNCTIONAL

## 2023-06-27 DEVICE — PLATE MANUBRIUM LOW PROFILE: Type: IMPLANTABLE DEVICE | Site: STERNUM | Status: FUNCTIONAL

## 2023-06-27 DEVICE — PLATE BOX STERNAL LARGE: Type: IMPLANTABLE DEVICE | Site: STERNUM | Status: FUNCTIONAL

## 2023-06-27 DEVICE — SCREW SD LOCKING 2.3X12MM: Type: IMPLANTABLE DEVICE | Site: STERNUM | Status: FUNCTIONAL

## 2023-06-27 DEVICE — SCREW SD LOCKING 2.3X16MM: Type: IMPLANTABLE DEVICE | Site: STERNUM | Status: FUNCTIONAL

## 2023-06-27 RX ORDER — MAGNESIUM SULFATE HEPTAHYDRATE 40 MG/ML
2 INJECTION, SOLUTION INTRAVENOUS
Status: DISCONTINUED | OUTPATIENT
Start: 2023-06-27 | End: 2023-07-02 | Stop reason: HOSPADM

## 2023-06-27 RX ORDER — CALCIUM CHLORIDE INJECTION 100 MG/ML
INJECTION, SOLUTION INTRAVENOUS
Status: DISCONTINUED | OUTPATIENT
Start: 2023-06-27 | End: 2023-06-27 | Stop reason: HOSPADM

## 2023-06-27 RX ORDER — FOLIC ACID 1 MG/1
1 TABLET ORAL DAILY
Status: DISCONTINUED | OUTPATIENT
Start: 2023-06-28 | End: 2023-07-02 | Stop reason: HOSPADM

## 2023-06-27 RX ORDER — CEFAZOLIN SODIUM 2 G/50ML
2 SOLUTION INTRAVENOUS
Status: COMPLETED | OUTPATIENT
Start: 2023-06-27 | End: 2023-06-28

## 2023-06-27 RX ORDER — PROTAMINE SULFATE 10 MG/ML
INJECTION, SOLUTION INTRAVENOUS
Status: DISCONTINUED | OUTPATIENT
Start: 2023-06-27 | End: 2023-06-27

## 2023-06-27 RX ORDER — SUCRALFATE 1 G/1
1 TABLET ORAL
Status: DISCONTINUED | OUTPATIENT
Start: 2023-06-28 | End: 2023-07-02 | Stop reason: HOSPADM

## 2023-06-27 RX ORDER — LOPERAMIDE HYDROCHLORIDE 2 MG/1
2 CAPSULE ORAL CONTINUOUS PRN
Status: DISCONTINUED | OUTPATIENT
Start: 2023-06-27 | End: 2023-07-02 | Stop reason: HOSPADM

## 2023-06-27 RX ORDER — PAPAVERINE HYDROCHLORIDE 30 MG/ML
INJECTION INTRAMUSCULAR; INTRAVENOUS
Status: DISCONTINUED | OUTPATIENT
Start: 2023-06-27 | End: 2023-06-27 | Stop reason: HOSPADM

## 2023-06-27 RX ORDER — PROPOFOL 10 MG/ML
VIAL (ML) INTRAVENOUS
Status: DISCONTINUED | OUTPATIENT
Start: 2023-06-27 | End: 2023-06-27

## 2023-06-27 RX ORDER — DIPHENHYDRAMINE HYDROCHLORIDE 50 MG/ML
INJECTION INTRAMUSCULAR; INTRAVENOUS
Status: DISCONTINUED | OUTPATIENT
Start: 2023-06-27 | End: 2023-06-27

## 2023-06-27 RX ORDER — FENTANYL CITRATE 50 UG/ML
INJECTION, SOLUTION INTRAMUSCULAR; INTRAVENOUS
Status: DISCONTINUED | OUTPATIENT
Start: 2023-06-27 | End: 2023-06-27

## 2023-06-27 RX ORDER — METOPROLOL TARTRATE 25 MG/1
12.5 TABLET ORAL 2 TIMES DAILY
Status: DISCONTINUED | OUTPATIENT
Start: 2023-06-28 | End: 2023-06-28

## 2023-06-27 RX ORDER — DOCUSATE SODIUM 100 MG/1
100 CAPSULE, LIQUID FILLED ORAL 2 TIMES DAILY
Status: DISCONTINUED | OUTPATIENT
Start: 2023-06-28 | End: 2023-07-02 | Stop reason: HOSPADM

## 2023-06-27 RX ORDER — METOCLOPRAMIDE HYDROCHLORIDE 5 MG/ML
5 INJECTION INTRAMUSCULAR; INTRAVENOUS EVERY 6 HOURS PRN
Status: DISCONTINUED | OUTPATIENT
Start: 2023-06-27 | End: 2023-07-02 | Stop reason: HOSPADM

## 2023-06-27 RX ORDER — ACETAMINOPHEN 10 MG/ML
1000 INJECTION, SOLUTION INTRAVENOUS EVERY 8 HOURS
Status: COMPLETED | OUTPATIENT
Start: 2023-06-27 | End: 2023-06-28

## 2023-06-27 RX ORDER — LACTULOSE 10 G/15ML
20 SOLUTION ORAL EVERY 6 HOURS PRN
Status: DISCONTINUED | OUTPATIENT
Start: 2023-06-27 | End: 2023-07-02 | Stop reason: HOSPADM

## 2023-06-27 RX ORDER — CALCIUM GLUCONATE 20 MG/ML
1 INJECTION, SOLUTION INTRAVENOUS
Status: DISCONTINUED | OUTPATIENT
Start: 2023-06-27 | End: 2023-07-02 | Stop reason: HOSPADM

## 2023-06-27 RX ORDER — HEPARIN SODIUM 1000 [USP'U]/ML
INJECTION, SOLUTION INTRAVENOUS; SUBCUTANEOUS
Status: DISCONTINUED | OUTPATIENT
Start: 2023-06-27 | End: 2023-06-27

## 2023-06-27 RX ORDER — DEXTROSE MONOHYDRATE AND SODIUM CHLORIDE 5; .45 G/100ML; G/100ML
INJECTION, SOLUTION INTRAVENOUS CONTINUOUS
Status: DISCONTINUED | OUTPATIENT
Start: 2023-06-27 | End: 2023-06-28

## 2023-06-27 RX ORDER — DEXMEDETOMIDINE HYDROCHLORIDE 4 UG/ML
0-1.4 INJECTION, SOLUTION INTRAVENOUS CONTINUOUS
Status: DISCONTINUED | OUTPATIENT
Start: 2023-06-27 | End: 2023-06-29 | Stop reason: HOSPADM

## 2023-06-27 RX ORDER — MUPIROCIN 20 MG/G
OINTMENT TOPICAL 2 TIMES DAILY
Status: DISPENSED | OUTPATIENT
Start: 2023-06-27 | End: 2023-07-02

## 2023-06-27 RX ORDER — CALCIUM CHLORIDE INJECTION 100 MG/ML
INJECTION, SOLUTION INTRAVENOUS
Status: DISCONTINUED | OUTPATIENT
Start: 2023-06-27 | End: 2023-06-27

## 2023-06-27 RX ORDER — POTASSIUM CHLORIDE 14.9 MG/ML
60 INJECTION INTRAVENOUS
Status: DISCONTINUED | OUTPATIENT
Start: 2023-06-27 | End: 2023-07-02 | Stop reason: HOSPADM

## 2023-06-27 RX ORDER — LISINOPRIL 20 MG/1
20 TABLET ORAL DAILY
Status: DISCONTINUED | OUTPATIENT
Start: 2023-06-28 | End: 2023-06-28

## 2023-06-27 RX ORDER — DESMOPRESSIN ACETATE 4 UG/ML
INJECTION, SOLUTION INTRAVENOUS; SUBCUTANEOUS
Status: DISCONTINUED | OUTPATIENT
Start: 2023-06-27 | End: 2023-06-27

## 2023-06-27 RX ORDER — ALBUMIN HUMAN 250 G/1000ML
SOLUTION INTRAVENOUS CONTINUOUS PRN
Status: DISCONTINUED | OUTPATIENT
Start: 2023-06-27 | End: 2023-06-27

## 2023-06-27 RX ORDER — SODIUM CHLORIDE, SODIUM GLUCONATE, SODIUM ACETATE, POTASSIUM CHLORIDE AND MAGNESIUM CHLORIDE 30; 37; 368; 526; 502 MG/100ML; MG/100ML; MG/100ML; MG/100ML; MG/100ML
INJECTION, SOLUTION INTRAVENOUS CONTINUOUS
Status: DISCONTINUED | OUTPATIENT
Start: 2023-06-27 | End: 2023-07-02 | Stop reason: HOSPADM

## 2023-06-27 RX ORDER — POTASSIUM CHLORIDE 14.9 MG/ML
20 INJECTION INTRAVENOUS
Status: DISCONTINUED | OUTPATIENT
Start: 2023-06-27 | End: 2023-07-02 | Stop reason: HOSPADM

## 2023-06-27 RX ORDER — EPINEPHRINE 0.1 MG/ML
INJECTION INTRAVENOUS
Status: DISCONTINUED | OUTPATIENT
Start: 2023-06-27 | End: 2023-06-27

## 2023-06-27 RX ORDER — LIDOCAINE HYDROCHLORIDE 20 MG/ML
INJECTION, SOLUTION EPIDURAL; INFILTRATION; INTRACAUDAL; PERINEURAL
Status: DISCONTINUED | OUTPATIENT
Start: 2023-06-27 | End: 2023-06-27

## 2023-06-27 RX ORDER — ALBUMIN HUMAN 50 G/1000ML
12.5 SOLUTION INTRAVENOUS
Status: DISCONTINUED | OUTPATIENT
Start: 2023-06-27 | End: 2023-07-02 | Stop reason: HOSPADM

## 2023-06-27 RX ORDER — POTASSIUM CHLORIDE 14.9 MG/ML
40 INJECTION INTRAVENOUS
Status: DISCONTINUED | OUTPATIENT
Start: 2023-06-27 | End: 2023-07-02 | Stop reason: HOSPADM

## 2023-06-27 RX ORDER — LIDOCAINE HYDROCHLORIDE 10 MG/ML
1 INJECTION, SOLUTION EPIDURAL; INFILTRATION; INTRACAUDAL; PERINEURAL ONCE
Status: CANCELLED | OUTPATIENT
Start: 2023-06-27 | End: 2023-06-27

## 2023-06-27 RX ORDER — MORPHINE SULFATE 10 MG/ML
4 INJECTION INTRAMUSCULAR; INTRAVENOUS; SUBCUTANEOUS EVERY 4 HOURS PRN
Status: DISCONTINUED | OUTPATIENT
Start: 2023-06-27 | End: 2023-07-02 | Stop reason: HOSPADM

## 2023-06-27 RX ORDER — FAMOTIDINE 10 MG/ML
20 INJECTION INTRAVENOUS ONCE
Status: CANCELLED | OUTPATIENT
Start: 2023-06-27

## 2023-06-27 RX ORDER — MIDAZOLAM HYDROCHLORIDE 1 MG/ML
INJECTION INTRAMUSCULAR; INTRAVENOUS
Status: DISCONTINUED | OUTPATIENT
Start: 2023-06-27 | End: 2023-06-27

## 2023-06-27 RX ORDER — ROCURONIUM BROMIDE 10 MG/ML
INJECTION, SOLUTION INTRAVENOUS
Status: DISCONTINUED | OUTPATIENT
Start: 2023-06-27 | End: 2023-06-27

## 2023-06-27 RX ORDER — VASOPRESSIN 20 [USP'U]/ML
INJECTION, SOLUTION INTRAMUSCULAR; SUBCUTANEOUS
Status: DISCONTINUED | OUTPATIENT
Start: 2023-06-27 | End: 2023-06-27

## 2023-06-27 RX ORDER — ASPIRIN 81 MG/1
81 TABLET ORAL DAILY
Status: DISCONTINUED | OUTPATIENT
Start: 2023-06-28 | End: 2023-06-27

## 2023-06-27 RX ORDER — CALCIUM GLUCONATE 20 MG/ML
3 INJECTION, SOLUTION INTRAVENOUS
Status: DISCONTINUED | OUTPATIENT
Start: 2023-06-27 | End: 2023-07-02 | Stop reason: HOSPADM

## 2023-06-27 RX ORDER — MAGNESIUM SULFATE HEPTAHYDRATE 40 MG/ML
4 INJECTION, SOLUTION INTRAVENOUS
Status: DISCONTINUED | OUTPATIENT
Start: 2023-06-27 | End: 2023-07-02 | Stop reason: HOSPADM

## 2023-06-27 RX ORDER — FAMOTIDINE 10 MG/ML
20 INJECTION INTRAVENOUS DAILY
Status: DISCONTINUED | OUTPATIENT
Start: 2023-06-27 | End: 2023-07-02 | Stop reason: HOSPADM

## 2023-06-27 RX ORDER — OXYCODONE HYDROCHLORIDE 10 MG/1
10 TABLET ORAL EVERY 4 HOURS PRN
Status: DISCONTINUED | OUTPATIENT
Start: 2023-06-27 | End: 2023-07-02 | Stop reason: HOSPADM

## 2023-06-27 RX ORDER — ONDANSETRON 2 MG/ML
4 INJECTION INTRAMUSCULAR; INTRAVENOUS EVERY 4 HOURS PRN
Status: DISCONTINUED | OUTPATIENT
Start: 2023-06-27 | End: 2023-07-02 | Stop reason: HOSPADM

## 2023-06-27 RX ORDER — PHENYLEPHRINE HYDROCHLORIDE 10 MG/ML
INJECTION INTRAVENOUS
Status: DISCONTINUED | OUTPATIENT
Start: 2023-06-27 | End: 2023-06-27

## 2023-06-27 RX ORDER — METOPROLOL TARTRATE 1 MG/ML
INJECTION, SOLUTION INTRAVENOUS
Status: DISCONTINUED | OUTPATIENT
Start: 2023-06-27 | End: 2023-06-27

## 2023-06-27 RX ORDER — HYDROCODONE BITARTRATE AND ACETAMINOPHEN 5; 325 MG/1; MG/1
1 TABLET ORAL EVERY 4 HOURS PRN
Status: DISCONTINUED | OUTPATIENT
Start: 2023-06-27 | End: 2023-07-02 | Stop reason: HOSPADM

## 2023-06-27 RX ORDER — CALCIUM GLUCONATE 20 MG/ML
2 INJECTION, SOLUTION INTRAVENOUS
Status: DISCONTINUED | OUTPATIENT
Start: 2023-06-27 | End: 2023-07-02 | Stop reason: HOSPADM

## 2023-06-27 RX ORDER — HEPARIN SODIUM 5000 [USP'U]/ML
INJECTION, SOLUTION INTRAVENOUS; SUBCUTANEOUS
Status: DISCONTINUED | OUTPATIENT
Start: 2023-06-27 | End: 2023-06-27 | Stop reason: HOSPADM

## 2023-06-27 RX ORDER — AMLODIPINE BESYLATE 5 MG/1
10 TABLET ORAL DAILY
Status: DISCONTINUED | OUTPATIENT
Start: 2023-06-28 | End: 2023-06-28

## 2023-06-27 RX ORDER — TRANEXAMIC ACID 100 MG/ML
INJECTION, SOLUTION INTRAVENOUS
Status: DISCONTINUED | OUTPATIENT
Start: 2023-06-27 | End: 2023-06-27

## 2023-06-27 RX ORDER — ACETAMINOPHEN 10 MG/ML
INJECTION, SOLUTION INTRAVENOUS
Status: DISCONTINUED | OUTPATIENT
Start: 2023-06-27 | End: 2023-06-27

## 2023-06-27 RX ORDER — ACETAMINOPHEN 650 MG/20.3ML
650 LIQUID ORAL EVERY 6 HOURS PRN
Status: DISCONTINUED | OUTPATIENT
Start: 2023-06-27 | End: 2023-07-02 | Stop reason: HOSPADM

## 2023-06-27 RX ORDER — KETOROLAC TROMETHAMINE 30 MG/ML
30 INJECTION, SOLUTION INTRAMUSCULAR; INTRAVENOUS EVERY 6 HOURS PRN
Status: DISPENSED | OUTPATIENT
Start: 2023-06-27 | End: 2023-06-28

## 2023-06-27 RX ADMIN — ACETAMINOPHEN 1000 MG: 10 INJECTION, SOLUTION INTRAVENOUS at 02:06

## 2023-06-27 RX ADMIN — MIDAZOLAM HYDROCHLORIDE 3 MG: 1 INJECTION, SOLUTION INTRAMUSCULAR; INTRAVENOUS at 07:06

## 2023-06-27 RX ADMIN — PROPOFOL 50 MG: 10 INJECTION, EMULSION INTRAVENOUS at 07:06

## 2023-06-27 RX ADMIN — HEPARIN SODIUM 35000 UNITS: 1000 INJECTION, SOLUTION INTRAVENOUS; SUBCUTANEOUS at 08:06

## 2023-06-27 RX ADMIN — EPINEPHRINE 4 MCG: 0.1 INJECTION INTRAVENOUS at 08:06

## 2023-06-27 RX ADMIN — MUPIROCIN: 20 OINTMENT TOPICAL at 08:06

## 2023-06-27 RX ADMIN — LIDOCAINE HYDROCHLORIDE 80 MG: 20 INJECTION, SOLUTION EPIDURAL; INFILTRATION; INTRACAUDAL; PERINEURAL at 06:06

## 2023-06-27 RX ADMIN — ALBUMIN (HUMAN): 12.5 SOLUTION INTRAVENOUS at 10:06

## 2023-06-27 RX ADMIN — SUGAMMADEX 200 MG: 100 INJECTION, SOLUTION INTRAVENOUS at 10:06

## 2023-06-27 RX ADMIN — ROCURONIUM BROMIDE 100 MG: 10 SOLUTION INTRAVENOUS at 06:06

## 2023-06-27 RX ADMIN — ALBUMIN (HUMAN) 12.5 G: 2.5 SOLUTION INTRAVENOUS at 03:06

## 2023-06-27 RX ADMIN — TRANEXAMIC ACID 1000 MG: 100 INJECTION, SOLUTION INTRAVENOUS at 07:06

## 2023-06-27 RX ADMIN — ACETAMINOPHEN 1000 MG: 10 INJECTION, SOLUTION INTRAVENOUS at 09:06

## 2023-06-27 RX ADMIN — VASOPRESSIN 2 UNITS: 20 INJECTION INTRAVENOUS at 08:06

## 2023-06-27 RX ADMIN — CLEVIPIDINE 12 MG/HR: 0.5 EMULSION INTRAVENOUS at 08:06

## 2023-06-27 RX ADMIN — OXYCODONE HYDROCHLORIDE 10 MG: 5 TABLET ORAL at 06:06

## 2023-06-27 RX ADMIN — ROCURONIUM BROMIDE 50 MG: 10 SOLUTION INTRAVENOUS at 08:06

## 2023-06-27 RX ADMIN — KETOROLAC TROMETHAMINE 30 MG: 30 INJECTION, SOLUTION INTRAMUSCULAR; INTRAVENOUS at 01:06

## 2023-06-27 RX ADMIN — CALCIUM CHLORIDE INJECTION 250 MG: 100 INJECTION, SOLUTION INTRAVENOUS at 09:06

## 2023-06-27 RX ADMIN — HYDROCODONE BITARTRATE AND ACETAMINOPHEN 1 TABLET: 5; 325 TABLET ORAL at 04:06

## 2023-06-27 RX ADMIN — PHENYLEPHRINE HYDROCHLORIDE 200 MCG: 10 INJECTION INTRAVENOUS at 08:06

## 2023-06-27 RX ADMIN — METOPROLOL TARTRATE 2 MG: 1 INJECTION, SOLUTION INTRAVENOUS at 09:06

## 2023-06-27 RX ADMIN — PROPOFOL 150 MG: 10 INJECTION, EMULSION INTRAVENOUS at 06:06

## 2023-06-27 RX ADMIN — PHENYLEPHRINE HYDROCHLORIDE 100 MCG: 10 INJECTION INTRAVENOUS at 06:06

## 2023-06-27 RX ADMIN — DEXTROSE AND SODIUM CHLORIDE: 5; 450 INJECTION, SOLUTION INTRAVENOUS at 11:06

## 2023-06-27 RX ADMIN — PHENYLEPHRINE HYDROCHLORIDE 100 MCG: 10 INJECTION INTRAVENOUS at 09:06

## 2023-06-27 RX ADMIN — SODIUM CHLORIDE, SODIUM GLUCONATE, SODIUM ACETATE, POTASSIUM CHLORIDE AND MAGNESIUM CHLORIDE: 526; 502; 368; 37; 30 INJECTION, SOLUTION INTRAVENOUS at 02:06

## 2023-06-27 RX ADMIN — MESALAMINE 1000 MG: 1000 SUPPOSITORY RECTAL at 12:06

## 2023-06-27 RX ADMIN — MIDAZOLAM HYDROCHLORIDE 2 MG: 1 INJECTION, SOLUTION INTRAMUSCULAR; INTRAVENOUS at 06:06

## 2023-06-27 RX ADMIN — FENTANYL CITRATE 100 MCG: 50 INJECTION, SOLUTION INTRAMUSCULAR; INTRAVENOUS at 09:06

## 2023-06-27 RX ADMIN — SODIUM CHLORIDE 2 UNITS/HR: 9 INJECTION, SOLUTION INTRAVENOUS at 09:06

## 2023-06-27 RX ADMIN — MORPHINE SULFATE 4 MG: 10 INJECTION, SOLUTION INTRAMUSCULAR; INTRAVENOUS at 11:06

## 2023-06-27 RX ADMIN — DESMOPRESSIN ACETATE 32 MCG: 4 SOLUTION INTRAVENOUS at 09:06

## 2023-06-27 RX ADMIN — TRANEXAMIC ACID 1000 MG: 100 INJECTION, SOLUTION INTRAVENOUS at 09:06

## 2023-06-27 RX ADMIN — INSULIN HUMAN 6 UNITS/HR: 1 INJECTION, SOLUTION INTRAVENOUS at 11:06

## 2023-06-27 RX ADMIN — ALBUMIN (HUMAN): 12.5 SOLUTION INTRAVENOUS at 08:06

## 2023-06-27 RX ADMIN — NITROGLYCERIN 0.04 MCG/MIN: 20 INJECTION INTRAVENOUS at 06:06

## 2023-06-27 RX ADMIN — VASOPRESSIN 1 UNITS: 20 INJECTION INTRAVENOUS at 08:06

## 2023-06-27 RX ADMIN — PROTAMINE SULFATE 50 MG: 10 INJECTION, SOLUTION INTRAVENOUS at 10:06

## 2023-06-27 RX ADMIN — CEFAZOLIN SODIUM 2 G: 2 SOLUTION INTRAVENOUS at 06:06

## 2023-06-27 RX ADMIN — FENTANYL CITRATE 250 MCG: 50 INJECTION, SOLUTION INTRAMUSCULAR; INTRAVENOUS at 07:06

## 2023-06-27 RX ADMIN — CALCIUM CHLORIDE INJECTION 2500 MG: 100 INJECTION, SOLUTION INTRAVENOUS at 10:06

## 2023-06-27 RX ADMIN — OXYCODONE HYDROCHLORIDE 10 MG: 5 TABLET ORAL at 02:06

## 2023-06-27 RX ADMIN — METOPROLOL TARTRATE 3 MG: 1 INJECTION, SOLUTION INTRAVENOUS at 09:06

## 2023-06-27 RX ADMIN — DEXTROSE AND SODIUM CHLORIDE: 5; 450 INJECTION, SOLUTION INTRAVENOUS at 08:06

## 2023-06-27 RX ADMIN — ACETAMINOPHEN 1000 MG: 10 INJECTION, SOLUTION INTRAVENOUS at 07:06

## 2023-06-27 RX ADMIN — ROCURONIUM BROMIDE 25 MG: 10 SOLUTION INTRAVENOUS at 09:06

## 2023-06-27 RX ADMIN — DEXMEDETOMIDINE HYDROCHLORIDE 0.8 MCG/KG/HR: 400 INJECTION INTRAVENOUS at 09:06

## 2023-06-27 RX ADMIN — CLEVIPIDINE 12 MG/HR: 0.5 EMULSION INTRAVENOUS at 11:06

## 2023-06-27 RX ADMIN — PROTAMINE SULFATE 450 MG: 10 INJECTION, SOLUTION INTRAVENOUS at 09:06

## 2023-06-27 RX ADMIN — PHENYLEPHRINE HYDROCHLORIDE 100 MCG: 10 INJECTION INTRAVENOUS at 08:06

## 2023-06-27 RX ADMIN — DIPHENHYDRAMINE HYDROCHLORIDE 50 MG: 50 INJECTION INTRAMUSCULAR; INTRAVENOUS at 08:06

## 2023-06-27 RX ADMIN — SODIUM CHLORIDE: 9 INJECTION, SOLUTION INTRAVENOUS at 06:06

## 2023-06-27 RX ADMIN — CLEVIPIDINE 10 MG/HR: 0.5 EMULSION INTRAVENOUS at 06:06

## 2023-06-27 RX ADMIN — EPINEPHRINE 0.01 MCG/KG/MIN: 1 INJECTION INTRAMUSCULAR; INTRAVENOUS; SUBCUTANEOUS at 09:06

## 2023-06-27 RX ADMIN — FENTANYL CITRATE 250 MCG: 50 INJECTION, SOLUTION INTRAMUSCULAR; INTRAVENOUS at 06:06

## 2023-06-27 RX ADMIN — CALCIUM CHLORIDE INJECTION 500 MG: 100 INJECTION, SOLUTION INTRAVENOUS at 09:06

## 2023-06-27 RX ADMIN — CLEVIPIDINE 16 MG/HR: 0.5 EMULSION INTRAVENOUS at 06:06

## 2023-06-27 RX ADMIN — DEXTROSE MONOHYDRATE 1.5 G: 50 INJECTION, SOLUTION INTRAVENOUS at 06:06

## 2023-06-27 NOTE — BRIEF OP NOTE
[facility]  Cardiothoracic Surgery  Operative Note    SUMMARY     Date of Procedure: 6/27/2023     Procedure: Procedure(s) (LRB):  CORONARY ARTERY BYPASS GRAFT (CABG) (N/A)  PLATING, STERNAL (N/A)  SURGICAL PROCUREMENT, VEIN, ENDOSCOPIC (N/A)    CABG x3 LIMA- LAD, SV- OM, SV- RCA.  [facility]    Surgeon(s) and Role:     * Jhony Sharma MD - Primary     * Jaswinder Jauregui PA-C - Assisting    Assistant: Jaswinder Jauregui PA-C    Pre-Operative Diagnosis: Coronary artery disease involving native coronary artery of native heart, unspecified whether angina present [I25.10]    Post-Operative Diagnosis: Post-Op Diagnosis Codes:     * Coronary artery disease involving native coronary artery of native heart, unspecified whether angina present [I25.10]    Anesthesia: General    Operative Findings (including complications, if any):            Specimens:   Specimen (24h ago, onward)      None                    Condition: Good    Disposition: ICU - intubated and hemodynamically stable.

## 2023-06-27 NOTE — PLAN OF CARE
Problem: Adult Inpatient Plan of Care  Goal: Plan of Care Review  Outcome: Ongoing, Progressing  Flowsheets (Taken 6/26/2023 1920)  Plan of Care Reviewed With:   patient   spouse   family  Goal: Optimal Comfort and Wellbeing  Outcome: Ongoing, Progressing  Intervention: Monitor Pain and Promote Comfort  Flowsheets (Taken 6/26/2023 1920)  Pain Management Interventions:   pillow support provided   quiet environment facilitated   medication offered  Intervention: Provide Person-Centered Care  Flowsheets (Taken 6/26/2023 1920)  Trust Relationship/Rapport:   questions encouraged   care explained   choices provided   reassurance provided   emotional support provided   thoughts/feelings acknowledged   empathic listening provided   questions answered     Problem: Fall Injury Risk  Goal: Absence of Fall and Fall-Related Injury  Outcome: Ongoing, Progressing  Intervention: Promote Injury-Free Environment  Flowsheets (Taken 6/26/2023 1920)  Safety Promotion/Fall Prevention:   assistive device/personal item within reach   medications reviewed

## 2023-06-27 NOTE — ANESTHESIA PROCEDURE NOTES
Intubation    Date/Time: 6/27/2023 6:49 AM  Performed by: Justin Metcalf CRNA  Authorized by: Sandor Franco DO     Intubation:     Induction:  Intravenous    Intubated:  Postinduction    Mask Ventilation:  Easy with oral airway    Attempts:  1    Attempted By:  CRNA    Method of Intubation:  Direct    Blade:  Rincon 4    Laryngeal View Grade: Grade IIA - cords partially seen      Difficult Airway Encountered?: No      Complications:  None    Airway Device:  Oral endotracheal tube    Airway Device Size:  8.0    Style/Cuff Inflation:  Cuffed    Inflation Amount (mL):  7    Tube secured:  23    Secured at:  The lips    Placement Verified By:  Capnometry and other (see comments)    Complicating Factors:  None    Findings Post-Intubation:  BS equal bilateral and atraumatic/condition of teeth unchanged

## 2023-06-27 NOTE — ANESTHESIA PROCEDURE NOTES
Central Line    Diagnosis: Coronary artery disease  Patient location during procedure: done in OR  Timeout: 6/27/2023 7:04 AM    Staffing  Authorizing Provider: Sandor Franco DO  Performing Provider: Sandor Franco DO    Staffing  Performed: anesthesiologist   Anesthesiologist: Sandor Franco DO  Anesthesiologist was present at the time of the procedure.  Preanesthetic Checklist  Completed: patient identified, IV checked, site marked, risks and benefits discussed, surgical consent, monitors and equipment checked, pre-op evaluation, timeout performed and anesthesia consent given  Indication   Indication: hemodynamic monitoring, vascular access, med administration     Anesthesia   general anesthesia    Central Line   Skin Prep: skin prepped with ChloraPrep, skin prep agent completely dried prior to procedure  Sterile Barriers Followed: Yes    All five maximal barriers used- gloves, gown, cap, mask, and large sterile sheet    hand hygiene performed prior to central venous catheter insertion  Location: right internal jugular.   Catheter type: introducer  Catheter Size: 9 Fr  Inserted Catheter Length: 11.5 cm  Ultrasound: vascular probe with ultrasound   Vessel Caliber: medium, patent, compressibility normal  Needle advanced into vessel with real time Ultrasound guidance.  Guidewire confirmed in vessel.  sterile gel and probe cover used in ultrasound-guided central venous catheter insertion  Manometry: none  Insertion Attempts: 1   Securement:line sutured, sterile dressing applied, blood return through all ports and chlorhexidine patch    Post-Procedure   X-Ray: placement verified by x-ray   Adverse Events:none      Guidewire Guidewire removed intact.

## 2023-06-27 NOTE — ANESTHESIA PROCEDURE NOTES
Arterial    Diagnosis: Coronary Artery Disease    Patient location during procedure: holding area    Staffing  Authorizing Provider: Justin Metcalf CRNA  Performing Provider: Justin Metcalf CRNA    Anesthesiologist was present at the time of the procedure.    Preanesthetic Checklist  Completed: patient identified, IV checked, site marked, risks and benefits discussed, surgical consent, monitors and equipment checked, pre-op evaluation, timeout performed and anesthesia consent givenArterial  Skin Prep: chlorhexidine gluconate  Local Infiltration: lidocaine  Orientation: right  Location: radial    Catheter Size: 20 G  Catheter placement by Anatomical landmarks. Heme positive aspiration all ports. Insertion Attempts: 1  Assessment  Dressing: secured with tape and tegaderm  Patient: Tolerated well

## 2023-06-27 NOTE — H&P
Critical Care Medicine History and Physical Note   [facility] - [facility]      Patient Name: Alex Reynoso  MRN: [id number]  Admission Date: 6/23/2023  Hospital Length of Stay: 4 days  Code Status: Full Code  Attending Provider: Jhony Sharma MD  Primary Care Provider: Glen Duffy NP   Principal Problem: <principal problem not specified>        HPI:  Before year old gentleman with past medical history of coronary artery disease, hypertension, hyperlipidemia further patient also has history of prostate cancer status post prostatectomy and radiation admitted for left arm intermittent chest pain patient was already scheduled for CABG underwent left heart catheterization on the 19th and was found to severe multivessel coronary artery disease at admission patient was started on heparin drip now status post urgent coronary artery bypass grafting x3 with EVH, left internal mammary artery to LAD, reverse saphenous vein graft to obtuse marginal and reverse saphenous vein graft to PDA.        Past Medical History:   Diagnosis Date    Acid reflux     Arthritis     Back pain     CAD (coronary artery disease), native coronary artery     Coronary artery disease     Coronary artery disease involving native coronary artery of native heart, unspecified whether angina present     High cholesterol     HTN (hypertension)     Obesity     Prostate cancer     Seasonal allergies     Shortness of breath          Past Surgical History:   Procedure Laterality Date    CHOLECYSTECTOMY      COLONOSCOPY      CORONARY ANGIOGRAPHY N/A 06/21/2023    Procedure: ANGIOGRAM, CORONARY ARTERY;  Surgeon: Raji Hughes MD;  Location: [facility] CATH LAB;  Service: Cardiology;  Laterality: N/A;    LEFT HEART CATHETERIZATION Left 12/01/2022      Moderate noncritical multivessel CAD with stenosis up to 60%    LEFT HEART CATHETERIZATION Left 06/21/2023    Procedure: Left heart cath;  Surgeon: Raji Hughes MD;  Location: [facility] CATH LAB;  Service:  Cardiology;  Laterality: Left;  C +/- PCI    LIPOMA RESECTION N/A 03/30/2023    Procedure: EXCISION, LIPOMA (excision of post neck lipoma);  Surgeon: Samuel Cruz MD;  Location: [facility] OR;  Service: General;  Laterality: N/A;  10 x 5 lipoma     PROSTATECTOMY      VENTRICULOGRAM, LEFT  06/21/2023    Procedure: Ventriculogram, Left;  Surgeon: Raji Hughes MD;  Location: [facility] CATH LAB;  Service: Cardiology;;         Social History     Socioeconomic History    Marital status:    Tobacco Use    Smoking status: Every Day     Packs/day: 0.50     Types: Cigarettes    Smokeless tobacco: Never   Substance and Sexual Activity    Alcohol use: Not Currently     Comment: socially    Drug use: Not Currently    Sexual activity: Not Currently     Social Determinants of Health     Financial Resource Strain: Low Risk     Difficulty of Paying Living Expenses: Not hard at all   Food Insecurity: No Food Insecurity    Worried About Running Out of Food in the Last Year: Never true    Ran Out of Food in the Last Year: Never true   Transportation Needs: No Transportation Needs    Lack of Transportation (Medical): No    Lack of Transportation (Non-Medical): No   Physical Activity: Inactive    Days of Exercise per Week: 0 days    Minutes of Exercise per Session: 0 min   Stress: No Stress Concern Present    Feeling of Stress : Not at all   Social Connections: Moderately Integrated    Frequency of Communication with Friends and Family: More than three times a week    Frequency of Social Gatherings with Friends and Family: More than three times a week    Attends Orthodox Services: 1 to 4 times per year    Active Member of Clubs or Organizations: No    Attends Club or Organization Meetings: Never    Marital Status:    Housing Stability: Low Risk     Unable to Pay for Housing in the Last Year: No    Number of Places Lived in the Last Year: 1    Unstable Housing in the Last Year: No         Family History   Problem Relation  Age of Onset    Hypertension Mother     Diabetes Mother     Prostate cancer Father          Drug Allergies:   Review of patient's allergies indicates:  No Known Allergies      Current Infusions:   dexmedeTOMIDine (Precedex) infusion (titrating)      dextrose 5 % and 0.45 % NaCl      electrolyte-A      EPINEPHrine      insulin regular 1 units/mL infusion orderable (CTS POST-OP)      loperamide           Scheduled Medications:     acetaminophen  1,000 mg Intravenous Q8H    aspirin  325 mg Oral Daily    ceFAZolin (ANCEF) IVPB  2 g Intravenous Q12H    [START ON 6/28/2023] docusate sodium  100 mg Oral BID    famotidine (PF)  20 mg Intravenous Daily    [START ON 6/28/2023] folic acid  1 mg Oral Daily    mesalamine  1,000 mg Rectal Nightly    [START ON 6/28/2023] metoprolol tartrate  12.5 mg Oral BID    mupirocin   Nasal BID    [START ON 6/28/2023] sucralfate  1 g Oral QID (AC & HS)         PRN Medications:   acetaminophen, albumin human 5%, calcium gluconate IVPB, calcium gluconate IVPB, calcium gluconate IVPB, dextrose 10%, dextrose 10%, HYDROcodone-acetaminophen, ketorolac, lactulose 10 gram/15 ml, loperamide, magnesium sulfate IVPB, magnesium sulfate IVPB, metoclopramide HCl, morphine, nitroGLYCERIN, ondansetron, oxyCODONE, oxyCODONE-acetaminophen, potassium chloride in water, potassium chloride in water, potassium chloride in water, sodium chloride 0.9%, sodium phosphate IVPB, sodium phosphate IVPB, sodium phosphate IVPB      ROS      Vital Signs:    Vitals:    06/27/23 1430   BP: 112/87   Pulse: 83   Resp: 15   Temp:          Fluid Balance:     Intake/Output Summary (Last 24 hours) at 6/27/2023 1438  Last data filed at 6/27/2023 1045  Gross per 24 hour   Intake 2932.33 ml   Output 1275 ml   Net 1657.33 ml         Physical Exam    Awake , responsive   Coarse BS   S1s2  N/T,N/D  CN 2- 12 Intact       Ventilator  Vent Mode: CPAP (06/27/23 1120)  Ventilator Initiated: Yes (06/27/23 1058)  Set Rate: 20 BPM (06/27/23  1058)  Vt Set: 500 mL (06/27/23 1058)  Pressure Support: 10 cmH20 (06/27/23 1120)  PEEP/CPAP: 5 cmH20 (06/27/23 1120)  Oxygen Concentration (%): 40 (06/27/23 1120)  Peak Airway Pressure: 16 cmH20 (06/27/23 1120)  Total Ve: 10.8 L/m (06/27/23 1120)  F/VT Ratio<105 (RSBI): (!) 43.73 (06/27/23 1120)      Laboratory Studies:     Recent Labs   Lab 06/27/23  1023 06/27/23  1205   PH 7.321* 7.300*   PCO2 46.4*  --    PO2 244* 90.0   HCO3 24.0 25.6   POCSATURATED 100  --    BE -2  --        Recent Labs   Lab 06/27/23  1137   WBC 10.98   RBC 3.16*   HGB 9.7*   HCT 29.5*      MCV 93.4   MCH 30.7   MCHC 32.9*       Recent Labs   Lab 06/27/23  1137   GLUCOSE 173*      K 4.2   CO2 23   BUN 10.0   CREATININE 0.79   MG 2.00         Microbiology Data:   Microbiology Results (last 7 days)       Procedure Component Value Units Date/Time    MRSA Screen by PCR [[id number]] Collected: 06/26/23 1748    Order Status: Canceled Specimen: Nasopharyngeal Swab from Nasal Updated: 06/26/23 1748            Imaging reviewed:  X-Ray Chest AP Portable  Narrative: EXAMINATION:  XR CHEST AP PORTABLE    CLINICAL HISTORY:  Post-op;    COMPARISON:  Yesterday    FINDINGS:  Portable frontal view of the chest was obtained. Interval median sternotomy.  Endotracheal tube tip near the level of the clavicular heads.  Enteric tube extends into the stomach.  There is a right jugular sheath.  There are mediastinal drains.  Heart size upper limit normal.  Low lung volumes with some postoperative atelectasis and pulmonary vascular congestion suspected.  No convincing pneumothorax..  Impression: Expected post median sternotomy chest with some atelectasis and mild pulmonary vascular congestion suspected.    Electronically signed by: Eugene Saldivar  Date:    06/27/2023  Time:    13:46        2D ECHO Results    No results found in the last 24 hours.       Pulmonary Functions Testing Results:    No results found for: FEV1, FVC, AUO6UCM, TLC,  DLCO      Assessment and Plan:    Assessment:  CORONARY ARTERY BYPASS GRAFT (CABG) (N/A)  PLATING, STERNAL (N/A)  SURGICAL PROCUREMENT, VEIN, ENDOSCOPIC (N/A)  CABG x3 LIMA- LAD, SV- OM, SV- RCA.  EVH LLE  Severe CAD  Morbid Obesity   HTN  DM   0      Plan:  -Tolerated weaning protocol well, now status post extubation on nasal cannula patient with high [place] sleep score likely untreated sleep apnea recommend nocturnal CPAP  -lung reexpansion therapies  -on Cleviprex for blood pressure control  -glycemic control  -H&H stable   - Moniter and Replace lytes  - PT/OT       Keep HOB elevated > 30*      The patient remains at high risk of decompensation and death and will remain in ICU level care    32 min of critical care time was spent reviewing the patient's chart including medications, radiographs, labs, pertinent cultures and pathology data, other consultant notes/recomendations as well as titration of vasopressors, adjustment of mechanical ventilatory or NIPPV support, as well as discussion of goals of care with nursing staff, respiratory therapy at the bedside and with family at the bedside/via phone.      Adama Frazier MD  6/27/2023  Pulmonology/Critical Care

## 2023-06-27 NOTE — TRANSFER OF CARE
"Anesthesia Transfer of Care Note    Patient: Alex Reynoso    Procedure(s) Performed: Procedure(s) (LRB):  CORONARY ARTERY BYPASS GRAFT (CABG) (N/A)  PLATING, STERNAL (N/A)  SURGICAL PROCUREMENT, VEIN, ENDOSCOPIC (N/A)    Patient location: ICU    Anesthesia Type: general    Transport from OR: Transported from OR intubated on 100% O2 by AMBU with assisted ventilation. Continuous ECG monitoring in transport. Continuous SpO2 monitoring in transport. Continuos invasive BP monitoring in transport    Post pain: adequate analgesia    Post assessment: no apparent anesthetic complications    Post vital signs: stable    Level of consciousness: sedated    Nausea/Vomiting: no nausea/vomiting    Complications: none    Transfer of care protocol was followed      Last vitals:   Visit Vitals  BP (!) 93/53 (BP Location: Right arm, Patient Position: Lying)   Pulse 85   Temp 36.8 °C (98.2 °F) (Skin)   Resp 20   Ht 5' 6" (1.676 m)   Wt 118 kg (260 lb 1.6 oz)   SpO2 99%   BMI 41.98 kg/m²     "

## 2023-06-27 NOTE — OP NOTE
[facility]  Cardiothoracic Surgery  Operative Note    SUMMARY     Date of Procedure: 6/27/2023     Procedure:  1.  Urgent coronary artery bypass grafting x3 with EVH (R GSV)  -left internal mammary artery to the LAD  -reverse saphenous vein graft to the obtuse marginal   -reverse saphenous vein graft to the PDA  * it should be noted that the vein conduit was extremely poor with very small caliber and many branches in both legs  2. Plating of sternal defect with the "[employer] sternal plating system and stainless steel wires     Surgeon: NATANAEL Sharma     Assistant: Jaswinder Jauregui    Pre-Operative Diagnosis:  1.  Coronary artery disease  2.  Morbid obesity  3.  Hypertension  4.  Chest pain syndrome  5.     Post-Operative Diagnosis:  Same    Anesthesia: General    Operative Findings (including complications, if any):  Dictated    Description of Technical Procedures: Patient was delivered to the operating room, support lines were placed, general endotracheal anesthesia was induced.  Patient was prepped and draped in usual sterile fashion.  Saphenous vein was harvested from the lower extremity with the endoscopic saphenous vein harvesting technique.  Wounds were closed in layers with Vicryl.  Median sternotomy was made in the left internal mammary artery was harvested as a pedicle graft.  Heparin was administered, once the ACT was satisfactory the patient was cannulated via the ascending aorta and right atrial appendage and placed on cardiopulmonary bypass.  Aortic cross-clamp was applied and cardioplegia was given down the aortic root to promote diastolic arrest.  Topical cold saline was also used to promote hypothermia.  Distal target coronary arteries were incised in longitudinal fashion conduits were sewn in an end-to-side fashion using Prolene suture.  Proximal ends of the saphenous vein lengths were sewn to the anterior ascending aorta in an end-to-side fashion using Prolene  suture.  The patient was weaned and discontinued from cardiopulmonary bypass.  Right atrial pacing wires were placed as were 2-24 Telugu Duong drains, 1 in the left chest and 1 in the mediastinum.  Protamine was administered the patient was decannulated.  Sternum was closed with stainless steel wire in the [id number] sternal plating system.  Subcutaneous tissues were closed in layers with Vicryl.  Patient tolerated the procedure well will be delivered to the recovery room in stable condition.     Estimated Blood Loss (EBL): N/A          Specimens:   Specimen (24h ago, onward)      None                    Condition: Stable    Disposition: ICU - intubated and hemodynamically stable.

## 2023-06-27 NOTE — ANESTHESIA PREPROCEDURE EVALUATION
06/27/2023  Alex Reynoso is a 53 y.o., male.      Pre-op Assessment    I have reviewed the Patient Summary Reports.     I have reviewed the Nursing Notes. I have reviewed the NPO Status.   I have reviewed the Medications.     Review of Systems  Anesthesia Hx:  No problems with previous Anesthesia    Social:  Smoker    Hematology/Oncology:        Hematology Comments: H/H 13.5/40.1 plt 218    INR 0.92     Cardiovascular:   Hypertension Denies MI. CAD    Angina  Denies CHF. hyperlipidemia Cath 6/21/23    EF 65%    LMT 10%    LAD 80%    CIRC 70-80%    RCA 95%   Pulmonary:   Denies COPD.  Denies Asthma. Shortness of breath Sleep apnea: snoring.    Renal/:   Denies Chronic Renal Disease. no renal calculi  Cr 0.81   Hepatic/GI:   GERD, well controlled Denies Liver Disease. Denies Hepatitis.    Neurological:   Denies CVA. Denies Seizures.    Endocrine:   Denies Diabetes. Denies Hypothyroidism. Denies Hyperthyroidism.  Obesity / BMI > 30      Physical Exam  General: Well nourished, Cooperative, Alert and Oriented    Airway:  Mallampati: I   Mouth Opening: Normal  TM Distance: Normal  Tongue: Normal  Neck ROM: Normal ROM  Pre-Existing Airway: Oral Endotracheal tube    Dental:  Intact        Anesthesia Plan  Type of Anesthesia, risks & benefits discussed:    Anesthesia Type: Gen ETT  Intra-op Monitoring Plan: Standard ASA Monitors, Central Line, JOSLYN and Art Line  Induction:  IV  Airway Plan: Video  Informed Consent: Informed consent signed with the Patient and all parties understand the risks and agree with anesthesia plan.  All questions answered.   ASA Score: 3  Day of Surgery Review of History & Physical: H&P Update referred to the surgeon/provider.  Anesthesia Plan Notes: Mckenzie    Ready For Surgery From Anesthesia Perspective.     .

## 2023-06-27 NOTE — PROGRESS NOTES
Water not placed in water seal portion of chest tube by surgery staff. This was corrected by beside ICU staff

## 2023-06-28 LAB
ANION GAP SERPL CALC-SCNC: 8 MEQ/L
BASOPHILS # BLD AUTO: 0.05 X10(3)/MCL
BASOPHILS NFR BLD AUTO: 0.4 %
BUN SERPL-MCNC: 10.3 MG/DL (ref 8.4–25.7)
CALCIUM SERPL-MCNC: 9 MG/DL (ref 8.4–10.2)
CHLORIDE SERPL-SCNC: 103 MMOL/L (ref 98–107)
CO2 SERPL-SCNC: 22 MMOL/L (ref 22–29)
CREAT SERPL-MCNC: 0.82 MG/DL (ref 0.73–1.18)
CREAT/UREA NIT SERPL: 13
EOSINOPHIL # BLD AUTO: 0.01 X10(3)/MCL (ref 0–0.9)
EOSINOPHIL NFR BLD AUTO: 0.1 %
ERYTHROCYTE [DISTWIDTH] IN BLOOD BY AUTOMATED COUNT: 12.8 % (ref 11.5–17)
GFR SERPLBLD CREATININE-BSD FMLA CKD-EPI: >60 MLS/MIN/1.73/M2
GLUCOSE SERPL-MCNC: 151 MG/DL (ref 74–100)
HCT VFR BLD AUTO: 32.4 % (ref 42–52)
HGB BLD-MCNC: 11.3 G/DL (ref 14–18)
IMM GRANULOCYTES # BLD AUTO: 0.04 X10(3)/MCL (ref 0–0.04)
IMM GRANULOCYTES NFR BLD AUTO: 0.3 %
LYMPHOCYTES # BLD AUTO: 0.76 X10(3)/MCL (ref 0.6–4.6)
LYMPHOCYTES NFR BLD AUTO: 6.3 %
MCH RBC QN AUTO: 31.4 PG (ref 27–31)
MCHC RBC AUTO-ENTMCNC: 34.9 G/DL (ref 33–36)
MCV RBC AUTO: 90 FL (ref 80–94)
MONOCYTES # BLD AUTO: 0.89 X10(3)/MCL (ref 0.1–1.3)
MONOCYTES NFR BLD AUTO: 7.4 %
NEUTROPHILS # BLD AUTO: 10.23 X10(3)/MCL (ref 2.1–9.2)
NEUTROPHILS NFR BLD AUTO: 85.5 %
NRBC BLD AUTO-RTO: 0 %
PLATELET # BLD AUTO: 208 X10(3)/MCL (ref 130–400)
PMV BLD AUTO: 9.9 FL (ref 7.4–10.4)
POCT GLUCOSE: 104 MG/DL (ref 70–110)
POCT GLUCOSE: 108 MG/DL (ref 70–110)
POCT GLUCOSE: 124 MG/DL (ref 70–110)
POCT GLUCOSE: 129 MG/DL (ref 70–110)
POCT GLUCOSE: 156 MG/DL (ref 70–110)
POCT GLUCOSE: 99 MG/DL (ref 70–110)
POTASSIUM SERPL-SCNC: 4.1 MMOL/L (ref 3.5–5.1)
RBC # BLD AUTO: 3.6 X10(6)/MCL (ref 4.7–6.1)
SODIUM SERPL-SCNC: 133 MMOL/L (ref 136–145)
WBC # SPEC AUTO: 11.98 X10(3)/MCL (ref 4.5–11.5)

## 2023-06-28 PROCEDURE — 25000003 PHARM REV CODE 250: Performed by: EMERGENCY MEDICINE

## 2023-06-28 PROCEDURE — 80048 BASIC METABOLIC PNL TOTAL CA: CPT | Performed by: PHYSICIAN ASSISTANT

## 2023-06-28 PROCEDURE — 63600175 PHARM REV CODE 636 W HCPCS: Mod: JZ,JG | Performed by: THORACIC SURGERY (CARDIOTHORACIC VASCULAR SURGERY)

## 2023-06-28 PROCEDURE — 25000003 PHARM REV CODE 250: Performed by: INTERNAL MEDICINE

## 2023-06-28 PROCEDURE — 94799 UNLISTED PULMONARY SVC/PX: CPT

## 2023-06-28 PROCEDURE — 21400001 HC TELEMETRY ROOM

## 2023-06-28 PROCEDURE — 25000003 PHARM REV CODE 250: Performed by: PHYSICIAN ASSISTANT

## 2023-06-28 PROCEDURE — 99024 PR POST-OP FOLLOW-UP VISIT: ICD-10-PCS | Mod: ,,, | Performed by: PHYSICIAN ASSISTANT

## 2023-06-28 PROCEDURE — 99024 POSTOP FOLLOW-UP VISIT: CPT | Mod: ,,, | Performed by: PHYSICIAN ASSISTANT

## 2023-06-28 PROCEDURE — 27000221 HC OXYGEN, UP TO 24 HOURS

## 2023-06-28 PROCEDURE — 25000003 PHARM REV CODE 250: Performed by: THORACIC SURGERY (CARDIOTHORACIC VASCULAR SURGERY)

## 2023-06-28 PROCEDURE — 99900035 HC TECH TIME PER 15 MIN (STAT)

## 2023-06-28 PROCEDURE — 94761 N-INVAS EAR/PLS OXIMETRY MLT: CPT

## 2023-06-28 PROCEDURE — 63600175 PHARM REV CODE 636 W HCPCS: Performed by: PHYSICIAN ASSISTANT

## 2023-06-28 PROCEDURE — C9248 INJ, CLEVIDIPINE BUTYRATE: HCPCS | Mod: JZ,JG | Performed by: THORACIC SURGERY (CARDIOTHORACIC VASCULAR SURGERY)

## 2023-06-28 PROCEDURE — 85025 COMPLETE CBC W/AUTO DIFF WBC: CPT | Performed by: PHYSICIAN ASSISTANT

## 2023-06-28 RX ORDER — POLYETHYLENE GLYCOL 3350 17 G/17G
17 POWDER, FOR SOLUTION ORAL DAILY PRN
Status: CANCELLED | OUTPATIENT
Start: 2023-06-28

## 2023-06-28 RX ORDER — ENOXAPARIN SODIUM 100 MG/ML
40 INJECTION SUBCUTANEOUS EVERY 24 HOURS
Status: DISCONTINUED | OUTPATIENT
Start: 2023-06-28 | End: 2023-07-02 | Stop reason: HOSPADM

## 2023-06-28 RX ORDER — LISINOPRIL 10 MG/1
20 TABLET ORAL DAILY
Status: DISCONTINUED | OUTPATIENT
Start: 2023-06-28 | End: 2023-07-02 | Stop reason: HOSPADM

## 2023-06-28 RX ORDER — IBUPROFEN 200 MG
24 TABLET ORAL
Status: DISCONTINUED | OUTPATIENT
Start: 2023-06-28 | End: 2023-07-02 | Stop reason: HOSPADM

## 2023-06-28 RX ORDER — INSULIN ASPART 100 [IU]/ML
0-5 INJECTION, SOLUTION INTRAVENOUS; SUBCUTANEOUS
Status: DISCONTINUED | OUTPATIENT
Start: 2023-06-28 | End: 2023-07-02 | Stop reason: HOSPADM

## 2023-06-28 RX ORDER — GLUCAGON 1 MG
1 KIT INJECTION
Status: DISCONTINUED | OUTPATIENT
Start: 2023-06-28 | End: 2023-07-02 | Stop reason: HOSPADM

## 2023-06-28 RX ORDER — IBUPROFEN 200 MG
16 TABLET ORAL
Status: DISCONTINUED | OUTPATIENT
Start: 2023-06-28 | End: 2023-07-02 | Stop reason: HOSPADM

## 2023-06-28 RX ORDER — DOCUSATE SODIUM 100 MG/1
200 CAPSULE, LIQUID FILLED ORAL 2 TIMES DAILY
Status: CANCELLED | OUTPATIENT
Start: 2023-06-28

## 2023-06-28 RX ORDER — AMLODIPINE BESYLATE 5 MG/1
10 TABLET ORAL DAILY
Status: DISCONTINUED | OUTPATIENT
Start: 2023-06-28 | End: 2023-07-02 | Stop reason: HOSPADM

## 2023-06-28 RX ORDER — METOPROLOL TARTRATE 25 MG/1
12.5 TABLET ORAL 2 TIMES DAILY
Status: DISCONTINUED | OUTPATIENT
Start: 2023-06-28 | End: 2023-07-02 | Stop reason: HOSPADM

## 2023-06-28 RX ORDER — MUPIROCIN 20 MG/G
OINTMENT TOPICAL 2 TIMES DAILY
Status: CANCELLED | OUTPATIENT
Start: 2023-06-28 | End: 2023-07-03

## 2023-06-28 RX ORDER — BISACODYL 10 MG
10 SUPPOSITORY, RECTAL RECTAL DAILY PRN
Status: CANCELLED | OUTPATIENT
Start: 2023-06-28

## 2023-06-28 RX ORDER — ASPIRIN 81 MG/1
81 TABLET ORAL DAILY
Status: CANCELLED | OUTPATIENT
Start: 2023-06-28

## 2023-06-28 RX ADMIN — DOCUSATE SODIUM 100 MG: 100 CAPSULE, LIQUID FILLED ORAL at 08:06

## 2023-06-28 RX ADMIN — OXYCODONE HYDROCHLORIDE 10 MG: 5 TABLET ORAL at 08:06

## 2023-06-28 RX ADMIN — CLEVIPIDINE 10 MG/HR: 0.5 EMULSION INTRAVENOUS at 08:06

## 2023-06-28 RX ADMIN — MUPIROCIN: 20 OINTMENT TOPICAL at 08:06

## 2023-06-28 RX ADMIN — CLEVIPIDINE 14 MG/HR: 0.5 EMULSION INTRAVENOUS at 03:06

## 2023-06-28 RX ADMIN — ACETAMINOPHEN 1000 MG: 10 INJECTION, SOLUTION INTRAVENOUS at 05:06

## 2023-06-28 RX ADMIN — METOPROLOL TARTRATE 12.5 MG: 25 TABLET, FILM COATED ORAL at 08:06

## 2023-06-28 RX ADMIN — METOPROLOL TARTRATE 12.5 MG: 25 TABLET, FILM COATED ORAL at 07:06

## 2023-06-28 RX ADMIN — SUCRALFATE 1 G: 1 TABLET ORAL at 11:06

## 2023-06-28 RX ADMIN — SUCRALFATE 1 G: 1 TABLET ORAL at 08:06

## 2023-06-28 RX ADMIN — CEFAZOLIN SODIUM 2 G: 2 SOLUTION INTRAVENOUS at 05:06

## 2023-06-28 RX ADMIN — OXYCODONE HYDROCHLORIDE 10 MG: 5 TABLET ORAL at 06:06

## 2023-06-28 RX ADMIN — INSULIN HUMAN 7 UNITS/HR: 1 INJECTION, SOLUTION INTRAVENOUS at 04:06

## 2023-06-28 RX ADMIN — LISINOPRIL 20 MG: 20 TABLET ORAL at 07:06

## 2023-06-28 RX ADMIN — KETOROLAC TROMETHAMINE 30 MG: 30 INJECTION, SOLUTION INTRAMUSCULAR; INTRAVENOUS at 12:06

## 2023-06-28 RX ADMIN — CLEVIPIDINE 14 MG/HR: 0.5 EMULSION INTRAVENOUS at 04:06

## 2023-06-28 RX ADMIN — ENOXAPARIN SODIUM 40 MG: 40 INJECTION SUBCUTANEOUS at 04:06

## 2023-06-28 RX ADMIN — MESALAMINE 1000 MG: 1000 SUPPOSITORY RECTAL at 08:06

## 2023-06-28 RX ADMIN — CLEVIPIDINE 14 MG/HR: 0.5 EMULSION INTRAVENOUS at 06:06

## 2023-06-28 RX ADMIN — AMLODIPINE BESYLATE 10 MG: 5 TABLET ORAL at 07:06

## 2023-06-28 RX ADMIN — CLEVIPIDINE 14 MG/HR: 0.5 EMULSION INTRAVENOUS at 01:06

## 2023-06-28 RX ADMIN — FOLIC ACID 1 MG: 1 TABLET ORAL at 08:06

## 2023-06-28 RX ADMIN — FAMOTIDINE 20 MG: 10 INJECTION, SOLUTION INTRAVENOUS at 08:06

## 2023-06-28 RX ADMIN — ASPIRIN 325 MG ORAL TABLET 325 MG: 325 PILL ORAL at 08:06

## 2023-06-28 RX ADMIN — OXYCODONE HYDROCHLORIDE 10 MG: 5 TABLET ORAL at 03:06

## 2023-06-28 RX ADMIN — SUCRALFATE 1 G: 1 TABLET ORAL at 04:06

## 2023-06-28 RX ADMIN — HYDROCODONE BITARTRATE AND ACETAMINOPHEN 1 TABLET: 5; 325 TABLET ORAL at 08:06

## 2023-06-28 RX ADMIN — KETOROLAC TROMETHAMINE 30 MG: 30 INJECTION, SOLUTION INTRAMUSCULAR; INTRAVENOUS at 05:06

## 2023-06-28 NOTE — PROGRESS NOTES
[facility] - [facility] ICU  Pulmonary Critical Care Note    Patient Name: Alex Reynoso  MRN: [id number]  Admission Date: 6/23/2023  Hospital Length of Stay: 5 days  Code Status: Full Code  Attending Provider: Jhony Sharma MD  Primary Care Provider: Glen Duffy NP     Subjective:     HPI:     Alex Reynoso is a 53 y.o. male with past medical history of coronary artery disease, hypertension, hyperlipidemia, DM, prostate cancer status post prostatectomy and radiation admitted for intermittent chest pain patient. He was already scheduled for CABG underwent left heart catheterization on the 19th and was found to severe multivessel coronary artery disease at admission patient was started on heparin drip now status post urgent coronary artery bypass grafting x3 with EVH, left internal mammary artery to LAD, reverse saphenous vein graft to obtuse marginal and reverse saphenous vein graft to PDA on 6/27/23.    Hospital Course/Significant events:    6/27/23: 3v CABG, admitted to ICU    24 Hour Interval History:    No acute events. Patient extubated postoperatively. He is on room air this morning. Remains on cleviprex infusion. Also on insulin infusion. Chest tube output 300 mL since surgery. Hemoglobin stable. Reports some incisional chest pain but tolerable. No additional concerns or complaints this morning.    Past Medical History:   Diagnosis Date    Acid reflux     Arthritis     Back pain     CAD (coronary artery disease), native coronary artery     Coronary artery disease     Coronary artery disease involving native coronary artery of native heart, unspecified whether angina present     High cholesterol     HTN (hypertension)     Obesity     Prostate cancer     Seasonal allergies     Shortness of breath        Past Surgical History:   Procedure Laterality Date    CHOLECYSTECTOMY      COLONOSCOPY      CORONARY ANGIOGRAPHY N/A 06/21/2023    Procedure: ANGIOGRAM, CORONARY ARTERY;  Surgeon: Raji Hughes MD;   Location: [facility] CATH LAB;  Service: Cardiology;  Laterality: N/A;    LEFT HEART CATHETERIZATION Left 12/01/2022      Moderate noncritical multivessel CAD with stenosis up to 60%    LEFT HEART CATHETERIZATION Left 06/21/2023    Procedure: Left heart cath;  Surgeon: Raji Hughes MD;  Location: [facility] CATH LAB;  Service: Cardiology;  Laterality: Left;  LHC +/- PCI    LIPOMA RESECTION N/A 03/30/2023    Procedure: EXCISION, LIPOMA (excision of post neck lipoma);  Surgeon: Samuel Cruz MD;  Location: [facility] OR;  Service: General;  Laterality: N/A;  10 x 5 lipoma     PROSTATECTOMY      VENTRICULOGRAM, LEFT  06/21/2023    Procedure: Ventriculogram, Left;  Surgeon: Raji Hughes MD;  Location: [facility] CATH LAB;  Service: Cardiology;;       Social History     Socioeconomic History    Marital status:    Tobacco Use    Smoking status: Every Day     Packs/day: 0.50     Types: Cigarettes    Smokeless tobacco: Never   Substance and Sexual Activity    Alcohol use: Not Currently     Comment: socially    Drug use: Not Currently    Sexual activity: Not Currently     Social Determinants of Health     Financial Resource Strain: Low Risk     Difficulty of Paying Living Expenses: Not hard at all   Food Insecurity: No Food Insecurity    Worried About Running Out of Food in the Last Year: Never true    Ran Out of Food in the Last Year: Never true   Transportation Needs: No Transportation Needs    Lack of Transportation (Medical): No    Lack of Transportation (Non-Medical): No   Physical Activity: Inactive    Days of Exercise per Week: 0 days    Minutes of Exercise per Session: 0 min   Stress: No Stress Concern Present    Feeling of Stress : Not at all   Social Connections: Moderately Integrated    Frequency of Communication with Friends and Family: More than three times a week    Frequency of Social Gatherings with Friends and Family: More than three times a week    Attends Bahai Services: 1 to 4 times per year    Active Member  of Clubs or Organizations: No    Attends Club or Organization Meetings: Never    Marital Status:    Housing Stability: Low Risk     Unable to Pay for Housing in the Last Year: No    Number of Places Lived in the Last Year: 1    Unstable Housing in the Last Year: No           Current Outpatient Medications   Medication Instructions    amLODIPine (NORVASC) 10 mg, Oral, Every morning    aspirin (ECOTRIN) 81 mg, Oral, Every morning, Stopped 3/26/23    atorvastatin (LIPITOR) 80 mg, Oral, Nightly    ezetimibe (ZETIA) 10 mg, Oral, Nightly    gabapentin (NEURONTIN) 100 mg, Oral, 3 times daily PRN    hydroCHLOROthiazide (HYDRODIURIL) 12.5 mg, Oral, Daily    isosorbide mononitrate (IMDUR) 30 mg, Oral, Daily    lisinopriL (PRINIVIL,ZESTRIL) 20 mg, Oral, Daily    metoprolol succinate (TOPROL-XL) 12.5 mg, Oral, Every morning    mupirocin (BACTROBAN) 2 % ointment Topical (Top), 2 times daily, Apply to inside of both nostrils the night before surgery and the morning of surgery.    oxyCODONE-acetaminophen (PERCOCET)  mg per tablet 1 tablet, Oral, Every 6 hours PRN    pantoprazole (PROTONIX) 40 mg, Oral, Daily    pregabalin (LYRICA) 100 MG capsule 1 capsule, Oral, 2 times daily       Current Inpatient Medications   acetaminophen  1,000 mg Intravenous Q8H    amLODIPine  10 mg Oral Daily    aspirin  325 mg Oral Daily    ceFAZolin (ANCEF) IVPB  2 g Intravenous Q12H    docusate sodium  100 mg Oral BID    famotidine (PF)  20 mg Intravenous Daily    folic acid  1 mg Oral Daily    lisinopriL  20 mg Oral Daily    mesalamine  1,000 mg Rectal Nightly    metoprolol tartrate  12.5 mg Oral BID    mupirocin   Nasal BID    sucralfate  1 g Oral QID (AC & HS)       Current Intravenous Infusions   clevidipine 14 mg/hr (06/28/23 0450)    dexmedeTOMIDine (Precedex) infusion (titrating)      dextrose 5 % and 0.45 % NaCl 100 mL/hr at 06/27/23 2040    electrolyte-A 20 mL/hr at 06/27/23 1400    EPINEPHrine      insulin regular 1 units/mL  infusion orderable (CTS POST-OP) 7 Units/hr (06/28/23 0450)    loperamide         Objective:       Intake/Output Summary (Last 24 hours) at 6/28/2023 0457  Last data filed at 6/28/2023 0400  Gross per 24 hour   Intake 4432.33 ml   Output 3600 ml   Net 832.33 ml         Vital Signs (Most Recent):  Temp: 99.3 °F (37.4 °C) (06/28/23 0400)  Pulse: 90 (06/28/23 0430)  Resp: (!) 28 (06/28/23 0430)  BP: (!) 148/75 (06/28/23 0430)  SpO2: 95 % (06/28/23 0430)  Body mass index is 41.98 kg/m².  Weight: 118 kg (260 lb 1.6 oz) Vital Signs (24h Range):  Temp:  [96.8 °F (36 °C)-99.3 °F (37.4 °C)] 99.3 °F (37.4 °C)  Pulse:  [] 90  Resp:  [12-37] 28  SpO2:  [90 %-100 %] 95 %  BP: ()/() 148/75     Physical Exam  Constitutional:       General: He is not in acute distress.     Appearance: Normal appearance.   HENT:      Head: Normocephalic and atraumatic.   Eyes:      Extraocular Movements: Extraocular movements intact.      Conjunctiva/sclera: Conjunctivae normal.      Pupils: Pupils are equal, round, and reactive to light.   Cardiovascular:      Rate and Rhythm: Normal rate and regular rhythm.      Heart sounds: No murmur heard.    No friction rub. No gallop.   Pulmonary:      Breath sounds: No wheezing, rhonchi or rales.   Chest:      Comments: Mediastinal chest tubes in place  Abdominal:      General: There is no distension.      Palpations: Abdomen is soft.      Tenderness: There is no abdominal tenderness.   Skin:     General: Skin is warm and dry.   Neurological:      General: No focal deficit present.      Mental Status: He is alert and oriented to person, place, and time.         Lines/Drains/Airways       Central Venous Catheter Line  Duration              Introducer Single Lumen 06/27/23 0740 <1 day     Introducer with Double Lumen 06/27/23 0740 <1 day              Drain  Duration                  Urethral Catheter 06/27/23 0654 Temperature probe 16 Fr. <1 day         Y Chest Tube 1 and 2 06/27/23 0953 Left  Mediastinal 24 Fr. Right 24 Fr. <1 day              Arterial Line  Duration             Arterial Line 06/27/23 0632 Right Radial <1 day              Peripheral Intravenous Line  Duration                  Peripheral IV - Single Lumen 06/21/23 0718 20 G Left Antecubital 6 days         Peripheral IV - Single Lumen 06/27/23 0600 18 G Anterior;Distal;Right Upper Arm <1 day                    Significant Labs:    Lab Results   Component Value Date    WBC 11.98 (H) 06/28/2023    HGB 11.3 (L) 06/28/2023    HCT 32.4 (L) 06/28/2023    MCV 90.0 06/28/2023     06/28/2023           BMP  Lab Results   Component Value Date     (L) 06/28/2023    K 4.1 06/28/2023    CHLORIDE 103 06/28/2023    CO2 22 06/28/2023    BUN 10.3 06/28/2023    CREATININE 0.82 06/28/2023    CALCIUM 9.0 06/28/2023    AGAP 8.0 06/28/2023    EGFRNONAA >60 11/09/2021         ABG  Recent Labs   Lab 06/27/23  1023 06/27/23  1205   PH 7.321* 7.300*   PO2 244* 90.0   PCO2 46.4*  --    HCO3 24.0 25.6   POCBASEDEF  --  -1.40       Mechanical Ventilation Support:  Vent Mode: CPAP (06/27/23 1120)  Ventilator Initiated: Yes (06/27/23 1058)  Set Rate: 20 BPM (06/27/23 1058)  Vt Set: 500 mL (06/27/23 1058)  Pressure Support: 10 cmH20 (06/27/23 1120)  PEEP/CPAP: 5 cmH20 (06/27/23 1120)  Oxygen Concentration (%): 40 (06/27/23 1120)  Peak Airway Pressure: 16 cmH20 (06/27/23 1120)  Total Ve: 10.8 L/m (06/27/23 1120)  F/VT Ratio<105 (RSBI): (!) 43.73 (06/27/23 1120)      Significant Imaging:  I have reviewed the pertinent imaging within the past 24 hours.        Assessment/Plan:     Assessment  CAD s/p 3v CABG 6/27/23  LIMA-LAD, SVG-OM, SVG-RCA  Hx HTN, DM, HLD, prostate cancer      Plan  Continue post-operative management per CV surgery post op protocol  Wean off cleviprex as tolerated, may need to increase oral antihypertensives to assist   Monitor surgical drain output for signs of bleeding  Multimodal pain control  Continue ICU care    32 minutes of critical  care was time spent personally by me on the following activities: development of treatment plan with patient or surrogate and bedside caregivers, discussions with consultants, evaluation of patient's response to treatment, examination of patient, ordering and performing treatments and interventions, ordering and review of laboratory studies, ordering and review of radiographic studies, pulse oximetry, re-evaluation of patient's condition.  This critical care time did not overlap with that of any other provider or involve time for any procedures.     Eugene Castanon MD  Pulmonary Critical Care Medicine  [facility] - [facility] ICU

## 2023-06-28 NOTE — PROGRESS NOTES
CT SURGERY PROGRESS NOTE  Alex Reynoso  53 y.o.  1970    Patients Procedure: Procedure(s) (LRB):  CORONARY ARTERY BYPASS GRAFT (CABG) (N/A)  PLATING, STERNAL (N/A)  SURGICAL PROCUREMENT, VEIN, ENDOSCOPIC (N/A)    Subjective  Interval History: POD 1    ROS    Medication List  Infusions   clevidipine 14 mg/hr (06/28/23 0632)    dexmedeTOMIDine (Precedex) infusion (titrating)      dextrose 5 % and 0.45 % NaCl 100 mL/hr at 06/27/23 2040    electrolyte-A 20 mL/hr at 06/27/23 1400    EPINEPHrine      insulin regular 1 units/mL infusion orderable (CTS POST-OP) 7 Units/hr (06/28/23 0450)    loperamide       Scheduled   amLODIPine  10 mg Oral Daily    aspirin  325 mg Oral Daily    docusate sodium  100 mg Oral BID    famotidine (PF)  20 mg Intravenous Daily    folic acid  1 mg Oral Daily    lisinopriL  20 mg Oral Daily    mesalamine  1,000 mg Rectal Nightly    metoprolol tartrate  12.5 mg Oral BID    mupirocin   Nasal BID    sucralfate  1 g Oral QID (AC & HS)       Objective:  Recent Vitals:  Temp:  [96.8 °F (36 °C)-99.3 °F (37.4 °C)] 99.3 °F (37.4 °C)  Pulse:  [] 87  Resp:  [12-37] 18  SpO2:  [90 %-100 %] 93 %  BP: ()/() 132/72    Physical Exam     I/O last 24 hrs:  Intake/Output - Last 3 Shifts         06/26 0700 06/27 0659 06/27 0700 06/28 0659 06/28 0700 06/29 0659    P.O. 420      I.V. (mL/kg)  3781 (31.2)     Blood  632     IV Piggyback 50 2262.3     Total Intake(mL/kg) 470 (4) 6675.3 (55.2)     Urine (mL/kg/hr)  2550 (0.9)     Blood  800     Chest Tube  370     Total Output  3720     Net +470 +2955.3            Urine Occurrence 3 x      Stool Occurrence 1 x              Labs  ABGs:   Recent Labs   Lab 06/27/23  1023 06/27/23  1205   PH 7.321* 7.300*   PCO2 46.4*  --    PO2 244* 90.0   HCO3 24.0 25.6   POCSATURATED 100  --    BE -2  --      BMP:   Recent Labs   Lab 06/27/23  1137 06/27/23  1835 06/28/23  0133      < > 133*   K 4.2   < > 4.1   CO2 23   < > 22   BUN 10.0   < > 10.3    CREATININE 0.79   < > 0.82   CALCIUM 8.7   < > 9.0   MG 2.00  --   --     < > = values in this interval not displayed.     Cardiac markers: No results for input(s): CKMB, CPKMB, TROPONINT, TROPONINI, MYOGLOBIN in the last 48 hours.  CBC:   Recent Labs   Lab 06/28/23 0133   WBC 11.98*   RBC 3.60*   HGB 11.3*   HCT 32.4*      MCV 90.0   MCH 31.4*   MCHC 34.9     CMP:   Recent Labs   Lab 06/28/23 0133   CALCIUM 9.0   *   K 4.1   CO2 22   BUN 10.3   CREATININE 0.82     Coagulation:   Recent Labs   Lab 06/27/23  1137   INR 1.45*         Imaging:   CXR: X-Ray Chest AP Portable    Result Date: 6/28/2023  As above. Electronically signed by: Parish Kelley Date:    06/28/2023 Time:    06:24    X-Ray Chest AP Portable    Result Date: 6/27/2023  Expected post median sternotomy chest with some atelectasis and mild pulmonary vascular congestion suspected. Electronically signed by: Eugene Saldivar Date:    06/27/2023 Time:    13:46         ASSESSMENT/PLAN:    Stable POD 1  Mobilize   Telem     Case and plan of care discussed with MD Jaswinder Jauregui PA-C

## 2023-06-29 PROBLEM — Z95.1 HX OF CABG: Chronic | Status: ACTIVE | Noted: 2023-06-29

## 2023-06-29 LAB
ANION GAP SERPL CALC-SCNC: 6 MEQ/L
BASOPHILS # BLD AUTO: 0.04 X10(3)/MCL
BASOPHILS NFR BLD AUTO: 0.4 %
BUN SERPL-MCNC: 11.4 MG/DL (ref 8.4–25.7)
CALCIUM SERPL-MCNC: 9.1 MG/DL (ref 8.4–10.2)
CHLORIDE SERPL-SCNC: 100 MMOL/L (ref 98–107)
CO2 SERPL-SCNC: 27 MMOL/L (ref 22–29)
CREAT SERPL-MCNC: 0.89 MG/DL (ref 0.73–1.18)
CREAT/UREA NIT SERPL: 13
EOSINOPHIL # BLD AUTO: 0.06 X10(3)/MCL (ref 0–0.9)
EOSINOPHIL NFR BLD AUTO: 0.6 %
ERYTHROCYTE [DISTWIDTH] IN BLOOD BY AUTOMATED COUNT: 12.9 % (ref 11.5–17)
GFR SERPLBLD CREATININE-BSD FMLA CKD-EPI: >60 MLS/MIN/1.73/M2
GLUCOSE SERPL-MCNC: 127 MG/DL (ref 74–100)
HCT VFR BLD AUTO: 33.2 % (ref 42–52)
HGB BLD-MCNC: 11 G/DL (ref 14–18)
IMM GRANULOCYTES # BLD AUTO: 0.04 X10(3)/MCL (ref 0–0.04)
IMM GRANULOCYTES NFR BLD AUTO: 0.4 %
LYMPHOCYTES # BLD AUTO: 1.01 X10(3)/MCL (ref 0.6–4.6)
LYMPHOCYTES NFR BLD AUTO: 9.7 %
MCH RBC QN AUTO: 30.6 PG (ref 27–31)
MCHC RBC AUTO-ENTMCNC: 33.1 G/DL (ref 33–36)
MCV RBC AUTO: 92.5 FL (ref 80–94)
MONOCYTES # BLD AUTO: 0.88 X10(3)/MCL (ref 0.1–1.3)
MONOCYTES NFR BLD AUTO: 8.5 %
NEUTROPHILS # BLD AUTO: 8.35 X10(3)/MCL (ref 2.1–9.2)
NEUTROPHILS NFR BLD AUTO: 80.4 %
NRBC BLD AUTO-RTO: 0 %
PLATELET # BLD AUTO: 189 X10(3)/MCL (ref 130–400)
PMV BLD AUTO: 10.1 FL (ref 7.4–10.4)
POCT GLUCOSE: 113 MG/DL (ref 70–110)
POCT GLUCOSE: 118 MG/DL (ref 70–110)
POCT GLUCOSE: 120 MG/DL (ref 70–110)
POCT GLUCOSE: 126 MG/DL (ref 70–110)
POTASSIUM SERPL-SCNC: 4.8 MMOL/L (ref 3.5–5.1)
RBC # BLD AUTO: 3.59 X10(6)/MCL (ref 4.7–6.1)
SODIUM SERPL-SCNC: 133 MMOL/L (ref 136–145)
WBC # SPEC AUTO: 10.38 X10(3)/MCL (ref 4.5–11.5)

## 2023-06-29 PROCEDURE — 25000003 PHARM REV CODE 250: Performed by: THORACIC SURGERY (CARDIOTHORACIC VASCULAR SURGERY)

## 2023-06-29 PROCEDURE — 25000003 PHARM REV CODE 250: Performed by: PHYSICIAN ASSISTANT

## 2023-06-29 PROCEDURE — 25000003 PHARM REV CODE 250: Performed by: INTERNAL MEDICINE

## 2023-06-29 PROCEDURE — 97162 PT EVAL MOD COMPLEX 30 MIN: CPT

## 2023-06-29 PROCEDURE — 94760 N-INVAS EAR/PLS OXIMETRY 1: CPT

## 2023-06-29 PROCEDURE — 80048 BASIC METABOLIC PNL TOTAL CA: CPT | Performed by: PHYSICIAN ASSISTANT

## 2023-06-29 PROCEDURE — 25000003 PHARM REV CODE 250: Performed by: EMERGENCY MEDICINE

## 2023-06-29 PROCEDURE — 21400001 HC TELEMETRY ROOM

## 2023-06-29 PROCEDURE — 97110 THERAPEUTIC EXERCISES: CPT

## 2023-06-29 PROCEDURE — 85025 COMPLETE CBC W/AUTO DIFF WBC: CPT | Performed by: PHYSICIAN ASSISTANT

## 2023-06-29 PROCEDURE — 11000001 HC ACUTE MED/SURG PRIVATE ROOM

## 2023-06-29 PROCEDURE — 63600175 PHARM REV CODE 636 W HCPCS: Performed by: PHYSICIAN ASSISTANT

## 2023-06-29 PROCEDURE — 97166 OT EVAL MOD COMPLEX 45 MIN: CPT

## 2023-06-29 RX ADMIN — OXYCODONE AND ACETAMINOPHEN 1 TABLET: 10; 325 TABLET ORAL at 10:06

## 2023-06-29 RX ADMIN — FAMOTIDINE 20 MG: 10 INJECTION, SOLUTION INTRAVENOUS at 08:06

## 2023-06-29 RX ADMIN — MUPIROCIN: 20 OINTMENT TOPICAL at 08:06

## 2023-06-29 RX ADMIN — SUCRALFATE 1 G: 1 TABLET ORAL at 08:06

## 2023-06-29 RX ADMIN — OXYCODONE HYDROCHLORIDE 10 MG: 5 TABLET ORAL at 08:06

## 2023-06-29 RX ADMIN — OXYCODONE HYDROCHLORIDE 10 MG: 5 TABLET ORAL at 05:06

## 2023-06-29 RX ADMIN — AMLODIPINE BESYLATE 10 MG: 5 TABLET ORAL at 08:06

## 2023-06-29 RX ADMIN — METOPROLOL TARTRATE 12.5 MG: 25 TABLET, FILM COATED ORAL at 08:06

## 2023-06-29 RX ADMIN — OXYCODONE HYDROCHLORIDE 10 MG: 5 TABLET ORAL at 04:06

## 2023-06-29 RX ADMIN — LISINOPRIL 20 MG: 20 TABLET ORAL at 08:06

## 2023-06-29 RX ADMIN — DOCUSATE SODIUM 100 MG: 100 CAPSULE, LIQUID FILLED ORAL at 08:06

## 2023-06-29 RX ADMIN — MESALAMINE 1000 MG: 1000 SUPPOSITORY RECTAL at 08:06

## 2023-06-29 RX ADMIN — ENOXAPARIN SODIUM 40 MG: 40 INJECTION SUBCUTANEOUS at 05:06

## 2023-06-29 RX ADMIN — ASPIRIN 325 MG ORAL TABLET 325 MG: 325 PILL ORAL at 08:06

## 2023-06-29 RX ADMIN — OXYCODONE HYDROCHLORIDE 10 MG: 5 TABLET ORAL at 12:06

## 2023-06-29 RX ADMIN — SUCRALFATE 1 G: 1 TABLET ORAL at 06:06

## 2023-06-29 RX ADMIN — SUCRALFATE 1 G: 1 TABLET ORAL at 05:06

## 2023-06-29 RX ADMIN — OXYCODONE AND ACETAMINOPHEN 1 TABLET: 10; 325 TABLET ORAL at 01:06

## 2023-06-29 NOTE — PROGRESS NOTES
[facility] - 6th Floor Medical Telemetry  Cardiothoracic Surgery  Progress Note    Patient Name: Alex Reynoso  MRN: [id number]  Admission Date: 6/23/2023  Hospital Length of Stay: 6 days  Code Status: Full Code   Attending Physician: Jhony Sharma MD   Referring Provider: Self, Aaareferral  Principal Problem:Hx of CABG            Subjective:     Post-Op Info:  Procedure(s) (LRB):  CORONARY ARTERY BYPASS GRAFT (CABG) (N/A)  PLATING, STERNAL (N/A)  SURGICAL PROCUREMENT, VEIN, ENDOSCOPIC (N/A)   2 Days Post-Op     Interval History:  Postoperative day 2 from coronary artery bypass grafting x3.  Exhibiting normal progress.    Review of Systems   Constitutional: Negative. Negative for chills, diaphoresis, fever and night sweats.   HENT:  Negative for hoarse voice, sore throat and stridor.    Eyes: Negative.  Negative for blurred vision and double vision.   Cardiovascular:  Negative for chest pain, claudication, cyanosis, dyspnea on exertion, irregular heartbeat, leg swelling, orthopnea, palpitations, paroxysmal nocturnal dyspnea and syncope.   Respiratory:  Negative for cough, hemoptysis, shortness of breath, sputum production and wheezing.    Endocrine: Negative for polydipsia and polyuria.   Hematologic/Lymphatic: Negative for adenopathy and bleeding problem.   Gastrointestinal:  Negative for abdominal pain, diarrhea, heartburn, hematemesis, hematochezia, nausea and vomiting.   Neurological:  Negative for brief paralysis, disturbances in coordination, dizziness, focal weakness, headaches, numbness and paresthesias.   Medications:  Continuous Infusions:   electrolyte-A 20 mL/hr at 06/27/23 1400    insulin regular 1 units/mL infusion orderable (CTS POST-OP) 7 Units/hr (06/28/23 0450)    loperamide       Scheduled Meds:   amLODIPine  10 mg Oral Daily    aspirin  325 mg Oral Daily    docusate sodium  100 mg Oral BID    enoxparin  40 mg Subcutaneous Daily    famotidine (PF)  20 mg Intravenous Daily     folic acid  1 mg Oral Daily    lisinopriL  20 mg Oral Daily    mesalamine  1,000 mg Rectal Nightly    metoprolol tartrate  12.5 mg Oral BID    mupirocin   Nasal BID    sucralfate  1 g Oral QID (AC & HS)     PRN Meds:acetaminophen, albumin human 5%, calcium gluconate IVPB, calcium gluconate IVPB, calcium gluconate IVPB, dextrose 10%, dextrose 10%, dextrose 10%, dextrose 10%, glucagon (human recombinant), glucose, glucose, HYDROcodone-acetaminophen, insulin aspart U-100, lactulose 10 gram/15 ml, loperamide, magnesium sulfate IVPB, magnesium sulfate IVPB, metoclopramide HCl, morphine, nitroGLYCERIN, ondansetron, oxyCODONE, oxyCODONE-acetaminophen, potassium chloride in water, potassium chloride in water, potassium chloride in water, sodium chloride 0.9%, sodium phosphate IVPB, sodium phosphate IVPB, sodium phosphate IVPB     Objective:     Vital Signs (Most Recent):  Temp: 98 °F (36.7 °C) (06/29/23 0845)  Pulse: 99 (06/29/23 0845)  Resp: 20 (06/29/23 0809)  BP: (!) 145/77 (06/29/23 0845)  SpO2: (!) 94 % (06/29/23 0845) Vital Signs (24h Range):  Temp:  [98 °F (36.7 °C)-98.9 °F (37.2 °C)] 98 °F (36.7 °C)  Pulse:  [] 99  Resp:  [15-40] 20  SpO2:  [92 %-100 %] 94 %  BP: (100-167)/() 145/77     Weight: 120.6 kg (265 lb 14 oz)  Body mass index is 42.91 kg/m².    SpO2: (!) 94 %       Intake/Output - Last 3 Shifts         06/27 0700 06/28 0659 06/28 0700 06/29 0659 06/29 0700 06/30 0659    P.O.       I.V. (mL/kg) 3781 (31.2)      Blood 632      IV Piggyback 2262.3      Total Intake(mL/kg) 6675.3 (55.2)      Urine (mL/kg/hr) 2550 (0.9) 700 (0.2) 600 (1.5)    Blood 800      Chest Tube 370 270 30    Total Output 3720 970 630    Net +2955.3 -970 -630                   Lines/Drains/Airways       Drain  Duration                  Y Chest Tube 1 and 2 06/27/23 0953 Left Mediastinal 24 Fr. Right 24 Fr. 2 days              Peripheral Intravenous Line  Duration                  Peripheral IV - Single Lumen 06/21/23  0718 20 G Left Antecubital 8 days         Peripheral IV - Single Lumen 06/27/23 0600 18 G Anterior;Distal;Right Upper Arm 2 days                     Physical Exam  Vitals and nursing note reviewed.   Constitutional:       General: He is not in acute distress.     Appearance: Normal appearance.   HENT:      Head: Normocephalic and atraumatic.      Nose: Nose normal. No congestion.      Mouth/Throat:      Mouth: Mucous membranes are moist.   Eyes:      General: No scleral icterus.     Extraocular Movements: Extraocular movements intact.      Conjunctiva/sclera: Conjunctivae normal.      Pupils: Pupils are equal, round, and reactive to light.   Neck:      Thyroid: No thyroid mass or thyromegaly.      Vascular: No carotid bruit or JVD.   Cardiovascular:      Rate and Rhythm: Normal rate and regular rhythm.      Pulses: Normal pulses.           Carotid pulses are 2+ on the right side and 2+ on the left side.       Radial pulses are 2+ on the right side and 2+ on the left side.        Femoral pulses are 2+ on the right side and 2+ on the left side.       Dorsalis pedis pulses are 2+ on the right side and 2+ on the left side.        Posterior tibial pulses are 2+ on the right side and 2+ on the left side.      Heart sounds: No murmur heard.    No friction rub. No gallop.   Pulmonary:      Effort: Pulmonary effort is normal. No respiratory distress.      Breath sounds: Normal breath sounds. No stridor. No wheezing, rhonchi or rales.   Chest:      Chest wall: No tenderness.      Comments: Sternum stable.  Prevena dressing intact.  Chest tube output minimal  Abdominal:      General: Abdomen is flat. Bowel sounds are normal. There is no distension.      Palpations: Abdomen is soft. There is no hepatomegaly, splenomegaly, mass or pulsatile mass.      Tenderness: There is no abdominal tenderness. There is no rebound.   Musculoskeletal:         General: No swelling. Normal range of motion.      Cervical back: Normal range of  motion. No rigidity or tenderness.      Right lower leg: No edema.      Left lower leg: No edema.   Lymphadenopathy:      Cervical: No cervical adenopathy.      Upper Body:      Right upper body: No supraclavicular or axillary adenopathy.      Left upper body: No supraclavicular or axillary adenopathy.   Skin:     General: Skin is warm and dry.      Coloration: Pallor: Wounds CDI.   Neurological:      General: No focal deficit present.      Mental Status: He is alert and oriented to person, place, and time. Mental status is at baseline.      Cranial Nerves: No cranial nerve deficit.      Sensory: No sensory deficit.      Motor: No weakness.      Gait: Gait normal.   Psychiatric:         Mood and Affect: Mood normal.          Significant Labs:  BMP:   Recent Labs   Lab 06/27/23  1137 06/27/23  1835 06/29/23  0206      < > 133*   K 4.2   < > 4.8   CO2 23   < > 27   BUN 10.0   < > 11.4   CREATININE 0.79   < > 0.89   CALCIUM 8.7   < > 9.1   MG 2.00  --   --     < > = values in this interval not displayed.     CBC:   Recent Labs   Lab 06/29/23  0206   WBC 10.38   RBC 3.59*   HGB 11.0*   HCT 33.2*      MCV 92.5   MCH 30.6   MCHC 33.1     All pertinent labs from the last 24 hours have been reviewed.    Significant Diagnostics:  CXR: I have reviewed all pertinent results/findings within the past 24 hours  I have reviewed and interpreted all pertinent imaging results/findings within the past 24 hours.    Assessment/Plan:     * Hx of CABG  Normal progress.  Increase activity.  Discontinue chest tubes and pacing wires.  Transfer to telemetry.        Jhony Sharma MD  Cardiothoracic Surgery  [facility] - 6th Floor Medical Telemetry

## 2023-06-29 NOTE — NURSING
Nurses Note -- 4 Eyes      6/29/2023   4:19 AM      Skin assessed during: Q Shift Change      [x] No Altered Skin Integrity Present    []Prevention Measures Documented      [] Yes- Altered Skin Integrity Present or Discovered   [] LDA Added if Not in Epic (Describe Wound)   [] New Altered Skin Integrity was Present on Admit and Documented in LDA   [] Wound Image Taken    Wound Care Consulted? No    Attending Nurse:  Aaron Roach RN     Second RN/Staff Member:  LYNN Hurst

## 2023-06-29 NOTE — SUBJECTIVE & OBJECTIVE
Interval History:  Postoperative day 2 from coronary artery bypass grafting x3.  Exhibiting normal progress.    Review of Systems   Constitutional: Negative. Negative for chills, diaphoresis, fever and night sweats.   HENT:  Negative for hoarse voice, sore throat and stridor.    Eyes: Negative.  Negative for blurred vision and double vision.   Cardiovascular:  Negative for chest pain, claudication, cyanosis, dyspnea on exertion, irregular heartbeat, leg swelling, orthopnea, palpitations, paroxysmal nocturnal dyspnea and syncope.   Respiratory:  Negative for cough, hemoptysis, shortness of breath, sputum production and wheezing.    Endocrine: Negative for polydipsia and polyuria.   Hematologic/Lymphatic: Negative for adenopathy and bleeding problem.   Gastrointestinal:  Negative for abdominal pain, diarrhea, heartburn, hematemesis, hematochezia, nausea and vomiting.   Neurological:  Negative for brief paralysis, disturbances in coordination, dizziness, focal weakness, headaches, numbness and paresthesias.   Medications:  Continuous Infusions:   electrolyte-A 20 mL/hr at 06/27/23 1400    insulin regular 1 units/mL infusion orderable (CTS POST-OP) 7 Units/hr (06/28/23 0450)    loperamide       Scheduled Meds:   amLODIPine  10 mg Oral Daily    aspirin  325 mg Oral Daily    docusate sodium  100 mg Oral BID    enoxparin  40 mg Subcutaneous Daily    famotidine (PF)  20 mg Intravenous Daily    folic acid  1 mg Oral Daily    lisinopriL  20 mg Oral Daily    mesalamine  1,000 mg Rectal Nightly    metoprolol tartrate  12.5 mg Oral BID    mupirocin   Nasal BID    sucralfate  1 g Oral QID (AC & HS)     PRN Meds:acetaminophen, albumin human 5%, calcium gluconate IVPB, calcium gluconate IVPB, calcium gluconate IVPB, dextrose 10%, dextrose 10%, dextrose 10%, dextrose 10%, glucagon (human recombinant), glucose, glucose, HYDROcodone-acetaminophen, insulin aspart U-100, lactulose 10 gram/15 ml, loperamide, magnesium sulfate IVPB,  magnesium sulfate IVPB, metoclopramide HCl, morphine, nitroGLYCERIN, ondansetron, oxyCODONE, oxyCODONE-acetaminophen, potassium chloride in water, potassium chloride in water, potassium chloride in water, sodium chloride 0.9%, sodium phosphate IVPB, sodium phosphate IVPB, sodium phosphate IVPB     Objective:     Vital Signs (Most Recent):  Temp: 98 °F (36.7 °C) (06/29/23 0845)  Pulse: 99 (06/29/23 0845)  Resp: 20 (06/29/23 0809)  BP: (!) 145/77 (06/29/23 0845)  SpO2: (!) 94 % (06/29/23 0845) Vital Signs (24h Range):  Temp:  [98 °F (36.7 °C)-98.9 °F (37.2 °C)] 98 °F (36.7 °C)  Pulse:  [] 99  Resp:  [15-40] 20  SpO2:  [92 %-100 %] 94 %  BP: (100-167)/() 145/77     Weight: 120.6 kg (265 lb 14 oz)  Body mass index is 42.91 kg/m².    SpO2: (!) 94 %       Intake/Output - Last 3 Shifts         06/27 0700  06/28 0659 06/28 0700 06/29 0659 06/29 0700  06/30 0659    P.O.       I.V. (mL/kg) 3781 (31.2)      Blood 632      IV Piggyback 2262.3      Total Intake(mL/kg) 6675.3 (55.2)      Urine (mL/kg/hr) 2550 (0.9) 700 (0.2) 600 (1.5)    Blood 800      Chest Tube 370 270 30    Total Output 3720 970 630    Net +2955.3 -970 -630                   Lines/Drains/Airways       Drain  Duration                  Y Chest Tube 1 and 2 06/27/23 0953 Left Mediastinal 24 Fr. Right 24 Fr. 2 days              Peripheral Intravenous Line  Duration                  Peripheral IV - Single Lumen 06/21/23 0718 20 G Left Antecubital 8 days         Peripheral IV - Single Lumen 06/27/23 0600 18 G Anterior;Distal;Right Upper Arm 2 days                     Physical Exam  Vitals and nursing note reviewed.   Constitutional:       General: He is not in acute distress.     Appearance: Normal appearance.   HENT:      Head: Normocephalic and atraumatic.      Nose: Nose normal. No congestion.      Mouth/Throat:      Mouth: Mucous membranes are moist.   Eyes:      General: No scleral icterus.     Extraocular Movements: Extraocular movements intact.       Conjunctiva/sclera: Conjunctivae normal.      Pupils: Pupils are equal, round, and reactive to light.   Neck:      Thyroid: No thyroid mass or thyromegaly.      Vascular: No carotid bruit or JVD.   Cardiovascular:      Rate and Rhythm: Normal rate and regular rhythm.      Pulses: Normal pulses.           Carotid pulses are 2+ on the right side and 2+ on the left side.       Radial pulses are 2+ on the right side and 2+ on the left side.        Femoral pulses are 2+ on the right side and 2+ on the left side.       Dorsalis pedis pulses are 2+ on the right side and 2+ on the left side.        Posterior tibial pulses are 2+ on the right side and 2+ on the left side.      Heart sounds: No murmur heard.    No friction rub. No gallop.   Pulmonary:      Effort: Pulmonary effort is normal. No respiratory distress.      Breath sounds: Normal breath sounds. No stridor. No wheezing, rhonchi or rales.   Chest:      Chest wall: No tenderness.      Comments: Sternum stable.  Prevena dressing intact.  Chest tube output minimal  Abdominal:      General: Abdomen is flat. Bowel sounds are normal. There is no distension.      Palpations: Abdomen is soft. There is no hepatomegaly, splenomegaly, mass or pulsatile mass.      Tenderness: There is no abdominal tenderness. There is no rebound.   Musculoskeletal:         General: No swelling. Normal range of motion.      Cervical back: Normal range of motion. No rigidity or tenderness.      Right lower leg: No edema.      Left lower leg: No edema.   Lymphadenopathy:      Cervical: No cervical adenopathy.      Upper Body:      Right upper body: No supraclavicular or axillary adenopathy.      Left upper body: No supraclavicular or axillary adenopathy.   Skin:     General: Skin is warm and dry.      Coloration: Pallor: Wounds CDI.   Neurological:      General: No focal deficit present.      Mental Status: He is alert and oriented to person, place, and time. Mental status is at baseline.       Cranial Nerves: No cranial nerve deficit.      Sensory: No sensory deficit.      Motor: No weakness.      Gait: Gait normal.   Psychiatric:         Mood and Affect: Mood normal.          Significant Labs:  BMP:   Recent Labs   Lab 06/27/23  1137 06/27/23  1835 06/29/23  0206      < > 133*   K 4.2   < > 4.8   CO2 23   < > 27   BUN 10.0   < > 11.4   CREATININE 0.79   < > 0.89   CALCIUM 8.7   < > 9.1   MG 2.00  --   --     < > = values in this interval not displayed.     CBC:   Recent Labs   Lab 06/29/23  0206   WBC 10.38   RBC 3.59*   HGB 11.0*   HCT 33.2*      MCV 92.5   MCH 30.6   MCHC 33.1     All pertinent labs from the last 24 hours have been reviewed.    Significant Diagnostics:  CXR: I have reviewed all pertinent results/findings within the past 24 hours  I have reviewed and interpreted all pertinent imaging results/findings within the past 24 hours.

## 2023-06-29 NOTE — PLAN OF CARE
Problem: Adult Inpatient Plan of Care  Goal: Plan of Care Review  Outcome: Ongoing, Progressing  Goal: Patient-Specific Goal (Individualized)  Outcome: Ongoing, Progressing  Goal: Absence of Hospital-Acquired Illness or Injury  Outcome: Ongoing, Progressing  Goal: Optimal Comfort and Wellbeing  Outcome: Ongoing, Progressing  Goal: Readiness for Transition of Care  Outcome: Ongoing, Progressing     Problem: Bariatric Environmental Safety  Goal: Safety Maintained with Care  Outcome: Ongoing, Progressing     Problem: Fall Injury Risk  Goal: Absence of Fall and Fall-Related Injury  Outcome: Ongoing, Progressing     Problem: Infection  Goal: Absence of Infection Signs and Symptoms  Outcome: Ongoing, Progressing     Problem: Skin Injury Risk Increased  Goal: Skin Health and Integrity  Outcome: Ongoing, Progressing

## 2023-06-29 NOTE — PT/OT/SLP EVAL
Physical Therapy Evaluation    Patient Name:  Alex Reynoso   MRN:  [id number]    Recommendations:     Discharge Recommendations: home health PT   Discharge Equipment Recommendations: none   Barriers to discharge: none    Assessment:     Alex Reynoso is a 53 y.o. male admitted with a medical diagnosis of Hx of CABG.  He presents with the following impairments/functional limitations: impaired endurance, impaired functional mobility ,Pt needed a little help to stand ,but walked well with a rw. Will just need HHPT.    Rehab Prognosis: Good; patient would benefit from acute skilled PT services to address these deficits and reach maximum level of function.    Recent Surgery: Procedure(s) (LRB):  CORONARY ARTERY BYPASS GRAFT (CABG) (N/A)  PLATING, STERNAL (N/A)  SURGICAL PROCUREMENT, VEIN, ENDOSCOPIC (N/A) 2 Days Post-Op    Plan:     During this hospitalization, patient to be seen 6 x/week to address the identified rehab impairments via gait training, therapeutic activities and progress toward the following goals:    Plan of Care Expires:  07/05/23    Subjective     Chief Complaint:  Patient/Family Comments/goals:   Pain/Comfort:  Pain Rating 1: 4/10  Location 1: sternal    Patients cultural, spiritual, [employer]ian conflicts given the current situation:      Living Environment:  Pt lives alone in a house with 4 steps and dillon rails  Prior to admission, patients level of function was ind.  Equipment used at home: cane, quad.  DME owned (not currently used):   Upon discharge, patient will have assistance from family.    Objective:     Communicated with nurse prior to session.  Patient found up in chair with wound vac  upon PT entry to room.    General Precautions: Standard, fall, sternal  Orthopedic Precautions:    Braces: N/A  Respiratory Status: Room air  Blood Pressure:       Exams:  RLE ROM: WFL  RLE Strength: WFL  LLE ROM: WFL  LLE Strength: WFL  Skin integrity: Visible skin intact      Functional Mobility:  Transfers:      Sit to Stand:  minimum assistance with rolling walker  Bed to Chair: minimum assistance with  rolling walker  using  Step Transfer  Gait: pt ambulated 150ft with a rw with cga      AM-PAC 6 CLICK MOBILITY  Total Score:        Treatment & Education:      Patient provided with verbal education regarding sternal precautions.  Understanding was verbalized.     Patient left up in chair with all lines intact and call button in reach.    GOALS:   Multidisciplinary Problems       Physical Therapy Goals          Problem: Physical Therapy    Goal Priority Disciplines Outcome Goal Variances Interventions   Physical Therapy Goal     PT, PT/OT Ongoing, Progressing     Description: Pt will be seen for the following goals:  1. Pt will be sba with all transfers  2. Pt will ambulate without an AD 200ft sba                       History:     Past Medical History:   Diagnosis Date    Acid reflux     Arthritis     Back pain     CAD (coronary artery disease), native coronary artery     Coronary artery disease     Coronary artery disease involving native coronary artery of native heart, unspecified whether angina present     High cholesterol     HTN (hypertension)     Obesity     Prostate cancer     Seasonal allergies     Shortness of breath        Past Surgical History:   Procedure Laterality Date    CHOLECYSTECTOMY      COLONOSCOPY      CORONARY ANGIOGRAPHY N/A 06/21/2023    Procedure: ANGIOGRAM, CORONARY ARTERY;  Surgeon: Raji Hughes MD;  Location: [facility] CATH LAB;  Service: Cardiology;  Laterality: N/A;    CORONARY ARTERY BYPASS GRAFT (CABG) N/A 6/27/2023    Procedure: CORONARY ARTERY BYPASS GRAFT (CABG);  Surgeon: Jhony Sharma MD;  Location: [facility];  Service: Cardiovascular;  Laterality: N/A;  ECHO NOTIFIED    ENDOSCOPIC HARVEST OF VEIN N/A 6/27/2023    Procedure: SURGICAL PROCUREMENT, VEIN, ENDOSCOPIC;  Surgeon: Jhony Sharma MD;  Location: [facility] OR;  Service: Cardiovascular;  Laterality: N/A;    LEFT HEART CATHETERIZATION Left  12/01/2022      Moderate noncritical multivessel CAD with stenosis up to 60%    LEFT HEART CATHETERIZATION Left 06/21/2023    Procedure: Left heart cath;  Surgeon: Raji Hughes MD;  Location: [facility] CATH LAB;  Service: Cardiology;  Laterality: Left;  LHC +/- PCI    LIPOMA RESECTION N/A 03/30/2023    Procedure: EXCISION, LIPOMA (excision of post neck lipoma);  Surgeon: Samuel Cruz MD;  Location: [facility] OR;  Service: General;  Laterality: N/A;  10 x 5 lipoma     PLATING OF STERNUM N/A 6/27/2023    Procedure: PLATING, STERNAL;  Surgeon: Jhony Sharma MD;  Location: [facility] OR;  Service: Cardiovascular;  Laterality: N/A;    PROSTATECTOMY      VENTRICULOGRAM, LEFT  06/21/2023    Procedure: Ventriculogram, Left;  Surgeon: Raji Hughes MD;  Location: [facility] CATH LAB;  Service: Cardiology;;       Time Tracking:     PT Received On:    PT Start Time: 1350     PT Stop Time: 1415  PT Total Time (min): 25 min     Billable Minutes: Evaluation 25 06/29/2023

## 2023-06-29 NOTE — ASSESSMENT & PLAN NOTE
Normal progress.  Increase activity.  Discontinue chest tubes and pacing wires.  Transfer to telemetry.

## 2023-06-29 NOTE — PLAN OF CARE
Problem: Physical Therapy  Goal: Physical Therapy Goal  Description: Pt will be seen for the following goals:  1. Pt will be sba with all transfers  2. Pt will ambulate without an AD 200ft sba  Outcome: Ongoing, Progressing

## 2023-06-29 NOTE — PLAN OF CARE
Problem: Occupational Therapy  Goal: Occupational Therapy Goal  Description: Goals to be met by: 7/13/23     Patient will increase functional independence with ADLs by performing:    UE Dressing with Modified [place].  LE Dressing with Modified [place].  Grooming while standing at sink with Modified [place].  Toileting from toilet with Modified [place] for hygiene and clothing management.   Toilet transfer to toilet with Modified [place].    Outcome: Ongoing, Progressing

## 2023-06-29 NOTE — PT/OT/SLP EVAL
Occupational Therapy  Evaluation    Name: Alex Reynoso  MRN: [id number]  Admitting Diagnosis: CABG x3, hx: prostate cancer s/p prostatectomy  Recent Surgery: Procedure(s) (LRB):  CORONARY ARTERY BYPASS GRAFT (CABG) (N/A)  PLATING, STERNAL (N/A)  SURGICAL PROCUREMENT, VEIN, ENDOSCOPIC (N/A) 2 Days Post-Op    Recommendations:     Discharge Recommendations: home with home health vs rehab pending progress c therapy and family assistance available   Discharge Equipment Recommendations:  walker, rolling  Barriers to discharge:  None    Assessment:     Alex Reynoso is a 53 y.o. male with a medical diagnosis of CABG x3, hx: prostate cancer s/p prostatectomy. Performance deficits affecting function: weakness, impaired endurance, impaired self care skills.  Pt presented c family at bedside. Pt appeared motivated to participate in therapy today. Pt reported SOB at rest-O2 assessed to be 93%. Pt educated on sternal pxn and adhered to pxn throughout session. Pt would benefit from HH services vs rehab placement upon d/c pending progress c therapy and family assistance available.     Rehab Prognosis: Good; patient would benefit from acute skilled OT services to address these deficits and reach maximum level of function.       Plan:     Patient to be seen 5 x/week to address the above listed problems via self-care/home management, therapeutic activities, therapeutic exercises  Plan of Care Expires: 07/13/23  Plan of Care Reviewed with: patient    Subjective     Chief Complaint: SOB  Patient/Family Comments/goals: return home    Occupational Profile:  Living Environment: Pt lives alone in a H c 4 steps to enter c rails on both sides. Pt has a tub and walk in shower.   Previous level of function: Pt reported he received occasional help from his children at home for LBD and UBD. Pt walks c quad cane all the time  Roles and Routines: retired  Equipment Used at Home: cane, quad  Assistance upon Discharge: Pt reported his children  would provide assistance upon d/c as needed    Pain/Comfort:  Pain Rating 1: 6/10  Location 1: chest    Patients cultural, spiritual, [employer]ism conflicts given the current situation: no    Objective:     Communicated with: RN prior to session.  Patient found up in chair with chest tube, telemetry, wound vac upon OT entry to room.    General Precautions: Standard, fall, sternal  Orthopedic Precautions: N/A  Braces: N/A  Respiratory Status: Room air  Vital Signs: Blood Pressure: 108/73  HR: 98  Sp02: 93    Occupational Performance:    Functional Mobility/Transfers:  Patient completed Sit <> Stand Transfer with minimum assistance  with  rolling walker   Patient completed Toilet Transfer Step Transfer technique with minimum assistance with  rolling walker  Functional Mobility: Pt required CGA to mobilize through room and bathroom with RW    Activities of Daily Living:  Toileting: minimum assistance min A for clothing management at toilet    Cognitive/Visual Perceptual:  Cognitive/Psychosocial Skills:     -       Oriented to: Person and Situation   -       Follows Commands/attention:Follows one-step commands  -       Safety awareness/insight to disability: intact   -       Mood/Affect/Coping skills/emotional control: Appropriate to situation, Cooperative, and Pleasant    Physical Exam:  Upper Extremity Range of Motion:     -       Right Upper Extremity: WFL  -       Left Upper Extremity: WFL  MMT n/t d/t sternal pxn    Therapeutic Positioning  Risk for acquired pressure injuries is decreased due to ability to get to BSC/toilet with assist.    OT interventions performed during the course of today's session in an effort to prevent and/or reduce acquired pressure injuries:   Therapeutic positioning completed       OT recommendations for therapeutic positioning throughout hospitalization:   Follow [facility] Pressure Injury Prevention Protocol      Patient Education:  Patient provided with verbal education regarding OT  role/goals/POC, post op precautions, fall prevention, safety awareness, and Discharge/DME recommendations.  Understanding was verbalized.     Patient left up in chair with all lines intact and call button in reach    GOALS:   Multidisciplinary Problems       Occupational Therapy Goals          Problem: Occupational Therapy    Goal Priority Disciplines Outcome Interventions   Occupational Therapy Goal     OT, PT/OT Ongoing, Progressing    Description: Goals to be met by: 7/13/23     Patient will increase functional independence with ADLs by performing:    UE Dressing with Modified [place].  LE Dressing with Modified [place].  Grooming while standing at sink with Modified [place].  Toileting from toilet with Modified [place] for hygiene and clothing management.   Toilet transfer to toilet with Modified [place].                         History:     Past Medical History:   Diagnosis Date    Acid reflux     Arthritis     Back pain     CAD (coronary artery disease), native coronary artery     Coronary artery disease     Coronary artery disease involving native coronary artery of native heart, unspecified whether angina present     High cholesterol     HTN (hypertension)     Obesity     Prostate cancer     Seasonal allergies     Shortness of breath          Past Surgical History:   Procedure Laterality Date    CHOLECYSTECTOMY      COLONOSCOPY      CORONARY ANGIOGRAPHY N/A 06/21/2023    Procedure: ANGIOGRAM, CORONARY ARTERY;  Surgeon: Raji Hughes MD;  Location: [facility] CATH LAB;  Service: Cardiology;  Laterality: N/A;    CORONARY ARTERY BYPASS GRAFT (CABG) N/A 6/27/2023    Procedure: CORONARY ARTERY BYPASS GRAFT (CABG);  Surgeon: Jhony Sharma MD;  Location: [facility];  Service: Cardiovascular;  Laterality: N/A;  ECHO NOTIFIED    ENDOSCOPIC HARVEST OF VEIN N/A 6/27/2023    Procedure: SURGICAL PROCUREMENT, VEIN, ENDOSCOPIC;  Surgeon: Jhony Sharma MD;  Location: [facility];  Service: Cardiovascular;   Laterality: N/A;    LEFT HEART CATHETERIZATION Left 12/01/2022      Moderate noncritical multivessel CAD with stenosis up to 60%    LEFT HEART CATHETERIZATION Left 06/21/2023    Procedure: Left heart cath;  Surgeon: Raji Hughes MD;  Location: [facility] CATH LAB;  Service: Cardiology;  Laterality: Left;  LHC +/- PCI    LIPOMA RESECTION N/A 03/30/2023    Procedure: EXCISION, LIPOMA (excision of post neck lipoma);  Surgeon: Samule Cruz MD;  Location: [facility] OR;  Service: General;  Laterality: N/A;  10 x 5 lipoma     PLATING OF STERNUM N/A 6/27/2023    Procedure: PLATING, STERNAL;  Surgeon: Jhony Sharma MD;  Location: [facility] OR;  Service: Cardiovascular;  Laterality: N/A;    PROSTATECTOMY      VENTRICULOGRAM, LEFT  06/21/2023    Procedure: Ventriculogram, Left;  Surgeon: Raji Hughes MD;  Location: [facility] CATH LAB;  Service: Cardiology;;       Time Tracking:     OT Date of Treatment:    OT Start Time: 1111  OT Stop Time: 1137  OT Total Time (min): 26 min    Billable Minutes:Evaluation moderate complexity    6/29/2023

## 2023-06-29 NOTE — PROGRESS NOTES
06/29/23 1030   Pre Exercise Vitals   /77   Pulse 95  (NSR)   Supplemental O2? No   SpO2 95 %   During Exercise Vitals   Pulse 114  (ST)   Supplemental O2? No   SpO2 94 %   Distance Walked 100 ft   Post Exercise Vitals   /80   Pulse 106  (ST)   Supplemental O2? No   SpO2 96 %   Modality   Modality   (rollator)     Communicated with nurse prior and post activity. Minimal assist sit to stand, maintains sternal precautions. Encouraged increased activity and use of Incentive spirometry.

## 2023-06-29 NOTE — PROGRESS NOTES
"Inpatient Nutrition Evaluation    Admit Date: 6/23/2023   Total duration of encounter: 6 days    Nutrition Recommendation/Prescription     Continue heart healthy diet as tolerated  RD to monitor po intake and weight changes    Nutrition Assessment     Chart Review    Reason Seen: length of stay    Malnutrition Screening Tool Results   Have you recently lost weight without trying?: No  Have you been eating poorly because of a decreased appetite?: No   MST Score: 0     Diagnosis:  POD 2 s/p CABG    Relevant Medical History: CAD, HTN, HLD, DM, prostate cancer s/p prostatectomy and radiation    Nutrition-Related Medications:  electrolyte, insulin in NaCl, colace, pepcid, folic acid, carafate, lactulose prn, zofran prn    Nutrition-Related Labs: 6/29: RBC-3.59, H/H-11/33.2, Na-133, glu-127      Diet Order: Diet heart healthy  Oral Supplement Order: none  Appetite/Oral Intake: good/% of meals  Factors Affecting Nutritional Intake: none identified  Food/Episcopalian/Cultural Preferences: unable to obtain  Food Allergies: unable to obtain       Wound(s):       Comments    6/29: pt working with PT at time of visit, unable to speak with at this time. Per EMR, 100% intake documented on 6/28; no GI issues per notes. Per MST, no decreased appetite or weight loss prior admission. Unable to obtain weight history at this time. Weight of 120.6 (265 lb) on 6/29; possible fluid loss noted since admission. Per EMR weights, weight stable prior admission, with recent weight gain noted. Will continue to monitor.    Anthropometrics    Height: 5' 6" (167.6 cm)    Last Weight: 120.6 kg (265 lb 14 oz) (06/29/23 0548) Weight Method: Standard Scale  BMI (Calculated): 42.9  BMI Classification: obese grade III (BMI >/=40)        Ideal Body Weight (IBW), Male: 142 lb     % Ideal Body Weight, Male (lb): 188.73 %                          Usual Weight Provided By: EMR weight history    Wt Readings from Last 5 Encounters:   06/29/23 120.6 kg (265 " lb 14 oz)   06/21/23 121.6 kg (268 lb 1.3 oz)   05/30/23 117.9 kg (260 lb)   04/30/23 120.8 kg (266 lb 6.4 oz)   04/23/23 117.9 kg (260 lb)     Weight Change(s) Since Admission:  Admit Weight: 121.6 kg (268 lb) (06/23/23 1327)      Patient Education    Not applicable.    Monitoring & Evaluation     Dietitian will monitor food and beverage intake and weight change.  Nutrition Risk/Follow-Up: low (follow-up in 5-7 days)  Patients assigned 'low nutrition risk' status do not qualify for a full nutritional assessment but will be monitored and re-evaluated in a 5-7 day time period. Please consult if re-evaluation needed sooner.

## 2023-06-30 LAB
ABO + RH BLD: NORMAL
ANION GAP SERPL CALC-SCNC: 10 MEQ/L
BASOPHILS # BLD AUTO: 0.03 X10(3)/MCL
BASOPHILS NFR BLD AUTO: 0.3 %
BLD PROD TYP BPU: NORMAL
BLOOD UNIT EXPIRATION DATE: NORMAL
BLOOD UNIT TYPE CODE: 5100
BUN SERPL-MCNC: 14 MG/DL (ref 8.4–25.7)
CALCIUM SERPL-MCNC: 8.8 MG/DL (ref 8.4–10.2)
CHLORIDE SERPL-SCNC: 99 MMOL/L (ref 98–107)
CO2 SERPL-SCNC: 24 MMOL/L (ref 22–29)
CREAT SERPL-MCNC: 0.77 MG/DL (ref 0.73–1.18)
CREAT/UREA NIT SERPL: 18
CROSSMATCH INTERPRETATION: NORMAL
DISPENSE STATUS: NORMAL
EOSINOPHIL # BLD AUTO: 0.31 X10(3)/MCL (ref 0–0.9)
EOSINOPHIL NFR BLD AUTO: 3.6 %
ERYTHROCYTE [DISTWIDTH] IN BLOOD BY AUTOMATED COUNT: 12.8 % (ref 11.5–17)
GFR SERPLBLD CREATININE-BSD FMLA CKD-EPI: >60 MLS/MIN/1.73/M2
GLUCOSE SERPL-MCNC: 124 MG/DL (ref 74–100)
HCT VFR BLD AUTO: 31.2 % (ref 42–52)
HGB BLD-MCNC: 10.7 G/DL (ref 14–18)
IMM GRANULOCYTES # BLD AUTO: 0.04 X10(3)/MCL (ref 0–0.04)
IMM GRANULOCYTES NFR BLD AUTO: 0.5 %
LYMPHOCYTES # BLD AUTO: 1.18 X10(3)/MCL (ref 0.6–4.6)
LYMPHOCYTES NFR BLD AUTO: 13.8 %
MCH RBC QN AUTO: 31.5 PG (ref 27–31)
MCHC RBC AUTO-ENTMCNC: 34.3 G/DL (ref 33–36)
MCV RBC AUTO: 91.8 FL (ref 80–94)
MONOCYTES # BLD AUTO: 0.71 X10(3)/MCL (ref 0.1–1.3)
MONOCYTES NFR BLD AUTO: 8.3 %
NEUTROPHILS # BLD AUTO: 6.31 X10(3)/MCL (ref 2.1–9.2)
NEUTROPHILS NFR BLD AUTO: 73.5 %
NRBC BLD AUTO-RTO: 0 %
PLATELET # BLD AUTO: 211 X10(3)/MCL (ref 130–400)
PMV BLD AUTO: 9.9 FL (ref 7.4–10.4)
POCT GLUCOSE: 118 MG/DL (ref 70–110)
POCT GLUCOSE: 127 MG/DL (ref 70–110)
POCT GLUCOSE: 134 MG/DL (ref 70–110)
POCT GLUCOSE: 156 MG/DL (ref 70–110)
POTASSIUM SERPL-SCNC: 4 MMOL/L (ref 3.5–5.1)
RBC # BLD AUTO: 3.4 X10(6)/MCL (ref 4.7–6.1)
SODIUM SERPL-SCNC: 133 MMOL/L (ref 136–145)
UNIT NUMBER: NORMAL
WBC # SPEC AUTO: 8.58 X10(3)/MCL (ref 4.5–11.5)

## 2023-06-30 PROCEDURE — 97110 THERAPEUTIC EXERCISES: CPT

## 2023-06-30 PROCEDURE — 99024 POSTOP FOLLOW-UP VISIT: CPT | Mod: ,,, | Performed by: PHYSICIAN ASSISTANT

## 2023-06-30 PROCEDURE — 94760 N-INVAS EAR/PLS OXIMETRY 1: CPT

## 2023-06-30 PROCEDURE — 97530 THERAPEUTIC ACTIVITIES: CPT | Mod: CQ

## 2023-06-30 PROCEDURE — 25000003 PHARM REV CODE 250: Performed by: INTERNAL MEDICINE

## 2023-06-30 PROCEDURE — 80048 BASIC METABOLIC PNL TOTAL CA: CPT | Performed by: PHYSICIAN ASSISTANT

## 2023-06-30 PROCEDURE — 99024 PR POST-OP FOLLOW-UP VISIT: ICD-10-PCS | Mod: ,,, | Performed by: PHYSICIAN ASSISTANT

## 2023-06-30 PROCEDURE — 25000003 PHARM REV CODE 250: Performed by: PHYSICIAN ASSISTANT

## 2023-06-30 PROCEDURE — 97535 SELF CARE MNGMENT TRAINING: CPT | Mod: CO

## 2023-06-30 PROCEDURE — 21400001 HC TELEMETRY ROOM

## 2023-06-30 PROCEDURE — 85025 COMPLETE CBC W/AUTO DIFF WBC: CPT | Performed by: PHYSICIAN ASSISTANT

## 2023-06-30 PROCEDURE — 25000003 PHARM REV CODE 250: Performed by: THORACIC SURGERY (CARDIOTHORACIC VASCULAR SURGERY)

## 2023-06-30 PROCEDURE — 97116 GAIT TRAINING THERAPY: CPT | Mod: CQ

## 2023-06-30 PROCEDURE — 63600175 PHARM REV CODE 636 W HCPCS: Performed by: PHYSICIAN ASSISTANT

## 2023-06-30 PROCEDURE — 25000003 PHARM REV CODE 250: Performed by: EMERGENCY MEDICINE

## 2023-06-30 RX ORDER — POLYETHYLENE GLYCOL 3350 17 G/17G
17 POWDER, FOR SOLUTION ORAL DAILY
Status: DISCONTINUED | OUTPATIENT
Start: 2023-06-30 | End: 2023-07-02 | Stop reason: HOSPADM

## 2023-06-30 RX ADMIN — ACETAMINOPHEN 650 MG: 650 SOLUTION ORAL at 04:06

## 2023-06-30 RX ADMIN — ENOXAPARIN SODIUM 40 MG: 40 INJECTION SUBCUTANEOUS at 04:06

## 2023-06-30 RX ADMIN — ASPIRIN 325 MG ORAL TABLET 325 MG: 325 PILL ORAL at 09:06

## 2023-06-30 RX ADMIN — AMLODIPINE BESYLATE 10 MG: 5 TABLET ORAL at 09:06

## 2023-06-30 RX ADMIN — HYDROCODONE BITARTRATE AND ACETAMINOPHEN 1 TABLET: 5; 325 TABLET ORAL at 11:06

## 2023-06-30 RX ADMIN — FOLIC ACID 1 MG: 1 TABLET ORAL at 09:06

## 2023-06-30 RX ADMIN — SUCRALFATE 1 G: 1 TABLET ORAL at 09:06

## 2023-06-30 RX ADMIN — OXYCODONE HYDROCHLORIDE 10 MG: 5 TABLET ORAL at 09:06

## 2023-06-30 RX ADMIN — METOPROLOL TARTRATE 12.5 MG: 25 TABLET, FILM COATED ORAL at 09:06

## 2023-06-30 RX ADMIN — MESALAMINE 1000 MG: 1000 SUPPOSITORY RECTAL at 09:06

## 2023-06-30 RX ADMIN — POLYETHYLENE GLYCOL 3350 17 G: 17 POWDER, FOR SOLUTION ORAL at 02:06

## 2023-06-30 RX ADMIN — OXYCODONE HYDROCHLORIDE 10 MG: 5 TABLET ORAL at 02:06

## 2023-06-30 RX ADMIN — FAMOTIDINE 20 MG: 10 INJECTION, SOLUTION INTRAVENOUS at 09:06

## 2023-06-30 RX ADMIN — MUPIROCIN: 20 OINTMENT TOPICAL at 09:06

## 2023-06-30 RX ADMIN — OXYCODONE HYDROCHLORIDE 10 MG: 5 TABLET ORAL at 03:06

## 2023-06-30 RX ADMIN — SUCRALFATE 1 G: 1 TABLET ORAL at 04:06

## 2023-06-30 RX ADMIN — DOCUSATE SODIUM 100 MG: 100 CAPSULE, LIQUID FILLED ORAL at 09:06

## 2023-06-30 RX ADMIN — SUCRALFATE 1 G: 1 TABLET ORAL at 12:06

## 2023-06-30 RX ADMIN — LISINOPRIL 20 MG: 20 TABLET ORAL at 09:06

## 2023-06-30 NOTE — PLAN OF CARE
06/30/23 1106   Discharge Reassessment   Assessment Type Discharge Planning Reassessment   Discharge Plan discussed with: Spouse/sig other;Patient   Discharge Plan A Home Health   Discharge Plan B Home Health   DME Needed Upon Discharge  rollator   Post-Acute Status   Post-Acute Authorization Home Health   Hospital Resources/Appts/Education Provided Appointments scheduled and added to AVS;Post-Acute resouces added to AVS     Spouse will assist during recovery. List of home health providers given. FOC obtained for [facility](Randy Shi). Referral sent via [facility]. PT recommends a RW/rollator. List of providers given. FOC obtained for [facility].Referral sent via [facility].

## 2023-06-30 NOTE — PROGRESS NOTES
CT SURGERY PROGRESS NOTE  Alex Reynoso  53 y.o.  1970    Patients Procedure: Procedure(s) (LRB):  CORONARY ARTERY BYPASS GRAFT (CABG) (N/A)  PLATING, STERNAL (N/A)  SURGICAL PROCUREMENT, VEIN, ENDOSCOPIC (N/A)    Subjective  Interval History: NAD    ROS    Medication List  Infusions   electrolyte-A 20 mL/hr at 06/27/23 1400    loperamide       Scheduled   amLODIPine  10 mg Oral Daily    aspirin  325 mg Oral Daily    docusate sodium  100 mg Oral BID    enoxparin  40 mg Subcutaneous Daily    famotidine (PF)  20 mg Intravenous Daily    folic acid  1 mg Oral Daily    lisinopriL  20 mg Oral Daily    mesalamine  1,000 mg Rectal Nightly    metoprolol tartrate  12.5 mg Oral BID    mupirocin   Nasal BID    polyethylene glycol  17 g Oral Daily    sucralfate  1 g Oral QID (AC & HS)       Objective:  Recent Vitals:  Temp:  [98.2 °F (36.8 °C)-98.8 °F (37.1 °C)] 98.7 °F (37.1 °C)  Pulse:  [] 106  Resp:  [16-20] 18  SpO2:  [81 %-95 %] 81 %  BP: ()/(62-75) 99/63    Physical Exam     I/O last 24 hrs:  Intake/Output - Last 3 Shifts         06/28 0700 06/29 0659 06/29 0700 06/30 0659 06/30 0700  07/01 0659    P.O.  702     I.V. (mL/kg)       Blood       IV Piggyback       Total Intake(mL/kg)  702 (5.9)     Urine (mL/kg/hr) 700 (0.2) 900 (0.3)     Stool  0     Blood       Chest Tube 270 30     Total Output 970 930     Net -970 -228            Urine Occurrence  3 x     Stool Occurrence  0 x             Labs  ABGs: No results for input(s): PH, PCO2, PO2, HCO3, POCSATURATED, BE in the last 48 hours.  BMP:   Recent Labs   Lab 06/30/23  0712   *   K 4.0   CO2 24   BUN 14.0   CREATININE 0.77   CALCIUM 8.8     CBC:   Recent Labs   Lab 06/30/23  0712   WBC 8.58   RBC 3.40*   HGB 10.7*   HCT 31.2*      MCV 91.8   MCH 31.5*   MCHC 34.3     CMP:   Recent Labs   Lab 06/30/23  0712   CALCIUM 8.8   *   K 4.0   CO2 24   BUN 14.0   CREATININE 0.77     Coagulation: No results for input(s): PT, INR, APTT in the last  48 hours.      Imaging:   CXR: X-Ray Chest PA And Lateral    Result Date: 6/30/2023  Mild basilar atelectasis. Electronically signed by: Shae Delgado Date:    06/30/2023 Time:    06:47         ASSESSMENT/PLAN:    DC planning- 24?    Case and plan of care discussed with MD Jaswinder Jauregui PA-C

## 2023-06-30 NOTE — PT/OT/SLP PROGRESS
Physical Therapy Treatment    Patient Name:  Alex Reynoso   MRN:  [id number]    Recommendations:     Discharge Recommendations: home health PT  Discharge Equipment Recommendations: walker, rolling, rollator  Barriers to discharge: Impaired mobility    Assessment:     Alex Reynoso is a 53 y.o. male admitted with a medical diagnosis of Hx of CABG.  He presents with the following impairments/functional limitations: weakness, gait instability, impaired endurance, impaired balance, impaired cardiopulmonary response to activity, impaired self care skills, impaired functional mobility.    Pt sitting in recliner with multiple family members in room. Pt appears very hesitant with mobility and significantly slow cyrus. Discussed home environment, assistance at home and DME recommendations.     Rehab Prognosis: Good; patient would benefit from acute skilled PT services to address these deficits and reach maximum level of function.    Recent Surgery: Procedure(s) (LRB):  CORONARY ARTERY BYPASS GRAFT (CABG) (N/A)  PLATING, STERNAL (N/A)  SURGICAL PROCUREMENT, VEIN, ENDOSCOPIC (N/A) 3 Days Post-Op    Plan:     During this hospitalization, patient to be seen 6 x/week to address the identified rehab impairments via gait training, therapeutic activities and progress toward the following goals:    Plan of Care Expires:  07/05/23    Subjective     Chief Complaint: chest pain and incision at L leg   Patient/Family Comments/goals: to go home  Pain/Comfort:  Pain Rating 1: 3/10  Location - Orientation 1: midline  Location 1: chest  Pain Addressed 1: Pre-medicate for activity  Pain Rating 2: 5/10      Objective:     Communicated with RN prior to session.  Patient found up in chair with wound vac (prevena) upon PT entry to room.     General Precautions: Standard, fall, sternal  Orthopedic Precautions: N/A  Braces: N/A  Respiratory Status: Room air  HR: 126 with activity. SPO2: 93% with activity   Skin Integrity:     sternal incision  covered with wound vac, L leg graft site dry and open to air      Functional Mobility:  Transfers:    Sit<->stand with RW; SBA and cues to improve technique and reduce dependence on family/staff  Scooting to edge of recliner with SBA  Gait: 100' with significantly slow cyrus, NBOS and decreased step length ASHLEY, pt appears very guarded, 1 minor LOB self corrected when making turn through the doorway of room. Pt declined GB. Will bring bariatric GB at next tx session.     Therapeutic Activities/Exercises:      Education:  Patient and family provided with verbal education regarding POC, stair training at next tx session, sternal pxns and DME recommendations.  Understanding was verbalized, however additional teaching warranted.     Patient left up in chair with all lines intact, call button in reach, and family present..    GOALS:   Multidisciplinary Problems       Physical Therapy Goals          Problem: Physical Therapy    Goal Priority Disciplines Outcome Goal Variances Interventions   Physical Therapy Goal     PT, PT/OT Ongoing, Progressing     Description: Pt will be seen for the following goals:  1. Pt will be sba with all transfers  2. Pt will ambulate without an AD 200ft sba                       Time Tracking:     PT Received On: 06/30/23  PT Start Time: 1025     PT Stop Time: 1051  PT Total Time (min): 26 min     Billable Minutes: Gait Training 1 unit and Therapeutic Activity 1 unit    Treatment Type: Treatment  PT/PTA: PTA     Number of PTA visits since last PT visit: 1 06/30/2023

## 2023-06-30 NOTE — PROGRESS NOTES
06/30/23 1345   Pre Exercise Vitals   /76   Pulse 98   Supplemental O2? No   SpO2 96 %   During Exercise Vitals   Pulse 113   Supplemental O2? No   SpO2 95 %   Distance Walked 200feet   Post Exercise Vitals   /74   Pulse 109   Supplemental O2? No   SpO2 96 %   Modality   Modality   (rollator)     Communicated with nurse prior to activity.   Min assist sit to stand, maintains sternal precautions.  Gait slow but steady with rollator.  Encouraged increased activity and use of IS.

## 2023-06-30 NOTE — PT/OT/SLP PROGRESS
Occupational Therapy   Treatment    Name: Alex Reynoso  MRN: [id number]  Admitting Diagnosis:  Hx of CABG  3 Days Post-Op    Recommendations:     Discharge Recommendations: home with home health  Discharge Equipment Recommendations:  walker, rolling  Barriers to discharge:       Assessment:     Alex Reynoso is a 53 y.o. male with a medical diagnosis of Hx of CABG.  Performance deficits affecting function are weakness, impaired endurance, impaired self care skills.     Rehab Prognosis:  Fair; patient would benefit from acute skilled OT services to address these deficits and reach maximum level of function.       Plan:     Patient to be seen 5 x/week to address the above listed problems via self-care/home management, therapeutic activities, therapeutic exercises  Plan of Care Expires: 07/13/23  Plan of Care Reviewed with: patient    Subjective     Pain/Comfort:       Objective:     Communicated with: RN prior to session.  Patient found up in chair with   upon OT entry to room.    General Precautions: Standard, fall, sternal    Orthopedic Precautions:   Braces: N/A  Respiratory Status: Room air  Vital Signs: HR: 110  Sp02: 96  With Activity:  O2 94     Occupational Performance:       Functional Mobility/Transfers:  Patient completed Sit <> Stand Transfer with stand by assistance  with  no assistive device   Patient completed Bed <> Chair Transfer using Step Transfer technique with stand by assistance with no assistive device  Functional Mobility: no LOB during transfer    Activities of Daily Living:  Lower Body Dressing: minimum assistance patient requiring max rest breaks throughout activity 2/2 coughing, patient donning/doffing B socks in modified figure-4 position with knee externally rotated on bed.     Patient Education:  Patient provided with verbal education regarding OT role/goals/POC.  Understanding was verbalized.      Patient left HOB elevated with all lines intact and call button in reach    GOALS:    Multidisciplinary Problems       Occupational Therapy Goals          Problem: Occupational Therapy    Goal Priority Disciplines Outcome Interventions   Occupational Therapy Goal     OT, PT/OT Ongoing, Progressing    Description: Goals to be met by: 7/13/23     Patient will increase functional independence with ADLs by performing:    UE Dressing with Modified [place].  LE Dressing with Modified [place].  Grooming while standing at sink with Modified [place].  Toileting from toilet with Modified [place] for hygiene and clothing management.   Toilet transfer to toilet with Modified [place].                         Time Tracking:     OT Date of Treatment: 06/30/23  OT Start Time: 1441  OT Stop Time: 1504  OT Total Time (min): 23 min    Billable Minutes:Self Care/Home Management 2    OT/JAGDISH: JAGDISH     Number of JAGDISH visits since last OT visit: 1 6/30/2023

## 2023-07-01 LAB
POCT GLUCOSE: 115 MG/DL (ref 70–110)
POCT GLUCOSE: 122 MG/DL (ref 70–110)
POCT GLUCOSE: 143 MG/DL (ref 70–110)
POCT GLUCOSE: 150 MG/DL (ref 70–110)

## 2023-07-01 PROCEDURE — 25000003 PHARM REV CODE 250: Performed by: PHYSICIAN ASSISTANT

## 2023-07-01 PROCEDURE — 94760 N-INVAS EAR/PLS OXIMETRY 1: CPT

## 2023-07-01 PROCEDURE — 99024 POSTOP FOLLOW-UP VISIT: CPT | Mod: ,,, | Performed by: PHYSICIAN ASSISTANT

## 2023-07-01 PROCEDURE — 25000003 PHARM REV CODE 250: Performed by: INTERNAL MEDICINE

## 2023-07-01 PROCEDURE — 63600175 PHARM REV CODE 636 W HCPCS: Performed by: PHYSICIAN ASSISTANT

## 2023-07-01 PROCEDURE — 97110 THERAPEUTIC EXERCISES: CPT

## 2023-07-01 PROCEDURE — 25000003 PHARM REV CODE 250: Performed by: EMERGENCY MEDICINE

## 2023-07-01 PROCEDURE — 97530 THERAPEUTIC ACTIVITIES: CPT | Mod: CQ

## 2023-07-01 PROCEDURE — 25000003 PHARM REV CODE 250: Performed by: THORACIC SURGERY (CARDIOTHORACIC VASCULAR SURGERY)

## 2023-07-01 PROCEDURE — 99024 PR POST-OP FOLLOW-UP VISIT: ICD-10-PCS | Mod: ,,, | Performed by: PHYSICIAN ASSISTANT

## 2023-07-01 PROCEDURE — 21400001 HC TELEMETRY ROOM

## 2023-07-01 RX ORDER — OXYCODONE AND ACETAMINOPHEN 10; 325 MG/1; MG/1
1 TABLET ORAL EVERY 6 HOURS PRN
Qty: 28 TABLET | Refills: 0 | Status: ON HOLD | OUTPATIENT
Start: 2023-07-01 | End: 2024-01-30 | Stop reason: HOSPADM

## 2023-07-01 RX ORDER — METOPROLOL TARTRATE 25 MG/1
12.5 TABLET, FILM COATED ORAL 2 TIMES DAILY
Qty: 30 TABLET | Refills: 1 | Status: SHIPPED | OUTPATIENT
Start: 2023-07-01

## 2023-07-01 RX ORDER — SYRING-NEEDL,DISP,INSUL,0.3 ML 29 G X1/2"
296 SYRINGE, EMPTY DISPOSABLE MISCELLANEOUS ONCE
Status: DISCONTINUED | OUTPATIENT
Start: 2023-07-01 | End: 2023-07-02 | Stop reason: HOSPADM

## 2023-07-01 RX ORDER — DOCUSATE SODIUM 100 MG/1
100 CAPSULE, LIQUID FILLED ORAL 2 TIMES DAILY
Qty: 12 CAPSULE | Refills: 0 | Status: ON HOLD | OUTPATIENT
Start: 2023-07-01 | End: 2024-01-30 | Stop reason: HOSPADM

## 2023-07-01 RX ORDER — ASPIRIN 81 MG/1
81 TABLET ORAL DAILY
Status: DISCONTINUED | OUTPATIENT
Start: 2023-07-01 | End: 2023-07-02 | Stop reason: HOSPADM

## 2023-07-01 RX ADMIN — FOLIC ACID 1 MG: 1 TABLET ORAL at 08:07

## 2023-07-01 RX ADMIN — FAMOTIDINE 20 MG: 10 INJECTION, SOLUTION INTRAVENOUS at 08:07

## 2023-07-01 RX ADMIN — OXYCODONE HYDROCHLORIDE 10 MG: 5 TABLET ORAL at 04:07

## 2023-07-01 RX ADMIN — SUCRALFATE 1 G: 1 TABLET ORAL at 04:07

## 2023-07-01 RX ADMIN — DOCUSATE SODIUM 100 MG: 100 CAPSULE, LIQUID FILLED ORAL at 08:07

## 2023-07-01 RX ADMIN — ASPIRIN 81 MG: 81 TABLET, COATED ORAL at 04:07

## 2023-07-01 RX ADMIN — OXYCODONE HYDROCHLORIDE 10 MG: 5 TABLET ORAL at 07:07

## 2023-07-01 RX ADMIN — MUPIROCIN: 20 OINTMENT TOPICAL at 07:07

## 2023-07-01 RX ADMIN — MUPIROCIN: 20 OINTMENT TOPICAL at 08:07

## 2023-07-01 RX ADMIN — DOCUSATE SODIUM 100 MG: 100 CAPSULE, LIQUID FILLED ORAL at 07:07

## 2023-07-01 RX ADMIN — AMLODIPINE BESYLATE 10 MG: 5 TABLET ORAL at 08:07

## 2023-07-01 RX ADMIN — METOPROLOL TARTRATE 12.5 MG: 25 TABLET, FILM COATED ORAL at 07:07

## 2023-07-01 RX ADMIN — METOPROLOL TARTRATE 12.5 MG: 25 TABLET, FILM COATED ORAL at 08:07

## 2023-07-01 RX ADMIN — SUCRALFATE 1 G: 1 TABLET ORAL at 07:07

## 2023-07-01 RX ADMIN — LISINOPRIL 20 MG: 20 TABLET ORAL at 08:07

## 2023-07-01 RX ADMIN — OXYCODONE HYDROCHLORIDE 10 MG: 5 TABLET ORAL at 08:07

## 2023-07-01 RX ADMIN — ENOXAPARIN SODIUM 40 MG: 40 INJECTION SUBCUTANEOUS at 04:07

## 2023-07-01 RX ADMIN — POLYETHYLENE GLYCOL 3350 17 G: 17 POWDER, FOR SOLUTION ORAL at 08:07

## 2023-07-01 RX ADMIN — SUCRALFATE 1 G: 1 TABLET ORAL at 11:07

## 2023-07-01 RX ADMIN — MESALAMINE 1000 MG: 1000 SUPPOSITORY RECTAL at 07:07

## 2023-07-01 NOTE — PT/OT/SLP PROGRESS
Physical Therapy Treatment    Patient Name:  Alex Reynoso   MRN:  [id number]    Recommendations:     Discharge Recommendations: home with home health  Discharge Equipment Recommendations: rollator  Barriers to discharge: Impaired mobility    Assessment:     Alex Reynoso is a 53 y.o. male admitted with a medical diagnosis of Hx of CABG.  He presents with the following impairments/functional limitations: weakness, gait instability, impaired endurance, impaired balance, impaired cardiopulmonary response to activity, impaired self care skills, impaired functional mobility.    Pt sitting in recliner with family members present. Pt recently ambulated with CR but agreeable to stair training. Spouse present for family training. Pt may d/c home today. GB provided. Tolerated all activities well. Less hesitant and guarded with mobility today.     Rehab Prognosis: Good; patient would benefit from acute skilled PT services to address these deficits and reach maximum level of function.    Recent Surgery: Procedure(s) (LRB):  CORONARY ARTERY BYPASS GRAFT (CABG) (N/A)  PLATING, STERNAL (N/A)  SURGICAL PROCUREMENT, VEIN, ENDOSCOPIC (N/A) 4 Days Post-Op    Plan:     During this hospitalization, patient to be seen 6 x/week to address the identified rehab impairments via gait training, therapeutic activities and progress toward the following goals:    Plan of Care Expires:  07/05/23    Subjective     Chief Complaint: no reports  Patient/Family Comments/goals: to go home  Pain/Comfort:  Pain Rating 1: other (see comments) (did not rate pain)  Pain Addressed 1: Pre-medicate for activity      Objective:     Communicated with RN prior to session.  Patient found up in chair with wound vac, telemetry upon PT entry to room.     General Precautions: Standard, fall, sternal  Orthopedic Precautions: N/A  Braces: N/A  Respiratory Status: Room air  Skin Integrity:     sternal incision covered with wound vac, L leg graft site dry and open to  air      Functional Mobility:  Transfers:    Sit<->stand; SBA  Stair training with spouse for family training in gym:   Ascend with L handrail, CGA performing with a lateral approach  Descend with R handrail, CGA with a lateral approach. No LOB or unsteadiness  Spouse performed activity with proper technique and safely.     Therapeutic Activities/Exercises:  Reviewed how to use GB and safety with rollator and use of breaks.     Education:  Patient and family provided with verbal education regarding POC.  Understanding was verbalized, however additional teaching warranted.     Patient left up in chair with all lines intact, call button in reach, and family present.    GOALS:   Multidisciplinary Problems       Physical Therapy Goals          Problem: Physical Therapy    Goal Priority Disciplines Outcome Goal Variances Interventions   Physical Therapy Goal     PT, PT/OT Ongoing, Progressing     Description: Pt will be seen for the following goals:  1. Pt will be sba with all transfers  2. Pt will ambulate without an AD 200ft sba                       Time Tracking:     PT Received On: 07/01/23  PT Start Time: 1039     PT Stop Time: 1103  PT Total Time (min): 24 min     Billable Minutes: Therapeutic Activity 2 units    Treatment Type: Treatment  PT/PTA: PTA     Number of PTA visits since last PT visit: 2     07/01/2023

## 2023-07-01 NOTE — PROGRESS NOTES
07/01/23 0810   Pre Exercise Vitals   /86   Pulse 101  (ST)   Supplemental O2? No   SpO2 97 %   During Exercise Vitals   Pulse 113   Supplemental O2? No   SpO2 95 %   Distance Walked 200 feet   Post Exercise Vitals   /65   Pulse 100   Supplemental O2? No   SpO2 96 %   Modality   Modality   (rollator)     Communicated with nurse prior to activity.   Min assist sit to stand, maintains sternal precautions.  Up in chair pre and post activity.   Encouraged increased activity and use of IS.   Pt c/o of abdominal distention, bloating.  + flatus, + belching, needs BM--nurse aware.

## 2023-07-01 NOTE — PROGRESS NOTES
CT SURGERY PROGRESS NOTE  Alex Reynoso  53 y.o.  1970    Patients Procedure: Procedure(s) (LRB):  CORONARY ARTERY BYPASS GRAFT (CABG) (N/A)  PLATING, STERNAL (N/A)  SURGICAL PROCUREMENT, VEIN, ENDOSCOPIC (N/A)    Subjective  Interval History: NAD    ROS    Medication List  Infusions   electrolyte-A 20 mL/hr at 06/27/23 1400    loperamide       Scheduled   amLODIPine  10 mg Oral Daily    aspirin  81 mg Oral Daily    docusate sodium  100 mg Oral BID    enoxparin  40 mg Subcutaneous Daily    famotidine (PF)  20 mg Intravenous Daily    folic acid  1 mg Oral Daily    lisinopriL  20 mg Oral Daily    mesalamine  1,000 mg Rectal Nightly    metoprolol tartrate  12.5 mg Oral BID    mupirocin   Nasal BID    polyethylene glycol  17 g Oral Daily    sucralfate  1 g Oral QID (AC & HS)       Objective:  Recent Vitals:  Temp:  [97.7 °F (36.5 °C)-99.8 °F (37.7 °C)] 99.8 °F (37.7 °C)  Pulse:  [102-118] 106  Resp:  [16-20] 18  SpO2:  [92 %-97 %] 96 %  BP: (119-152)/(68-96) 133/68    Physical Exam     I/O last 24 hrs:  Intake/Output - Last 3 Shifts         06/29 0700  06/30 0659 06/30 0700  07/01 0659 07/01 0700  07/02 0659    P.O. 702 840 660    Total Intake(mL/kg) 702 (5.9) 840 (7.2) 660 (5.6)    Urine (mL/kg/hr) 900 (0.3)      Stool 0      Chest Tube 30      Total Output 930      Net -228 +840 +660           Urine Occurrence 3 x 3 x 2 x    Stool Occurrence 0 x 0 x             Labs  ABGs: No results for input(s): PH, PCO2, PO2, HCO3, POCSATURATED, BE in the last 48 hours.  BMP:   Recent Labs   Lab 06/30/23  0712   *   K 4.0   CO2 24   BUN 14.0   CREATININE 0.77   CALCIUM 8.8       CBC:   Recent Labs   Lab 06/30/23  0712   WBC 8.58   RBC 3.40*   HGB 10.7*   HCT 31.2*      MCV 91.8   MCH 31.5*   MCHC 34.3       CMP:   Recent Labs   Lab 06/30/23  0712   CALCIUM 8.8   *   K 4.0   CO2 24   BUN 14.0   CREATININE 0.77       Coagulation: No results for input(s): PT, INR, APTT in the last 48 hours.      Imaging:   CXR:  X-Ray Chest PA And Lateral    Result Date: 6/30/2023  Mild basilar atelectasis. Electronically signed by: Shae Delgado Date:    06/30/2023 Time:    06:47         ASSESSMENT/PLAN:    No BM - will cont treat  Pt doesn't fel ready for DC home today   DC planning- 24?    Case and plan of care discussed with MD Jaswinder Jauregui, PAANDREA

## 2023-07-02 VITALS
OXYGEN SATURATION: 96 % | WEIGHT: 259.06 LBS | TEMPERATURE: 98 F | DIASTOLIC BLOOD PRESSURE: 70 MMHG | SYSTOLIC BLOOD PRESSURE: 132 MMHG | HEIGHT: 66 IN | BODY MASS INDEX: 41.63 KG/M2 | HEART RATE: 92 BPM | RESPIRATION RATE: 18 BRPM

## 2023-07-02 LAB
POCT GLUCOSE: 119 MG/DL (ref 70–110)
POCT GLUCOSE: 169 MG/DL (ref 70–110)

## 2023-07-02 PROCEDURE — 94760 N-INVAS EAR/PLS OXIMETRY 1: CPT

## 2023-07-02 PROCEDURE — 25000003 PHARM REV CODE 250: Performed by: THORACIC SURGERY (CARDIOTHORACIC VASCULAR SURGERY)

## 2023-07-02 PROCEDURE — 99024 PR POST-OP FOLLOW-UP VISIT: ICD-10-PCS | Mod: ,,, | Performed by: PHYSICIAN ASSISTANT

## 2023-07-02 PROCEDURE — 97110 THERAPEUTIC EXERCISES: CPT

## 2023-07-02 PROCEDURE — 99024 POSTOP FOLLOW-UP VISIT: CPT | Mod: ,,, | Performed by: PHYSICIAN ASSISTANT

## 2023-07-02 PROCEDURE — 25000003 PHARM REV CODE 250: Performed by: PHYSICIAN ASSISTANT

## 2023-07-02 RX ADMIN — OXYCODONE AND ACETAMINOPHEN 1 TABLET: 10; 325 TABLET ORAL at 10:07

## 2023-07-02 RX ADMIN — SUCRALFATE 1 G: 1 TABLET ORAL at 04:07

## 2023-07-02 RX ADMIN — OXYCODONE HYDROCHLORIDE 10 MG: 5 TABLET ORAL at 03:07

## 2023-07-02 RX ADMIN — DOCUSATE SODIUM 100 MG: 100 CAPSULE, LIQUID FILLED ORAL at 08:07

## 2023-07-02 RX ADMIN — METOPROLOL TARTRATE 12.5 MG: 25 TABLET, FILM COATED ORAL at 08:07

## 2023-07-02 RX ADMIN — SUCRALFATE 1 G: 1 TABLET ORAL at 10:07

## 2023-07-02 RX ADMIN — LISINOPRIL 20 MG: 20 TABLET ORAL at 08:07

## 2023-07-02 RX ADMIN — AMLODIPINE BESYLATE 10 MG: 5 TABLET ORAL at 08:07

## 2023-07-02 RX ADMIN — FAMOTIDINE 20 MG: 10 INJECTION, SOLUTION INTRAVENOUS at 08:07

## 2023-07-02 RX ADMIN — ASPIRIN 81 MG: 81 TABLET, COATED ORAL at 08:07

## 2023-07-02 RX ADMIN — FOLIC ACID 1 MG: 1 TABLET ORAL at 08:07

## 2023-07-02 NOTE — ANESTHESIA PROCEDURE NOTES
JOSLYN    Diagnosis: coronary artery disease  Patient location during procedure: OR  Procedure start time: 6/27/2023 7:10 AM  Timeout: 6/27/2023 7:10 AM  Procedure end time: 6/27/2023 7:20 AM  Exam type: Baseline    Staffing  Performed: anesthesiologist     Anesthesiologist: Sandor Franco DO        Anesthesiologist Present  Yes      Setup & Induction  Patient preparation: bite block inserted  Probe Insertion: easy  Exam: completeDoppler Echo: 2D, continuous wave Doppler and color flow mapping.  Exam     Left Heart  Left Atruim: normal and normal (men 3.0-4.0; women 2.7-3.8)    Left Ventricle: cm, normal (men 4.2-5.9; women 3.8-5.2)  LV Wall Thickness (posterior wall):cm, normal (men 0.6-1.0 cm; women 0.6-0.9 cm)    LVAD  Estimated Ejection Fraction: > 55% normal  Regional Wall Abnormalities: no RWMA            Right Heart  Right Ventricle: normal  Right Ventricle Function: normal  Right Atria:  normal    Intra Atrial Septum  PFO: no shunt by color flow doppler  no IAS aneurysm  no lipomatous hypertrophy  no Atrial Septal Defect (Asd)    Right Ventricle  Size: normal    Aortic Valve:  Stenosis: none.  Morphology: trileaflet    Regurgitation: no aortic valve regurgitation      Mitral Valve:   Morphology:normal  Prolapse: none  Flail: no flail  Jet Description: noneStenosis: no significant stenosis.    Tricuspid Valve:  Morphology: normal  Regurgitation: none              Effusions none    SummaryFindings discussed with surgeon.    Other Findings

## 2023-07-02 NOTE — SUBJECTIVE & OBJECTIVE
Interval History: Doing well POD 5 CABG. Normal progress.  DC home    Review of Systems   Constitutional: Negative. Negative for chills, diaphoresis, fever and night sweats.   HENT:  Negative for hoarse voice, sore throat and stridor.    Eyes: Negative.  Negative for blurred vision and double vision.   Cardiovascular:  Negative for chest pain, claudication, cyanosis, dyspnea on exertion, irregular heartbeat, leg swelling, orthopnea, palpitations, paroxysmal nocturnal dyspnea and syncope.   Respiratory:  Negative for cough, hemoptysis, shortness of breath, sputum production and wheezing.    Endocrine: Negative for polydipsia and polyuria.   Hematologic/Lymphatic: Negative for adenopathy and bleeding problem.   Gastrointestinal:  Negative for abdominal pain, diarrhea, heartburn, hematemesis, hematochezia, nausea and vomiting.   Neurological:  Negative for brief paralysis, disturbances in coordination, dizziness, focal weakness, headaches, numbness and paresthesias.   Medications:  Continuous Infusions:   electrolyte-A 20 mL/hr at 06/27/23 1400    loperamide       Scheduled Meds:   amLODIPine  10 mg Oral Daily    aspirin  81 mg Oral Daily    docusate sodium  100 mg Oral BID    enoxparin  40 mg Subcutaneous Daily    famotidine (PF)  20 mg Intravenous Daily    folic acid  1 mg Oral Daily    lisinopriL  20 mg Oral Daily    magnesium citrate  296 mL Oral Once    mesalamine  1,000 mg Rectal Nightly    metoprolol tartrate  12.5 mg Oral BID    mupirocin   Nasal BID    polyethylene glycol  17 g Oral Daily    sucralfate  1 g Oral QID (AC & HS)     PRN Meds:acetaminophen, albumin human 5%, calcium gluconate IVPB, calcium gluconate IVPB, calcium gluconate IVPB, dextrose 10%, dextrose 10%, dextrose 10%, dextrose 10%, glucagon (human recombinant), glucose, glucose, HYDROcodone-acetaminophen, insulin aspart U-100, lactulose 10 gram/15 ml, loperamide, magnesium sulfate IVPB, magnesium sulfate IVPB, metoclopramide HCl, morphine,  nitroGLYCERIN, ondansetron, oxyCODONE, oxyCODONE-acetaminophen, potassium chloride in water, potassium chloride in water, potassium chloride in water, sodium chloride 0.9%, sodium phosphate IVPB, sodium phosphate IVPB, sodium phosphate IVPB     Objective:     Vital Signs (Most Recent):  Temp: 98.3 °F (36.8 °C) (07/02/23 0733)  Pulse: 92 (07/02/23 0733)  Resp: 18 (07/02/23 0733)  BP: 132/70 (07/02/23 0733)  SpO2: 96 % (07/02/23 0733) Vital Signs (24h Range):  Temp:  [98.1 °F (36.7 °C)-99.8 °F (37.7 °C)] 98.3 °F (36.8 °C)  Pulse:  [] 92  Resp:  [18-20] 18  SpO2:  [94 %-97 %] 96 %  BP: (106-137)/(65-74) 132/70     Weight: 116.9 kg (257 lb 11.5 oz)  Body mass index is 41.6 kg/m².    SpO2: 96 %       Intake/Output - Last 3 Shifts         06/30 0700  07/01 0659 07/01 0700 07/02 0659 07/02 0700  07/03 0659    P.O. 840 660     Total Intake(mL/kg) 840 (7.2) 660 (5.6)     Urine (mL/kg/hr)  200 (0.1)     Stool       Chest Tube       Total Output  200     Net +840 +460            Urine Occurrence 3 x 4 x     Stool Occurrence 0 x 1 x             Lines/Drains/Airways       Peripheral Intravenous Line  Duration                  Peripheral IV - Single Lumen 06/21/23 0718 20 G Left Antecubital 11 days                     Physical Exam  Vitals and nursing note reviewed.   Constitutional:       General: He is not in acute distress.     Appearance: Normal appearance.   HENT:      Head: Normocephalic and atraumatic.      Nose: Nose normal. No congestion.      Mouth/Throat:      Mouth: Mucous membranes are moist.   Eyes:      General: No scleral icterus.     Extraocular Movements: Extraocular movements intact.      Conjunctiva/sclera: Conjunctivae normal.      Pupils: Pupils are equal, round, and reactive to light.   Neck:      Thyroid: No thyroid mass or thyromegaly.      Vascular: No carotid bruit or JVD.   Cardiovascular:      Rate and Rhythm: Normal rate and regular rhythm.      Pulses: Normal pulses.           Carotid pulses  are 2+ on the right side and 2+ on the left side.       Radial pulses are 2+ on the right side and 2+ on the left side.        Femoral pulses are 2+ on the right side and 2+ on the left side.       Dorsalis pedis pulses are 2+ on the right side and 2+ on the left side.        Posterior tibial pulses are 2+ on the right side and 2+ on the left side.      Heart sounds: No murmur heard.    No friction rub. No gallop.   Pulmonary:      Effort: Pulmonary effort is normal. No respiratory distress.      Breath sounds: Normal breath sounds. No stridor. No wheezing, rhonchi or rales.   Chest:      Chest wall: No tenderness.      Comments: Wounds cdi  St Stable  Abdominal:      General: Abdomen is flat. Bowel sounds are normal. There is no distension.      Palpations: Abdomen is soft. There is no hepatomegaly, splenomegaly, mass or pulsatile mass.      Tenderness: There is no abdominal tenderness. There is no rebound.   Musculoskeletal:         General: No swelling. Normal range of motion.      Cervical back: Normal range of motion. No rigidity or tenderness.      Right lower leg: No edema.      Left lower leg: No edema.   Lymphadenopathy:      Cervical: No cervical adenopathy.      Upper Body:      Right upper body: No supraclavicular or axillary adenopathy.      Left upper body: No supraclavicular or axillary adenopathy.   Skin:     General: Skin is warm and dry.   Neurological:      General: No focal deficit present.      Mental Status: He is alert and oriented to person, place, and time. Mental status is at baseline.      Cranial Nerves: No cranial nerve deficit.      Sensory: No sensory deficit.      Motor: No weakness.      Gait: Gait normal.   Psychiatric:         Mood and Affect: Mood normal.          Significant Labs:  All pertinent labs from the last 24 hours have been reviewed.    Significant Diagnostics:  I have reviewed and interpreted all pertinent imaging results/findings within the past 24 hours.

## 2023-07-02 NOTE — DISCHARGE SUMMARY
[facility] 6th Floor Medical Telemetry  Cardiothoracic Surgery  Discharge Summary      Patient Name: Alex Reynoso  MRN: [id number]  Admission Date: 6/23/2023  Hospital Length of Stay: 9 days  Discharge Date and Time:  07/02/2023 9:25 AM  Attending Physician: Jhony Sharma MD   Discharging Provider: Jaswinder Jauregui PA-C  Primary Care Provider: Glen Duffy NP    HPI:   No notes on file    Procedure(s) (LRB):  CORONARY ARTERY BYPASS GRAFT (CABG) (N/A)  PLATING, STERNAL (N/A)  SURGICAL PROCUREMENT, VEIN, ENDOSCOPIC (N/A)      Indwelling Lines/Drains at time of discharge:   Lines/Drains/Airways     None               Hospital Course: No notes on file    Goals of Care Treatment Preferences:  Code Status: Full Code      Consults (From admission, onward)        Status Ordering Provider     Inpatient consult to Gastroenterology  Once        Provider:  Aaron Davis MD    Completed THAO DE GUZMAN          Significant Diagnostic Studies:     Pending Diagnostic Studies:     None          No new Assessment & Plan notes have been filed under this hospital service since the last note was generated.  Service: Cardiothoracic Surgery    Final Active Diagnoses:    Diagnosis Date Noted POA    PRINCIPAL PROBLEM:  Hx of CABG [Z95.1] 06/29/2023 Not Applicable     Chronic      Problems Resolved During this Admission:      Discharged Condition: good    Disposition: Home or Self Care    Follow Up:   Follow-up Information     [facility] Follow up.    Specialty: Home Health Services  Why: This is your home health agency.  Contact information:  [address] [place] [address] [phone]             [facility] Follow up.    Specialties: DME Provider, Wound Care  Why: [facility] will provide your with a rollator. [facility] will contact you for delivery.  Contact information:  [facility] [address] [phone]             Raji Hughes MD Follow up in 1 week(s).   "  Specialty: Cardiology  Contact information:  [address]  [address]  [place] [address]  [phone]             Jhony Sharma MD Follow up in 3 week(s).    Specialties: Cardiothoracic Surgery, Cardiology  Contact information:  [address]  [address]  [place] [address]  [phone]                       Patient Instructions:      WALKER FOR HOME USE     Order Specific Question Answer Comments   Type of Walker: Rollator with brakes and/or seat    With wheels? Yes    Height: 5' 6" (1.676 m)    Weight: 118.6 kg (261 lb 7.5 oz)    Length of need (1-99 months): 99 99months   Does patient have medical equipment at home? cane, quad    Please check all that apply: Patient's condition impairs ambulation.    Please check all that apply: Patient is unable to safely ambulate without equipment.      HOME HEALTH ORDERS   Order Comments: Home Health:SN, PT Eval and Treat     Order Specific Question Answer Comments   What Home Health Agency is the patient currently using? Other/External      Medications:  Reconciled Home Medications:      Medication List      START taking these medications    docusate sodium 100 MG capsule  Commonly known as: COLACE  Take 1 capsule (100 mg total) by mouth 2 (two) times daily.     metoprolol tartrate 25 MG tablet  Commonly known as: LOPRESSOR  Take 0.5 tablets (12.5 mg total) by mouth 2 (two) times daily.        CHANGE how you take these medications    oxyCODONE-acetaminophen  mg per tablet  Commonly known as: PERCOCET  Take 1 tablet by mouth every 6 (six) hours as needed for Pain.  What changed: reasons to take this        CONTINUE taking these medications    aspirin 81 MG EC tablet  Commonly known as: ECOTRIN  Take 81 mg by mouth every morning. Stopped 3/26/23     atorvastatin 80 MG tablet  Commonly known as: LIPITOR  Take 80 mg by mouth every evening.     ezetimibe 10 mg tablet  Commonly known as: ZETIA  Take 10 mg by mouth every evening.     gabapentin 100 MG capsule  Commonly " known as: NEURONTIN  Take 100 mg by mouth 3 (three) times daily as needed.     pantoprazole 40 MG tablet  Commonly known as: PROTONIX  Take 40 mg by mouth once daily.     pregabalin 100 MG capsule  Commonly known as: LYRICA  Take 1 capsule by mouth 2 (two) times daily.        STOP taking these medications    amLODIPine 10 MG tablet  Commonly known as: NORVASC     hydroCHLOROthiazide 12.5 MG Tab  Commonly known as: HYDRODIURIL     isosorbide mononitrate 30 MG 24 hr tablet  Commonly known as: IMDUR     lisinopriL 20 MG tablet  Commonly known as: PRINIVIL,ZESTRIL     metoprolol succinate 25 MG 24 hr tablet  Commonly known as: TOPROL-XL     mupirocin 2 % ointment  Commonly known as: BACTROBAN          Time spent on the discharge of patient: 30 minutes    Jaswinder Jauregui PA-C  Cardiothoracic Surgery  [facility] - 6th Floor Medical Telemetry

## 2023-07-02 NOTE — ANESTHESIA POSTPROCEDURE EVALUATION
Anesthesia Post Evaluation    Patient: Alex Reynoso    Procedure(s) Performed: Procedure(s) (LRB):  CORONARY ARTERY BYPASS GRAFT (CABG) (N/A)  PLATING, STERNAL (N/A)  SURGICAL PROCUREMENT, VEIN, ENDOSCOPIC (N/A)    Final Anesthesia Type: general      Patient location during evaluation: ICU  Patient participation: No - Unable to Participate, Intubation  Level of consciousness: sedated  Post-procedure vital signs: reviewed and stable  Pain management: adequate  Airway patency: patent    PONV status at discharge: No PONV  Anesthetic complications: no      Cardiovascular status: blood pressure returned to baseline  Respiratory status: intubated and ventilator  Hydration status: euvolemic  Follow-up needed           Vitals Value Taken Time   /70 07/02/23 0733   Temp 36.8 °C (98.3 °F) 07/02/23 0733   Pulse 92 07/02/23 0733   Resp 18 07/02/23 1049   SpO2 96 % 07/02/23 0733         No case tracking events are documented in the log.      Pain/Sil Score: Pain Rating Prior to Med Admin: 6 (7/2/2023 10:49 AM)  Pain Rating Post Med Admin: 2 (7/2/2023 11:49 AM)

## 2023-07-02 NOTE — PROGRESS NOTES
07/02/23 0830   Pre Exercise Vitals   /73   Pulse 95   Supplemental O2? No   SpO2 93 %   During Exercise Vitals   Pulse 108   Supplemental O2? No   SpO2 95 %   Distance Walked 200 feet   Post Exercise Vitals   /72   Pulse 101   Supplemental O2? No   SpO2 99 %   Modality   Modality   (rollator)     Communicated with nurse prior to activity.   Anticipate d/c home today.  Prevena d/c'd-incision intact.  Sit to stand independently, maintains sternal precautions.   Morning weight obtained and reported to nurse.  Gait steady with rollator.  Mild JEROME at end of ambulation, quickly recovers with brief rest.  Encouraged increased activity and use of IS.

## 2023-07-03 PROCEDURE — G0180 MD CERTIFICATION HHA PATIENT: HCPCS | Mod: ,,, | Performed by: THORACIC SURGERY (CARDIOTHORACIC VASCULAR SURGERY)

## 2023-07-03 PROCEDURE — G0180 PR HOME HEALTH MD CERTIFICATION: ICD-10-PCS | Mod: ,,, | Performed by: THORACIC SURGERY (CARDIOTHORACIC VASCULAR SURGERY)

## 2023-07-07 ENCOUNTER — PATIENT MESSAGE (OUTPATIENT)
Dept: ADMINISTRATIVE | Facility: CLINIC | Age: 53
End: 2023-07-07
Payer: MEDICARE

## 2023-07-07 ENCOUNTER — PATIENT OUTREACH (OUTPATIENT)
Dept: ADMINISTRATIVE | Facility: CLINIC | Age: 53
End: 2023-07-07
Payer: MEDICARE

## 2023-07-07 NOTE — PROGRESS NOTES
C3 nurse attempted to contact Alex Reynoso for a TCC post hospital discharge follow up call. No answer. Left voicemail with callback information. The patient has a scheduled HOSFU appointment with  Jhony Sharma MD (Cardiothoracic Surgery) 7/17/23 @ 10:20.

## 2023-07-17 ENCOUNTER — OFFICE VISIT (OUTPATIENT)
Dept: CARDIAC SURGERY | Facility: CLINIC | Age: 53
End: 2023-07-17
Payer: MEDICARE

## 2023-07-17 VITALS
HEIGHT: 66 IN | DIASTOLIC BLOOD PRESSURE: 68 MMHG | SYSTOLIC BLOOD PRESSURE: 117 MMHG | BODY MASS INDEX: 40.34 KG/M2 | WEIGHT: 251 LBS

## 2023-07-17 DIAGNOSIS — Z95.1 HX OF CABG: Chronic | ICD-10-CM

## 2023-07-17 PROCEDURE — 3008F BODY MASS INDEX DOCD: CPT | Mod: CPTII,,, | Performed by: THORACIC SURGERY (CARDIOTHORACIC VASCULAR SURGERY)

## 2023-07-17 PROCEDURE — 3074F PR MOST RECENT SYSTOLIC BLOOD PRESSURE < 130 MM HG: ICD-10-PCS | Mod: CPTII,,, | Performed by: THORACIC SURGERY (CARDIOTHORACIC VASCULAR SURGERY)

## 2023-07-17 PROCEDURE — 3008F PR BODY MASS INDEX (BMI) DOCUMENTED: ICD-10-PCS | Mod: CPTII,,, | Performed by: THORACIC SURGERY (CARDIOTHORACIC VASCULAR SURGERY)

## 2023-07-17 PROCEDURE — 3078F PR MOST RECENT DIASTOLIC BLOOD PRESSURE < 80 MM HG: ICD-10-PCS | Mod: CPTII,,, | Performed by: THORACIC SURGERY (CARDIOTHORACIC VASCULAR SURGERY)

## 2023-07-17 PROCEDURE — 4010F PR ACE/ARB THEARPY RXD/TAKEN: ICD-10-PCS | Mod: CPTII,,, | Performed by: THORACIC SURGERY (CARDIOTHORACIC VASCULAR SURGERY)

## 2023-07-17 PROCEDURE — 3074F SYST BP LT 130 MM HG: CPT | Mod: CPTII,,, | Performed by: THORACIC SURGERY (CARDIOTHORACIC VASCULAR SURGERY)

## 2023-07-17 PROCEDURE — 4010F ACE/ARB THERAPY RXD/TAKEN: CPT | Mod: CPTII,,, | Performed by: THORACIC SURGERY (CARDIOTHORACIC VASCULAR SURGERY)

## 2023-07-17 PROCEDURE — 1159F PR MEDICATION LIST DOCUMENTED IN MEDICAL RECORD: ICD-10-PCS | Mod: CPTII,,, | Performed by: THORACIC SURGERY (CARDIOTHORACIC VASCULAR SURGERY)

## 2023-07-17 PROCEDURE — 1160F PR REVIEW ALL MEDS BY PRESCRIBER/CLIN PHARMACIST DOCUMENTED: ICD-10-PCS | Mod: CPTII,,, | Performed by: THORACIC SURGERY (CARDIOTHORACIC VASCULAR SURGERY)

## 2023-07-17 PROCEDURE — 3078F DIAST BP <80 MM HG: CPT | Mod: CPTII,,, | Performed by: THORACIC SURGERY (CARDIOTHORACIC VASCULAR SURGERY)

## 2023-07-17 PROCEDURE — 1160F RVW MEDS BY RX/DR IN RCRD: CPT | Mod: CPTII,,, | Performed by: THORACIC SURGERY (CARDIOTHORACIC VASCULAR SURGERY)

## 2023-07-17 PROCEDURE — 99024 POSTOP FOLLOW-UP VISIT: CPT | Mod: ,,, | Performed by: THORACIC SURGERY (CARDIOTHORACIC VASCULAR SURGERY)

## 2023-07-17 PROCEDURE — 1159F MED LIST DOCD IN RCRD: CPT | Mod: CPTII,,, | Performed by: THORACIC SURGERY (CARDIOTHORACIC VASCULAR SURGERY)

## 2023-07-17 PROCEDURE — 99024 PR POST-OP FOLLOW-UP VISIT: ICD-10-PCS | Mod: ,,, | Performed by: THORACIC SURGERY (CARDIOTHORACIC VASCULAR SURGERY)

## 2023-07-17 RX ORDER — TAMSULOSIN HYDROCHLORIDE 0.4 MG/1
CAPSULE ORAL
COMMUNITY
End: 2023-10-06

## 2023-07-17 RX ORDER — BENZONATATE 100 MG/1
CAPSULE ORAL
Status: ON HOLD | COMMUNITY
End: 2024-01-30 | Stop reason: HOSPADM

## 2023-07-17 RX ORDER — LISINOPRIL 20 MG/1
1 TABLET ORAL DAILY
Status: ON HOLD | COMMUNITY
End: 2023-10-11 | Stop reason: HOSPADM

## 2023-07-17 RX ORDER — HYDROCODONE BITARTRATE AND ACETAMINOPHEN 5; 325 MG/1; MG/1
1 TABLET ORAL EVERY 12 HOURS PRN
COMMUNITY
Start: 2023-03-30 | End: 2023-10-06

## 2023-07-17 RX ORDER — EZETIMIBE 10 MG/1
1 TABLET ORAL NIGHTLY
Status: ON HOLD | COMMUNITY
End: 2023-10-11 | Stop reason: HOSPADM

## 2023-07-17 RX ORDER — METOPROLOL SUCCINATE 25 MG/1
0.5 TABLET, EXTENDED RELEASE ORAL DAILY
COMMUNITY
End: 2023-10-06

## 2023-07-17 RX ORDER — NITROGLYCERIN 0.4 MG/1
TABLET SUBLINGUAL
COMMUNITY
Start: 2023-06-07

## 2023-07-17 RX ORDER — AMLODIPINE BESYLATE 10 MG/1
1 TABLET ORAL DAILY
Status: ON HOLD | COMMUNITY
End: 2024-03-12

## 2023-07-17 RX ORDER — MUPIROCIN 20 MG/G
OINTMENT TOPICAL
Status: ON HOLD | COMMUNITY
End: 2024-01-30 | Stop reason: HOSPADM

## 2023-07-17 RX ORDER — OMEPRAZOLE 20 MG/1
1 CAPSULE, DELAYED RELEASE ORAL DAILY
Status: ON HOLD | COMMUNITY
Start: 2021-11-09 | End: 2024-03-12

## 2023-07-25 ENCOUNTER — TELEPHONE (OUTPATIENT)
Dept: CARDIAC SURGERY | Facility: CLINIC | Age: 53
End: 2023-07-25
Payer: MEDICARE

## 2023-07-25 ENCOUNTER — EXTERNAL HOME HEALTH (OUTPATIENT)
Dept: HOME HEALTH SERVICES | Facility: HOSPITAL | Age: 53
End: 2023-07-25
Payer: MEDICARE

## 2023-07-25 NOTE — TELEPHONE ENCOUNTER
Called Mr. Mendez and informed her that her [facility] papers were completed and faxed. She requested a copy of the completed papers. Informed her of our office hours and that copies would be left at the  for her to  at her convenience. RENEA Feliciano RN

## 2023-09-01 PROCEDURE — 99499 NO LOS: ICD-10-PCS | Mod: ,,, | Performed by: THORACIC SURGERY (CARDIOTHORACIC VASCULAR SURGERY)

## 2023-09-01 PROCEDURE — 99499 UNLISTED E&M SERVICE: CPT | Mod: ,,, | Performed by: THORACIC SURGERY (CARDIOTHORACIC VASCULAR SURGERY)

## 2023-09-05 DIAGNOSIS — K21.9 ESOPHAGEAL REFLUX: Primary | ICD-10-CM

## 2023-09-11 ENCOUNTER — HOSPITAL ENCOUNTER (EMERGENCY)
Facility: HOSPITAL | Age: 53
Discharge: HOME | End: 2023-09-11
Attending: INTERNAL MEDICINE
Payer: MEDICARE

## 2023-09-11 VITALS
WEIGHT: 263 LBS | DIASTOLIC BLOOD PRESSURE: 89 MMHG | TEMPERATURE: 97 F | HEIGHT: 66 IN | OXYGEN SATURATION: 98 % | RESPIRATION RATE: 10 BRPM | BODY MASS INDEX: 42.27 KG/M2 | SYSTOLIC BLOOD PRESSURE: 127 MMHG | HEART RATE: 67 BPM

## 2023-09-11 DIAGNOSIS — R07.9 CHEST PAIN, UNSPECIFIED TYPE: Primary | ICD-10-CM

## 2023-09-11 DIAGNOSIS — R07.9 CHEST PAIN: ICD-10-CM

## 2023-09-11 LAB
ALBUMIN SERPL-MCNC: 3.7 G/DL (ref 3.5–5)
ALBUMIN/GLOB SERPL: 1.1 RATIO (ref 1.1–2)
ALP SERPL-CCNC: 91 UNIT/L (ref 40–150)
ALT SERPL-CCNC: 38 UNIT/L (ref 0–55)
AST SERPL-CCNC: 22 UNIT/L (ref 5–34)
BASOPHILS # BLD AUTO: 0.05 X10(3)/MCL
BASOPHILS NFR BLD AUTO: 0.7 %
BILIRUB SERPL-MCNC: 0.3 MG/DL
BNP BLD-MCNC: 83.3 PG/ML
BUN SERPL-MCNC: 13 MG/DL (ref 8.4–25.7)
CALCIUM SERPL-MCNC: 10 MG/DL (ref 8.4–10.2)
CHLORIDE SERPL-SCNC: 106 MMOL/L (ref 98–107)
CO2 SERPL-SCNC: 29 MMOL/L (ref 22–29)
CREAT SERPL-MCNC: 0.83 MG/DL (ref 0.73–1.18)
EOSINOPHIL # BLD AUTO: 0.43 X10(3)/MCL (ref 0–0.9)
EOSINOPHIL NFR BLD AUTO: 5.7 %
ERYTHROCYTE [DISTWIDTH] IN BLOOD BY AUTOMATED COUNT: 12.4 % (ref 11.5–17)
FLUAV AG UPPER RESP QL IA.RAPID: NOT DETECTED
FLUBV AG UPPER RESP QL IA.RAPID: NOT DETECTED
GFR SERPLBLD CREATININE-BSD FMLA CKD-EPI: >60 MLS/MIN/1.73/M2
GLOBULIN SER-MCNC: 3.4 GM/DL (ref 2.4–3.5)
GLUCOSE SERPL-MCNC: 147 MG/DL (ref 74–100)
HCT VFR BLD AUTO: 41.9 % (ref 42–52)
HGB BLD-MCNC: 13.9 G/DL (ref 14–18)
IMM GRANULOCYTES # BLD AUTO: 0.03 X10(3)/MCL (ref 0–0.04)
IMM GRANULOCYTES NFR BLD AUTO: 0.4 %
LYMPHOCYTES # BLD AUTO: 1.86 X10(3)/MCL (ref 0.6–4.6)
LYMPHOCYTES NFR BLD AUTO: 24.8 %
MCH RBC QN AUTO: 29.4 PG (ref 27–31)
MCHC RBC AUTO-ENTMCNC: 33.2 G/DL (ref 33–36)
MCV RBC AUTO: 88.8 FL (ref 80–94)
MONOCYTES # BLD AUTO: 0.53 X10(3)/MCL (ref 0.1–1.3)
MONOCYTES NFR BLD AUTO: 7.1 %
NEUTROPHILS # BLD AUTO: 4.61 X10(3)/MCL (ref 2.1–9.2)
NEUTROPHILS NFR BLD AUTO: 61.3 %
PLATELET # BLD AUTO: 245 X10(3)/MCL (ref 130–400)
PMV BLD AUTO: 9.9 FL (ref 7.4–10.4)
POTASSIUM SERPL-SCNC: 4.1 MMOL/L (ref 3.5–5.1)
PROT SERPL-MCNC: 7.1 GM/DL (ref 6.4–8.3)
RBC # BLD AUTO: 4.72 X10(6)/MCL (ref 4.7–6.1)
SARS-COV-2 RNA RESP QL NAA+PROBE: NOT DETECTED
SODIUM SERPL-SCNC: 141 MMOL/L (ref 136–145)
TROPONIN I SERPL-MCNC: <0.01 NG/ML (ref 0–0.04)
WBC # SPEC AUTO: 7.51 X10(3)/MCL (ref 4.5–11.5)

## 2023-09-11 PROCEDURE — 85025 COMPLETE CBC W/AUTO DIFF WBC: CPT | Performed by: INTERNAL MEDICINE

## 2023-09-11 PROCEDURE — 83880 ASSAY OF NATRIURETIC PEPTIDE: CPT | Performed by: INTERNAL MEDICINE

## 2023-09-11 PROCEDURE — 93010 EKG 12-LEAD: ICD-10-PCS | Mod: ,,, | Performed by: STUDENT IN AN ORGANIZED HEALTH CARE EDUCATION/TRAINING PROGRAM

## 2023-09-11 PROCEDURE — 84484 ASSAY OF TROPONIN QUANT: CPT | Performed by: INTERNAL MEDICINE

## 2023-09-11 PROCEDURE — 80053 COMPREHEN METABOLIC PANEL: CPT | Performed by: INTERNAL MEDICINE

## 2023-09-11 PROCEDURE — 0240U COVID/FLU A&B PCR: CPT | Performed by: INTERNAL MEDICINE

## 2023-09-11 PROCEDURE — 93010 ELECTROCARDIOGRAM REPORT: CPT | Mod: ,,, | Performed by: STUDENT IN AN ORGANIZED HEALTH CARE EDUCATION/TRAINING PROGRAM

## 2023-09-11 PROCEDURE — 99285 EMERGENCY DEPT VISIT HI MDM: CPT | Mod: 25

## 2023-09-11 PROCEDURE — 93005 ELECTROCARDIOGRAM TRACING: CPT

## 2023-09-11 PROCEDURE — 25000003 PHARM REV CODE 250: Performed by: INTERNAL MEDICINE

## 2023-09-11 RX ORDER — NAPROXEN SODIUM 220 MG/1
324 TABLET, FILM COATED ORAL
Status: COMPLETED | OUTPATIENT
Start: 2023-09-11 | End: 2023-09-11

## 2023-09-11 RX ADMIN — ASPIRIN 81 MG CHEWABLE TABLET 324 MG: 81 TABLET CHEWABLE at 11:09

## 2023-09-11 NOTE — ED PROVIDER NOTES
Encounter Date: 9/11/2023  History from patient     History     Chief Complaint   Patient presents with    Chest Pain    Shortness of Breath     C/o chest pain and shortness of breath for the past 2-3 days. States he had bypass surgery in June of 2023.     HPI    53 y.o. male  has a past medical history of Acid reflux, Arthritis, Back pain, CAD (coronary artery disease), native coronary artery, Coronary artery disease, Coronary artery disease involving native coronary artery of native heart, unspecified whether angina present, High cholesterol, HTN (hypertension), Obesity, Prostate cancer, Seasonal allergies, and Shortness of breath.  Chest Pain and Shortness of Breath (C/o chest pain and shortness of breath for the past 2-3 days. States he had bypass surgery in June of 2023.)     Review of patient's allergies indicates:  No Known Allergies  Past Medical History:   Diagnosis Date    Acid reflux     Arthritis     Back pain     CAD (coronary artery disease), native coronary artery     Coronary artery disease     Coronary artery disease involving native coronary artery of native heart, unspecified whether angina present     High cholesterol     HTN (hypertension)     Obesity     Prostate cancer     Seasonal allergies     Shortness of breath      Past Surgical History:   Procedure Laterality Date    CHOLECYSTECTOMY      COLONOSCOPY      CORONARY ANGIOGRAPHY N/A 06/21/2023    Procedure: ANGIOGRAM, CORONARY ARTERY;  Surgeon: Raji Hughes MD;  Location: [facility] CATH LAB;  Service: Cardiology;  Laterality: N/A;    CORONARY ARTERY BYPASS GRAFT (CABG) N/A 6/27/2023    Procedure: CORONARY ARTERY BYPASS GRAFT (CABG);  Surgeon: Jhony Sharma MD;  Location: [facility];  Service: Cardiovascular;  Laterality: N/A;  ECHO NOTIFIED    ENDOSCOPIC HARVEST OF VEIN N/A 6/27/2023    Procedure: SURGICAL PROCUREMENT, VEIN, ENDOSCOPIC;  Surgeon: Jhony Sharma MD;  Location: [facility];  Service: Cardiovascular;  Laterality: N/A;    LEFT HEART  CATHETERIZATION Left 12/01/2022      Moderate noncritical multivessel CAD with stenosis up to 60%    LEFT HEART CATHETERIZATION Left 06/21/2023    Procedure: Left heart cath;  Surgeon: Raji Hughes MD;  Location: [facility] CATH LAB;  Service: Cardiology;  Laterality: Left;  LHC +/- PCI    LIPOMA RESECTION N/A 03/30/2023    Procedure: EXCISION, LIPOMA (excision of post neck lipoma);  Surgeon: Samuel Cruz MD;  Location: [facility] OR;  Service: General;  Laterality: N/A;  10 x 5 lipoma     PLATING OF STERNUM N/A 6/27/2023    Procedure: PLATING, STERNAL;  Surgeon: Jhony Sharma MD;  Location: [facility] OR;  Service: Cardiovascular;  Laterality: N/A;    PROSTATECTOMY      VENTRICULOGRAM, LEFT  06/21/2023    Procedure: Ventriculogram, Left;  Surgeon: Raji Hughes MD;  Location: [facility] CATH LAB;  Service: Cardiology;;     Family History   Problem Relation Age of Onset    Hypertension Mother     Diabetes Mother     Prostate cancer Father      Social History     Tobacco Use    Smoking status: Former     Current packs/day: 0.50     Types: Cigarettes    Smokeless tobacco: Never   Substance Use Topics    Alcohol use: Not Currently     Comment: socially    Drug use: Not Currently     Review of Systems   Constitutional:  Negative for fever.   HENT:  Negative for trouble swallowing and voice change.    Eyes:  Negative for visual disturbance.   Respiratory:  Positive for cough, chest tightness and shortness of breath.    Cardiovascular:  Positive for chest pain. Negative for palpitations and leg swelling.   Gastrointestinal:  Negative for abdominal pain, diarrhea and vomiting.   Genitourinary:  Negative for dysuria and hematuria.   Musculoskeletal:  Negative for gait problem.        No Pain.   Skin:  Negative for color change and rash.   Neurological:  Negative for headaches.   Psychiatric/Behavioral:  Negative for behavioral problems and sleep disturbance.    All other systems reviewed and are negative.      Physical Exam     Initial  Vitals [09/11/23 1040]   BP Pulse Resp Temp SpO2   (!) 162/88 71 20 97.3 °F (36.3 °C) 98 %      MAP       --         Physical Exam    Nursing note and vitals reviewed.  Constitutional: He appears well-developed and well-nourished. No distress.   HENT:   Head: Atraumatic.   Eyes: EOM are normal.   Neck: Neck supple.   Cardiovascular:  Normal rate, regular rhythm, normal heart sounds and intact distal pulses.           Pulmonary/Chest: Breath sounds normal. No respiratory distress. He has no wheezes. He has no rhonchi. He has no rales. He exhibits no tenderness.   Abdominal: Abdomen is soft. Bowel sounds are normal. He exhibits no distension. There is no abdominal tenderness.   Musculoskeletal:         General: No edema. Normal range of motion.      Cervical back: Neck supple. No bony tenderness.     Neurological: He is alert.   Speech Normal   Skin: Skin is dry.   Psychiatric: He has a normal mood and affect.   Pleasant         ED Course   Procedures    Orders Placed This Encounter   Procedures    X-ray Chest AP Portable    Comprehensive metabolic panel    CBC auto differential    Troponin I    Brain natriuretic peptide    CBC with Differential    COVID/FLU A&B PCR    Diet NPO    Vital signs    Cardiac Monitoring - Adult    Oxygen Continuous    Pulse Oximetry Continuous    EKG 12-LEAD    Insert saline lock     Medications   aspirin chewable tablet 324 mg (324 mg Oral Given 9/11/23 1109)     Admission on 09/11/2023   Component Date Value Ref Range Status    Sodium Level 09/11/2023 141  136 - 145 mmol/L Final    Potassium Level 09/11/2023 4.1  3.5 - 5.1 mmol/L Final    Chloride 09/11/2023 106  98 - 107 mmol/L Final    Carbon Dioxide 09/11/2023 29  22 - 29 mmol/L Final    Glucose Level 09/11/2023 147 (H)  74 - 100 mg/dL Final    Blood Urea Nitrogen 09/11/2023 13.0  8.4 - 25.7 mg/dL Final    Creatinine 09/11/2023 0.83  0.73 - 1.18 mg/dL Final    Calcium Level Total 09/11/2023 10.0  8.4 - 10.2 mg/dL Final    Protein Total  09/11/2023 7.1  6.4 - 8.3 gm/dL Final    Albumin Level 09/11/2023 3.7  3.5 - 5.0 g/dL Final    Globulin 09/11/2023 3.4  2.4 - 3.5 gm/dL Final    Albumin/Globulin Ratio 09/11/2023 1.1  1.1 - 2.0 ratio Final    Bilirubin Total 09/11/2023 0.3  <=1.5 mg/dL Final    Alkaline Phosphatase 09/11/2023 91  40 - 150 unit/L Final    Alanine Aminotransferase 09/11/2023 38  0 - 55 unit/L Final    Aspartate Aminotransferase 09/11/2023 22  5 - 34 unit/L Final    eGFR 09/11/2023 >60  mls/min/1.73/m2 Final    Troponin-I 09/11/2023 <0.010  0.000 - 0.045 ng/mL Final    Natriuretic Peptide 09/11/2023 83.3  <=100.0 pg/mL Final    WBC 09/11/2023 7.51  4.50 - 11.50 x10(3)/mcL Final    RBC 09/11/2023 4.72  4.70 - 6.10 x10(6)/mcL Final    Hgb 09/11/2023 13.9 (L)  14.0 - 18.0 g/dL Final    Hct 09/11/2023 41.9 (L)  42.0 - 52.0 % Final    MCV 09/11/2023 88.8  80.0 - 94.0 fL Final    MCH 09/11/2023 29.4  27.0 - 31.0 pg Final    MCHC 09/11/2023 33.2  33.0 - 36.0 g/dL Final    RDW 09/11/2023 12.4  11.5 - 17.0 % Final    Platelet 09/11/2023 245  130 - 400 x10(3)/mcL Final    MPV 09/11/2023 9.9  7.4 - 10.4 fL Final    Neut % 09/11/2023 61.3  % Final    Lymph % 09/11/2023 24.8  % Final    Mono % 09/11/2023 7.1  % Final    Eos % 09/11/2023 5.7  % Final    Basophil % 09/11/2023 0.7  % Final    Lymph # 09/11/2023 1.86  0.6 - 4.6 x10(3)/mcL Final    Neut # 09/11/2023 4.61  2.1 - 9.2 x10(3)/mcL Final    Mono # 09/11/2023 0.53  0.1 - 1.3 x10(3)/mcL Final    Eos # 09/11/2023 0.43  0 - 0.9 x10(3)/mcL Final    Baso # 09/11/2023 0.05  <=0.2 x10(3)/mcL Final    IG# 09/11/2023 0.03  0 - 0.04 x10(3)/mcL Final    IG% 09/11/2023 0.4  % Final    Influenza A PCR 09/11/2023 Not Detected  Not Detected Final    Influenza B PCR 09/11/2023 Not Detected  Not Detected Final    SARS-CoV-2 PCR 09/11/2023 Not Detected  Not Detected, Negative, Invalid Final    l  Labs Reviewed   COMPREHENSIVE METABOLIC PANEL - Abnormal; Notable for the following components:       Result Value     Glucose Level 147 (*)     All other components within normal limits   CBC WITH DIFFERENTIAL - Abnormal; Notable for the following components:    Hgb 13.9 (*)     Hct 41.9 (*)     All other components within normal limits   TROPONIN I - Normal   B-TYPE NATRIURETIC PEPTIDE - Normal   COVID/FLU A&B PCR - Normal    Narrative:     The Xpert Xpress SARS-CoV-2/FLU/RSV plus is a rapid, multiplexed real-time PCR test intended for the simultaneous qualitative detection and differentiation of SARS-CoV-2, Influenza A, Influenza B, and respiratory syncytial virus (RSV) viral RNA in either nasopharyngeal swab or nasal swab specimens.         CBC W/ AUTO DIFFERENTIAL    Narrative:     The following orders were created for panel order CBC auto differential.  Procedure                               Abnormality         Status                     ---------                               -----------         ------                     CBC with Differential[[id number]]        Abnormal            Final result                 Please view results for these tests on the individual orders.        ECG Results              EKG 12-LEAD (Preliminary result)  Result time 09/11/23 10:58:06      Wet Read by Jordan aGmez MD (09/11/23 10:58:06, [facility] - Emergency Dept, Emergency Medicine)    EKG: Independently reviewed and / or Interpreted by Jordan Gamez MD. independently as Normal Sinus Rhythm, Rate 71, Normal Axis, Normal Intervals., Non Specific T wave Changes, No STEMI                                   Imaging Results              X-ray Chest AP Portable (Preliminary result)  Result time 09/11/23 12:09:29      Wet Read by Jordan Gamez MD (09/11/23 12:09:29, [facility] - Emergency Dept, Emergency Medicine)    Chest One View:  Independently reviewed and/or interpreted by Jordan Gamez MD.  No Focal Consolidation, No Acute Cardiopulmonary abnormality identified grossly.                                      Medications   aspirin chewable tablet 324 mg (324 mg Oral Given 9/11/23 1109)     Medical Decision Making    53 y.o. male  has a past medical history of Acid reflux, Arthritis, Back pain, CAD (coronary artery disease), native coronary artery, Coronary artery disease, Coronary artery disease involving native coronary artery of native heart, unspecified whether angina present, High cholesterol, HTN (hypertension), Obesity, Prostate cancer, Seasonal allergies, and Shortness of breath.  Chest Pain and Shortness of Breath (C/o chest pain and shortness of breath for the past 2-3 days. States he had bypass surgery in June of 2023.)       Patient essentially says that he has been having some cough, shortness of breath, chest pain and some numbness in the arms since Friday, and he says his family doctor wanted to get some GI test done on him so today decided to come to the emergency room to get checked.  And see if they can get the GI test done on him.    I have advised him that since he is having chest pain, I am going to do cardiac workup on him and I will also do a COVID and flu test and then will decide further.  He does not know the name of the tests what his family doctor wants to get done.  He was supposed to bring it to the hospital and they told him that they are going to call him for the test.    Amount and/or Complexity of Data Reviewed  Labs: ordered.  Radiology: ordered.    Risk  OTC drugs.               ED Course as of 09/11/23 1210   Mon Sep 11, 2023   1206 Patient's cardiac workup is essentially negative, his flu and COVID test is also negative, I am going to let him go home with instruction to see his cardiologist for follow-up and continue taking his usual medication and come back to the emergency room in case he develops any other symptoms. [GQ]      ED Course User Index  [GQ] Jordan Gamez MD                    Clinical Impression:   Final diagnoses:  [R07.9] Chest pain  [R07.9] Chest pain,  unspecified type (Primary)        ED Disposition Condition    Discharge Stable          ED Prescriptions    None       Follow-up Information       Follow up With Specialties Details Why Contact Info    Glen Duffy NP Family Medicine   [address] [facility]  [place] [address]  [phone]               Jordan Gamez MD  09/11/23 1756

## 2023-10-02 ENCOUNTER — HOSPITAL ENCOUNTER (OUTPATIENT)
Dept: RADIOLOGY | Facility: HOSPITAL | Age: 53
Discharge: HOME | End: 2023-10-02
Attending: NURSE PRACTITIONER
Payer: MEDICARE

## 2023-10-02 DIAGNOSIS — K21.9 ESOPHAGEAL REFLUX: ICD-10-CM

## 2023-10-02 PROCEDURE — A9698 NON-RAD CONTRAST MATERIALNOC: HCPCS

## 2023-10-02 PROCEDURE — 74240 X-RAY XM UPR GI TRC 1CNTRST: CPT | Mod: TC

## 2023-10-02 PROCEDURE — 25500020 PHARM REV CODE 255

## 2023-10-02 RX ADMIN — BARIUM SULFATE 135 ML: 980 POWDER, FOR SUSPENSION ORAL at 09:10

## 2023-10-06 ENCOUNTER — LAB VISIT (OUTPATIENT)
Dept: LAB | Facility: HOSPITAL | Age: 53
End: 2023-10-06
Attending: INTERNAL MEDICINE
Payer: MEDICARE

## 2023-10-06 DIAGNOSIS — I10 HYPERTENSION, UNSPECIFIED TYPE: ICD-10-CM

## 2023-10-06 DIAGNOSIS — I25.10 CORONARY ARTERY DISEASE, UNSPECIFIED VESSEL OR LESION TYPE, UNSPECIFIED WHETHER ANGINA PRESENT, UNSPECIFIED WHETHER NATIVE OR TRANSPLANTED HEART: Primary | ICD-10-CM

## 2023-10-06 DIAGNOSIS — E78.5 HYPERLIPIDEMIA, UNSPECIFIED HYPERLIPIDEMIA TYPE: ICD-10-CM

## 2023-10-06 LAB
ANION GAP SERPL CALC-SCNC: 6 MEQ/L
BUN SERPL-MCNC: 12 MG/DL (ref 8.4–25.7)
CALCIUM SERPL-MCNC: 9.6 MG/DL (ref 8.4–10.2)
CHLORIDE SERPL-SCNC: 106 MMOL/L (ref 98–107)
CO2 SERPL-SCNC: 26 MMOL/L (ref 22–29)
CREAT SERPL-MCNC: 0.93 MG/DL (ref 0.73–1.18)
CREAT/UREA NIT SERPL: 13
ERYTHROCYTE [DISTWIDTH] IN BLOOD BY AUTOMATED COUNT: 13 % (ref 11.5–17)
GFR SERPLBLD CREATININE-BSD FMLA CKD-EPI: >60 MLS/MIN/1.73/M2
GLUCOSE SERPL-MCNC: 101 MG/DL (ref 74–100)
HCT VFR BLD AUTO: 41.8 % (ref 42–52)
HGB BLD-MCNC: 13.8 G/DL (ref 14–18)
MCH RBC QN AUTO: 29.1 PG (ref 27–31)
MCHC RBC AUTO-ENTMCNC: 33 G/DL (ref 33–36)
MCV RBC AUTO: 88.2 FL (ref 80–94)
PLATELET # BLD AUTO: 247 X10(3)/MCL (ref 130–400)
PMV BLD AUTO: 10.3 FL (ref 7.4–10.4)
POTASSIUM SERPL-SCNC: 4.2 MMOL/L (ref 3.5–5.1)
RBC # BLD AUTO: 4.74 X10(6)/MCL (ref 4.7–6.1)
SODIUM SERPL-SCNC: 138 MMOL/L (ref 136–145)
WBC # SPEC AUTO: 7.89 X10(3)/MCL (ref 4.5–11.5)

## 2023-10-06 PROCEDURE — 85027 COMPLETE CBC AUTOMATED: CPT

## 2023-10-06 PROCEDURE — 36415 COLL VENOUS BLD VENIPUNCTURE: CPT

## 2023-10-06 PROCEDURE — 80048 BASIC METABOLIC PNL TOTAL CA: CPT

## 2023-10-09 ENCOUNTER — HOSPITAL ENCOUNTER (INPATIENT)
Facility: HOSPITAL | Age: 53
LOS: 2 days | Discharge: HOME HEALTH | DRG: 322 | End: 2023-10-11
Attending: EMERGENCY MEDICINE | Admitting: INTERNAL MEDICINE
Payer: MEDICARE

## 2023-10-09 DIAGNOSIS — R07.89 CHEST DISCOMFORT: ICD-10-CM

## 2023-10-09 DIAGNOSIS — R07.9 CHEST PAIN: Primary | ICD-10-CM

## 2023-10-09 DIAGNOSIS — I21.3 ST ELEVATION MYOCARDIAL INFARCTION (STEMI), UNSPECIFIED ARTERY: ICD-10-CM

## 2023-10-09 DIAGNOSIS — Z95.1 HX OF CABG: Chronic | ICD-10-CM

## 2023-10-09 DIAGNOSIS — I21.3 STEMI (ST ELEVATION MYOCARDIAL INFARCTION): ICD-10-CM

## 2023-10-09 LAB
ALBUMIN SERPL-MCNC: 4 G/DL (ref 3.5–5)
ALBUMIN/GLOB SERPL: 1.2 RATIO (ref 1.1–2)
ALP SERPL-CCNC: 102 UNIT/L (ref 40–150)
ALT SERPL-CCNC: 38 UNIT/L (ref 0–55)
AST SERPL-CCNC: 21 UNIT/L (ref 5–34)
AV INDEX (PROSTH): 0.83
AV MEAN GRADIENT: 3 MMHG
AV PEAK GRADIENT: 6 MMHG
AV VALVE AREA BY VELOCITY RATIO: 2.99 CM²
AV VALVE AREA: 2.89 CM²
AV VELOCITY RATIO: 0.86
BASOPHILS # BLD AUTO: 0.05 X10(3)/MCL
BASOPHILS NFR BLD AUTO: 0.4 %
BILIRUB SERPL-MCNC: 0.4 MG/DL
BSA FOR ECHO PROCEDURE: 2.3 M2
BUN SERPL-MCNC: 12.5 MG/DL (ref 8.4–25.7)
CALCIUM SERPL-MCNC: 9.6 MG/DL (ref 8.4–10.2)
CHLORIDE SERPL-SCNC: 103 MMOL/L (ref 98–107)
CO2 SERPL-SCNC: 25 MMOL/L (ref 22–29)
CREAT SERPL-MCNC: 0.97 MG/DL (ref 0.73–1.18)
CV ECHO LV RWT: 0.54 CM
DOP CALC AO PEAK VEL: 1.18 M/S
DOP CALC AO VTI: 23.6 CM
DOP CALC LVOT AREA: 3.5 CM2
DOP CALC LVOT DIAMETER: 2.1 CM
DOP CALC LVOT PEAK VEL: 1.02 M/S
DOP CALC LVOT STROKE VOLUME: 68.2 CM3
DOP CALC MV VTI: 19.8 CM
DOP CALCLVOT PEAK VEL VTI: 19.7 CM
E WAVE DECELERATION TIME: 145 MSEC
E/A RATIO: 1.27
E/E' RATIO: 12.92 M/S
ECHO LV POSTERIOR WALL: 1.35 CM (ref 0.6–1.1)
EOSINOPHIL # BLD AUTO: 0.38 X10(3)/MCL (ref 0–0.9)
EOSINOPHIL NFR BLD AUTO: 3.4 %
ERYTHROCYTE [DISTWIDTH] IN BLOOD BY AUTOMATED COUNT: 12.9 % (ref 11.5–17)
EST. AVERAGE GLUCOSE BLD GHB EST-MCNC: 145.6 MG/DL
FLUAV AG UPPER RESP QL IA.RAPID: NOT DETECTED
FLUBV AG UPPER RESP QL IA.RAPID: NOT DETECTED
FRACTIONAL SHORTENING: 34 % (ref 28–44)
GFR SERPLBLD CREATININE-BSD FMLA CKD-EPI: >60 MLS/MIN/1.73/M2
GLOBULIN SER-MCNC: 3.4 GM/DL (ref 2.4–3.5)
GLUCOSE SERPL-MCNC: 121 MG/DL (ref 70–110)
GLUCOSE SERPL-MCNC: 132 MG/DL (ref 74–100)
HBA1C MFR BLD: 6.7 %
HCT VFR BLD AUTO: 45.1 % (ref 42–52)
HGB BLD-MCNC: 15.3 G/DL (ref 14–18)
IMM GRANULOCYTES # BLD AUTO: 0.03 X10(3)/MCL (ref 0–0.04)
IMM GRANULOCYTES NFR BLD AUTO: 0.3 %
INTERVENTRICULAR SEPTUM: 1.36 CM (ref 0.6–1.1)
LEFT ATRIUM SIZE: 3.6 CM
LEFT ATRIUM VOLUME INDEX MOD: 16.2 ML/M2
LEFT ATRIUM VOLUME MOD: 36.3 CM3
LEFT INTERNAL DIMENSION IN SYSTOLE: 3.32 CM (ref 2.1–4)
LEFT VENTRICLE DIASTOLIC VOLUME INDEX: 52.68 ML/M2
LEFT VENTRICLE DIASTOLIC VOLUME: 118 ML
LEFT VENTRICLE MASS INDEX: 124 G/M2
LEFT VENTRICLE SYSTOLIC VOLUME INDEX: 20 ML/M2
LEFT VENTRICLE SYSTOLIC VOLUME: 44.8 ML
LEFT VENTRICULAR INTERNAL DIMENSION IN DIASTOLE: 5 CM (ref 3.5–6)
LEFT VENTRICULAR MASS: 277.92 G
LV LATERAL E/E' RATIO: 14 M/S
LV SEPTAL E/E' RATIO: 12 M/S
LVOT MG: 2 MMHG
LVOT MV: 0.66 CM/S
LYMPHOCYTES # BLD AUTO: 1.98 X10(3)/MCL (ref 0.6–4.6)
LYMPHOCYTES NFR BLD AUTO: 17.8 %
MAGNESIUM SERPL-MCNC: 1.8 MG/DL (ref 1.6–2.6)
MCH RBC QN AUTO: 29.6 PG (ref 27–31)
MCHC RBC AUTO-ENTMCNC: 33.9 G/DL (ref 33–36)
MCV RBC AUTO: 87.2 FL (ref 80–94)
MONOCYTES # BLD AUTO: 0.76 X10(3)/MCL (ref 0.1–1.3)
MONOCYTES NFR BLD AUTO: 6.8 %
MV MEAN GRADIENT: 2 MMHG
MV PEAK A VEL: 0.66 M/S
MV PEAK E VEL: 0.84 M/S
MV PEAK GRADIENT: 3 MMHG
MV STENOSIS PRESSURE HALF TIME: 68 MS
MV VALVE AREA BY CONTINUITY EQUATION: 3.44 CM2
MV VALVE AREA P 1/2 METHOD: 3.24 CM2
NEUTROPHILS # BLD AUTO: 7.94 X10(3)/MCL (ref 2.1–9.2)
NEUTROPHILS NFR BLD AUTO: 71.3 %
NRBC BLD AUTO-RTO: 0 %
OHS LV EJECTION FRACTION SIMPSONS BIPLANE MOD: 52 %
PISA TR MAX VEL: 1.6 M/S
PLATELET # BLD AUTO: 236 X10(3)/MCL (ref 130–400)
PMV BLD AUTO: 9.8 FL (ref 7.4–10.4)
POC ACTIVATED CLOTTING TIME K: 167 SEC (ref 74–137)
POC ACTIVATED CLOTTING TIME K: 185 SEC (ref 74–137)
POC CARDIAC TROPONIN I: 0 NG/ML (ref 0–0.08)
POCT GLUCOSE: 116 MG/DL (ref 70–110)
POTASSIUM SERPL-SCNC: 4.6 MMOL/L (ref 3.5–5.1)
PROT SERPL-MCNC: 7.4 GM/DL (ref 6.4–8.3)
PV PEAK GRADIENT: 5 MMHG
PV PEAK VELOCITY: 1.08 M/S
RA PRESSURE ESTIMATED: 3 MMHG
RBC # BLD AUTO: 5.17 X10(6)/MCL (ref 4.7–6.1)
RV TB RVSP: 5 MMHG
SAMPLE: ABNORMAL
SAMPLE: ABNORMAL
SAMPLE: NORMAL
SARS-COV-2 RNA RESP QL NAA+PROBE: NOT DETECTED
SODIUM SERPL-SCNC: 137 MMOL/L (ref 136–145)
TDI LATERAL: 0.06 M/S
TDI SEPTAL: 0.07 M/S
TDI: 0.07 M/S
TR MAX PG: 10 MMHG
TRICUSPID ANNULAR PLANE SYSTOLIC EXCURSION: 1.72 CM
TROPONIN I SERPL-MCNC: 0.01 NG/ML (ref 0–0.04)
TROPONIN I SERPL-MCNC: 0.04 NG/ML (ref 0–0.04)
TROPONIN I SERPL-MCNC: <0.01 NG/ML (ref 0–0.04)
TV REST PULMONARY ARTERY PRESSURE: 13 MMHG
WBC # SPEC AUTO: 11.14 X10(3)/MCL (ref 4.5–11.5)
Z-SCORE OF LEFT VENTRICULAR DIMENSION IN END DIASTOLE: -4.72
Z-SCORE OF LEFT VENTRICULAR DIMENSION IN END SYSTOLE: -3.01

## 2023-10-09 PROCEDURE — 25000242 PHARM REV CODE 250 ALT 637 W/ HCPCS

## 2023-10-09 PROCEDURE — C1894 INTRO/SHEATH, NON-LASER: HCPCS | Performed by: INTERNAL MEDICINE

## 2023-10-09 PROCEDURE — C1725 CATH, TRANSLUMIN NON-LASER: HCPCS | Performed by: INTERNAL MEDICINE

## 2023-10-09 PROCEDURE — 93459 L HRT ART/GRFT ANGIO: CPT | Mod: 59 | Performed by: INTERNAL MEDICINE

## 2023-10-09 PROCEDURE — 25500020 PHARM REV CODE 255: Performed by: INTERNAL MEDICINE

## 2023-10-09 PROCEDURE — 84484 ASSAY OF TROPONIN QUANT: CPT | Performed by: INTERNAL MEDICINE

## 2023-10-09 PROCEDURE — 93005 ELECTROCARDIOGRAM TRACING: CPT

## 2023-10-09 PROCEDURE — C1887 CATHETER, GUIDING: HCPCS | Performed by: INTERNAL MEDICINE

## 2023-10-09 PROCEDURE — C1874 STENT, COATED/COV W/DEL SYS: HCPCS | Performed by: INTERNAL MEDICINE

## 2023-10-09 PROCEDURE — 27201423 OPTIME MED/SURG SUP & DEVICES STERILE SUPPLY: Performed by: INTERNAL MEDICINE

## 2023-10-09 PROCEDURE — 27000221 HC OXYGEN, UP TO 24 HOURS

## 2023-10-09 PROCEDURE — 84484 ASSAY OF TROPONIN QUANT: CPT

## 2023-10-09 PROCEDURE — 93010 EKG 12-LEAD: ICD-10-PCS | Mod: ,,, | Performed by: INTERNAL MEDICINE

## 2023-10-09 PROCEDURE — 80053 COMPREHEN METABOLIC PANEL: CPT | Performed by: EMERGENCY MEDICINE

## 2023-10-09 PROCEDURE — 11000001 HC ACUTE MED/SURG PRIVATE ROOM

## 2023-10-09 PROCEDURE — C9606 PERC D-E COR REVASC W AMI S: HCPCS | Mod: RC | Performed by: INTERNAL MEDICINE

## 2023-10-09 PROCEDURE — 93010 EKG 12-LEAD: ICD-10-PCS | Mod: 77,ICN,, | Performed by: STUDENT IN AN ORGANIZED HEALTH CARE EDUCATION/TRAINING PROGRAM

## 2023-10-09 PROCEDURE — 25000242 PHARM REV CODE 250 ALT 637 W/ HCPCS: Performed by: EMERGENCY MEDICINE

## 2023-10-09 PROCEDURE — 63600175 PHARM REV CODE 636 W HCPCS: Performed by: INTERNAL MEDICINE

## 2023-10-09 PROCEDURE — 96375 TX/PRO/DX INJ NEW DRUG ADDON: CPT

## 2023-10-09 PROCEDURE — C1753 CATH, INTRAVAS ULTRASOUND: HCPCS | Performed by: INTERNAL MEDICINE

## 2023-10-09 PROCEDURE — 93010 ELECTROCARDIOGRAM REPORT: CPT | Mod: ,,, | Performed by: INTERNAL MEDICINE

## 2023-10-09 PROCEDURE — 0240U COVID/FLU A&B PCR: CPT | Performed by: EMERGENCY MEDICINE

## 2023-10-09 PROCEDURE — 83036 HEMOGLOBIN GLYCOSYLATED A1C: CPT

## 2023-10-09 PROCEDURE — C1769 GUIDE WIRE: HCPCS | Performed by: INTERNAL MEDICINE

## 2023-10-09 PROCEDURE — 99291 CRITICAL CARE FIRST HOUR: CPT

## 2023-10-09 PROCEDURE — 25000003 PHARM REV CODE 250: Performed by: INTERNAL MEDICINE

## 2023-10-09 PROCEDURE — 92978 ENDOLUMINL IVUS OCT C 1ST: CPT | Mod: RC | Performed by: INTERNAL MEDICINE

## 2023-10-09 PROCEDURE — 25000003 PHARM REV CODE 250: Performed by: EMERGENCY MEDICINE

## 2023-10-09 PROCEDURE — 84484 ASSAY OF TROPONIN QUANT: CPT | Performed by: EMERGENCY MEDICINE

## 2023-10-09 PROCEDURE — 96374 THER/PROPH/DIAG INJ IV PUSH: CPT

## 2023-10-09 PROCEDURE — 93010 ELECTROCARDIOGRAM REPORT: CPT | Mod: 77,ICN,, | Performed by: STUDENT IN AN ORGANIZED HEALTH CARE EDUCATION/TRAINING PROGRAM

## 2023-10-09 PROCEDURE — 21400001 HC TELEMETRY ROOM

## 2023-10-09 PROCEDURE — 83735 ASSAY OF MAGNESIUM: CPT | Performed by: EMERGENCY MEDICINE

## 2023-10-09 PROCEDURE — 63600175 PHARM REV CODE 636 W HCPCS: Performed by: EMERGENCY MEDICINE

## 2023-10-09 PROCEDURE — 85025 COMPLETE CBC W/AUTO DIFF WBC: CPT | Performed by: EMERGENCY MEDICINE

## 2023-10-09 DEVICE — EVEROLIMUS-ELUTING PLATINUM CHROMIUM CORONARY STENT SYSTEM
Type: IMPLANTABLE DEVICE | Site: CORONARY | Status: FUNCTIONAL
Brand: SYNERGY™ XD

## 2023-10-09 RX ORDER — SODIUM CHLORIDE 9 MG/ML
INJECTION, SOLUTION INTRAVENOUS CONTINUOUS
Status: ACTIVE | OUTPATIENT
Start: 2023-10-09 | End: 2023-10-09

## 2023-10-09 RX ORDER — ALBUTEROL SULFATE 0.83 MG/ML
2.5 SOLUTION RESPIRATORY (INHALATION)
Status: COMPLETED | OUTPATIENT
Start: 2023-10-09 | End: 2023-10-09

## 2023-10-09 RX ORDER — LIDOCAINE HYDROCHLORIDE 10 MG/ML
INJECTION INFILTRATION; PERINEURAL
Status: DISCONTINUED | OUTPATIENT
Start: 2023-10-09 | End: 2023-10-09 | Stop reason: HOSPADM

## 2023-10-09 RX ORDER — MIDAZOLAM HYDROCHLORIDE 1 MG/ML
INJECTION INTRAMUSCULAR; INTRAVENOUS
Status: DISCONTINUED | OUTPATIENT
Start: 2023-10-09 | End: 2023-10-09 | Stop reason: HOSPADM

## 2023-10-09 RX ORDER — HYDROCODONE BITARTRATE AND ACETAMINOPHEN 5; 325 MG/1; MG/1
1 TABLET ORAL EVERY 4 HOURS PRN
Status: DISCONTINUED | OUTPATIENT
Start: 2023-10-09 | End: 2023-10-11 | Stop reason: HOSPADM

## 2023-10-09 RX ORDER — ONDANSETRON 2 MG/ML
4 INJECTION INTRAMUSCULAR; INTRAVENOUS
Status: COMPLETED | OUTPATIENT
Start: 2023-10-09 | End: 2023-10-09

## 2023-10-09 RX ORDER — PANTOPRAZOLE SODIUM 40 MG/1
40 TABLET, DELAYED RELEASE ORAL DAILY
Status: DISCONTINUED | OUTPATIENT
Start: 2023-10-09 | End: 2023-10-11 | Stop reason: HOSPADM

## 2023-10-09 RX ORDER — PREGABALIN 100 MG/1
100 CAPSULE ORAL 2 TIMES DAILY
Status: DISCONTINUED | OUTPATIENT
Start: 2023-10-09 | End: 2023-10-11 | Stop reason: HOSPADM

## 2023-10-09 RX ORDER — RANOLAZINE 500 MG/1
500 TABLET, EXTENDED RELEASE ORAL 2 TIMES DAILY
Status: DISCONTINUED | OUTPATIENT
Start: 2023-10-09 | End: 2023-10-11 | Stop reason: HOSPADM

## 2023-10-09 RX ORDER — NITROGLYCERIN 20 MG/100ML
INJECTION INTRAVENOUS
Status: DISCONTINUED | OUTPATIENT
Start: 2023-10-09 | End: 2023-10-09 | Stop reason: HOSPADM

## 2023-10-09 RX ORDER — MORPHINE SULFATE 4 MG/ML
4 INJECTION, SOLUTION INTRAMUSCULAR; INTRAVENOUS
Status: COMPLETED | OUTPATIENT
Start: 2023-10-09 | End: 2023-10-09

## 2023-10-09 RX ORDER — HEPARIN SODIUM 5000 [USP'U]/ML
4000 INJECTION, SOLUTION INTRAVENOUS; SUBCUTANEOUS
Status: COMPLETED | OUTPATIENT
Start: 2023-10-09 | End: 2023-10-09

## 2023-10-09 RX ORDER — CLOPIDOGREL BISULFATE 75 MG/1
75 TABLET ORAL DAILY
Status: DISCONTINUED | OUTPATIENT
Start: 2023-10-09 | End: 2023-10-11 | Stop reason: HOSPADM

## 2023-10-09 RX ORDER — ATORVASTATIN CALCIUM 40 MG/1
80 TABLET, FILM COATED ORAL NIGHTLY
Status: DISCONTINUED | OUTPATIENT
Start: 2023-10-09 | End: 2023-10-11 | Stop reason: HOSPADM

## 2023-10-09 RX ORDER — CLOPIDOGREL BISULFATE 75 MG/1
75 TABLET ORAL DAILY
Status: DISCONTINUED | OUTPATIENT
Start: 2023-10-10 | End: 2023-10-09

## 2023-10-09 RX ORDER — ASPIRIN 81 MG/1
81 TABLET ORAL EVERY MORNING
Status: DISCONTINUED | OUTPATIENT
Start: 2023-10-09 | End: 2023-10-11 | Stop reason: HOSPADM

## 2023-10-09 RX ORDER — NITROGLYCERIN 0.4 MG/1
0.4 TABLET SUBLINGUAL EVERY 5 MIN PRN
Status: COMPLETED | OUTPATIENT
Start: 2023-10-09 | End: 2023-10-10

## 2023-10-09 RX ORDER — LISINOPRIL 20 MG/1
20 TABLET ORAL DAILY
Status: DISCONTINUED | OUTPATIENT
Start: 2023-10-09 | End: 2023-10-11 | Stop reason: HOSPADM

## 2023-10-09 RX ORDER — METOPROLOL TARTRATE 25 MG/1
25 TABLET, FILM COATED ORAL 2 TIMES DAILY
Status: DISCONTINUED | OUTPATIENT
Start: 2023-10-09 | End: 2023-10-11 | Stop reason: HOSPADM

## 2023-10-09 RX ORDER — ALBUTEROL SULFATE 90 UG/1
2 AEROSOL, METERED RESPIRATORY (INHALATION) EVERY 6 HOURS PRN
Status: DISCONTINUED | OUTPATIENT
Start: 2023-10-09 | End: 2023-10-11 | Stop reason: HOSPADM

## 2023-10-09 RX ORDER — ALBUTEROL SULFATE 0.83 MG/ML
SOLUTION RESPIRATORY (INHALATION)
Status: COMPLETED
Start: 2023-10-09 | End: 2023-10-09

## 2023-10-09 RX ORDER — EZETIMIBE 10 MG/1
10 TABLET ORAL NIGHTLY
Status: DISCONTINUED | OUTPATIENT
Start: 2023-10-09 | End: 2023-10-09

## 2023-10-09 RX ORDER — FENTANYL CITRATE 50 UG/ML
INJECTION, SOLUTION INTRAMUSCULAR; INTRAVENOUS
Status: DISCONTINUED | OUTPATIENT
Start: 2023-10-09 | End: 2023-10-09 | Stop reason: HOSPADM

## 2023-10-09 RX ORDER — ASPIRIN 81 MG/1
81 TABLET ORAL DAILY
Status: DISCONTINUED | OUTPATIENT
Start: 2023-10-10 | End: 2023-10-09

## 2023-10-09 RX ORDER — HEPARIN SODIUM 5000 [USP'U]/ML
INJECTION, SOLUTION INTRAVENOUS; SUBCUTANEOUS
Status: DISPENSED
Start: 2023-10-09 | End: 2023-10-09

## 2023-10-09 RX ORDER — ACETAMINOPHEN 325 MG/1
650 TABLET ORAL EVERY 4 HOURS PRN
Status: DISCONTINUED | OUTPATIENT
Start: 2023-10-09 | End: 2023-10-11 | Stop reason: HOSPADM

## 2023-10-09 RX ORDER — AMLODIPINE BESYLATE 5 MG/1
10 TABLET ORAL DAILY
Status: DISCONTINUED | OUTPATIENT
Start: 2023-10-09 | End: 2023-10-11 | Stop reason: HOSPADM

## 2023-10-09 RX ORDER — HEPARIN SODIUM 5000 [USP'U]/ML
4000 INJECTION, SOLUTION INTRAVENOUS; SUBCUTANEOUS EVERY 8 HOURS
Status: DISCONTINUED | OUTPATIENT
Start: 2023-10-09 | End: 2023-10-09

## 2023-10-09 RX ORDER — HEPARIN SODIUM 1000 [USP'U]/ML
INJECTION, SOLUTION INTRAVENOUS; SUBCUTANEOUS
Status: DISCONTINUED | OUTPATIENT
Start: 2023-10-09 | End: 2023-10-09 | Stop reason: HOSPADM

## 2023-10-09 RX ORDER — CLOPIDOGREL 300 MG/1
TABLET, FILM COATED ORAL
Status: DISCONTINUED | OUTPATIENT
Start: 2023-10-09 | End: 2023-10-09 | Stop reason: HOSPADM

## 2023-10-09 RX ORDER — GABAPENTIN 100 MG/1
100 CAPSULE ORAL 3 TIMES DAILY PRN
Status: DISCONTINUED | OUTPATIENT
Start: 2023-10-09 | End: 2023-10-11 | Stop reason: HOSPADM

## 2023-10-09 RX ORDER — NITROGLYCERIN 0.4 MG/1
TABLET SUBLINGUAL CODE/TRAUMA/SEDATION MEDICATION
Status: COMPLETED | OUTPATIENT
Start: 2023-10-09 | End: 2023-10-09

## 2023-10-09 RX ORDER — ONDANSETRON 4 MG/1
8 TABLET, ORALLY DISINTEGRATING ORAL EVERY 8 HOURS PRN
Status: DISCONTINUED | OUTPATIENT
Start: 2023-10-09 | End: 2023-10-11 | Stop reason: HOSPADM

## 2023-10-09 RX ORDER — EZETIMIBE 10 MG/1
10 TABLET ORAL NIGHTLY
Status: DISCONTINUED | OUTPATIENT
Start: 2023-10-09 | End: 2023-10-11 | Stop reason: HOSPADM

## 2023-10-09 RX ORDER — NAPROXEN SODIUM 220 MG/1
324 TABLET, FILM COATED ORAL
Status: COMPLETED | OUTPATIENT
Start: 2023-10-09 | End: 2023-10-09

## 2023-10-09 RX ORDER — DOCUSATE SODIUM 100 MG/1
100 CAPSULE, LIQUID FILLED ORAL 2 TIMES DAILY
Status: DISCONTINUED | OUTPATIENT
Start: 2023-10-09 | End: 2023-10-11 | Stop reason: HOSPADM

## 2023-10-09 RX ORDER — CLOPIDOGREL 300 MG/1
300 TABLET, FILM COATED ORAL
Status: COMPLETED | OUTPATIENT
Start: 2023-10-09 | End: 2023-10-09

## 2023-10-09 RX ADMIN — NITROGLYCERIN 0.4 MG: 0.4 TABLET, ORALLY DISINTEGRATING SUBLINGUAL at 03:10

## 2023-10-09 RX ADMIN — EZETIMIBE 10 MG: 10 TABLET ORAL at 08:10

## 2023-10-09 RX ADMIN — METOPROLOL TARTRATE 25 MG: 25 TABLET, FILM COATED ORAL at 09:10

## 2023-10-09 RX ADMIN — RANOLAZINE 500 MG: 500 TABLET, EXTENDED RELEASE ORAL at 09:10

## 2023-10-09 RX ADMIN — ONDANSETRON 4 MG: 2 INJECTION INTRAMUSCULAR; INTRAVENOUS at 03:10

## 2023-10-09 RX ADMIN — HYDROCODONE BITARTRATE AND ACETAMINOPHEN 1 TABLET: 5; 325 TABLET ORAL at 11:10

## 2023-10-09 RX ADMIN — PANTOPRAZOLE SODIUM 40 MG: 40 TABLET, DELAYED RELEASE ORAL at 09:10

## 2023-10-09 RX ADMIN — LISINOPRIL 20 MG: 20 TABLET ORAL at 09:10

## 2023-10-09 RX ADMIN — PREGABALIN 100 MG: 100 CAPSULE ORAL at 09:10

## 2023-10-09 RX ADMIN — DOCUSATE SODIUM 100 MG: 100 CAPSULE, LIQUID FILLED ORAL at 09:10

## 2023-10-09 RX ADMIN — CLOPIDOGREL BISULFATE 75 MG: 75 TABLET ORAL at 09:10

## 2023-10-09 RX ADMIN — ALBUTEROL SULFATE 2.5 MG: 2.5 SOLUTION RESPIRATORY (INHALATION) at 03:10

## 2023-10-09 RX ADMIN — CLOPIDOGREL BISULFATE 300 MG: 300 TABLET, FILM COATED ORAL at 03:10

## 2023-10-09 RX ADMIN — PREGABALIN 100 MG: 100 CAPSULE ORAL at 08:10

## 2023-10-09 RX ADMIN — ASPIRIN 81 MG: 81 TABLET, COATED ORAL at 09:10

## 2023-10-09 RX ADMIN — DOCUSATE SODIUM 100 MG: 100 CAPSULE, LIQUID FILLED ORAL at 08:10

## 2023-10-09 RX ADMIN — ACETAMINOPHEN 650 MG: 325 TABLET, FILM COATED ORAL at 09:10

## 2023-10-09 RX ADMIN — AMLODIPINE BESYLATE 10 MG: 5 TABLET ORAL at 09:10

## 2023-10-09 RX ADMIN — ALBUTEROL SULFATE 2.5 MG: 0.83 SOLUTION RESPIRATORY (INHALATION) at 03:10

## 2023-10-09 RX ADMIN — HEPARIN SODIUM 4000 UNITS: 5000 INJECTION, SOLUTION INTRAVENOUS; SUBCUTANEOUS at 03:10

## 2023-10-09 RX ADMIN — METOPROLOL TARTRATE 25 MG: 25 TABLET, FILM COATED ORAL at 08:10

## 2023-10-09 RX ADMIN — ASPIRIN 81 MG 324 MG: 81 TABLET ORAL at 03:10

## 2023-10-09 RX ADMIN — RANOLAZINE 500 MG: 500 TABLET, EXTENDED RELEASE ORAL at 08:10

## 2023-10-09 RX ADMIN — ATORVASTATIN CALCIUM 80 MG: 40 TABLET, FILM COATED ORAL at 08:10

## 2023-10-09 RX ADMIN — MORPHINE SULFATE 4 MG: 4 INJECTION, SOLUTION INTRAMUSCULAR; INTRAVENOUS at 03:10

## 2023-10-09 NOTE — ED NOTES
Cath lab paged @ 0302, page not returned by cath lab team. ER  called cath lab for update, cath lab RN stated that cath lab team is not ready for patient at this time. Cath lab states they will notify ER when they are ready for patient.

## 2023-10-09 NOTE — Clinical Note
The catheter was inserted into the, was removed from the and was inserted over the wire into the distal   right coronary artery. IVUS performed

## 2023-10-09 NOTE — PROGRESS NOTES
[facility] - [facility] Medical Telemetry    Cardiology  History and Physical     Patient Name: Alex Reynoso  MRN: [id number]  Admission Date: 10/9/2023  Code Status: Prior   Attending Provider: Dawit Rodarte MD   Primary Care Physician: Glen Duffy NP  Principal Problem:<principal problem not specified>    Patient information was obtained from patient, past medical records, and ER records.     Subjective:     Chief Complaint:  CP     HPI:   Mr. Reynoso is a 53 year old male who is known to [facility], Dr. Hughes. He reports to the ER on 10.9.23 with complaints of CP x 24 hours & worsening SOB over the last few hours. He describes the pain as a tightness in the center of his chest with radiation his neck and left shoulder. He reports that he took nitro and achieved mild relief but couldn't get comfortable while lying in bed. He underwent CABG x 3 in June and was planned for an outpatient LHC tomorrow (10.10.23). He reports associated symptoms of HA and chronic back pain but denies palpitations, nausea, or vomiting. An EKG was obtained and demonstrated an inferior STEMI. He will be urgently taken to the cath lab.     10.9.23: NAD. Right groin benign. Denies CP, SOB, or palps. Reports HA & back pain. SR on tele. BP stable.     PMH: CAD, HTN, HLD, depression, GERD, prostate CA  PSH: prostate surgery, cholecystectomy, neck surgery, CABG  Family History: Father - HTN, CA, MI, HLD; Mother - HTN, MI, HLD  Social History: Former smoker. Denies alcohol or illicit drug use.     Previous Cardiac Diagnostics:   [facility] 9.28.23:  The study quality is good.   This is an abnormal perfusion study. Study is consistent with ischemia.   This scan is suggestive of moderate risk for future cardiovascular events.   Medium reversible perfusion abnormality of moderate intensity in the inferior region. SSS 4 SRS 0.   No change with prone imaging is noted.   The left ventricular cavity is noted to be normal on the stress study. The left  ventricular ejection fraction was calculated to be 50% and left ventricular global function is normal.     [facility] 7.10.23:  Severe multivessel CAD (ostial/proximal LAD, proximal obtuse marginal 1, mid RCA and subtotally occluded rPDA which fills via left to right    TTE 11.8.22:  The study quality is average.   The left ventricle is normal in size. Global left ventricular systolic function is normal. The left ventricular ejection fraction is 60%. Concentric left ventricular hypertrophy is present. It is mild.   Insufficient TR to calculate pulmonary artery pressure.       Review of patient's allergies indicates:  No Known Allergies    Current Facility-Administered Medications on File Prior to Encounter   Medication    fentaNYL 50 mcg/mL injection 25 mcg    HYDROmorphone (PF) injection 0.2 mg    lactated ringers infusion    sodium chloride 0.9% flush 10 mL    sodium chloride 0.9% flush 10 mL     Current Outpatient Medications on File Prior to Encounter   Medication Sig    albuterol (VENTOLIN HFA) 90 mcg/actuation inhaler Inhale 2 puffs into the lungs every 6 (six) hours as needed for Wheezing. Rescue    amLODIPine (NORVASC) 10 MG tablet Take 1 tablet by mouth once daily.    aspirin (ECOTRIN) 81 MG EC tablet Take 81 mg by mouth every morning. Stopped 3/26/23    atorvastatin (LIPITOR) 80 MG tablet Take 80 mg by mouth every evening.    benzonatate (TESSALON) 100 MG capsule Take 1 capsule 3 times a day by oral route as needed.    clopidogreL (PLAVIX) 75 mg tablet Take 75 mg by mouth once daily.    docusate sodium (COLACE) 100 MG capsule Take 1 capsule (100 mg total) by mouth 2 (two) times daily.    ezetimibe (ZETIA) 10 mg tablet Take 10 mg by mouth every evening.    ezetimibe (ZETIA) 10 mg tablet Take 1 tablet by mouth every evening.    gabapentin (NEURONTIN) 100 MG capsule Take 100 mg by mouth 3 (three) times daily as needed.    lisinopriL (PRINIVIL,ZESTRIL) 20 MG tablet Take 1 tablet by mouth once daily.    metoprolol  tartrate (LOPRESSOR) 25 MG tablet Take 0.5 tablets (12.5 mg total) by mouth 2 (two) times daily. (Patient taking differently: Take 25 mg by mouth 2 (two) times daily.)    mupirocin (BACTROBAN) 2 % ointment mupirocin 2 % topical ointment   APPLY TOPICALLY TO THE AFFECTED AREA TWICE DAILY AS NEEDED    nitroGLYCERIN (NITROSTAT) 0.4 MG SL tablet Place under the tongue.    omeprazole (PRILOSEC) 20 MG capsule Take 1 capsule by mouth once daily.    oxyCODONE-acetaminophen (PERCOCET)  mg per tablet Take 1 tablet by mouth every 6 (six) hours as needed for Pain.    pantoprazole (PROTONIX) 40 MG tablet Take 40 mg by mouth once daily.    pregabalin (LYRICA) 100 MG capsule Take 1 capsule by mouth 2 (two) times daily.    ranolazine 1,000 mg PSRG Take 1,000 mg by mouth 2 (two) times daily.     Review of Systems   Cardiovascular:  Negative for chest pain, leg swelling and palpitations.   Respiratory:  Negative for shortness of breath.    Musculoskeletal:  Positive for back pain.   Neurological:  Positive for headaches.     Objective:     Vital Signs (Most Recent):  Temp: 97.6 °F (36.4 °C) (10/09/23 0646)  Pulse: 81 (10/09/23 0646)  Resp: 18 (10/09/23 1122)  BP: 131/82 (10/09/23 0941)  SpO2: 95 % (10/09/23 0646) Vital Signs (24h Range):  Temp:  [97.6 °F (36.4 °C)-99.2 °F (37.3 °C)] 97.6 °F (36.4 °C)  Pulse:  [] 81  Resp:  [14-26] 18  SpO2:  [93 %-98 %] 95 %  BP: (102-175)/() 131/82     Weight: 118.8 kg (262 lb)  Body mass index is 42.29 kg/m².    SpO2: 95 %       No intake or output data in the 24 hours ending 10/09/23 1258    Lines/Drains/Airways       Line  Duration                  Sheath 10/09/23 0419 Right anterior;proximal <1 day              Peripheral Intravenous Line  Duration                  Peripheral IV - Double Lumen 10/09/23 0300 20 G;22 G Right Antecubital <1 day         Peripheral IV - Single Lumen 10/09/23 0300 18 G Anterior;Distal;Left Upper Arm <1 day                    Physical  Exam  Constitutional:       Appearance: He is obese.   HENT:      Head: Normocephalic.      Nose: Nose normal.      Mouth/Throat:      Mouth: Mucous membranes are moist.   Eyes:      Extraocular Movements: Extraocular movements intact.   Cardiovascular:      Rate and Rhythm: Normal rate and regular rhythm.      Pulses: Normal pulses.      Heart sounds: Normal heart sounds.      Comments: Right groin soft, nontender. No hematoma noted. + 2 peripheral pulse  Pulmonary:      Breath sounds: Normal breath sounds.   Abdominal:      Palpations: Abdomen is soft.   Skin:     General: Skin is warm and dry.   Neurological:      Mental Status: He is alert and oriented to person, place, and time.   Psychiatric:         Behavior: Behavior normal.         EKG:         Telemetry: SR    Significant Labs: BMP:   Recent Labs   Lab 10/09/23  0304      K 4.6   CO2 25   BUN 12.5   CREATININE 0.97   CALCIUM 9.6   MG 1.80     , CMP   Recent Labs   Lab 10/09/23  0304      K 4.6   CO2 25   BUN 12.5   CREATININE 0.97   CALCIUM 9.6   ALBUMIN 4.0   BILITOT 0.4   ALKPHOS 102   AST 21   ALT 38     , CBC   Recent Labs   Lab 10/09/23  0304   WBC 11.14   HGB 15.3   HCT 45.1        , Troponin   Recent Labs   Lab 10/09/23  0304 10/09/23  0930   TROPONINI <0.010 <0.010     , All pertinent lab results from the last 24 hours have been reviewed., and   Recent Lab Results  (Last 5 results in the past 24 hours)        10/09/23  0930   10/09/23  0823   10/09/23  0736   10/09/23  0331   10/09/23  0320        Influenza A, Molecular       Not Detected         Influenza B, Molecular       Not Detected         POC ACTIVATED CLOTTING TIME K   167   185           POC Cardiac Troponin I         0.00       Sample   unknown   unknown     VENOUS  Comment: A single negative troponin is insufficient to rule out myocardial infarction.  The use of a serial sampling protocol is recommended practice. Correlate results with reference intervals established  for methodology used. Point of care and core laboratory   troponin results are not interchangeable.         SARS-CoV2 (COVID-19) Qualitative PCR       Not Detected         Troponin I <0.010                                      Significant Imaging: X-Ray: CXR: X-Ray Chest 1 View (CXR): No results found for this visit on 10/09/23.  Assessment and Plan:   ASSESSMENT:  STEMI    - [facility] 10.9.23:RCA: severe midvessel 99% lesion -  stent to distal RCA, mid RCA, & mid-proximal RCA  CAD    - CABG x 3 7.10.23: LIMA to LAD, SVG to OM, SVG to PDA  HTN  HLD  Depression  GERD  Prostate CA    PLAN:  Continue ASA, Plavix, ACE, BB, & high intensity statin.  Obtain A1C. Lipid panel in am.   Continue to trend troponin levels. Echo pending.   Labs and EKG in am: CBC, CMP, & Mg.       VTE Risk Mitigation (From admission, onward)      None            CALLUM Jensen  Cardiology  [facility] - [address] [facility] Medical Telemetry  10/09/2023 8:51 AM

## 2023-10-09 NOTE — ED PROVIDER NOTES
Encounter Date: 10/9/2023    SCRIBE #1 NOTE: I, Didier Sigala, am scribing for, and in the presence of,  Zachariah Joseph MD. I have scribed the following portions of the note - Other sections scribed: HPI,ROS,PE.       History     Chief Complaint   Patient presents with    Chest Pain     Pt c/o chest pain for 24 hours and SOB getting worse  2 hours ago. . Hx of CABG scheduled for  angiogram tomoroow     52 y/o male with PMHx of CAD, GERD, HTN, obesity, and hyperglycemia presents to ED c/o worsening chest pain onset 24x hours ago severely worsened at 0200 hours. Pt reports associated symptoms of SOB. Wife reports his pain has been constant since onset. She states that he took Nitro with mild relief ~2200 on 10/8. She states that he couldn't get comfortable while laying in bed. She reports pt complained that the pain was worse ~0200 on 10/9. She reports pt had a bypass in June with Dr. Sharma. She reports pt has an appointment with Dr. Hughes for an angiogram tomorrow.  States pain is in the center of the chest tight radiates to the neck and left shoulder    Cardiothoracic surgeron: Jhony Sharma MD  Cardiology: Raji Hughes MD    The history is provided by the patient and the spouse. No  was used.     Review of patient's allergies indicates:  No Known Allergies  Past Medical History:   Diagnosis Date    Acid reflux     Arthritis     Back pain     CAD (coronary artery disease), native coronary artery     Coronary artery disease     Coronary artery disease involving native coronary artery of native heart, unspecified whether angina present     GERD (gastroesophageal reflux disease)     High cholesterol     HTN (hypertension)     Hyperglycemia     Obesity     Prostate cancer     Seasonal allergies     Shortness of breath     Tobacco use      Past Surgical History:   Procedure Laterality Date    CHOLECYSTECTOMY      COLONOSCOPY      CORONARY ANGIOGRAPHY N/A 06/21/2023    Procedure: ANGIOGRAM,  CORONARY ARTERY;  Surgeon: Raji Hughes MD;  Location: [facility] CATH LAB;  Service: Cardiology;  Laterality: N/A;    CORONARY ARTERY BYPASS GRAFT (CABG) N/A 6/27/2023    Procedure: CORONARY ARTERY BYPASS GRAFT (CABG);  Surgeon: Jhony Sharma MD;  Location: [facility];  Service: Cardiovascular;  Laterality: N/A;  ECHO NOTIFIED    ENDOSCOPIC HARVEST OF VEIN N/A 6/27/2023    Procedure: SURGICAL PROCUREMENT, VEIN, ENDOSCOPIC;  Surgeon: Jhony Sharma MD;  Location: [facility];  Service: Cardiovascular;  Laterality: N/A;    LEFT HEART CATHETERIZATION Left 12/01/2022      Moderate noncritical multivessel CAD with stenosis up to 60%    LEFT HEART CATHETERIZATION Left 06/21/2023    Procedure: Left heart cath;  Surgeon: Raji Hughes MD;  Location: [facility] CATH LAB;  Service: Cardiology;  Laterality: Left;  LHC +/- PCI    LIPOMA RESECTION N/A 03/30/2023    Procedure: EXCISION, LIPOMA (excision of post neck lipoma);  Surgeon: Samuel Cruz MD;  Location: [facility] OR;  Service: General;  Laterality: N/A;  10 x 5 lipoma     PLATING OF STERNUM N/A 6/27/2023    Procedure: PLATING, STERNAL;  Surgeon: Jhony Sharma MD;  Location: [facility];  Service: Cardiovascular;  Laterality: N/A;    PROSTATECTOMY      VENTRICULOGRAM, LEFT  06/21/2023    Procedure: Ventriculogram, Left;  Surgeon: Raji Hughes MD;  Location: [facility] CATH LAB;  Service: Cardiology;;     Family History   Problem Relation Age of Onset    Hypertension Mother     Diabetes Mother     Prostate cancer Father      Social History     Tobacco Use    Smoking status: Former     Average packs/day: 0.5 packs/day for 33.8 years (16.7 ttl pk-yrs)     Types: Cigarettes     Start date: 1990    Smokeless tobacco: Never   Substance Use Topics    Alcohol use: Not Currently     Comment: socially    Drug use: Not Currently     Review of Systems   Constitutional:  Negative for chills and fever.   Respiratory:  Positive for shortness of breath. Negative for cough.    Cardiovascular:  Positive for chest  pain.   Gastrointestinal:  Negative for abdominal pain, nausea and vomiting.   Musculoskeletal:  Negative for myalgias.   Neurological:  Negative for syncope.   All other systems reviewed and are negative.      Physical Exam     Initial Vitals [10/09/23 0259]   BP Pulse Resp Temp SpO2   128/83 103 (!) 24 99.2 °F (37.3 °C) 97 %      MAP       --         Physical Exam    Constitutional: He appears well-developed and well-nourished. He appears distressed.   HENT:   Head: Normocephalic and atraumatic.   Cardiovascular:  Normal rate.           Pulses:       Radial pulses are 2+ on the right side and 2+ on the left side.   Pulmonary/Chest: No respiratory distress. He has no wheezes. He has no rhonchi. He exhibits no tenderness.   Abdominal: Abdomen is soft. He exhibits no distension. There is no abdominal tenderness. There is no rebound and no guarding.   Musculoskeletal:         General: Normal range of motion.     Neurological: He is alert and oriented to person, place, and time. He has normal strength.   Skin: Skin is warm and dry.   Psychiatric: He has a normal mood and affect.         ED Course   Critical Care    Date/Time: 10/9/2023 4:05 AM    Performed by: Zachariah Joseph MD  Authorized by: Zachariah Joseph MD  Direct patient critical care time: 16 minutes  Additional history critical care time: 3 minutes  Ordering / reviewing critical care time: 4 minutes  Documentation critical care time: 12 minutes  Consulting other physicians critical care time: 3 minutes  Total critical care time (exclusive of procedural time) : 38 minutes        Labs Reviewed   COMPREHENSIVE METABOLIC PANEL - Abnormal; Notable for the following components:       Result Value    Glucose Level 132 (*)     All other components within normal limits   MAGNESIUM - Normal   TROPONIN I - Normal   CBC W/ AUTO DIFFERENTIAL    Narrative:     The following orders were created for panel order CBC Auto Differential.  Procedure                                Abnormality         Status                     ---------                               -----------         ------                     CBC with Differential[[id number]]                           Final result                 Please view results for these tests on the individual orders.   CBC WITH DIFFERENTIAL   TROPONIN ISTAT   COVID/FLU A&B PCR   POCT TROPONIN          Imaging Results              X-Ray Chest AP Portable (In process)                      Medications   nitroGLYCERIN SL tablet 0.4 mg (0.4 mg Sublingual Given 10/9/23 0307)   nitroGLYCERIN SL tablet (0.4 mg Sublingual Given 10/9/23 0312)   aspirin chewable tablet 324 mg (324 mg Oral Given 10/9/23 0302)   morphine injection 4 mg (4 mg Intravenous Given 10/9/23 0315)   ondansetron injection 4 mg (4 mg Intravenous Given 10/9/23 0303)   clopidogreL tablet 300 mg (300 mg Oral Given 10/9/23 0315)   heparin (porcine) injection 4,000 Units ( Intravenous Canceled Entry 10/9/23 0315)   albuterol nebulizer solution 2.5 mg (2.5 mg Nebulization Given 10/9/23 0317)             Scribe Attestation:   Scribe #1: I performed the above scribed service and the documentation accurately describes the services I performed. I attest to the accuracy of the note.    Attending Attestation:           Physician Attestation for Scribe:  Physician Attestation Statement for Scribe #1: I, Zachariah Joseph MD, reviewed documentation, as scribed by Didier Sigala in my presence, and it is both accurate and complete.         Medical Decision Making  The differential diagnosis includes, but is not limited to, MI, GERD, and PNA.     Had a nonproductive cough at all he is not had any fever white count is not elevated but there is some density in the right lower lung field by me x-ray could be fluid could be infectious.  Chest pain has been present for 24 hours in his not intense until about 0200 hours troponin is negative.  Obviously this could be too early for the troponin to have  elevated ST segments are elevated inferior leads do is inverted in aVL but no ST segment depressions.  Chest pain improved with morphine and nitroglycerin in the emergency department morphine hydrate improving at home as well.  Patient was having radiation of pain toward the jaw and neck as well as further concerning severe dyspnea.  He uses nebulizers at home but denies history of asthma or COPD.  Given nebulizer here.    Amount and/or Complexity of Data Reviewed  Independent Historian: spouse     Details: Wife reports his pain has been constant since onset. She states that he took Nitro with mild relief ~2200 on 10/8. She states that he couldn't get comfortable while laying in bed. She reports pt complained that the pain was worse ~0200 on 10/9. She reports pt had a bypass in June with Dr. Sharma. She reports pt has an appointment with Dr. Hughes for an angiogram tomorrow.   External Data Reviewed:      Details: CABG summary:  Date of Procedure: 6/27/2023      Procedure:  1.  Urgent coronary artery bypass grafting x3 with EVH (R GSV)  -left internal mammary artery to the LAD  -reverse saphenous vein graft to the obtuse marginal   -reverse saphenous vein graft to the PDA  * it should be noted that the vein conduit was extremely poor with very small caliber and many branches in both legs  2. Plating of sternal defect with the [employer] sternal plating system and stainless steel wires   Labs: ordered. Decision-making details documented in ED Course.  Radiology: ordered. Decision-making details documented in ED Course.  ECG/medicine tests: ordered. Decision-making details documented in ED Course.    Risk  OTC drugs.  Prescription drug management.  Decision regarding hospitalization.           ED Course as of 10/09/23 0406   Mon Oct 09, 2023   0301 Call and consult with [facility] [NASRIN]   0304 Discussed with Dr Rodarte, coming to take patient to cath lab.  Patient's onset of 5 mg of Plavix daily he recommends loading with 300  agrees with heparin as well. [LF]   0313 EKG at 0249 hours.  102 beats per minute sinus tachycardia ST segment elevation in lead 2 3 AVF downgoing T-wave in aVL V2.  This EKG is different compared to 09/11/2023 [LF]   0317 He received 3 doses of sublingual nitroglycerin here feeling improved.  He looks more comfortable.  Blood pressure tolerated the nitroglycerin. [LF]   0321 Chest x-ray has some opacification in the right lower quadrant concerning for vascular congestion versus infiltrate [LF]   0325 Chest pain is improved.  He states it is still mildly present but no where near as intense as it was initially. [LF]      ED Course User Index  [NASRIN] Didier Sigala  [LF] Zachariah Joseph MD Dr Amin evaluated and took to cath lab          Clinical Impression:   Final diagnoses:  [I21.3] ST elevation myocardial infarction (STEMI), unspecified artery        ED Disposition Condition    Admit Stable                Zachariah Joseph MD  10/09/23 0403       Zachariah Joseph MD  10/09/23 0406

## 2023-10-09 NOTE — Clinical Note
The catheter was removed from the and was repositioned into the ostial LIMA graft. An angiography was performed of the graft. The angiography was performed via power injection.

## 2023-10-09 NOTE — Clinical Note
The catheter was repositioned into the ostial SVG and OM graft. An angiography was performed of the graft. The angiography was performed via power injection.

## 2023-10-09 NOTE — Clinical Note
The catheter was repositioned into the ostial SVG and PDA graft. An angiography was performed of the graft. The angiography was performed via power injection.

## 2023-10-09 NOTE — Clinical Note
The catheter was inserted into the, was removed from the and was inserted over the wire into the mid   right coronary artery. An angiography was performed of the right coronary arteries. The angiography was performed via power injection.

## 2023-10-09 NOTE — Clinical Note
The catheter was inserted into the, was removed from the and was inserted over the wire into the mid   right coronary artery. IVUS performed

## 2023-10-09 NOTE — NURSING
Nurses Note -- 4 Eyes      10/9/2023   11:51 AM      Skin assessed during: Admit      [x] No Altered Skin Integrity Present    [x]Prevention Measures Documented      [] Yes- Altered Skin Integrity Present or Discovered   [] LDA Added if Not in Epic (Describe Wound)   [] New Altered Skin Integrity was Present on Admit and Documented in LDA   [] Wound Image Taken    Wound Care Consulted? No    Attending Nurse:  LYNN Carrillo    Second RN/Staff Member:   LYNN Irby

## 2023-10-09 NOTE — NURSING
HANDED OFF REPORT TO LYNN AGUERO.     PT IN [facility], INFORMED OF S/S TO REPORT, RIGHT GROIN INTACT, NO REDNESS, LEAKAGE, OR PAIN AT THIS TIME, NO C/O NUMBNESS OR TINGLING  OR SOB AT THIS MOMENT.    CONNECTED TO PORT 3 WITH 15 MIN CYCLES FOR VITALS, URINAL AT BS..EKG: NSR.

## 2023-10-09 NOTE — H&P
[facility] - [address] Medical Telemetry    Cardiology  History and Physical     Patient Name: Alex Reynoso  MRN: [id number]  Admission Date: 10/9/2023  Code Status: Prior   Attending Provider: Dawit Rodarte MD   Primary Care Physician: Glen Duffy NP  Principal Problem:<principal problem not specified>    Patient information was obtained from patient, past medical records, and ER records.     Subjective:     Chief Complaint:  CP     HPI:   Mr. Reynoso is a 53 year old male who is known to [facility], Dr. Hughes. He reports to the ER on 10.9.23 with complaints of CP x 24 hours & worsening SOB over the last few hours. He describes the pain as a tightness in the center of his chest with radiation his neck and left shoulder. He reports that he took nitro and achieved mild relief but couldn't get comfortable while lying in bed. He underwent CABG x 3 in June and was planned for an outpatient [facility] tomorrow (10.10.23). He reports associated symptoms of HA and chronic back pain but denies palpitations, nausea, or vomiting. An EKG was obtained and demonstrated an inferior STEMI. He will be urgently taken to the cath lab.     PMH: CAD, HTN, HLD, depression, GERD, prostate CA  PSH: prostate surgery, cholecystectomy, neck surgery, CABG  Family History: Father - HTN, CA, MI, HLD; Mother - HTN, MI, HLD  Social History: Former smoker. Denies alcohol or illicit drug use.     Previous Cardiac Diagnostics:   MPI 9.28.23:  The study quality is good.   This is an abnormal perfusion study. Study is consistent with ischemia.   This scan is suggestive of moderate risk for future cardiovascular events.   Medium reversible perfusion abnormality of moderate intensity in the inferior region. SSS 4 SRS 0.   No change with prone imaging is noted.   The left ventricular cavity is noted to be normal on the stress study. The left ventricular ejection fraction was calculated to be 50% and left ventricular global function is normal.     [facility]  7.10.23:  Severe multivessel CAD (ostial/proximal LAD, proximal obtuse marginal 1, mid RCA and subtotally occluded rPDA which fills via left to right    TTE 11.8.22:  The study quality is average.   The left ventricle is normal in size. Global left ventricular systolic function is normal. The left ventricular ejection fraction is 60%. Concentric left ventricular hypertrophy is present. It is mild.   Insufficient TR to calculate pulmonary artery pressure.       Review of patient's allergies indicates:  No Known Allergies    Current Facility-Administered Medications on File Prior to Encounter   Medication    fentaNYL 50 mcg/mL injection 25 mcg    HYDROmorphone (PF) injection 0.2 mg    lactated ringers infusion    sodium chloride 0.9% flush 10 mL    sodium chloride 0.9% flush 10 mL     Current Outpatient Medications on File Prior to Encounter   Medication Sig    albuterol (VENTOLIN HFA) 90 mcg/actuation inhaler Inhale 2 puffs into the lungs every 6 (six) hours as needed for Wheezing. Rescue    amLODIPine (NORVASC) 10 MG tablet Take 1 tablet by mouth once daily.    aspirin (ECOTRIN) 81 MG EC tablet Take 81 mg by mouth every morning. Stopped 3/26/23    atorvastatin (LIPITOR) 80 MG tablet Take 80 mg by mouth every evening.    benzonatate (TESSALON) 100 MG capsule Take 1 capsule 3 times a day by oral route as needed.    clopidogreL (PLAVIX) 75 mg tablet Take 75 mg by mouth once daily.    docusate sodium (COLACE) 100 MG capsule Take 1 capsule (100 mg total) by mouth 2 (two) times daily.    ezetimibe (ZETIA) 10 mg tablet Take 10 mg by mouth every evening.    ezetimibe (ZETIA) 10 mg tablet Take 1 tablet by mouth every evening.    gabapentin (NEURONTIN) 100 MG capsule Take 100 mg by mouth 3 (three) times daily as needed.    lisinopriL (PRINIVIL,ZESTRIL) 20 MG tablet Take 1 tablet by mouth once daily.    metoprolol tartrate (LOPRESSOR) 25 MG tablet Take 0.5 tablets (12.5 mg total) by mouth 2 (two) times daily. (Patient taking  differently: Take 25 mg by mouth 2 (two) times daily.)    mupirocin (BACTROBAN) 2 % ointment mupirocin 2 % topical ointment   APPLY TOPICALLY TO THE AFFECTED AREA TWICE DAILY AS NEEDED    nitroGLYCERIN (NITROSTAT) 0.4 MG SL tablet Place under the tongue.    omeprazole (PRILOSEC) 20 MG capsule Take 1 capsule by mouth once daily.    oxyCODONE-acetaminophen (PERCOCET)  mg per tablet Take 1 tablet by mouth every 6 (six) hours as needed for Pain.    pantoprazole (PROTONIX) 40 MG tablet Take 40 mg by mouth once daily.    pregabalin (LYRICA) 100 MG capsule Take 1 capsule by mouth 2 (two) times daily.    ranolazine 1,000 mg PSRG Take 1,000 mg by mouth 2 (two) times daily.     Review of Systems   Cardiovascular:  Positive for chest pain. Negative for leg swelling and palpitations.   Respiratory:  Positive for shortness of breath.      Objective:     Vital Signs (Most Recent):  Temp: 97.6 °F (36.4 °C) (10/09/23 0646)  Pulse: 81 (10/09/23 0646)  Resp: 18 (10/09/23 0646)  BP: 131/82 (10/09/23 0646)  SpO2: 95 % (10/09/23 0646) Vital Signs (24h Range):  Temp:  [97.6 °F (36.4 °C)-99.2 °F (37.3 °C)] 97.6 °F (36.4 °C)  Pulse:  [] 81  Resp:  [14-26] 18  SpO2:  [93 %-98 %] 95 %  BP: (102-175)/() 131/82     Weight: 118.8 kg (262 lb)  Body mass index is 42.29 kg/m².    SpO2: 95 %       No intake or output data in the 24 hours ending 10/09/23 0851    Lines/Drains/Airways       Line  Duration                  Sheath 10/09/23 0419 Right anterior;proximal <1 day              Peripheral Intravenous Line  Duration                  Peripheral IV - Double Lumen 10/09/23 0300 20 G;22 G Right Antecubital <1 day         Peripheral IV - Single Lumen 10/09/23 0300 18 G Anterior;Distal;Left Upper Arm <1 day                    Physical Exam  Constitutional:       Appearance: He is ill-appearing.   HENT:      Head: Normocephalic.      Nose: Nose normal.      Mouth/Throat:      Mouth: Mucous membranes are moist.   Eyes:       Extraocular Movements: Extraocular movements intact.   Cardiovascular:      Rate and Rhythm: Normal rate and regular rhythm.      Pulses: Normal pulses.      Heart sounds: Normal heart sounds.   Pulmonary:      Breath sounds: Normal breath sounds.   Abdominal:      Palpations: Abdomen is soft.   Skin:     General: Skin is warm and dry.   Neurological:      Mental Status: He is alert and oriented to person, place, and time.   Psychiatric:         Behavior: Behavior normal.         EKG:         Telemetry: SR    Significant Labs: BMP:   Recent Labs   Lab 10/09/23  0304      K 4.6   CO2 25   BUN 12.5   CREATININE 0.97   CALCIUM 9.6   MG 1.80   , CMP   Recent Labs   Lab 10/09/23  0304      K 4.6   CO2 25   BUN 12.5   CREATININE 0.97   CALCIUM 9.6   ALBUMIN 4.0   BILITOT 0.4   ALKPHOS 102   AST 21   ALT 38   , CBC   Recent Labs   Lab 10/09/23  0304   WBC 11.14   HGB 15.3   HCT 45.1      , Troponin   Recent Labs   Lab 10/09/23  0304   TROPONINI <0.010   , All pertinent lab results from the last 24 hours have been reviewed., and   Recent Lab Results         10/09/23  0331   10/09/23  0320   10/09/23  0304        Influenza A, Molecular Not Detected           Influenza B, Molecular Not Detected           Albumin/Globulin Ratio     1.2       Albumin     4.0       ALP     102       ALT     38       AST     21       Baso #     0.05       Basophil %     0.4       BILIRUBIN TOTAL     0.4       BUN     12.5       Calcium     9.6       Chloride     103       CO2     25       Creatinine     0.97       eGFR     >60       Eos #     0.38       Eosinophil %     3.4       Globulin, Total     3.4       Glucose     132       Hematocrit     45.1       Hemoglobin     15.3       Immature Grans (Abs)     0.03       Immature Granulocytes     0.3       Lymph #     1.98       LYMPH %     17.8       Magnesium      1.80       MCH     29.6       MCHC     33.9       MCV     87.2       Mono #     0.76       Mono %     6.8       MPV      9.8       Neut #     7.94       Neut %     71.3       nRBC     0.0       Platelet Count     236       POC Cardiac Troponin I   0.00         Potassium     4.6       PROTEIN TOTAL     7.4       RBC     5.17       RDW     12.9       Sample   VENOUS  Comment: A single negative troponin is insufficient to rule out myocardial infarction.  The use of a serial sampling protocol is recommended practice. Correlate results with reference intervals established for methodology used. Point of care and core laboratory   troponin results are not interchangeable.           SARS-CoV2 (COVID-19) Qualitative PCR Not Detected           Sodium     137       Troponin I     <0.010       WBC     11.14               Significant Imaging: X-Ray: CXR: X-Ray Chest 1 View (CXR): No results found for this visit on 10/09/23.  Assessment and Plan:   ASSESSMENT:  Suspected STEMI  CAD    - CABG x 3 7.10.23: LIMA to LAD, SVG to OM, SVG to PDA  HTN  HLD  Depression  GERD  Prostate CA    PLAN:  Will plan for emergent LHC today.  Load with ASA & Plavix & start heparin gtt per protocol.  Obtain echo.  Trend troponin levels and obtain echo.  Labs and EKG in am: CBC, CMP, & Mg.       VTE Risk Mitigation (From admission, onward)      None            CALLUM Jensen  Cardiology  [facility] - [address] [facility] Telemetry  10/09/2023 8:51 AM

## 2023-10-09 NOTE — OP NOTE
Indication procedure:   53-year-old male with history of CAD with recent CABG with noted abnormal stress test of the inferior wall was admitted for worsening chest pain and what looks like to be inferior ST-elevation suggestive of inferior MI. he arrives to the catheterization lab urgently.    Procedure performed.  Coronary angiogram with bypass angiogram   Left heart catheterization   PTCA PCI of the distal to proximal RCA    Findings:  Left system:  Similar findings to prior angiogram.  Patent LAD and circumflex - with same severe lesions  RCA:  Severe midvessel 99% lesion.  Diffuse disease noted on intravascular ultrasound  Patent LIMA to LAD  Patent SVG to OM  Unable to locate SVG to PDA (noted to be small and diseased on surgical run report)    Intervention:  Distal RCA-2.5 mm by 32 mm stent  Mid RCA-3.0 mm x 28 mm stent  Mid-proximal RCA-3.5 mm x 32 mm stent    Post dilated 3.5 mm distally to 3.7 mm proximally - with 0% residual stenosis        Impression:  Successful intervention of the RCA  Patent lima-lad and SVG to OM.  Normal left ventricular end-diastolic pressure   No aortic stenosis    Plan:  Routine postop care   Aggressive medical management for underlying coronary artery disease   Dual antiplatelet therapy  Remove sheath once ACT less than 180    Full procedure report to follow

## 2023-10-10 LAB
ALBUMIN SERPL-MCNC: 3.2 G/DL (ref 3.5–5)
ALBUMIN/GLOB SERPL: 1.1 RATIO (ref 1.1–2)
ALP SERPL-CCNC: 72 UNIT/L (ref 40–150)
ALT SERPL-CCNC: 25 UNIT/L (ref 0–55)
AST SERPL-CCNC: 14 UNIT/L (ref 5–34)
BASOPHILS # BLD AUTO: 0.03 X10(3)/MCL
BASOPHILS NFR BLD AUTO: 0.4 %
BILIRUB SERPL-MCNC: 0.4 MG/DL
BUN SERPL-MCNC: 10.5 MG/DL (ref 8.4–25.7)
CALCIUM SERPL-MCNC: 8.9 MG/DL (ref 8.4–10.2)
CHLORIDE SERPL-SCNC: 107 MMOL/L (ref 98–107)
CHOLEST SERPL-MCNC: 121 MG/DL
CHOLEST/HDLC SERPL: 3 {RATIO} (ref 0–5)
CO2 SERPL-SCNC: 23 MMOL/L (ref 22–29)
CREAT SERPL-MCNC: 0.84 MG/DL (ref 0.73–1.18)
EOSINOPHIL # BLD AUTO: 0.34 X10(3)/MCL (ref 0–0.9)
EOSINOPHIL NFR BLD AUTO: 4.7 %
ERYTHROCYTE [DISTWIDTH] IN BLOOD BY AUTOMATED COUNT: 13 % (ref 11.5–17)
GFR SERPLBLD CREATININE-BSD FMLA CKD-EPI: >60 MLS/MIN/1.73/M2
GLOBULIN SER-MCNC: 2.8 GM/DL (ref 2.4–3.5)
GLUCOSE SERPL-MCNC: 126 MG/DL (ref 70–110)
GLUCOSE SERPL-MCNC: 131 MG/DL (ref 70–110)
GLUCOSE SERPL-MCNC: 152 MG/DL (ref 74–100)
HCT VFR BLD AUTO: 38.3 % (ref 42–52)
HDLC SERPL-MCNC: 35 MG/DL (ref 35–60)
HGB BLD-MCNC: 13 G/DL (ref 14–18)
IMM GRANULOCYTES # BLD AUTO: 0.02 X10(3)/MCL (ref 0–0.04)
IMM GRANULOCYTES NFR BLD AUTO: 0.3 %
LDLC SERPL CALC-MCNC: 69 MG/DL (ref 50–140)
LYMPHOCYTES # BLD AUTO: 1.38 X10(3)/MCL (ref 0.6–4.6)
LYMPHOCYTES NFR BLD AUTO: 19.1 %
MAGNESIUM SERPL-MCNC: 2 MG/DL (ref 1.6–2.6)
MCH RBC QN AUTO: 29.5 PG (ref 27–31)
MCHC RBC AUTO-ENTMCNC: 33.9 G/DL (ref 33–36)
MCV RBC AUTO: 87 FL (ref 80–94)
MONOCYTES # BLD AUTO: 0.45 X10(3)/MCL (ref 0.1–1.3)
MONOCYTES NFR BLD AUTO: 6.2 %
NEUTROPHILS # BLD AUTO: 5.01 X10(3)/MCL (ref 2.1–9.2)
NEUTROPHILS NFR BLD AUTO: 69.3 %
NRBC BLD AUTO-RTO: 0 %
PLATELET # BLD AUTO: 201 X10(3)/MCL (ref 130–400)
PMV BLD AUTO: 9.9 FL (ref 7.4–10.4)
POCT GLUCOSE: 130 MG/DL (ref 70–110)
POCT GLUCOSE: 97 MG/DL (ref 70–110)
POTASSIUM SERPL-SCNC: 4.3 MMOL/L (ref 3.5–5.1)
PROT SERPL-MCNC: 6 GM/DL (ref 6.4–8.3)
RBC # BLD AUTO: 4.4 X10(6)/MCL (ref 4.7–6.1)
SODIUM SERPL-SCNC: 137 MMOL/L (ref 136–145)
TRIGL SERPL-MCNC: 85 MG/DL (ref 34–140)
VLDLC SERPL CALC-MCNC: 17 MG/DL
WBC # SPEC AUTO: 7.23 X10(3)/MCL (ref 4.5–11.5)

## 2023-10-10 PROCEDURE — 93005 ELECTROCARDIOGRAM TRACING: CPT

## 2023-10-10 PROCEDURE — 25000003 PHARM REV CODE 250: Performed by: INTERNAL MEDICINE

## 2023-10-10 PROCEDURE — 21400001 HC TELEMETRY ROOM

## 2023-10-10 PROCEDURE — 80053 COMPREHEN METABOLIC PANEL: CPT

## 2023-10-10 PROCEDURE — 83735 ASSAY OF MAGNESIUM: CPT

## 2023-10-10 PROCEDURE — 25000003 PHARM REV CODE 250: Performed by: NURSE PRACTITIONER

## 2023-10-10 PROCEDURE — 80061 LIPID PANEL: CPT

## 2023-10-10 PROCEDURE — 93010 ELECTROCARDIOGRAM REPORT: CPT | Mod: ,,, | Performed by: STUDENT IN AN ORGANIZED HEALTH CARE EDUCATION/TRAINING PROGRAM

## 2023-10-10 PROCEDURE — 25000242 PHARM REV CODE 250 ALT 637 W/ HCPCS: Performed by: INTERNAL MEDICINE

## 2023-10-10 PROCEDURE — 93010 EKG 12-LEAD: ICD-10-PCS | Mod: ,,, | Performed by: STUDENT IN AN ORGANIZED HEALTH CARE EDUCATION/TRAINING PROGRAM

## 2023-10-10 PROCEDURE — 11000001 HC ACUTE MED/SURG PRIVATE ROOM

## 2023-10-10 PROCEDURE — 93010 EKG 12-LEAD: ICD-10-PCS | Mod: ,,, | Performed by: INTERNAL MEDICINE

## 2023-10-10 PROCEDURE — 93010 ELECTROCARDIOGRAM REPORT: CPT | Mod: ,,, | Performed by: INTERNAL MEDICINE

## 2023-10-10 PROCEDURE — 85025 COMPLETE CBC W/AUTO DIFF WBC: CPT

## 2023-10-10 RX ORDER — ISOSORBIDE MONONITRATE 60 MG/1
60 TABLET, EXTENDED RELEASE ORAL DAILY
Status: DISCONTINUED | OUTPATIENT
Start: 2023-10-11 | End: 2023-10-11 | Stop reason: HOSPADM

## 2023-10-10 RX ORDER — ISOSORBIDE MONONITRATE 30 MG/1
30 TABLET, EXTENDED RELEASE ORAL ONCE
Status: COMPLETED | OUTPATIENT
Start: 2023-10-10 | End: 2023-10-10

## 2023-10-10 RX ADMIN — METOPROLOL TARTRATE 25 MG: 25 TABLET, FILM COATED ORAL at 11:10

## 2023-10-10 RX ADMIN — NITROGLYCERIN 0.4 MG: 0.4 TABLET, ORALLY DISINTEGRATING SUBLINGUAL at 04:10

## 2023-10-10 RX ADMIN — ISOSORBIDE MONONITRATE 30 MG: 30 TABLET, EXTENDED RELEASE ORAL at 08:10

## 2023-10-10 RX ADMIN — ATORVASTATIN CALCIUM 80 MG: 40 TABLET, FILM COATED ORAL at 08:10

## 2023-10-10 RX ADMIN — PANTOPRAZOLE SODIUM 40 MG: 40 TABLET, DELAYED RELEASE ORAL at 10:10

## 2023-10-10 RX ADMIN — RANOLAZINE 500 MG: 500 TABLET, EXTENDED RELEASE ORAL at 10:10

## 2023-10-10 RX ADMIN — ASPIRIN 81 MG: 81 TABLET, COATED ORAL at 06:10

## 2023-10-10 RX ADMIN — DOCUSATE SODIUM 100 MG: 100 CAPSULE, LIQUID FILLED ORAL at 10:10

## 2023-10-10 RX ADMIN — HYDROCODONE BITARTRATE AND ACETAMINOPHEN 1 TABLET: 5; 325 TABLET ORAL at 08:10

## 2023-10-10 RX ADMIN — DOCUSATE SODIUM 100 MG: 100 CAPSULE, LIQUID FILLED ORAL at 08:10

## 2023-10-10 RX ADMIN — LISINOPRIL 20 MG: 20 TABLET ORAL at 11:10

## 2023-10-10 RX ADMIN — AMLODIPINE BESYLATE 10 MG: 5 TABLET ORAL at 11:10

## 2023-10-10 RX ADMIN — ISOSORBIDE MONONITRATE 30 MG: 30 TABLET, EXTENDED RELEASE ORAL at 04:10

## 2023-10-10 RX ADMIN — RANOLAZINE 500 MG: 500 TABLET, EXTENDED RELEASE ORAL at 08:10

## 2023-10-10 RX ADMIN — HYDROCODONE BITARTRATE AND ACETAMINOPHEN 1 TABLET: 5; 325 TABLET ORAL at 04:10

## 2023-10-10 RX ADMIN — PREGABALIN 100 MG: 100 CAPSULE ORAL at 08:10

## 2023-10-10 RX ADMIN — PREGABALIN 100 MG: 100 CAPSULE ORAL at 10:10

## 2023-10-10 RX ADMIN — EZETIMIBE 10 MG: 10 TABLET ORAL at 08:10

## 2023-10-10 RX ADMIN — CLOPIDOGREL BISULFATE 75 MG: 75 TABLET ORAL at 10:10

## 2023-10-10 RX ADMIN — METOPROLOL TARTRATE 25 MG: 25 TABLET, FILM COATED ORAL at 09:10

## 2023-10-10 NOTE — PLAN OF CARE
Pt was not able to answer questions.  Wife answered most questions.  She states they are  and do not live together.  She states she tried pt was denied waiver services for home caregivers.  She states she works 10-12 hour days and her children work as well.  They rotate as best as they can but no 24/7 coverage is not possible.  He has had falls.  He has used his medic alert which alerted EMS to home.      Provided wife with listing of sitter services.      10/10/23 1612   Discharge Assessment   Assessment Type Discharge Planning Assessment   Confirmed/corrected address, phone number and insurance Yes   Confirmed Demographics Correct on Facesheet   Source of Information family   When was your last doctors appointment?   (9/18/23)   Communicated EZEQUIEL with patient/caregiver Date not available/Unable to determine   Reason For Admission NSTEMI   People in Home alone   Facility Arrived From: home   Do you expect to return to your current living situation? Other (see comments)  (TBD)   Do you have help at home or someone to help you manage your care at home? No   Prior to hospitilization cognitive status: Alert/Oriented   Current cognitive status: Alert/Oriented   Walking or Climbing Stairs ambulation difficulty, requires equipment;stair climbing difficulty, assistance 1 person   Mobility Management Has a rollator   Dressing/Bathing bathing difficulty, requires equipment   Dressing/Bathing Management Shower chair   Do you have any problems with: Errands/Grocery  (Family helps with errands and meal prep.)   Home Accessibility stairs to enter home   Number of Stairs, Main Entrance four   Home Layout Able to live on 1st floor   Equipment Currently Used at Home other (see comments);nebulizer;cane, straight;rollator;shower chair  (Medic Alert)   Readmission within 30 days? No   Patient currently being followed by outpatient case management? No   Do you currently have service(s) that help you manage your care at home? No    Do you take prescription medications? Yes   Do you have prescription coverage? Yes   Coverage [employer] dual   Do you have any problems affording any of your prescribed medications? No   Is the patient taking medications as prescribed? yes   Who is going to help you get home at discharge? Wife or adult children   How do you get to doctors appointments? family or friend will provide   Are you on dialysis? No   Do you take coumadin? No   DME Needed Upon Discharge  other (see comments)  (TBD)   Discharge Plan discussed with: Spouse/sig other   Name(s) and Number(s) Vanessa Reynoso (Spouse)   [phone] (Mobile)   Transition of Care Barriers No family/friends to help  (not enough family assistance.)   Discharge Plan A Home with family;Home Health   Discharge Plan B Skilled Nursing Facility   Housing Stability   In the last 12 months, was there a time when you were not able to pay the mortgage or rent on time? N   In the last 12 months, was there a time when you did not have a steady place to sleep or slept in a shelter (including now)? N   Transportation Needs   In the past 12 months, has lack of transportation kept you from medical appointments or from getting medications? no   In the past 12 months, has lack of transportation kept you from meetings, work, or from getting things needed for daily living? No   Food Insecurity   Within the past 12 months, you worried that your food would run out before you got the money to buy more. Never true   Within the past 12 months, the food you bought just didn't last and you didn't have money to get more. Never true   Stress   Do you feel stress - tense, restless, nervous, or anxious, or unable to sleep at night because your mind is troubled all the time - these days? Patient refu   Social Connections   In a typical week, how many times do you talk on the phone with family, friends, or neighbors? Patient refu   How often do you get together with friends or relatives? Patient refu   How  often do you attend Yazidism or Jain services? Patient refu   Do you belong to any clubs or organizations such as Yazidism groups, unions, fraternal or athletic groups, or school groups? Patient refu   How often do you attend meetings of the clubs or organizations you belong to? Patient refu   Are you , , , , never , or living with a partner?    Alcohol Use   Q1: How often do you have a drink containing alcohol? Never   Q2: How many drinks containing alcohol do you have on a typical day when you are drinking? None   Q3: How often do you have six or more drinks on one occasion? Never

## 2023-10-10 NOTE — PROGRESS NOTES
[facility] - [facility] Tele[facility]    Cardiology  History and Physical     Patient Name: Alex Reynoso  MRN: [id number]  Admission Date: 10/9/2023  Code Status: Prior   Attending Provider: Dawit Rodarte MD   Primary Care Physician: Glen Duffy NP  Principal Problem:<principal problem not specified>    Patient information was obtained from patient, past medical records, and ER records.     Subjective:     Chief Complaint:  CP     HPI:   Mr. Reynoso is a 53 year old male who is known to [facility], Dr. Hughes. He reports to the ER on 10.9.23 with complaints of CP x 24 hours & worsening SOB over the last few hours. He describes the pain as a tightness in the center of his chest with radiation his neck and left shoulder. He reports that he took nitro and achieved mild relief but couldn't get comfortable while lying in bed. He underwent CABG x 3 in June and was planned for an outpatient LHC tomorrow (10.10.23). He reports associated symptoms of HA and chronic back pain but denies palpitations, nausea, or vomiting. An EKG was obtained and demonstrated an inferior STEMI. He will be urgently taken to the cath lab.     10.9.23: NAD. Right groin benign. Denies CP, SOB, or palps. Reports HA & back pain. SR on tele. BP stable.   10.10.23: NAD Right groin continues to be benign, He reported CP this am. He denies SOB, or palpations. EKG reviewed with no acute ST changes,     PMH: CAD, HTN, HLD, depression, GERD, prostate CA  PSH: prostate surgery, cholecystectomy, neck surgery, CABG  Family History: Father - HTN, CA, MI, HLD; Mother - HTN, MI, HLD  Social History: Former smoker. Denies alcohol or illicit drug use.     Previous Cardiac Diagnostics:   TTE 10.9.23   Left Ventricle: The left ventricle is normal in size. Normal wall thickness. Normal wall motion. There is normal systolic function with a visually estimated ejection fraction of 55 - 60%.  Right Ventricle: Normal right ventricular cavity size. Wall  thickness is normal. Right ventricle wall motion  is normal. Systolic function is normal.  Aortic Valve: The aortic valve is a trileaflet valve.  IVC/SVC: Normal venous pressure at 3 mmHg.  Pericardium: There is a trivial effusion. No indication of cardiac tamponade.    MPI 9.28.23:  The study quality is good.   This is an abnormal perfusion study. Study is consistent with ischemia.   This scan is suggestive of moderate risk for future cardiovascular events.   Medium reversible perfusion abnormality of moderate intensity in the inferior region. SSS 4 SRS 0.   No change with prone imaging is noted.   The left ventricular cavity is noted to be normal on the stress study. The left ventricular ejection fraction was calculated to be 50% and left ventricular global function is normal.     LHC 7.10.23:  Severe multivessel CAD (ostial/proximal LAD, proximal obtuse marginal 1, mid RCA and subtotally occluded rPDA which fills via left to right    TTE 11.8.22:  The study quality is average.   The left ventricle is normal in size. Global left ventricular systolic function is normal. The left ventricular ejection fraction is 60%. Concentric left ventricular hypertrophy is present. It is mild.   Insufficient TR to calculate pulmonary artery pressure.       Review of patient's allergies indicates:  No Known Allergies    Current Facility-Administered Medications on File Prior to Encounter   Medication    fentaNYL 50 mcg/mL injection 25 mcg    HYDROmorphone (PF) injection 0.2 mg    lactated ringers infusion    sodium chloride 0.9% flush 10 mL    sodium chloride 0.9% flush 10 mL     Current Outpatient Medications on File Prior to Encounter   Medication Sig    albuterol (VENTOLIN HFA) 90 mcg/actuation inhaler Inhale 2 puffs into the lungs every 6 (six) hours as needed for Wheezing. Rescue    amLODIPine (NORVASC) 10 MG tablet Take 1 tablet by mouth once daily.    aspirin (ECOTRIN) 81 MG EC tablet Take 81 mg by mouth every morning.  Stopped 3/26/23    atorvastatin (LIPITOR) 80 MG tablet Take 80 mg by mouth every evening.    benzonatate (TESSALON) 100 MG capsule Take 1 capsule 3 times a day by oral route as needed.    clopidogreL (PLAVIX) 75 mg tablet Take 75 mg by mouth once daily.    docusate sodium (COLACE) 100 MG capsule Take 1 capsule (100 mg total) by mouth 2 (two) times daily.    ezetimibe (ZETIA) 10 mg tablet Take 10 mg by mouth every evening.    ezetimibe (ZETIA) 10 mg tablet Take 1 tablet by mouth every evening.    gabapentin (NEURONTIN) 100 MG capsule Take 100 mg by mouth 3 (three) times daily as needed.    lisinopriL (PRINIVIL,ZESTRIL) 20 MG tablet Take 1 tablet by mouth once daily.    metoprolol tartrate (LOPRESSOR) 25 MG tablet Take 0.5 tablets (12.5 mg total) by mouth 2 (two) times daily. (Patient taking differently: Take 25 mg by mouth 2 (two) times daily.)    mupirocin (BACTROBAN) 2 % ointment mupirocin 2 % topical ointment   APPLY TOPICALLY TO THE AFFECTED AREA TWICE DAILY AS NEEDED    nitroGLYCERIN (NITROSTAT) 0.4 MG SL tablet Place under the tongue.    omeprazole (PRILOSEC) 20 MG capsule Take 1 capsule by mouth once daily.    oxyCODONE-acetaminophen (PERCOCET)  mg per tablet Take 1 tablet by mouth every 6 (six) hours as needed for Pain.    pantoprazole (PROTONIX) 40 MG tablet Take 40 mg by mouth once daily.    pregabalin (LYRICA) 100 MG capsule Take 1 capsule by mouth 2 (two) times daily.    ranolazine 1,000 mg PSRG Take 1,000 mg by mouth 2 (two) times daily.     Review of Systems   Cardiovascular:  Negative for chest pain, leg swelling and palpitations.   Respiratory:  Negative for shortness of breath.    Musculoskeletal:  Positive for back pain.   Neurological:  Positive for headaches.     Objective:     Vital Signs (Most Recent):  Temp: 97.5 °F (36.4 °C) (10/10/23 1458)  Pulse: 80 (10/10/23 1458)  Resp: 18 (10/10/23 1458)  BP: 114/71 (10/10/23 1458)  SpO2: 97 % (10/10/23 1458) Vital Signs (24h Range):  Temp:  [97.5  °F (36.4 °C)-98.4 °F (36.9 °C)] 97.5 °F (36.4 °C)  Pulse:  [69-80] 80  Resp:  [18-20] 18  SpO2:  [93 %-97 %] 97 %  BP: (105-129)/(64-85) 114/71     Weight: 118.8 kg (262 lb)  Body mass index is 42.29 kg/m².    SpO2: 97 %         Intake/Output Summary (Last 24 hours) at 10/10/2023 1605  Last data filed at 10/10/2023 1245  Gross per 24 hour   Intake 480 ml   Output --   Net 480 ml       Lines/Drains/Airways       Line  Duration                  Sheath 10/09/23 0419 Right anterior;proximal 1 day              Peripheral Intravenous Line  Duration                  Peripheral IV - Double Lumen 10/09/23 0300 20 G;22 G Right Antecubital 1 day         Peripheral IV - Single Lumen 10/09/23 0300 18 G Anterior;Distal;Left Upper Arm 1 day                    Physical Exam  Constitutional:       Appearance: He is obese.   HENT:      Head: Normocephalic.      Nose: Nose normal.      Mouth/Throat:      Mouth: Mucous membranes are moist.   Eyes:      Extraocular Movements: Extraocular movements intact.   Cardiovascular:      Rate and Rhythm: Normal rate and regular rhythm.      Pulses: Normal pulses.      Heart sounds: Normal heart sounds.      Comments: Right groin soft, nontender. No hematoma noted. + 2 peripheral pulse  Pulmonary:      Breath sounds: Normal breath sounds.   Abdominal:      Palpations: Abdomen is soft.   Skin:     General: Skin is warm and dry.   Neurological:      Mental Status: He is alert and oriented to person, place, and time.   Psychiatric:         Behavior: Behavior normal.         EKG:         Telemetry: SR    Significant Labs: BMP:   Recent Labs   Lab 10/09/23  0304 10/10/23  0408    137   K 4.6 4.3   CO2 25 23   BUN 12.5 10.5   CREATININE 0.97 0.84   CALCIUM 9.6 8.9   MG 1.80 2.00     , CMP   Recent Labs   Lab 10/09/23  0304 10/10/23  0408    137   K 4.6 4.3   CO2 25 23   BUN 12.5 10.5   CREATININE 0.97 0.84   CALCIUM 9.6 8.9   ALBUMIN 4.0 3.2*   BILITOT 0.4 0.4   ALKPHOS 102 72   AST 21 14    ALT 38 25     , CBC   Recent Labs   Lab 10/09/23  0304 10/10/23  0408   WBC 11.14 7.23   HGB 15.3 13.0*   HCT 45.1 38.3*    201     , Troponin   Recent Labs   Lab 10/09/23  1515 10/09/23  1635 10/09/23  1745   TROPONINI <0.010 0.044 0.011     , All pertinent lab results from the last 24 hours have been reviewed., and   Recent Lab Results  (Last 5 results in the past 24 hours)        10/10/23  1101   10/10/23  0522   10/10/23  0408   10/09/23  2205   10/09/23  1745        Albumin/Globulin Ratio     1.1           Albumin     3.2           ALP     72           ALT     25           AST     14           Baso #     0.03           Basophil %     0.4           BILIRUBIN TOTAL     0.4           BUN     10.5           Calcium     8.9           Chloride     107           Cholesterol Total     121           CO2     23           Creatinine     0.84           eGFR     >60           Eos #     0.34           Eosinophil %     4.7           Globulin, Total     2.8           Glucose     152           HDL     35           Hematocrit     38.3           Hemoglobin     13.0           Immature Grans (Abs)     0.02           Immature Granulocytes     0.3           LDL Cholesterol External     69.00           Lymph #     1.38           LYMPH %     19.1           Magnesium      2.00           MCH     29.5           MCHC     33.9           MCV     87.0           Mono #     0.45           Mono %     6.2           MPV     9.9           Neut #     5.01           Neut %     69.3           nRBC     0.0           Platelet Count     201           POC Glucose   131     121         POCT Glucose 97               Potassium     4.3           PROTEIN TOTAL     6.0           RBC     4.40           RDW     13.0           Sodium     137           Total Cholesterol/HDL Ratio     3           Triglycerides     85           Troponin I         0.011       Very Low Density Lipoprotein     17           WBC     7.23                                   Significant Imaging: X-Ray: CXR: X-Ray Chest 1 View (CXR): No results found for this visit on 10/09/23.  Assessment and Plan:   ASSESSMENT:  STEMI    - [facility] 10.9.23:RCA: severe midvessel 99% lesion -  stent to distal RCA, mid RCA, & mid-proximal RCA   - EF 55-60%  CAD    - CABG x 3 7.10.23: LIMA to LAD, SVG to OM, SVG to PDA  HTN  HLD  Depression  GERD  Prostate CA    PLAN:  ECHO reviewed ~ normal systolic and no WMA noted   Cont ASA, Plavix, BB, & high intensity statin.  Will add ACE for anginal pains   Will continue to monitor symptoms   Possible D/C tomorrow AM  Obtain A1C. Lipid panel in am.   Labs and EKG in am: CBC, CMP, & Mg.       VTE Risk Mitigation (From admission, onward)      None            Natalya Renee NP  Cardiology  [facility] - [facility]  10/10/2023 8:51 AM     Physician addendum:  I have seen and examined this patient as a split-shared visit with the [facility] d/t complicated medical management of above problems written in assessment and high acuity requiring physician expertise in medical decision-making. I performed the substantive portion of the history and exam. Above medical decision-making is also formulated by me.    Cardiovascular exam:  S1, S2  Lungs:  Fine crackles at bases.  Extremities:  1+ Edema bilaterally    Plan:  add Imdur in current regimen for chest pain.  Stable.  Plan for discharge tomorrow      Dawit Gil MD  Cardiologist

## 2023-10-11 VITALS
HEART RATE: 80 BPM | SYSTOLIC BLOOD PRESSURE: 108 MMHG | WEIGHT: 262 LBS | OXYGEN SATURATION: 96 % | RESPIRATION RATE: 18 BRPM | HEIGHT: 66 IN | BODY MASS INDEX: 42.11 KG/M2 | TEMPERATURE: 99 F | DIASTOLIC BLOOD PRESSURE: 63 MMHG

## 2023-10-11 LAB — GLUCOSE SERPL-MCNC: 117 MG/DL (ref 70–110)

## 2023-10-11 PROCEDURE — 25000003 PHARM REV CODE 250: Performed by: NURSE PRACTITIONER

## 2023-10-11 PROCEDURE — 93010 ELECTROCARDIOGRAM REPORT: CPT | Mod: ,,, | Performed by: INTERNAL MEDICINE

## 2023-10-11 PROCEDURE — 97162 PT EVAL MOD COMPLEX 30 MIN: CPT

## 2023-10-11 PROCEDURE — 93010 EKG 12-LEAD: ICD-10-PCS | Mod: ,,, | Performed by: INTERNAL MEDICINE

## 2023-10-11 PROCEDURE — 93005 ELECTROCARDIOGRAM TRACING: CPT

## 2023-10-11 PROCEDURE — 25000003 PHARM REV CODE 250: Performed by: INTERNAL MEDICINE

## 2023-10-11 RX ORDER — CLOPIDOGREL BISULFATE 75 MG/1
75 TABLET ORAL DAILY
Qty: 30 TABLET | Refills: 11 | Status: SHIPPED | OUTPATIENT
Start: 2023-10-11

## 2023-10-11 RX ORDER — ISOSORBIDE MONONITRATE 60 MG/1
60 TABLET, EXTENDED RELEASE ORAL DAILY
Qty: 30 TABLET | Refills: 3 | Status: SHIPPED | OUTPATIENT
Start: 2023-10-12 | End: 2024-10-11

## 2023-10-11 RX ADMIN — ISOSORBIDE MONONITRATE 60 MG: 60 TABLET, EXTENDED RELEASE ORAL at 09:10

## 2023-10-11 RX ADMIN — METOPROLOL TARTRATE 25 MG: 25 TABLET, FILM COATED ORAL at 09:10

## 2023-10-11 RX ADMIN — CLOPIDOGREL BISULFATE 75 MG: 75 TABLET ORAL at 09:10

## 2023-10-11 RX ADMIN — HYDROCODONE BITARTRATE AND ACETAMINOPHEN 1 TABLET: 5; 325 TABLET ORAL at 02:10

## 2023-10-11 RX ADMIN — RANOLAZINE 500 MG: 500 TABLET, EXTENDED RELEASE ORAL at 09:10

## 2023-10-11 RX ADMIN — ASPIRIN 81 MG: 81 TABLET, COATED ORAL at 09:10

## 2023-10-11 RX ADMIN — DOCUSATE SODIUM 100 MG: 100 CAPSULE, LIQUID FILLED ORAL at 09:10

## 2023-10-11 RX ADMIN — PREGABALIN 100 MG: 100 CAPSULE ORAL at 09:10

## 2023-10-11 RX ADMIN — HYDROCODONE BITARTRATE AND ACETAMINOPHEN 1 TABLET: 5; 325 TABLET ORAL at 09:10

## 2023-10-11 RX ADMIN — PANTOPRAZOLE SODIUM 40 MG: 40 TABLET, DELAYED RELEASE ORAL at 09:10

## 2023-10-11 NOTE — NURSING
Went over d/c instructions with pt and family regarding help at home, follow up appts, and prescriptions. No questions or complaints. Verbalized understanding. IV removed and tele d/c. Stable at d/c.

## 2023-10-11 NOTE — PLAN OF CARE
Problem: Physical Therapy  Goal: Physical Therapy Goal  Description: Goals to be met by: 23     Patient will increase functional independence with mobility by performin. Supine to sit with [place]  2. Sit to supine with [place]  3. Sit to stand transfer with Modified [place]  4. Gait  x 400 feet with Modified [place] using Rolling Walker.   5. Ascend/descend 5 stairs with bilateral Handrails & Supervision     Outcome: Ongoing, Progressing

## 2023-10-11 NOTE — DISCHARGE SUMMARY
[facility] - [facility]    Cardiology  Discharge Summary      Patient Name: Alex Reynoso  MRN: [id number]  Admission Date: 10/9/2023  Hospital Length of Stay: 2 days  Discharge Date and Time:  10/11/2023 1:31 PM  Attending Physician: Dawit Rodarte MD  Discharging Provider: Natalya Renee NP  Primary Care Physician: Glen Duffy NP    Chief Complaint:  CP      HPI:   Mr. Reynoso is a 53 year old male who is known to [facility], Dr. Hughes. He reports to the ER on 10.9.23 with complaints of CP x 24 hours & worsening SOB over the last few hours. He describes the pain as a tightness in the center of his chest with radiation his neck and left shoulder. He reports that he took nitro and achieved mild relief but couldn't get comfortable while lying in bed. He underwent CABG x 3 in June and was planned for an outpatient C tomorrow (10.10.23). He reports associated symptoms of HA and chronic back pain but denies palpitations, nausea, or vomiting. An EKG was obtained and demonstrated an inferior STEMI. He will be urgently taken to the cath lab.     Hospital Course:   10.9.23: NAD. Right groin benign. Denies CP, SOB, or palps. Reports HA & back pain. SR on tele. BP stable.   10.10.23: NAD Right groin continues to be benign, He reported CP this am. He denies SOB, or palpations. EKG reviewed with no acute ST changes,   10.11.23:NAD, He denies SOB, CP, or palpitations this morning. R groing conts to be benign       PMH: CAD, HTN, HLD, depression, GERD, prostate CA  PSH: prostate surgery, cholecystectomy, neck surgery, CABG  Family History: Father - HTN, CA, MI, HLD; Mother - HTN, MI, HLD  Social History: Former smoker. Denies alcohol or illicit drug use.      Previous Cardiac Diagnostics:   TTE 10.9.23   Left Ventricle: The left ventricle is normal in size. Normal wall thickness. Normal wall motion. There is normal systolic function with a visually estimated ejection fraction of 55 - 60%.  Right  Ventricle: Normal right ventricular cavity size. Wall thickness is normal. Right ventricle wall motion  is normal. Systolic function is normal.  Aortic Valve: The aortic valve is a trileaflet valve.  IVC/SVC: Normal venous pressure at 3 mmHg.  Pericardium: There is a trivial effusion. No indication of cardiac tamponade.     MPI 9.28.23:  The study quality is good.   This is an abnormal perfusion study. Study is consistent with ischemia.   This scan is suggestive of moderate risk for future cardiovascular events.   Medium reversible perfusion abnormality of moderate intensity in the inferior region. SSS 4 SRS 0.   No change with prone imaging is noted.   The left ventricular cavity is noted to be normal on the stress study. The left ventricular ejection fraction was calculated to be 50% and left ventricular global function is normal.      LHC 7.10.23:  Severe multivessel CAD (ostial/proximal LAD, proximal obtuse marginal 1, mid RCA and subtotally occluded rPDA which fills via left to right     TTE 11.8.22:  The study quality is average.   The left ventricle is normal in size. Global left ventricular systolic function is normal. The left ventricular ejection fraction is 60%. Concentric left ventricular hypertrophy is present. It is mild.   Insufficient TR to calculate pulmonary artery pressure.            Final Active Diagnoses:    Diagnosis Date Noted POA    PRINCIPAL PROBLEM:  STEMI (ST elevation myocardial infarction) [I21.3] 10/09/2023 Yes    Chest pain [R07.9] 10/09/2023 Yes      Problems Resolved During this Admission:       Discharged Condition: stable    Review of Systems   Constitutional: Negative for chills and fever.   Cardiovascular:  Negative for chest pain, palpitations and syncope.   Respiratory:  Negative for cough and shortness of breath.    Gastrointestinal:  Negative for abdominal pain, nausea and vomiting.   Neurological: Negative.        Physical Exam  Constitutional:       Appearance: Normal  appearance.   HENT:      Head: Normocephalic.      Nose: Nose normal.      Mouth/Throat:      Mouth: Mucous membranes are moist.   Eyes:      Pupils: Pupils are equal, round, and reactive to light.   Cardiovascular:      Rate and Rhythm: Normal rate and regular rhythm.      Pulses: Normal pulses.   Pulmonary:      Breath sounds: Normal breath sounds.   Abdominal:      General: Abdomen is flat.      Palpations: Abdomen is soft.   Skin:     General: Skin is warm.   Neurological:      Mental Status: He is alert and oriented to person, place, and time.   Psychiatric:         Mood and Affect: Mood normal.         Behavior: Behavior normal.         Judgment: Judgment normal.         Disposition: Home or Self Care    Follow Up:    Patient Instructions:      Diet Cardiac     No driving until:     Order Specific Question Answer Comments   Date: 10/13/2023      Lifting restrictions     Notify your health care provider if you experience any of the following:  temperature >100.4     Notify your health care provider if you experience any of the following:  severe uncontrolled pain     Notify your health care provider if you experience any of the following:  redness, tenderness, or signs of infection (pain, swelling, redness, odor or green/yellow discharge around incision site)     Activity as tolerated     Shower on day dressing removed (No bath)     Medications:  Reconciled Home Medications:      Medication List        START taking these medications      isosorbide mononitrate 60 MG 24 hr tablet  Commonly known as: IMDUR  Take 1 tablet (60 mg total) by mouth once daily.  Start taking on: October 12, 2023            CHANGE how you take these medications      ezetimibe 10 mg tablet  Commonly known as: ZETIA  Take 10 mg by mouth every evening.  What changed: Another medication with the same name was removed. Continue taking this medication, and follow the directions you see here.     metoprolol tartrate 25 MG tablet  Commonly  known as: LOPRESSOR  Take 0.5 tablets (12.5 mg total) by mouth 2 (two) times daily.  What changed: how much to take            CONTINUE taking these medications      amLODIPine 10 MG tablet  Commonly known as: NORVASC  Take 1 tablet by mouth once daily.     aspirin 81 MG EC tablet  Commonly known as: ECOTRIN  Take 81 mg by mouth every morning. Stopped 3/26/23     atorvastatin 80 MG tablet  Commonly known as: LIPITOR  Take 80 mg by mouth every evening.     benzonatate 100 MG capsule  Commonly known as: TESSALON  Take 1 capsule 3 times a day by oral route as needed.     clopidogreL 75 mg tablet  Commonly known as: PLAVIX  Take 1 tablet (75 mg total) by mouth once daily.     docusate sodium 100 MG capsule  Commonly known as: COLACE  Take 1 capsule (100 mg total) by mouth 2 (two) times daily.     gabapentin 100 MG capsule  Commonly known as: NEURONTIN  Take 100 mg by mouth 3 (three) times daily as needed.     mupirocin 2 % ointment  Commonly known as: BACTROBAN  mupirocin 2 % topical ointment   APPLY TOPICALLY TO THE AFFECTED AREA TWICE DAILY AS NEEDED     nitroGLYCERIN 0.4 MG SL tablet  Commonly known as: NITROSTAT  Place under the tongue.     omeprazole 20 MG capsule  Commonly known as: PRILOSEC  Take 1 capsule by mouth once daily.     oxyCODONE-acetaminophen  mg per tablet  Commonly known as: PERCOCET  Take 1 tablet by mouth every 6 (six) hours as needed for Pain.     pantoprazole 40 MG tablet  Commonly known as: PROTONIX  Take 40 mg by mouth once daily.     pregabalin 100 MG capsule  Commonly known as: LYRICA  Take 1 capsule by mouth 2 (two) times daily.     ranolazine 1,000 mg Psrg  Take 1,000 mg by mouth 2 (two) times daily.     VENTOLIN HFA 90 mcg/actuation inhaler  Generic drug: albuterol  Inhale 2 puffs into the lungs every 6 (six) hours as needed for Wheezing. Rescue            STOP taking these medications      lisinopriL 20 MG tablet  Commonly known as: PRINIVIL,ZESTRIL               Impression:  STEMI    - [facility] 10.9.23:RCA: severe midvessel 99% lesion -  stent to distal RCA, mid RCA, & mid-proximal RCA   - EF 55-60%  CAD    - CABG x 3 7.10.23: LIMA to LAD, SVG to OM, SVG to PDA  HTN  HLD  Depression  GERD  Prostate CA    Plan:   ECHO reviewed ~ normal systolic and no WMA noted   Cont ASA, Plavix, BB, & high intensity statin.  contACE for anginal pains   Labs  CBC, CMP, & Mg as an outpatient in 1 week   Will f/u with dr. Hughes in 1 week  Okay from a cardiology standpoint to D/C Home       Time spent on the discharge of patient: 37 minutes    Natalya Renee NP  Cardiology  [facility] - [facility]    Physician addendum:  I have seen and examined this patient as a split-shared visit with the CATHI d/t complicated medical management of above problems written in assessment and high acuity requiring physician expertise in medical decision-making. I performed the substantive portion of the history and exam. Above medical decision-making is also formulated by me.    Pt. Would like to be discharged.   Plan:   Medications reviewed. Discharge to home. Answered all questions prior to discharge.   F/U scheduled as above.     Dawit Gil MD  Cardiologist

## 2023-10-11 NOTE — PT/OT/SLP EVAL
Incoming call from patient, verified  and Zip code;  Patient reviewed her appointment with her PCP this morning and shared she was able to receive an extension in her leave for two additional weeks;  Reports she is awaiting a Pulmonary consult call back still and now has a new consult with cardiology;      Verbalized concerns in getting the appointments scheduled; Patient continues with SOB and hoarseness during our call;  Agreed to support patient with these appointments as possible; Reminded patient of need for referral to be processed by PCP office and sent as orders over to the appropriate speciality offices; Agreed to follow up with [facility] for patient appointment as we spoke with PCP office last week working to obtain that;    Outreach call placed to [facility] no answer, brief VM message left requesting return call with contact information and purpose of call; Awaiting response;    Patient made aware on FU call;  Expressed her concerns over returning to work and neither consult being completed. Also reviewed he purpose of the consults with this patient and answered questions. Again encouraged patient to consider a call into [facility] for additional support at this time; Will continue to follow and support; This note will not be viewable in [address] Physical Therapy Evaluation    Patient Name:  Alex Reynoso   MRN:  [id number]    Recommendations:     Discharge Recommendations: home with home health   Discharge Equipment Recommendations: none   Barriers to discharge: Ongoing medical needs    Assessment:     Alex Reynoso is a 53 y.o. male admitted with a medical diagnosis of STEMI (ST elevation myocardial infarction). S/p C on 10/9. Patient reports living alone in [facility] with 5 steps (bilateral rails) to enter. At baseline, he is independent and ambulates with a quad cane.  He presents with the following impairments/functional limitations: weakness, impaired endurance, impaired self care skills, impaired functional mobility, gait instability, impaired balance, decreased safety awareness, pain. He was SBA for bed mobility. Ambulated 50 ft + 48 ft with RW & CGA. Slowed pace. Seated and standing rest breaks. Will see patient 3x/week to work on balance and endurance. He will need HH PT at discharge. Progress patient as tolerated.      Rehab Prognosis: Good; patient would benefit from acute skilled PT services to address these deficits and reach maximum level of function.    Recent Surgery: Procedure(s) (LRB):  ANGIOGRAM, CORONARY ARTERY (N/A)  Ultrasound-coronary (N/A)  Stent, Drug Eluting, Single Vessel, Coronary 2 Days Post-Op    Plan:     During this hospitalization, patient to be seen 3 x/week to address the identified rehab impairments via gait training, therapeutic exercises, therapeutic activities and progress toward the following goals:    Plan of Care Expires:  11/11/23    Subjective     Chief Complaint: fatigue  Patient/Family Comments/goals: none  Pain/Comfort:  Pain Rating 1: 5/10  Location 1: chest  Pain Addressed 1: Reposition, Distraction  Pain Rating Post-Intervention 1: 5/10    Patients cultural, spiritual, Methodist conflicts given the current situation: no    Living Environment:  Patient reports living alone in [facility] with 5 steps (bilateral rails) to  enter.   Prior to admission, patients level of function was independent.  Equipment used at home: nebulizer, rollator, shower chair, cane, quad.  DME owned (not currently used):  rollator .  Upon discharge, patient will have assistance from family.    Objective:     Communicated with nurse prior to session.  Patient found supine with telemetry  upon PT entry to room.    General Precautions: Standard, fall  Orthopedic Precautions:N/A   Braces: N/A  Respiratory Status: Room air. Patients sats not being monitored.   HR: 77-85 BPM    Exams:  Cognitive Exam:  Patient is oriented to Person, Place, Time, and Situation  Sensation: -       Intact  RLE ROM: WFL  RLE Strength: 4/5 grossly  LLE ROM: WFL  LLE Strength: 4/5 grossly  Skin integrity: Visible skin intact      Functional Mobility:  Bed Mobility:     Supine to Sit: stand by assistance  Transfers:  Sit to Stand:  contact guard assistance with rolling walker  Gait: Ambulated 50 ft + 48 ft with RW & CGA. Slowed pace. Seated and standing rest breaks.   Balance: fair      AM-PAC 6 CLICK MOBILITY  Total Score:20     Education Provided:  Role and goals of PT, transfer training, bed mobility, gait training, balance training, safety awareness, assistive device, strengthening exercises, and importance of participating in PT to return to PLOF.    Patient left up in chair with all lines intact, call button in reach, nurse notified, and educated on calling for assistance when ready to return to bed .    GOALS:   Multidisciplinary Problems       Physical Therapy Goals          Problem: Physical Therapy    Goal Priority Disciplines Outcome Goal Variances Interventions   Physical Therapy Goal     PT, PT/OT Ongoing, Progressing     Description: Goals to be met by: 23     Patient will increase functional independence with mobility by performin. Supine to sit with [employer] [place]  2. Sit to supine with [employer] [place]  3. Sit to stand transfer with Modified [employer] [place]  4. Gait   x 400 feet with Modified [place] using Rolling Walker.   5. Ascend/descend 5 stairs with bilateral Handrails & Supervision                          History:     Past Medical History:   Diagnosis Date    Acid reflux     Arthritis     Back pain     CAD (coronary artery disease), native coronary artery     Coronary artery disease     Coronary artery disease involving native coronary artery of native heart, unspecified whether angina present     GERD (gastroesophageal reflux disease)     High cholesterol     HTN (hypertension)     Hyperglycemia     Obesity     Prostate cancer     Seasonal allergies     Shortness of breath     Tobacco use        Past Surgical History:   Procedure Laterality Date    CHOLECYSTECTOMY      COLONOSCOPY      CORONARY ANGIOGRAPHY N/A 06/21/2023    Procedure: ANGIOGRAM, CORONARY ARTERY;  Surgeon: Raji Hughes MD;  Location: [facility] CATH LAB;  Service: Cardiology;  Laterality: N/A;    CORONARY ANGIOGRAPHY N/A 10/9/2023    Procedure: ANGIOGRAM, CORONARY ARTERY;  Surgeon: Dawit Rodarte MD;  Location: [facility] CATH LAB;  Service: Cardiology;  Laterality: N/A;    CORONARY ARTERY BYPASS GRAFT (CABG) N/A 6/27/2023    Procedure: CORONARY ARTERY BYPASS GRAFT (CABG);  Surgeon: Jhony Sharma MD;  Location: [facility];  Service: Cardiovascular;  Laterality: N/A;  ECHO NOTIFIED    ENDOSCOPIC HARVEST OF VEIN N/A 6/27/2023    Procedure: SURGICAL PROCUREMENT, VEIN, ENDOSCOPIC;  Surgeon: Jhony Sharma MD;  Location: [facility] OR;  Service: Cardiovascular;  Laterality: N/A;    INTRAVASCULAR ULTRASOUND, CORONARY N/A 10/9/2023    Procedure: Ultrasound-coronary;  Surgeon: Dawit Rodarte MD;  Location: [facility] CATH LAB;  Service: Cardiology;  Laterality: N/A;    LEFT HEART CATHETERIZATION Left 12/01/2022      Moderate noncritical multivessel CAD with stenosis up to 60%    LEFT HEART CATHETERIZATION Left 06/21/2023    Procedure: Left heart cath;  Surgeon: Raji Hughes MD;  Location: [facility] CATH LAB;  Service: Cardiology;   Laterality: Left;  C +/- PCI    LIPOMA RESECTION N/A 03/30/2023    Procedure: EXCISION, LIPOMA (excision of post neck lipoma);  Surgeon: Samuel Cruz MD;  Location: [facility] OR;  Service: General;  Laterality: N/A;  10 x 5 lipoma     PLATING OF STERNUM N/A 6/27/2023    Procedure: PLATING, STERNAL;  Surgeon: Jhony Sharma MD;  Location: [facility] OR;  Service: Cardiovascular;  Laterality: N/A;    PROSTATECTOMY      STENT, DRUG ELUTING, SINGLE VESSEL, CORONARY  10/9/2023    Procedure: Stent, Drug Eluting, Single Vessel, Coronary;  Surgeon: Dawit Rodarte MD;  Location: [facility] CATH LAB;  Service: Cardiology;;    VENTRICULOGRAM, LEFT  06/21/2023    Procedure: Ventriculogram, Left;  Surgeon: Raji Hughes MD;  Location: [facility] CATH LAB;  Service: Cardiology;;       Time Tracking:     PT Received On: 10/11/23  PT Start Time: 1049     PT Stop Time: 1109  PT Total Time (min): 20 min     Billable Minutes: Evaluation 20 minutes      10/11/2023

## 2023-10-31 PROCEDURE — G0179 MD RECERTIFICATION HHA PT: HCPCS | Mod: ,,, | Performed by: THORACIC SURGERY (CARDIOTHORACIC VASCULAR SURGERY)

## 2023-10-31 PROCEDURE — G0179 PR HOME HEALTH MD RECERTIFICATION: ICD-10-PCS | Mod: ,,, | Performed by: THORACIC SURGERY (CARDIOTHORACIC VASCULAR SURGERY)

## 2023-11-13 ENCOUNTER — EXTERNAL HOME HEALTH (OUTPATIENT)
Dept: HOME HEALTH SERVICES | Facility: HOSPITAL | Age: 53
End: 2023-11-13
Payer: MEDICARE

## 2023-11-14 ENCOUNTER — EXTERNAL HOME HEALTH (OUTPATIENT)
Dept: HOME HEALTH SERVICES | Facility: HOSPITAL | Age: 53
End: 2023-11-14
Payer: MEDICARE

## 2023-12-12 DIAGNOSIS — N50.82 SCROTAL PAIN: Primary | ICD-10-CM

## 2023-12-15 ENCOUNTER — HOSPITAL ENCOUNTER (OUTPATIENT)
Dept: RADIOLOGY | Facility: HOSPITAL | Age: 53
Discharge: HOME | End: 2023-12-15
Attending: UROLOGY
Payer: MEDICARE

## 2023-12-15 DIAGNOSIS — N50.82 SCROTAL PAIN: ICD-10-CM

## 2023-12-15 PROCEDURE — 76870 US EXAM SCROTUM: CPT | Mod: TC

## 2023-12-30 PROCEDURE — G0179 MD RECERTIFICATION HHA PT: HCPCS | Mod: ,,, | Performed by: THORACIC SURGERY (CARDIOTHORACIC VASCULAR SURGERY)

## 2024-01-15 PROBLEM — I21.3 STEMI (ST ELEVATION MYOCARDIAL INFARCTION): Status: RESOLVED | Noted: 2023-10-09 | Resolved: 2024-01-15

## 2024-01-20 ENCOUNTER — EXTERNAL HOME HEALTH (OUTPATIENT)
Dept: HOME HEALTH SERVICES | Facility: HOSPITAL | Age: 54
End: 2024-01-20
Payer: MEDICARE

## 2024-01-25 ENCOUNTER — OFFICE VISIT (OUTPATIENT)
Dept: CARDIAC SURGERY | Facility: CLINIC | Age: 54
End: 2024-01-25
Payer: MEDICARE

## 2024-01-25 VITALS
BODY MASS INDEX: 41.82 KG/M2 | SYSTOLIC BLOOD PRESSURE: 133 MMHG | HEART RATE: 68 BPM | WEIGHT: 260.19 LBS | DIASTOLIC BLOOD PRESSURE: 82 MMHG | HEIGHT: 66 IN | OXYGEN SATURATION: 98 %

## 2024-01-25 DIAGNOSIS — Z95.1 HX OF CABG: Primary | Chronic | ICD-10-CM

## 2024-01-25 PROCEDURE — 3079F DIAST BP 80-89 MM HG: CPT | Mod: CPTII,,, | Performed by: THORACIC SURGERY (CARDIOTHORACIC VASCULAR SURGERY)

## 2024-01-25 PROCEDURE — 1159F MED LIST DOCD IN RCRD: CPT | Mod: CPTII,,, | Performed by: THORACIC SURGERY (CARDIOTHORACIC VASCULAR SURGERY)

## 2024-01-25 PROCEDURE — 3008F BODY MASS INDEX DOCD: CPT | Mod: CPTII,,, | Performed by: THORACIC SURGERY (CARDIOTHORACIC VASCULAR SURGERY)

## 2024-01-25 PROCEDURE — 99213 OFFICE O/P EST LOW 20 MIN: CPT | Mod: ,,, | Performed by: THORACIC SURGERY (CARDIOTHORACIC VASCULAR SURGERY)

## 2024-01-25 PROCEDURE — 3075F SYST BP GE 130 - 139MM HG: CPT | Mod: CPTII,,, | Performed by: THORACIC SURGERY (CARDIOTHORACIC VASCULAR SURGERY)

## 2024-01-25 PROCEDURE — 1160F RVW MEDS BY RX/DR IN RCRD: CPT | Mod: CPTII,,, | Performed by: THORACIC SURGERY (CARDIOTHORACIC VASCULAR SURGERY)

## 2024-01-25 RX ORDER — LISINOPRIL 20 MG/1
20 TABLET ORAL DAILY
Status: ON HOLD | COMMUNITY
Start: 2023-10-13 | End: 2024-04-08

## 2024-01-29 ENCOUNTER — HOSPITAL ENCOUNTER (OUTPATIENT)
Facility: HOSPITAL | Age: 54
Discharge: HOME | End: 2024-01-30
Attending: EMERGENCY MEDICINE | Admitting: INTERNAL MEDICINE
Payer: MEDICARE

## 2024-01-29 DIAGNOSIS — R07.9 CHEST PAIN, UNSPECIFIED TYPE: Primary | ICD-10-CM

## 2024-01-29 DIAGNOSIS — R07.9 CHEST PAIN: ICD-10-CM

## 2024-01-29 LAB
ALBUMIN SERPL-MCNC: 3.9 G/DL (ref 3.5–5)
ALBUMIN/GLOB SERPL: 1 RATIO (ref 1.1–2)
ALP SERPL-CCNC: 89 UNIT/L (ref 40–150)
ALT SERPL-CCNC: 21 UNIT/L (ref 0–55)
APTT PPP: 32.8 SECONDS (ref 23.2–33.7)
AST SERPL-CCNC: 15 UNIT/L (ref 5–34)
AV INDEX (PROSTH): 0.75
AV MEAN GRADIENT: 3 MMHG
AV PEAK GRADIENT: 6 MMHG
AV VALVE AREA BY VELOCITY RATIO: 2.35 CM²
AV VALVE AREA: 2.34 CM²
AV VELOCITY RATIO: 0.75
BASOPHILS # BLD AUTO: 0.05 X10(3)/MCL
BASOPHILS NFR BLD AUTO: 0.5 %
BILIRUB SERPL-MCNC: 0.4 MG/DL
BNP BLD-MCNC: 53.3 PG/ML
BSA FOR ECHO PROCEDURE: 2.34 M2
BUN SERPL-MCNC: 11.8 MG/DL (ref 8.4–25.7)
CALCIUM SERPL-MCNC: 9.5 MG/DL (ref 8.4–10.2)
CHLORIDE SERPL-SCNC: 105 MMOL/L (ref 98–107)
CHOLEST SERPL-MCNC: 253 MG/DL
CHOLEST/HDLC SERPL: 6 {RATIO} (ref 0–5)
CO2 SERPL-SCNC: 27 MMOL/L (ref 22–29)
CREAT SERPL-MCNC: 0.86 MG/DL (ref 0.73–1.18)
CV ECHO LV RWT: 0.59 CM
DOP CALC AO PEAK VEL: 1.19 M/S
DOP CALC AO VTI: 19.3 CM
DOP CALC LVOT AREA: 3.1 CM2
DOP CALC LVOT DIAMETER: 2 CM
DOP CALC LVOT PEAK VEL: 0.89 M/S
DOP CALC LVOT STROKE VOLUME: 45.22 CM3
DOP CALC MV VTI: 16.9 CM
DOP CALCLVOT PEAK VEL VTI: 14.4 CM
E WAVE DECELERATION TIME: 169 MSEC
E/A RATIO: 1.4
E/E' RATIO: 8.11 M/S
ECHO LV POSTERIOR WALL: 1.39 CM (ref 0.6–1.1)
EOSINOPHIL # BLD AUTO: 0.32 X10(3)/MCL (ref 0–0.9)
EOSINOPHIL NFR BLD AUTO: 3 %
ERYTHROCYTE [DISTWIDTH] IN BLOOD BY AUTOMATED COUNT: 13.5 % (ref 11.5–17)
FRACTIONAL SHORTENING: 27 % (ref 28–44)
GFR SERPLBLD CREATININE-BSD FMLA CKD-EPI: >60 MLS/MIN/1.73/M2
GLOBULIN SER-MCNC: 3.8 GM/DL (ref 2.4–3.5)
GLUCOSE SERPL-MCNC: 105 MG/DL (ref 74–100)
HCT VFR BLD AUTO: 40.1 % (ref 42–52)
HDLC SERPL-MCNC: 44 MG/DL (ref 35–60)
HGB BLD-MCNC: 13.8 G/DL (ref 14–18)
IMM GRANULOCYTES # BLD AUTO: 0.02 X10(3)/MCL (ref 0–0.04)
IMM GRANULOCYTES NFR BLD AUTO: 0.2 %
INR PPP: 1
INTERVENTRICULAR SEPTUM: 1.26 CM (ref 0.6–1.1)
LDLC SERPL CALC-MCNC: 171 MG/DL (ref 50–140)
LEFT ATRIUM SIZE: 4 CM
LEFT ATRIUM VOLUME INDEX MOD: 11.5 ML/M2
LEFT ATRIUM VOLUME MOD: 25.8 CM3
LEFT INTERNAL DIMENSION IN SYSTOLE: 3.42 CM (ref 2.1–4)
LEFT VENTRICLE DIASTOLIC VOLUME INDEX: 45.98 ML/M2
LEFT VENTRICLE DIASTOLIC VOLUME: 103 ML
LEFT VENTRICLE MASS INDEX: 110 G/M2
LEFT VENTRICLE SYSTOLIC VOLUME INDEX: 21.5 ML/M2
LEFT VENTRICLE SYSTOLIC VOLUME: 48.1 ML
LEFT VENTRICULAR INTERNAL DIMENSION IN DIASTOLE: 4.71 CM (ref 3.5–6)
LEFT VENTRICULAR MASS: 245.38 G
LV LATERAL E/E' RATIO: 6.42 M/S
LV SEPTAL E/E' RATIO: 11 M/S
LVOT MG: 2 MMHG
LVOT MV: 0.55 CM/S
LYMPHOCYTES # BLD AUTO: 1.52 X10(3)/MCL (ref 0.6–4.6)
LYMPHOCYTES NFR BLD AUTO: 14.3 %
MCH RBC QN AUTO: 30.5 PG (ref 27–31)
MCHC RBC AUTO-ENTMCNC: 34.4 G/DL (ref 33–36)
MCV RBC AUTO: 88.5 FL (ref 80–94)
MONOCYTES # BLD AUTO: 0.79 X10(3)/MCL (ref 0.1–1.3)
MONOCYTES NFR BLD AUTO: 7.4 %
MV MEAN GRADIENT: 1 MMHG
MV PEAK A VEL: 0.55 M/S
MV PEAK E VEL: 0.77 M/S
MV PEAK GRADIENT: 1 MMHG
MV STENOSIS PRESSURE HALF TIME: 38 MS
MV VALVE AREA BY CONTINUITY EQUATION: 2.68 CM2
MV VALVE AREA P 1/2 METHOD: 5.79 CM2
NEUTROPHILS # BLD AUTO: 7.94 X10(3)/MCL (ref 2.1–9.2)
NEUTROPHILS NFR BLD AUTO: 74.6 %
NRBC BLD AUTO-RTO: 0 %
PLATELET # BLD AUTO: 250 X10(3)/MCL (ref 130–400)
PMV BLD AUTO: 9.5 FL (ref 7.4–10.4)
POTASSIUM SERPL-SCNC: 4.4 MMOL/L (ref 3.5–5.1)
PROT SERPL-MCNC: 7.7 GM/DL (ref 6.4–8.3)
PROTHROMBIN TIME: 12.9 SECONDS (ref 12.5–14.5)
PV PEAK GRADIENT: 4 MMHG
PV PEAK VELOCITY: 0.97 M/S
RA PRESSURE ESTIMATED: 3 MMHG
RBC # BLD AUTO: 4.53 X10(6)/MCL (ref 4.7–6.1)
SODIUM SERPL-SCNC: 139 MMOL/L (ref 136–145)
TDI LATERAL: 0.12 M/S
TDI SEPTAL: 0.07 M/S
TDI: 0.1 M/S
TRIGL SERPL-MCNC: 188 MG/DL (ref 34–140)
TROPONIN I SERPL-MCNC: <0.01 NG/ML (ref 0–0.04)
VLDLC SERPL CALC-MCNC: 38 MG/DL
WBC # SPEC AUTO: 10.64 X10(3)/MCL (ref 4.5–11.5)
Z-SCORE OF LEFT VENTRICULAR DIMENSION IN END DIASTOLE: -5.31
Z-SCORE OF LEFT VENTRICULAR DIMENSION IN END SYSTOLE: -2.77

## 2024-01-29 PROCEDURE — 85610 PROTHROMBIN TIME: CPT | Performed by: NURSE PRACTITIONER

## 2024-01-29 PROCEDURE — 25000003 PHARM REV CODE 250: Performed by: EMERGENCY MEDICINE

## 2024-01-29 PROCEDURE — 80053 COMPREHEN METABOLIC PANEL: CPT | Performed by: NURSE PRACTITIONER

## 2024-01-29 PROCEDURE — 84484 ASSAY OF TROPONIN QUANT: CPT | Mod: 91 | Performed by: EMERGENCY MEDICINE

## 2024-01-29 PROCEDURE — G0378 HOSPITAL OBSERVATION PER HR: HCPCS

## 2024-01-29 PROCEDURE — 99285 EMERGENCY DEPT VISIT HI MDM: CPT | Mod: 25

## 2024-01-29 PROCEDURE — 25000242 PHARM REV CODE 250 ALT 637 W/ HCPCS: Performed by: NURSE PRACTITIONER

## 2024-01-29 PROCEDURE — 80061 LIPID PANEL: CPT | Performed by: EMERGENCY MEDICINE

## 2024-01-29 PROCEDURE — 85025 COMPLETE CBC W/AUTO DIFF WBC: CPT | Performed by: NURSE PRACTITIONER

## 2024-01-29 PROCEDURE — 93005 ELECTROCARDIOGRAM TRACING: CPT

## 2024-01-29 PROCEDURE — 84484 ASSAY OF TROPONIN QUANT: CPT | Mod: 91

## 2024-01-29 PROCEDURE — 85730 THROMBOPLASTIN TIME PARTIAL: CPT | Performed by: NURSE PRACTITIONER

## 2024-01-29 PROCEDURE — 84484 ASSAY OF TROPONIN QUANT: CPT | Performed by: NURSE PRACTITIONER

## 2024-01-29 PROCEDURE — 25000003 PHARM REV CODE 250

## 2024-01-29 PROCEDURE — 83880 ASSAY OF NATRIURETIC PEPTIDE: CPT | Performed by: NURSE PRACTITIONER

## 2024-01-29 RX ORDER — CLOPIDOGREL BISULFATE 75 MG/1
75 TABLET ORAL DAILY
Status: DISCONTINUED | OUTPATIENT
Start: 2024-01-30 | End: 2024-01-30 | Stop reason: HOSPADM

## 2024-01-29 RX ORDER — ATORVASTATIN CALCIUM 40 MG/1
80 TABLET, FILM COATED ORAL DAILY
Status: DISCONTINUED | OUTPATIENT
Start: 2024-01-30 | End: 2024-01-30 | Stop reason: HOSPADM

## 2024-01-29 RX ORDER — HYDROCODONE BITARTRATE AND ACETAMINOPHEN 10; 325 MG/1; MG/1
1 TABLET ORAL
Status: COMPLETED | OUTPATIENT
Start: 2024-01-29 | End: 2024-01-29

## 2024-01-29 RX ORDER — SODIUM CHLORIDE 0.9 % (FLUSH) 0.9 %
10 SYRINGE (ML) INJECTION
Status: DISCONTINUED | OUTPATIENT
Start: 2024-01-29 | End: 2024-01-30 | Stop reason: HOSPADM

## 2024-01-29 RX ORDER — ASPIRIN 81 MG/1
81 TABLET ORAL DAILY
Status: DISCONTINUED | OUTPATIENT
Start: 2024-01-30 | End: 2024-01-30

## 2024-01-29 RX ORDER — NITROGLYCERIN 0.4 MG/1
0.4 TABLET SUBLINGUAL EVERY 5 MIN PRN
Status: DISCONTINUED | OUTPATIENT
Start: 2024-01-29 | End: 2024-01-29 | Stop reason: SDUPTHER

## 2024-01-29 RX ORDER — HYDROCODONE BITARTRATE AND ACETAMINOPHEN 5; 325 MG/1; MG/1
1 TABLET ORAL EVERY 4 HOURS PRN
Status: DISCONTINUED | OUTPATIENT
Start: 2024-01-29 | End: 2024-01-30 | Stop reason: HOSPADM

## 2024-01-29 RX ORDER — NITROGLYCERIN 0.4 MG/1
0.4 TABLET SUBLINGUAL EVERY 5 MIN PRN
Status: DISCONTINUED | OUTPATIENT
Start: 2024-01-29 | End: 2024-01-30 | Stop reason: HOSPADM

## 2024-01-29 RX ORDER — ASPIRIN 325 MG
325 TABLET ORAL DAILY
Status: DISCONTINUED | OUTPATIENT
Start: 2024-01-30 | End: 2024-01-30 | Stop reason: HOSPADM

## 2024-01-29 RX ORDER — PANTOPRAZOLE SODIUM 40 MG/1
40 TABLET, DELAYED RELEASE ORAL DAILY
Status: DISCONTINUED | OUTPATIENT
Start: 2024-01-30 | End: 2024-01-30 | Stop reason: HOSPADM

## 2024-01-29 RX ORDER — METOPROLOL TARTRATE 25 MG/1
25 TABLET, FILM COATED ORAL 2 TIMES DAILY
Status: DISCONTINUED | OUTPATIENT
Start: 2024-01-29 | End: 2024-01-30 | Stop reason: HOSPADM

## 2024-01-29 RX ORDER — RANOLAZINE 500 MG/1
1000 TABLET, EXTENDED RELEASE ORAL 2 TIMES DAILY
Status: DISCONTINUED | OUTPATIENT
Start: 2024-01-29 | End: 2024-01-30 | Stop reason: HOSPADM

## 2024-01-29 RX ADMIN — HYDROCODONE BITARTRATE AND ACETAMINOPHEN 1 TABLET: 5; 325 TABLET ORAL at 09:01

## 2024-01-29 RX ADMIN — NITROGLYCERIN 0.4 MG: 0.4 TABLET, ORALLY DISINTEGRATING SUBLINGUAL at 11:01

## 2024-01-29 RX ADMIN — NITROGLYCERIN 0.4 MG: 0.4 TABLET, ORALLY DISINTEGRATING SUBLINGUAL at 01:01

## 2024-01-29 RX ADMIN — HYDROCODONE BITARTRATE AND ACETAMINOPHEN 1 TABLET: 10; 325 TABLET ORAL at 04:01

## 2024-01-29 RX ADMIN — METOPROLOL TARTRATE 25 MG: 25 TABLET, FILM COATED ORAL at 09:01

## 2024-01-29 RX ADMIN — RANOLAZINE 1000 MG: 500 TABLET, EXTENDED RELEASE ORAL at 09:01

## 2024-01-29 NOTE — NURSING
Nurses Note -- 4 Eyes      1/29/2024   4:16 PM      Skin assessed during: Admit      [x] No Altered Skin Integrity Present    [x]Prevention Measures Documented      [] Yes- Altered Skin Integrity Present or Discovered   [] LDA Added if Not in Epic (Describe Wound)   [] New Altered Skin Integrity was Present on Admit and Documented in LDA   [] Wound Image Taken    Wound Care Consulted? No    Attending Nurse:  Armando Parikh RN/Staff Member:   Zeeshan

## 2024-01-29 NOTE — H&P
[facility] - Emergency Dept    Cardiology  History and Physical     Patient Name: Alex Reynoso  MRN: [id number]  Admission Date: 1/29/2024  Code Status: Prior   Attending Provider: Dawit Gil MD   Primary Care Physician: Glen Duffy NP  Principal Problem:<principal problem not specified>    Patient information was obtained from patient, past medical records, and ER records.     Subjective:     Chief Complaint: Chest Pain      HPI: Mr. Reynoso is a 55 y/o male with a history of CAD s/p CABG, HTN, HLD, Claudication, who is known to [facility], Dr. Hughes. He presented to the ER on 1.29.24 with complaints of chest pain. He reports chest pain started last night. He reports associated shortness of breath and left arm and leg pain with numbness. He reports pain is worse with cough and taking a deep breath. Significant labs include H&H 13.8/40.1, Glucose 105. Troponin negative. CXR unremarkable. EKG shows sinus rhythm with nonspecific T-wave abnormality. [facility] will admit for ACS rule out.     PMH: CAD, HTN, HLD, Claudication, Arthritis, Depression, Anxiety, Prostate Cancer, GERD, Hyperglycemia, Morbid Obesity  PSH: LHC, CABG, Cholecystectomy, Colonoscopy, Lipoma Excision, Prostatectomy, Ventriculogram    Family History: Father - HTN, Cancer, MI, HLD; Mother - HTN, DM II, MI, HLD; Brother - HTN, HLD; Sister - Cancer   Social History: Current smoker (1-2 per week; previously 1/2 PPD). Reports occasionally alcohol use. Denies Illicit drug use.     Previous Cardiac Diagnostics:   Abdominal US (1.22.24):  The study quality is below average.  No AAA visualized.    ECHO (10.9.23):  Left Ventricle: The left ventricle is normal in size. Normal wall thickness. Normal wall motion. There is normal systolic function with a visually estimated ejection fraction of 55 - 60%. Right Ventricle: Normal right ventricular cavity size. Wall thickness is normal. Right ventricle wall motion  is normal. Systolic function is normal.  Aortic Valve: The aortic valve is a trileaflet valve.  IVC/SVC: Normal venous pressure at 3 mmHg. Pericardium: There is a trivial effusion. No indication of cardiac tamponade.    LHC (10.9.23):  Left main:  Large caliber switched bifurcates into circumflex and LAD. Minimal. LAD:  Moderate-to-large caliber vessel with proximal severe 90% stenosis. Circumflex:  Moderate-to-large caliber vessel with severe diffuse disease scattered throughout.  Proximally just prior to a bifurcation of the OM and circumflex there is a greater than 70% lesion.  RCA:  99% midvessel stenosis.  Diffuse disease scattered throughout.  Lima-lad:  Is widely patent.  Anastomotic site 40-50% noted from tenting  SVG-OM1:  Widely patent. SVG-PDA:  Unable to locate SVG to PDA (noted to be small and diseased on surgical run report). LVEDP:  16 mmHg  Aortic valve:  No stenosis.     Intervention:  The Mid RCA lesion was 99% stenosed with 0% stenosis post-intervention. The Dist RCA lesion was 90% stenosed with 0% stenosis post-intervention. Dist RCA lesion: A STENT SYNERGY XD [id number] stent was successfully placed.    MPI (9.28.23):  The study quality is good.  This is an abnormal perfusion study. Study is consistent with ischemia. This scan is suggestive of moderate risk for future cardiovascular events. Medium reversible perfusion abnormality of moderate intensity in the inferior region. SSS 4 SRS 0. No change with prone imaging is noted. The left ventricular cavity is noted to be normal on the stress study. The left ventricular ejection fraction was calculated to be 50% and left ventricular global function is normal.     LHC (6.21.23):  Dominance: right. Left main: no obstructive disease with <10% epicardial stenosis. Left anterior descending artery:  Ostial/proximal 80% stenosis.  Diffuse luminal irregularities in the mid to distal LAD.  Diagonal branch is free of any obstructive disease.  Distal LAD is noted to fill the right PDA. Circumflex  artery:  70-80% disease in the proximal obtuse marginal 1 branch which is a medium to large caliber vessel. Right coronary artery:  95% mid RCA lesion.  The RPDA appears to be subtotally occluded with competitive flow noted.  Collaterals noted from the distal LAD that fills the RPDA.     Left ventriculography:  EF 65%  Hemodynamics:  LVEDP = 13    TTE (11.8.22):  The study quality is average. The left ventricle is normal in size. Global left ventricular systolic function is normal. The left ventricular ejection fraction is 60%. Concentric left ventricular hypertrophy is present. It is mild. Insufficient TR to calculate pulmonary artery pressure.    Calcium Score (11.3.22):  Total calcium score 875.3 compatible with high coronary heart disease risk.       Review of patient's allergies indicates:  No Known Allergies    Current Facility-Administered Medications on File Prior to Encounter   Medication    fentaNYL 50 mcg/mL injection 25 mcg    HYDROmorphone (PF) injection 0.2 mg    lactated ringers infusion    sodium chloride 0.9% flush 10 mL    sodium chloride 0.9% flush 10 mL     Current Outpatient Medications on File Prior to Encounter   Medication Sig    albuterol (VENTOLIN HFA) 90 mcg/actuation inhaler Inhale 2 puffs into the lungs every 6 (six) hours as needed for Wheezing. Rescue    amLODIPine (NORVASC) 10 MG tablet Take 1 tablet by mouth once daily.    aspirin (ECOTRIN) 81 MG EC tablet Take 81 mg by mouth every morning. Stopped 3/26/23    atorvastatin (LIPITOR) 80 MG tablet Take 80 mg by mouth every evening.    benzonatate (TESSALON) 100 MG capsule Take 1 capsule 3 times a day by oral route as needed.    clopidogreL (PLAVIX) 75 mg tablet Take 1 tablet (75 mg total) by mouth once daily.    docusate sodium (COLACE) 100 MG capsule Take 1 capsule (100 mg total) by mouth 2 (two) times daily.    ezetimibe (ZETIA) 10 mg tablet Take 10 mg by mouth every evening.    gabapentin (NEURONTIN) 100 MG capsule Take 100 mg by  mouth 3 (three) times daily as needed.    isosorbide mononitrate (IMDUR) 60 MG 24 hr tablet Take 1 tablet (60 mg total) by mouth once daily.    lisinopriL (PRINIVIL,ZESTRIL) 20 MG tablet Take 20 mg by mouth once daily.    metoprolol tartrate (LOPRESSOR) 25 MG tablet Take 0.5 tablets (12.5 mg total) by mouth 2 (two) times daily.    mupirocin (BACTROBAN) 2 % ointment mupirocin 2 % topical ointment   APPLY TOPICALLY TO THE AFFECTED AREA TWICE DAILY AS NEEDED    nitroGLYCERIN (NITROSTAT) 0.4 MG SL tablet Place under the tongue.    omeprazole (PRILOSEC) 20 MG capsule Take 1 capsule by mouth once daily.    oxyCODONE-acetaminophen (PERCOCET)  mg per tablet Take 1 tablet by mouth every 6 (six) hours as needed for Pain.    pantoprazole (PROTONIX) 40 MG tablet Take 40 mg by mouth once daily.    pregabalin (LYRICA) 100 MG capsule Take 1 capsule by mouth 2 (two) times daily.    ranolazine 1,000 mg PSRG Take 1,000 mg by mouth 2 (two) times daily.     Review of Systems   Cardiovascular:  Positive for chest pain. Negative for dyspnea on exertion, leg swelling and palpitations.   Respiratory:  Positive for cough and shortness of breath.    Musculoskeletal:         Left arm & Leg pain with numbness    All other systems reviewed and are negative.    Objective:     Vital Signs (Most Recent):  Temp: 98 °F (36.7 °C) (01/29/24 1108)  Pulse: 75 (01/29/24 1342)  Resp: 13 (01/29/24 1342)  BP: 113/75 (01/29/24 1342)  SpO2: 99 % (01/29/24 1342) Vital Signs (24h Range):  Temp:  [98 °F (36.7 °C)] 98 °F (36.7 °C)  Pulse:  [74-80] 75  Resp:  [13-20] 13  SpO2:  [94 %-99 %] 99 %  BP: (113-141)/(73-82) 113/75     Weight: 117.9 kg (260 lb)  Body mass index is 41.97 kg/m².    SpO2: 99 %       No intake or output data in the 24 hours ending 01/29/24 1534    Lines/Drains/Airways       Peripheral Intravenous Line  Duration                  Peripheral IV - Single Lumen 01/29/24 1149 20 G Left Antecubital <1 day                    Physical  Exam  Vitals and nursing note reviewed.   Constitutional:       Appearance: He is obese.   HENT:      Head: Normocephalic.      Nose: Nose normal.   Eyes:      Pupils: Pupils are equal, round, and reactive to light.   Cardiovascular:      Rate and Rhythm: Normal rate and regular rhythm.      Pulses: Normal pulses.   Pulmonary:      Effort: Pulmonary effort is normal.      Breath sounds: Normal breath sounds.   Abdominal:      General: Bowel sounds are normal.      Palpations: Abdomen is soft.   Musculoskeletal:         General: Normal range of motion.      Cervical back: Normal range of motion.   Skin:     General: Skin is warm.      Capillary Refill: Capillary refill takes less than 2 seconds.   Neurological:      Mental Status: He is alert and oriented to person, place, and time.   Psychiatric:         Mood and Affect: Mood normal.         Behavior: Behavior normal.         EKG: SR    Telemetry:       Significant Labs:   Recent Lab Results         01/29/24  1504   01/29/24  1136        Albumin/Globulin Ratio   1.0       Albumin   3.9       ALP   89       ALT   21       aPTT   32.8  Comment: For Minimal Heparin Infusion, the goal aPTT 64-85 seconds corresponds to an anti-Xa of 0.3-0.5.    For Low Intensity and High Intensity Heparin, the goal aPTT  seconds corresponds to an anti-Xa of 0.3-0.7       AST   15       Baso #   0.05       Basophil %   0.5       BILIRUBIN TOTAL   0.4       BNP   53.3       BUN   11.8       Calcium   9.5       Chloride   105       CO2   27       Creatinine   0.86       eGFR   >60       Eos #   0.32       Eosinophil %   3.0       Globulin, Total   3.8       Glucose   105       Hematocrit   40.1       Hemoglobin   13.8       Immature Grans (Abs)   0.02       Immature Granulocytes   0.2       INR   1.0       Lymph #   1.52       LYMPH %   14.3       MCH   30.5       MCHC   34.4       MCV   88.5       Mono #   0.79       Mono %   7.4       MPV   9.5       Neut #   7.94       Neut %    74.6       nRBC   0.0       Platelet Count   250       Potassium   4.4       PROTEIN TOTAL   7.7       Protime   12.9       RBC   4.53       RDW   13.5       Sodium   139       Troponin I <0.010   <0.010       WBC   10.64               Significant Imaging: X-Ray: CXR: X-Ray Chest 1 View (CXR):   Results for orders placed or performed during the hospital encounter of 01/29/24   X-Ray Chest 1 View    Narrative    EXAMINATION:  XR CHEST 1 VIEW    CLINICAL HISTORY:  chest pain;    TECHNIQUE:  Frontal view(s) of the chest.    COMPARISON:  Radiography 10/09/2023    FINDINGS:  Normal cardiac silhouette.  The lungs are well-inflated.  No consolidation identified.  No significant pleural effusion or discernible pneumothorax.      Impression    No acute pulmonary process identified.      Electronically signed by: Nolberto Hoover  Date:    01/29/2024  Time:    11:46     VTE Risk Mitigation (From admission, onward)      None          Assessment:   Chest pain - atypical    - worse with cough and deep breaths  CAD    - LHC (10.9.23): The Mid RCA lesion was 99% stenosed with 0% stenosis post-intervention. The Dist RCA lesion was 90% stenosed with 0% stenosis post-intervention. Dist RCA lesion: A STENT SYNERGY XD 2.5X32MM stent was successfully placed.    - s/p CABG (6.27.23): LIMA to LAD, SV to OM, SV to PDA  HTN  HLD  Claudication  Arthritis  Depression  Anxiety  Prostate Cancer    - s/p Prostatectomy   GERD  Hyperglycemia  Morbid Obesity  No know history of GI Bleed     Plan:  Trend Troponin   ECHO Today   Resume Home Medications  Keep Mag > 2 and Potassium > 4  Labs in AM: CBC, BMP, and Mag       CALLUM Bosch  Cardiology  [facility] - Emergency Dept  01/29/2024 3:34 PM  Physician addendum:  I have seen and examined this patient as a split-shared visit with the [facility] d/t complicated medical management of above problems written in assessment and high acuity requiring physician expertise in medical decision-making.  I performed the substantive portion of the history and exam. Above medical decision-making is also formulated by me.    Cardiovascular exam:  S1, S2  Lungs:  fine crackles at bases.  Extremities:  1+ edema bilaterally    Plan:  Atypical chest pain.  We will trend troponin.  Follow up echocardiogram.  If troponin negative and no chest pain plan for outpatient stress test.      Dawit Gil MD  Cardiologist

## 2024-01-29 NOTE — ED PROVIDER NOTES
Encounter Date: 1/29/2024    SCRIBE #1 NOTE: I, Shey Shepherd, am scribing for, and in the presence of,  Harley Butler MD. I have scribed the following portions of the note - the EKG reading. Other sections scribed: HPI, ROS, PE, MDM.       History     Chief Complaint   Patient presents with    Chest Pain     C/O CP/SOB x2 days. Pain worse with movement/breathing. Denies cough/congestion. Hx stents. Cardiologist Dr Amato     54 year old male with a history of CABG in June 2023, stents in October 2023, and back and neck injury presents to ED complaining of chest pain and SOB.  Pt reports SOB, chest pain that radiates down his left arm and causes some left arm numbness that began last night. He says the CP and SOB is not worse with exertion.  He denies any cough or fever.  Pt says he is a smoker, but he is trying to quit and does not smoke every day.    The history is provided by the patient. No  was used.   Chest Pain  The current episode started yesterday. Chest pain occurs intermittently. The chest pain is unchanged. The pain radiates to the left arm. Primary symptoms include shortness of breath. Pertinent negatives for primary symptoms include no fever, no fatigue, no wheezing, no abdominal pain, no nausea, no vomiting and no dizziness.     Review of patient's allergies indicates:  No Known Allergies  Past Medical History:   Diagnosis Date    Acid reflux     Arthritis     Back pain     CAD (coronary artery disease), native coronary artery     Coronary artery disease     Coronary artery disease involving native coronary artery of native heart, unspecified whether angina present     GERD (gastroesophageal reflux disease)     High cholesterol     HTN (hypertension)     Hyperglycemia     Obesity     Prostate cancer     Seasonal allergies     Shortness of breath     Tobacco use      Past Surgical History:   Procedure Laterality Date    CHOLECYSTECTOMY      COLONOSCOPY      CORONARY  ANGIOGRAPHY N/A 06/21/2023    Procedure: ANGIOGRAM, CORONARY ARTERY;  Surgeon: Raji Hughes MD;  Location: [facility] CATH LAB;  Service: Cardiology;  Laterality: N/A;    CORONARY ANGIOGRAPHY N/A 10/09/2023    Procedure: ANGIOGRAM, CORONARY ARTERY;  Surgeon: Dawit Rodarte MD;  Location: [facility] CATH LAB;  Service: Cardiology;  Laterality: N/A;    CORONARY ARTERY BYPASS GRAFT (CABG) N/A 06/27/2023    Procedure: CORONARY ARTERY BYPASS GRAFT (CABG);  Surgeon: Jhony Sharma MD;  Location: [facility];  Service: Cardiovascular;  Laterality: N/A;  ECHO NOTIFIED    ENDOSCOPIC HARVEST OF VEIN N/A 06/27/2023    Procedure: SURGICAL PROCUREMENT, VEIN, ENDOSCOPIC;  Surgeon: Jhony Sharma MD;  Location: [facility];  Service: Cardiovascular;  Laterality: N/A;    INTRAVASCULAR ULTRASOUND, CORONARY N/A 10/09/2023    Procedure: Ultrasound-coronary;  Surgeon: Dawit Rodarte MD;  Location: [facility] CATH LAB;  Service: Cardiology;  Laterality: N/A;    LEFT HEART CATHETERIZATION Left 12/01/2022      Moderate noncritical multivessel CAD with stenosis up to 60%    LEFT HEART CATHETERIZATION Left 06/21/2023    Procedure: Left heart cath;  Surgeon: Raji Hughes MD;  Location: [facility] CATH LAB;  Service: Cardiology;  Laterality: Left;  [facility] +/- PCI    LIPOMA RESECTION N/A 03/30/2023    Procedure: EXCISION, LIPOMA (excision of post neck lipoma);  Surgeon: Samuel Cruz MD;  Location: [facility] OR;  Service: General;  Laterality: N/A;  10 x 5 lipoma     PLATING OF STERNUM N/A 06/27/2023    Procedure: PLATING, STERNAL;  Surgeon: Jhony Sharma MD;  Location: [facility];  Service: Cardiovascular;  Laterality: N/A;    PROSTATECTOMY      STENT, DRUG ELUTING, SINGLE VESSEL, CORONARY  10/09/2023    Procedure: Stent, Drug Eluting, Single Vessel, Coronary;  Surgeon: Dawit Rodarte MD;  Location: [facility] CATH LAB;  Service: Cardiology;;    VENTRICULOGRAM, LEFT  06/21/2023    Procedure: Ventriculogram, Left;  Surgeon: Raji Hughes MD;  Location: [facility] CATH LAB;  Service: Cardiology;;      Family History   Problem Relation Age of Onset    Hypertension Mother     Diabetes Mother     Prostate cancer Father      Social History     Tobacco Use    Smoking status: Former     Average packs/day: 0.5 packs/day for 34.1 years (16.7 ttl pk-yrs)     Types: Cigarettes     Start date: 1990    Smokeless tobacco: Never   Substance Use Topics    Alcohol use: Not Currently     Comment: socially    Drug use: Not Currently     Review of Systems   Constitutional:  Negative for fatigue, fever and unexpected weight change.   HENT:  Negative for congestion and rhinorrhea.    Eyes:  Negative for pain.   Respiratory:  Positive for shortness of breath. Negative for chest tightness and wheezing.    Cardiovascular:  Positive for chest pain.   Gastrointestinal:  Negative for abdominal pain, constipation, diarrhea, nausea and vomiting.   Genitourinary:  Negative for dysuria.   Musculoskeletal:  Negative for back pain and neck pain.   Skin:  Negative for rash.   Allergic/Immunologic: Negative for environmental allergies, food allergies and immunocompromised state.   Neurological:  Negative for dizziness and speech difficulty.   Hematological:  Does not bruise/bleed easily.   Psychiatric/Behavioral:  Negative for sleep disturbance and suicidal ideas.        Physical Exam     Initial Vitals [01/29/24 1108]   BP Pulse Resp Temp SpO2   (!) 141/82 74 18 98 °F (36.7 °C) 98 %      MAP       --         Physical Exam    Nursing note and vitals reviewed.  Constitutional: He is Obese . No distress.   HENT:   Head: Normocephalic and atraumatic.   Right Ear: Tympanic membrane normal.   Left Ear: Tympanic membrane normal.   Mouth/Throat: Oropharynx is clear and moist.   Eyes: Conjunctivae and EOM are normal. Pupils are equal, round, and reactive to light.   Neck: Trachea normal. Neck supple. Carotid bruit is not present. No JVD present.   Normal range of motion.  Cardiovascular:  Normal rate and regular rhythm.           No murmur  heard.  Pulmonary/Chest: Breath sounds normal. No respiratory distress. He exhibits no tenderness.   Abdominal: Abdomen is soft. Bowel sounds are normal. He exhibits no distension. There is no abdominal tenderness.   Musculoskeletal:         General: Normal range of motion.      Cervical back: Normal range of motion and neck supple.      Lumbar back: Normal. No tenderness. Normal range of motion.     Neurological: He is alert and oriented to person, place, and time. He has normal strength. No cranial nerve deficit or sensory deficit.   Psychiatric: He has a normal mood and affect. Judgment normal.         ED Course   Procedures  Labs Reviewed   CBC WITH DIFFERENTIAL - Abnormal; Notable for the following components:       Result Value    RBC 4.53 (*)     Hgb 13.8 (*)     Hct 40.1 (*)     All other components within normal limits   COMPREHENSIVE METABOLIC PANEL - Abnormal; Notable for the following components:    Glucose Level 105 (*)     Globulin 3.8 (*)     Albumin/Globulin Ratio 1.0 (*)     All other components within normal limits   TROPONIN I - Normal   PROTIME-INR - Normal   APTT - Normal   B-TYPE NATRIURETIC PEPTIDE - Normal   TROPONIN I     EKG Readings: (Independently Interpreted)   Initial Reading: No STEMI. Rhythm: Normal Sinus Rhythm. Heart Rate: 72. Other Impression: non-specific ST changes   Time: 1105     ECG Results              EKG 12-lead (Final result)  Result time 01/29/24 11:37:16      Final result by Interface, Lab In [facility] (01/29/24 11:37:16)                   Narrative:    Test Reason : R07.9,    Vent. Rate : 072 BPM     Atrial Rate : 072 BPM     P-R Int : 206 ms          QRS Dur : 082 ms      QT Int : 392 ms       P-R-T Axes : 020 -16 063 degrees     QTc Int : 429 ms    Normal sinus rhythm  Nonspecific T wave abnormality  Abnormal ECG  When compared with ECG of 11-OCT-2023 06:07,  No significant change was found  Confirmed by Parish Mackenzie MD ([id number]) on 1/29/2024 11:37:10 AM    Referred By:              Confirmed By:Parish Mackenzie MD                                  Imaging Results              X-Ray Chest 1 View (Final result)  Result time 01/29/24 11:46:16      Final result by Nolberto Hoover MD (01/29/24 11:46:16)                   Impression:      No acute pulmonary process identified.      Electronically signed by: Nolberto Hoover  Date:    01/29/2024  Time:    11:46               Narrative:    EXAMINATION:  XR CHEST 1 VIEW    CLINICAL HISTORY:  chest pain;    TECHNIQUE:  Frontal view(s) of the chest.    COMPARISON:  Radiography 10/09/2023    FINDINGS:  Normal cardiac silhouette.  The lungs are well-inflated.  No consolidation identified.  No significant pleural effusion or discernible pneumothorax.                                       Medications   nitroGLYCERIN SL tablet 0.4 mg (0.4 mg Sublingual Given 1/29/24 1342)     Medical Decision Making  Differential diagnosis includes, but is not limited to: ACS, NSTEMI, STEMI, non cardiac chest pain, COVID, bronchitis.     Amount and/or Complexity of Data Reviewed  External Data Reviewed: notes.     Details: The patient's prior admission notes from last summer as well as October wearing patient had a CABG, subsequently last October developed a graft occlusion requiring a stent.  Labs: ordered. Decision-making details documented in ED Course.     Details: Initial troponin negative  Radiology: ordered and independent interpretation performed.  ECG/medicine tests: ordered and independent interpretation performed. Decision-making details documented in ED Course.  Discussion of management or test interpretation with external provider(s): Discussed with/consulted cardiology services.  Will admit for observation and rule out as patient with known CAD history, and complains of similar type of chest discomfort    Risk  Decision regarding hospitalization.               ED Course as of 01/29/24 1521   Mon Jan 29, 2024   1339 First set of cardiac enzymes negative,  will repeat and discuss with cardiologist [MP]      ED Course User Index  [MP] Harley Butler MD                           Clinical Impression:  Final diagnoses:  [R07.9] Chest pain  [R07.9] Chest pain, unspecified type (Primary)          ED Disposition Condition    Observation Stable                Harley Butler MD  01/29/24 1279

## 2024-01-29 NOTE — FIRST PROVIDER EVALUATION
Medical screening examination initiated.  I have conducted a focused provider triage encounter, findings are as follows:    Brief history of present illness:  53 y/o male presents with 2 day history of chest pain and sob. States has stents in his heart -- Dr. Milton is cardiologist and DR. Sharma is CV surgery. Denies cough/congestion.     There were no vitals filed for this visit.    Pertinent physical exam:  alert, nonlabored but does appear uncomfortable     Brief workup plan:  EKG, labs, imaging    Preliminary workup initiated; this workup will be continued and followed by the physician or advanced practice provider that is assigned to the patient when roomed.

## 2024-01-30 VITALS
OXYGEN SATURATION: 97 % | WEIGHT: 260 LBS | SYSTOLIC BLOOD PRESSURE: 110 MMHG | HEART RATE: 70 BPM | HEIGHT: 66 IN | BODY MASS INDEX: 41.78 KG/M2 | RESPIRATION RATE: 18 BRPM | DIASTOLIC BLOOD PRESSURE: 63 MMHG | TEMPERATURE: 98 F

## 2024-01-30 LAB
ANION GAP SERPL CALC-SCNC: 5 MEQ/L
BASOPHILS # BLD AUTO: 0.04 X10(3)/MCL
BASOPHILS NFR BLD AUTO: 0.4 %
BUN SERPL-MCNC: 16.7 MG/DL (ref 8.4–25.7)
CALCIUM SERPL-MCNC: 9 MG/DL (ref 8.4–10.2)
CHLORIDE SERPL-SCNC: 107 MMOL/L (ref 98–107)
CO2 SERPL-SCNC: 26 MMOL/L (ref 22–29)
CREAT SERPL-MCNC: 0.94 MG/DL (ref 0.73–1.18)
CREAT/UREA NIT SERPL: 18
EOSINOPHIL # BLD AUTO: 0.31 X10(3)/MCL (ref 0–0.9)
EOSINOPHIL NFR BLD AUTO: 3.1 %
ERYTHROCYTE [DISTWIDTH] IN BLOOD BY AUTOMATED COUNT: 13.4 % (ref 11.5–17)
GFR SERPLBLD CREATININE-BSD FMLA CKD-EPI: >60 MLS/MIN/1.73/M2
GLUCOSE SERPL-MCNC: 123 MG/DL (ref 74–100)
HCT VFR BLD AUTO: 38.6 % (ref 42–52)
HGB BLD-MCNC: 12.6 G/DL (ref 14–18)
IMM GRANULOCYTES # BLD AUTO: 0.03 X10(3)/MCL (ref 0–0.04)
IMM GRANULOCYTES NFR BLD AUTO: 0.3 %
LYMPHOCYTES # BLD AUTO: 1.56 X10(3)/MCL (ref 0.6–4.6)
LYMPHOCYTES NFR BLD AUTO: 15.6 %
MAGNESIUM SERPL-MCNC: 2.3 MG/DL (ref 1.6–2.6)
MCH RBC QN AUTO: 29.7 PG (ref 27–31)
MCHC RBC AUTO-ENTMCNC: 32.6 G/DL (ref 33–36)
MCV RBC AUTO: 91 FL (ref 80–94)
MONOCYTES # BLD AUTO: 0.84 X10(3)/MCL (ref 0.1–1.3)
MONOCYTES NFR BLD AUTO: 8.4 %
NEUTROPHILS # BLD AUTO: 7.21 X10(3)/MCL (ref 2.1–9.2)
NEUTROPHILS NFR BLD AUTO: 72.2 %
NRBC BLD AUTO-RTO: 0 %
PLATELET # BLD AUTO: 211 X10(3)/MCL (ref 130–400)
PMV BLD AUTO: 9.7 FL (ref 7.4–10.4)
POTASSIUM SERPL-SCNC: 4.4 MMOL/L (ref 3.5–5.1)
RBC # BLD AUTO: 4.24 X10(6)/MCL (ref 4.7–6.1)
SODIUM SERPL-SCNC: 138 MMOL/L (ref 136–145)
TROPONIN I SERPL-MCNC: <0.01 NG/ML (ref 0–0.04)
TROPONIN I SERPL-MCNC: <0.01 NG/ML (ref 0–0.04)
WBC # SPEC AUTO: 9.99 X10(3)/MCL (ref 4.5–11.5)

## 2024-01-30 PROCEDURE — G0378 HOSPITAL OBSERVATION PER HR: HCPCS

## 2024-01-30 PROCEDURE — 85025 COMPLETE CBC W/AUTO DIFF WBC: CPT

## 2024-01-30 PROCEDURE — 80048 BASIC METABOLIC PNL TOTAL CA: CPT

## 2024-01-30 PROCEDURE — 25000003 PHARM REV CODE 250

## 2024-01-30 PROCEDURE — 84484 ASSAY OF TROPONIN QUANT: CPT

## 2024-01-30 PROCEDURE — 25000003 PHARM REV CODE 250: Performed by: EMERGENCY MEDICINE

## 2024-01-30 PROCEDURE — 83735 ASSAY OF MAGNESIUM: CPT

## 2024-01-30 RX ADMIN — PANTOPRAZOLE SODIUM 40 MG: 40 TABLET, DELAYED RELEASE ORAL at 09:01

## 2024-01-30 RX ADMIN — METOPROLOL TARTRATE 25 MG: 25 TABLET, FILM COATED ORAL at 09:01

## 2024-01-30 RX ADMIN — RANOLAZINE 1000 MG: 500 TABLET, EXTENDED RELEASE ORAL at 09:01

## 2024-01-30 RX ADMIN — ATORVASTATIN CALCIUM 80 MG: 40 TABLET, FILM COATED ORAL at 09:01

## 2024-01-30 RX ADMIN — ASPIRIN 325 MG ORAL TABLET 325 MG: 325 PILL ORAL at 09:01

## 2024-01-30 RX ADMIN — CLOPIDOGREL BISULFATE 75 MG: 75 TABLET ORAL at 09:01

## 2024-01-30 RX ADMIN — HYDROCODONE BITARTRATE AND ACETAMINOPHEN 1 TABLET: 5; 325 TABLET ORAL at 03:01

## 2024-01-30 NOTE — NURSING
Pt AAOx4, VSS, educated on discharge instructions, new medications, follow up appointments, and signs and symptoms to return to the ED with. All questions answered. Pt verbalized understanding. Peripheral IV discontinued and gauze/tape applied to site with no signs of bleeding or swelling noted. Telemetry monitor discontinued and returned to box. Pt wheeled down via transport to personal transportation provided by wife at bedside.

## 2024-01-30 NOTE — DISCHARGE SUMMARY
"[facility] Tele[facility]    Cardiology  Discharge Summary      Patient Name: Alex Reynoso  MRN: [id number]  Admission Date: 1/29/2024  Hospital Length of Stay: 0 days  Discharge Date and Time:  01/30/2024 8:08 AM  Attending Physician: Dawit Gil MD  Discharging Provider: CALLUM Bosch  Primary Care Physician: Glen Duffy NP    HPI: Mr. Reynoso is a 53 y/o male with a history of CAD s/p CABG, HTN, HLD, Claudication, who is known to [facility], Dr. Hughes. He presented to the ER on 1.29.24 with complaints of chest pain. He reports chest pain started last night. He reports associated shortness of breath and left arm and leg pain with numbness. He reports pain is worse with cough and taking a deep breath. Significant labs include H&H 13.8/40.1, Glucose 105. Troponin negative. CXR unremarkable. EKG shows sinus rhythm with nonspecific T-wave abnormality. [facility] will admit for ACS rule out.     Hospital course:   1.30.24: NAD. VSS. NO complaints of CP/SOB/Palps. "I feel okay"     PMH: CAD, HTN, HLD, Claudication, Arthritis, Depression, Anxiety, Prostate Cancer, GERD, Hyperglycemia, Morbid Obesity  PSH: LHC, CABG, Cholecystectomy, Colonoscopy, Lipoma Excision, Prostatectomy, Ventriculogram    Family History: Father - HTN, Cancer, MI, HLD; Mother - HTN, DM II, MI, HLD; Brother - HTN, HLD; Sister - Cancer   Social History: Current smoker (1-2 per week; previously 1/2 PPD). Reports occasionally alcohol use. Denies Illicit drug use.      Previous Cardiac Diagnostics:   ECHO (1.29.24):  Left Ventricle: The left ventricle is normal in size. Normal wall thickness. Normal wall motion. There is normal systolic function with a visually estimated ejection fraction of 60 - 65%. There is normal diastolic function. Right Ventricle: Right ventricle was not well visualized due to poor acoustic window. Aortic Valve: The aortic valve is a trileaflet valve. IVC/SVC: Normal venous pressure at 3 mmHg.    Abdominal " US (1.22.24):  The study quality is below average.  No AAA visualized.     ECHO (10.9.23):  Left Ventricle: The left ventricle is normal in size. Normal wall thickness. Normal wall motion. There is normal systolic function with a visually estimated ejection fraction of 55 - 60%. Right Ventricle: Normal right ventricular cavity size. Wall thickness is normal. Right ventricle wall motion  is normal. Systolic function is normal. Aortic Valve: The aortic valve is a trileaflet valve.  IVC/SVC: Normal venous pressure at 3 mmHg. Pericardium: There is a trivial effusion. No indication of cardiac tamponade.     LHC (10.9.23):  Left main:  Large caliber switched bifurcates into circumflex and LAD. Minimal. LAD:  Moderate-to-large caliber vessel with proximal severe 90% stenosis. Circumflex:  Moderate-to-large caliber vessel with severe diffuse disease scattered throughout.  Proximally just prior to a bifurcation of the OM and circumflex there is a greater than 70% lesion.  RCA:  99% midvessel stenosis.  Diffuse disease scattered throughout.  Lima-lad:  Is widely patent.  Anastomotic site 40-50% noted from tenting  SVG-OM1:  Widely patent. SVG-PDA:  Unable to locate SVG to PDA (noted to be small and diseased on surgical run report). LVEDP:  16 mmHg  Aortic valve:  No stenosis.      Intervention:  The Mid RCA lesion was 99% stenosed with 0% stenosis post-intervention. The Dist RCA lesion was 90% stenosed with 0% stenosis post-intervention. Dist RCA lesion: A STENT SYNERGY XD [id number] stent was successfully placed.     MPI (9.28.23):  The study quality is good.  This is an abnormal perfusion study. Study is consistent with ischemia. This scan is suggestive of moderate risk for future cardiovascular events. Medium reversible perfusion abnormality of moderate intensity in the inferior region. SSS 4 SRS 0. No change with prone imaging is noted. The left ventricular cavity is noted to be normal on the stress study. The left  ventricular ejection fraction was calculated to be 50% and left ventricular global function is normal.      LHC (6.21.23):  Dominance: right. Left main: no obstructive disease with <10% epicardial stenosis. Left anterior descending artery:  Ostial/proximal 80% stenosis.  Diffuse luminal irregularities in the mid to distal LAD.  Diagonal branch is free of any obstructive disease.  Distal LAD is noted to fill the right PDA. Circumflex artery:  70-80% disease in the proximal obtuse marginal 1 branch which is a medium to large caliber vessel. Right coronary artery:  95% mid RCA lesion.  The RPDA appears to be subtotally occluded with competitive flow noted.  Collaterals noted from the distal LAD that fills the RPDA.     Left ventriculography:  EF 65%  Hemodynamics:  LVEDP = 13     TTE (11.8.22):  The study quality is average. The left ventricle is normal in size. Global left ventricular systolic function is normal. The left ventricular ejection fraction is 60%. Concentric left ventricular hypertrophy is present. It is mild. Insufficient TR to calculate pulmonary artery pressure.     Calcium Score (11.3.22):  Total calcium score 875.3 compatible with high coronary heart disease risk.     * No surgery found *     Consults:       Final Active Diagnoses:    Diagnosis Date Noted POA    PRINCIPAL PROBLEM:  Chest pain [R07.9] 10/09/2023 Yes      Problems Resolved During this Admission:       Discharged Condition: good    Review of Systems   Cardiovascular:  Negative for chest pain, dyspnea on exertion, leg swelling and palpitations.   Respiratory:  Negative for shortness of breath.    All other systems reviewed and are negative.      Physical Exam  Vitals reviewed.   Constitutional:       Appearance: Normal appearance.   HENT:      Head: Normocephalic.      Nose: Nose normal.      Mouth/Throat:      Mouth: Mucous membranes are moist.   Eyes:      Pupils: Pupils are equal, round, and reactive to light.   Cardiovascular:       Rate and Rhythm: Normal rate and regular rhythm.      Pulses: Normal pulses.      Heart sounds: Normal heart sounds. No murmur heard.  Pulmonary:      Effort: Pulmonary effort is normal. No respiratory distress.      Breath sounds: Normal breath sounds.   Abdominal:      General: Bowel sounds are normal.      Palpations: Abdomen is soft.   Musculoskeletal:         General: Normal range of motion.      Cervical back: Normal range of motion.   Skin:     General: Skin is warm.   Neurological:      Mental Status: He is alert and oriented to person, place, and time.   Psychiatric:         Mood and Affect: Mood normal.         Behavior: Behavior normal.         Disposition: Home or Self Care    Follow Up:   Follow-up Information       Raji Hughes MD Follow up.    Specialty: Cardiology  Why: VANESSA PET prior to appointment  1-2 week follow-up  Contact information:  [address]  [address]  [facility] [id number] [phone]                           Patient Instructions:      Diet Cardiac     Activity as tolerated     Medications:  Reconciled Home Medications:      Medication List        CONTINUE taking these medications      amLODIPine 10 MG tablet  Commonly known as: NORVASC  Take 1 tablet by mouth once daily.     aspirin 81 MG EC tablet  Commonly known as: ECOTRIN  Take 81 mg by mouth every morning. Stopped 3/26/23     atorvastatin 80 MG tablet  Commonly known as: LIPITOR  Take 80 mg by mouth every evening.     clopidogreL 75 mg tablet  Commonly known as: PLAVIX  Take 1 tablet (75 mg total) by mouth once daily.     ezetimibe 10 mg tablet  Commonly known as: ZETIA  Take 10 mg by mouth every evening.     gabapentin 100 MG capsule  Commonly known as: NEURONTIN  Take 100 mg by mouth 3 (three) times daily as needed.     isosorbide mononitrate 60 MG 24 hr tablet  Commonly known as: IMDUR  Take 1 tablet (60 mg total) by mouth once daily.     lisinopriL 20 MG tablet  Commonly known as: PRINIVIL,ZESTRIL  Take 20 mg by mouth once  daily.     metoprolol tartrate 25 MG tablet  Commonly known as: LOPRESSOR  Take 0.5 tablets (12.5 mg total) by mouth 2 (two) times daily.     nitroGLYCERIN 0.4 MG SL tablet  Commonly known as: NITROSTAT  Place under the tongue.     omeprazole 20 MG capsule  Commonly known as: PRILOSEC  Take 1 capsule by mouth once daily.     pantoprazole 40 MG tablet  Commonly known as: PROTONIX  Take 40 mg by mouth once daily.     pregabalin 100 MG capsule  Commonly known as: LYRICA  Take 1 capsule by mouth 2 (two) times daily.     ranolazine 1,000 mg Psrg  Take 1,000 mg by mouth 2 (two) times daily.     VENTOLIN HFA 90 mcg/actuation inhaler  Generic drug: albuterol  Inhale 2 puffs into the lungs every 6 (six) hours as needed for Wheezing. Rescue            STOP taking these medications      benzonatate 100 MG capsule  Commonly known as: TESSALON     docusate sodium 100 MG capsule  Commonly known as: COLACE     mupirocin 2 % ointment  Commonly known as: BACTROBAN     oxyCODONE-acetaminophen  mg per tablet  Commonly known as: PERCOCET              Impression:  Chest pain - atypical    - worse with cough and deep breaths  CAD    - LHC (10.9.23): The Mid RCA lesion was 99% stenosed with 0% stenosis post-intervention. The Dist RCA lesion was 90% stenosed with 0% stenosis post-intervention. Dist RCA lesion: A STENT SYNERGY XD 2.5X32MM stent was successfully placed.    - s/p CABG (6.27.23): LIMA to LAD, SV to OM, SV to PDA  HTN  HLD  Claudication  Arthritis  Depression  Anxiety  Prostate Cancer    - s/p Prostatectomy   GERD  Hyperglycemia  Morbid Obesity  No know history of GI Bleed     Plan:   Discharge Home   ECHO Reviewed  Continue ASA, Statin, Plavix, BB, Ranexa, and Protonix  Outpatient [facility] PET   Follow-up with Dr. Hughes in 1-2 weeks       Time spent on the discharge of patient: 36 minutes    CALLUM Bosch  Cardiology  [facility] Tele[facility]      I have reviewed the Nurse Practitioner's  note, assessment and discharge plan. Medical decision making listed above were done under my guidance.

## 2024-01-30 NOTE — PLAN OF CARE
01/30/24 1110   Final Note   Assessment Type Final Discharge Note   Anticipated Discharge Disposition Home   Post-Acute Status   Discharge Delays None known at this time     Dc to home, no needs noted for CM.

## 2024-01-31 ENCOUNTER — PATIENT OUTREACH (OUTPATIENT)
Dept: ADMINISTRATIVE | Facility: CLINIC | Age: 54
End: 2024-01-31
Payer: MEDICARE

## 2024-01-31 NOTE — PROGRESS NOTES
C3 nurse spoke with Alex Reynoso for a TCC post hospital discharge follow up call. The patient has a scheduled [facility] appointment with Glen Duffy NP on 2/7/24 @ 10:30.

## 2024-02-19 DIAGNOSIS — I21.3 ST ELEVATION MI (STEMI): Primary | ICD-10-CM

## 2024-02-20 ENCOUNTER — CLINICAL SUPPORT (OUTPATIENT)
Dept: CARDIAC REHAB | Facility: HOSPITAL | Age: 54
End: 2024-02-20
Attending: INTERNAL MEDICINE
Payer: MEDICARE

## 2024-02-20 DIAGNOSIS — Z95.5 PRESENCE OF DRUG COATED STENT IN RIGHT CORONARY ARTERY: ICD-10-CM

## 2024-02-20 DIAGNOSIS — I21.3 ST ELEVATION MYOCARDIAL INFARCTION (STEMI), UNSPECIFIED ARTERY: Primary | ICD-10-CM

## 2024-02-20 PROCEDURE — 93798 PHYS/QHP OP CAR RHAB W/ECG: CPT

## 2024-02-20 NOTE — DISCHARGE INSTRUCTIONS
GROIN ACCESS: No driving for 3 days, no heavy lifting (over 10 lbs) for 3 days, wait 3 days before sexual intercourse.    Take it easy for the rest of the day. Keep arm or leg straight as much as possible. If catheter was put in your groin, avoid using stairs for a few days. When you must use stairs, step up with the leg that was not used for the angiogram.     Keep the catheter wound area clean and dry for 24 hours after your angiogram. You may shower 24 hours after your angiogram. Refrain from taking a tub bath, swimming, hot tubs, and submerging in dish water for 5 days. During shower, gently wash your angiogram site with soap and water. If the site is oozing or bleeding slightly, place a small bandage over it to protect your clothes.     If the place where the catheter was inserted starts to bleed, use your hand to put pressure on the bandage. It is better if someone else hold pressure for you. Also, call for help. Hold pressure for 30 minutes, even after bleeding stops. Lie flat for at least an hour. If bleeding does not stop within 15 minutes of holding pressure, you will need to go to the nearest hospital. Do not walk or drive yourself.     Drink plenty of liquids to help your body rid of the dye that was used during the angiogram. Drink eight (8 oz) glasses of water each day. Limit the amount of caffeine you drink such as coffee, tea, and soda. Do not drink alcohol for 24 hours after the test. Taking these actions are critical for the health of your kidneys.     REASONS TO CALL YOUR DOCTOR:    You have shaking, chills, or a body temperature over 101.5 F  The puncture site becomes red or has pus or foul-smelling drainage coming from it.  You have increasing pain at the puncture site. (It is normal to have soreness, but this should get better, not worse).  You have questions or concerns about your angiogram, medicines, or health condition.     SEEK CARE IMMEDIATELY IF:  The leg or arm used for the angiogram  becomes numb, is very painful, or changes color.  The bruise site starts to get bigger, or the area has new swelling.     IT IS AN EMERGENCY:  The puncture site is bleeding and you cannot stop the bleeding.  You have signs of a stroke..new weakness, trouble moving one side of your face or body, new trouble thinking or speaking, and new changes in vision.

## 2024-02-20 NOTE — PROGRESS NOTES
Documentation of vitals, telemetry, exercise, and nurses notes completed in [facility] system.

## 2024-02-21 ENCOUNTER — CLINICAL SUPPORT (OUTPATIENT)
Dept: CARDIAC REHAB | Facility: HOSPITAL | Age: 54
End: 2024-02-21
Attending: INTERNAL MEDICINE
Payer: MEDICARE

## 2024-02-21 DIAGNOSIS — Z95.5 PRESENCE OF DRUG COATED STENT IN RIGHT CORONARY ARTERY: Primary | ICD-10-CM

## 2024-02-21 PROCEDURE — 93798 PHYS/QHP OP CAR RHAB W/ECG: CPT

## 2024-02-21 NOTE — PROGRESS NOTES
Documentation of vitals, telemetry, exercise, and nurses notes completed in [facility] system.

## 2024-02-23 ENCOUNTER — CLINICAL SUPPORT (OUTPATIENT)
Dept: CARDIAC REHAB | Facility: HOSPITAL | Age: 54
End: 2024-02-23
Attending: INTERNAL MEDICINE
Payer: MEDICARE

## 2024-02-23 DIAGNOSIS — Z95.5 PRESENCE OF DRUG COATED STENT IN RIGHT CORONARY ARTERY: Primary | ICD-10-CM

## 2024-02-23 PROCEDURE — 93798 PHYS/QHP OP CAR RHAB W/ECG: CPT

## 2024-02-23 NOTE — PROGRESS NOTES
Documentation of vitals, telemetry, exercise, and nurses notes completed in [facility] system.

## 2024-02-26 ENCOUNTER — CLINICAL SUPPORT (OUTPATIENT)
Dept: CARDIAC REHAB | Facility: HOSPITAL | Age: 54
End: 2024-02-26
Attending: INTERNAL MEDICINE
Payer: MEDICARE

## 2024-02-26 DIAGNOSIS — Z95.5 PRESENCE OF DRUG COATED STENT IN RIGHT CORONARY ARTERY: Primary | ICD-10-CM

## 2024-02-26 PROCEDURE — 93798 PHYS/QHP OP CAR RHAB W/ECG: CPT

## 2024-02-26 NOTE — PROGRESS NOTES
Documentation of vitals, telemetry, exercise, and nurses notes completed in [facility] system.

## 2024-02-28 ENCOUNTER — CLINICAL SUPPORT (OUTPATIENT)
Dept: CARDIAC REHAB | Facility: HOSPITAL | Age: 54
End: 2024-02-28
Attending: INTERNAL MEDICINE
Payer: MEDICARE

## 2024-02-28 DIAGNOSIS — Z95.5 PRESENCE OF DRUG COATED STENT IN RIGHT CORONARY ARTERY: Primary | ICD-10-CM

## 2024-02-28 PROCEDURE — 93798 PHYS/QHP OP CAR RHAB W/ECG: CPT

## 2024-02-28 NOTE — PROGRESS NOTES
Documentation of vitals, telemetry, exercise, and nurses notes completed in [facility] system.

## 2024-03-01 ENCOUNTER — CLINICAL SUPPORT (OUTPATIENT)
Dept: CARDIAC REHAB | Facility: HOSPITAL | Age: 54
End: 2024-03-01
Attending: INTERNAL MEDICINE
Payer: MEDICARE

## 2024-03-01 DIAGNOSIS — Z95.5 PRESENCE OF DRUG COATED STENT IN RIGHT CORONARY ARTERY: Primary | ICD-10-CM

## 2024-03-01 PROCEDURE — 93798 PHYS/QHP OP CAR RHAB W/ECG: CPT

## 2024-03-01 NOTE — PROGRESS NOTES
Documentation of vitals, telemetry, exercise, and nurses notes completed in [facility] system.

## 2024-03-04 ENCOUNTER — CLINICAL SUPPORT (OUTPATIENT)
Dept: CARDIAC REHAB | Facility: HOSPITAL | Age: 54
End: 2024-03-04
Attending: INTERNAL MEDICINE
Payer: MEDICARE

## 2024-03-04 DIAGNOSIS — Z95.5 PRESENCE OF DRUG-ELUTING STENT IN RIGHT CORONARY ARTERY: Primary | ICD-10-CM

## 2024-03-04 PROCEDURE — 93798 PHYS/QHP OP CAR RHAB W/ECG: CPT

## 2024-03-04 NOTE — PROGRESS NOTES
Documentation of vitals, telemetry, exercise, and nurses notes completed in [facility] system.

## 2024-03-06 ENCOUNTER — CLINICAL SUPPORT (OUTPATIENT)
Dept: CARDIAC REHAB | Facility: HOSPITAL | Age: 54
End: 2024-03-06
Attending: INTERNAL MEDICINE
Payer: MEDICARE

## 2024-03-06 DIAGNOSIS — Z95.5 PRESENCE OF DRUG COATED STENT IN RIGHT CORONARY ARTERY: Primary | ICD-10-CM

## 2024-03-06 PROCEDURE — 93798 PHYS/QHP OP CAR RHAB W/ECG: CPT

## 2024-03-06 RX ORDER — OXYCODONE AND ACETAMINOPHEN 10; 325 MG/1; MG/1
1 TABLET ORAL EVERY 4 HOURS PRN
COMMUNITY

## 2024-03-06 RX ORDER — FAMOTIDINE 40 MG/1
40 TABLET, FILM COATED ORAL DAILY
Status: ON HOLD | COMMUNITY
End: 2024-03-12

## 2024-03-06 NOTE — PROGRESS NOTES
Documentation of vitals, telemetry, exercise, and nurses notes completed in [facility] system.

## 2024-03-08 ENCOUNTER — CLINICAL SUPPORT (OUTPATIENT)
Dept: CARDIAC REHAB | Facility: HOSPITAL | Age: 54
End: 2024-03-08
Attending: INTERNAL MEDICINE
Payer: MEDICARE

## 2024-03-08 DIAGNOSIS — Z95.5 PRESENCE OF DRUG COATED STENT IN RIGHT CORONARY ARTERY: Primary | ICD-10-CM

## 2024-03-08 PROCEDURE — 93798 PHYS/QHP OP CAR RHAB W/ECG: CPT

## 2024-03-08 NOTE — PROGRESS NOTES
Documentation of vitals, telemetry, exercise, and nurses notes completed in [facility] system.

## 2024-03-11 RX ORDER — SODIUM CHLORIDE 9 MG/ML
INJECTION, SOLUTION INTRAVENOUS ONCE
Status: CANCELLED | OUTPATIENT
Start: 2024-03-11 | End: 2024-03-11

## 2024-03-11 RX ORDER — SODIUM CHLORIDE 0.9 % (FLUSH) 0.9 %
10 SYRINGE (ML) INJECTION
Status: CANCELLED | OUTPATIENT
Start: 2024-03-11

## 2024-03-12 ENCOUNTER — HOSPITAL ENCOUNTER (OUTPATIENT)
Facility: HOSPITAL | Age: 54
Discharge: HOME | End: 2024-03-12
Attending: INTERNAL MEDICINE | Admitting: INTERNAL MEDICINE
Payer: MEDICARE

## 2024-03-12 DIAGNOSIS — R94.39 ABNORMAL CARDIOVASCULAR STRESS TEST: ICD-10-CM

## 2024-03-12 PROCEDURE — 93799 UNLISTED CV SVC/PROCEDURE: CPT | Performed by: INTERNAL MEDICINE

## 2024-03-12 PROCEDURE — 25000003 PHARM REV CODE 250: Performed by: INTERNAL MEDICINE

## 2024-03-12 PROCEDURE — 25500020 PHARM REV CODE 255: Performed by: INTERNAL MEDICINE

## 2024-03-12 PROCEDURE — 63600175 PHARM REV CODE 636 W HCPCS: Performed by: INTERNAL MEDICINE

## 2024-03-12 PROCEDURE — 99153 MOD SED SAME PHYS/QHP EA: CPT | Performed by: INTERNAL MEDICINE

## 2024-03-12 PROCEDURE — 93459 L HRT ART/GRFT ANGIO: CPT | Performed by: INTERNAL MEDICINE

## 2024-03-12 PROCEDURE — C1887 CATHETER, GUIDING: HCPCS | Performed by: INTERNAL MEDICINE

## 2024-03-12 PROCEDURE — C1769 GUIDE WIRE: HCPCS | Performed by: INTERNAL MEDICINE

## 2024-03-12 PROCEDURE — 85347 COAGULATION TIME ACTIVATED: CPT | Performed by: INTERNAL MEDICINE

## 2024-03-12 PROCEDURE — C1894 INTRO/SHEATH, NON-LASER: HCPCS | Performed by: INTERNAL MEDICINE

## 2024-03-12 PROCEDURE — 99152 MOD SED SAME PHYS/QHP 5/>YRS: CPT | Performed by: INTERNAL MEDICINE

## 2024-03-12 RX ORDER — SODIUM CHLORIDE 9 MG/ML
INJECTION, SOLUTION INTRAVENOUS CONTINUOUS
Status: DISCONTINUED | OUTPATIENT
Start: 2024-03-12 | End: 2024-03-12 | Stop reason: HOSPADM

## 2024-03-12 RX ORDER — FENTANYL CITRATE 50 UG/ML
INJECTION, SOLUTION INTRAMUSCULAR; INTRAVENOUS
Status: DISCONTINUED | OUTPATIENT
Start: 2024-03-12 | End: 2024-03-12 | Stop reason: HOSPADM

## 2024-03-12 RX ORDER — HEPARIN SODIUM 1000 [USP'U]/ML
INJECTION, SOLUTION INTRAVENOUS; SUBCUTANEOUS
Status: DISCONTINUED | OUTPATIENT
Start: 2024-03-12 | End: 2024-03-12 | Stop reason: HOSPADM

## 2024-03-12 RX ORDER — LIDOCAINE HYDROCHLORIDE 20 MG/ML
INJECTION, SOLUTION EPIDURAL; INFILTRATION; INTRACAUDAL; PERINEURAL
Status: DISCONTINUED | OUTPATIENT
Start: 2024-03-12 | End: 2024-03-12 | Stop reason: HOSPADM

## 2024-03-12 RX ORDER — DIPHENHYDRAMINE HCL 25 MG
50 CAPSULE ORAL
Status: DISCONTINUED | OUTPATIENT
Start: 2024-03-12 | End: 2024-03-12 | Stop reason: HOSPADM

## 2024-03-12 RX ORDER — DIAZEPAM 5 MG/1
10 TABLET ORAL
Status: DISCONTINUED | OUTPATIENT
Start: 2024-03-12 | End: 2024-03-12 | Stop reason: HOSPADM

## 2024-03-12 RX ORDER — ASPIRIN 325 MG
325 TABLET ORAL
Status: DISCONTINUED | OUTPATIENT
Start: 2024-03-12 | End: 2024-03-12 | Stop reason: HOSPADM

## 2024-03-12 RX ORDER — NITROGLYCERIN 20 MG/100ML
INJECTION INTRAVENOUS
Status: DISCONTINUED | OUTPATIENT
Start: 2024-03-12 | End: 2024-03-12 | Stop reason: HOSPADM

## 2024-03-12 RX ORDER — MIDAZOLAM HYDROCHLORIDE 1 MG/ML
INJECTION, SOLUTION INTRAMUSCULAR; INTRAVENOUS
Status: DISCONTINUED | OUTPATIENT
Start: 2024-03-12 | End: 2024-03-12 | Stop reason: HOSPADM

## 2024-03-12 RX ADMIN — ASPIRIN 325 MG ORAL TABLET 325 MG: 325 PILL ORAL at 07:03

## 2024-03-12 RX ADMIN — SODIUM CHLORIDE: 9 INJECTION, SOLUTION INTRAVENOUS at 07:03

## 2024-03-12 RX ADMIN — DIAZEPAM 10 MG: 5 TABLET ORAL at 07:03

## 2024-03-12 RX ADMIN — DIPHENHYDRAMINE HYDROCHLORIDE 50 MG: 25 CAPSULE ORAL at 07:03

## 2024-03-12 NOTE — DISCHARGE SUMMARY
[facility]-Cath Lab/EP  Discharge Note  Short Stay    Procedure(s) (LRB):  ANGIOGRAM, CORONARY ARTERY (Left)      OUTCOME: Patient tolerated treatment/procedure well without complication and is now ready for discharge.    DISPOSITION: Home or Self Care    FINAL DIAGNOSIS:  CAD     FOLLOWUP: In clinic    DISCHARGE INSTRUCTIONS:  monitor right CFA     Clinical Reference Documents Added to Patient Instructions         Document    CARDIAC CATHETERIZATION DISCHARGE INSTRUCTIONS (ENGLISH)            TIME SPENT ON DISCHARGE: 35 minutes

## 2024-03-12 NOTE — Clinical Note
The catheter was inserted into the and was inserted over the wire into the aorta ostium   left main. An angiography was performed of the left coronary arteries. Multiple views were taken. The angiography was performed via power injection. The injected amount was 10 mL contrast at 4 mL/s. The PSI from the power injection was 600.

## 2024-03-12 NOTE — Clinical Note
The catheter was repositioned into the ostial SVG graft. An angiography was performed of the graft. Multiple views were taken. The angiography was performed via power injection. The injected amount was 10 mL contrast at 4 mL/s. The PSI from the power injection was 600. SVG- OM

## 2024-03-12 NOTE — Clinical Note
The catheter was inserted into the and was inserted over the wire into the ostium   right coronary artery. Hemodynamics were performed.  An angiography was performed of the right coronary arteries. Multiple views were taken. The angiography was performed via power injection. The injected amount was 10 mL contrast at 4 mL/s. The PSI from the power injection was 600.

## 2024-03-12 NOTE — PLAN OF CARE
Patient arrived back to cath lab holding, in stable condition, cath site free of bleeding/hematoma, patient denies further needs at this time.    
[id number]

## 2024-03-12 NOTE — Clinical Note
The catheter was inserted into the and was inserted over the wire into the aorta ostial LIMA graft. LIMA - LAD

## 2024-03-15 VITALS
HEART RATE: 66 BPM | DIASTOLIC BLOOD PRESSURE: 74 MMHG | BODY MASS INDEX: 42.52 KG/M2 | HEIGHT: 66 IN | SYSTOLIC BLOOD PRESSURE: 126 MMHG | TEMPERATURE: 98 F | OXYGEN SATURATION: 97 % | RESPIRATION RATE: 15 BRPM | WEIGHT: 264.56 LBS

## 2024-03-19 ENCOUNTER — HOSPITAL ENCOUNTER (INPATIENT)
Facility: HOSPITAL | Age: 54
LOS: 4 days | Discharge: HOME | DRG: 699 | End: 2024-03-24
Attending: STUDENT IN AN ORGANIZED HEALTH CARE EDUCATION/TRAINING PROGRAM | Admitting: INTERNAL MEDICINE
Payer: MEDICARE

## 2024-03-19 DIAGNOSIS — R07.9 CHEST PAIN: ICD-10-CM

## 2024-03-19 DIAGNOSIS — R31.9 HEMATURIA, UNSPECIFIED TYPE: ICD-10-CM

## 2024-03-19 DIAGNOSIS — R31.0 GROSS HEMATURIA: Primary | ICD-10-CM

## 2024-03-19 DIAGNOSIS — R10.9 ABDOMINAL PAIN, UNSPECIFIED ABDOMINAL LOCATION: Primary | ICD-10-CM

## 2024-03-19 LAB
ALBUMIN SERPL-MCNC: 3.7 G/DL (ref 3.5–5)
ALBUMIN/GLOB SERPL: 1.1 RATIO (ref 1.1–2)
ALP SERPL-CCNC: 92 UNIT/L (ref 40–150)
ALT SERPL-CCNC: 25 UNIT/L (ref 0–55)
AST SERPL-CCNC: 15 UNIT/L (ref 5–34)
BASOPHILS # BLD AUTO: 0.04 X10(3)/MCL
BASOPHILS NFR BLD AUTO: 0.5 %
BILIRUB SERPL-MCNC: 0.2 MG/DL
BNP BLD-MCNC: 34.9 PG/ML
BUN SERPL-MCNC: 17.8 MG/DL (ref 8.4–25.7)
CALCIUM SERPL-MCNC: 9.6 MG/DL (ref 8.4–10.2)
CHLORIDE SERPL-SCNC: 103 MMOL/L (ref 98–107)
CO2 SERPL-SCNC: 25 MMOL/L (ref 22–29)
CREAT SERPL-MCNC: 1.25 MG/DL (ref 0.73–1.18)
EOSINOPHIL # BLD AUTO: 0.53 X10(3)/MCL (ref 0–0.9)
EOSINOPHIL NFR BLD AUTO: 6.5 %
ERYTHROCYTE [DISTWIDTH] IN BLOOD BY AUTOMATED COUNT: 12.4 % (ref 11.5–17)
GFR SERPLBLD CREATININE-BSD FMLA CKD-EPI: >60 MLS/MIN/1.73/M2
GLOBULIN SER-MCNC: 3.4 GM/DL (ref 2.4–3.5)
GLUCOSE SERPL-MCNC: 102 MG/DL (ref 74–100)
HCT VFR BLD AUTO: 40.6 % (ref 42–52)
HGB BLD-MCNC: 13.8 G/DL (ref 14–18)
IMM GRANULOCYTES # BLD AUTO: 0.02 X10(3)/MCL (ref 0–0.04)
IMM GRANULOCYTES NFR BLD AUTO: 0.2 %
INR PPP: 1
LYMPHOCYTES # BLD AUTO: 2.02 X10(3)/MCL (ref 0.6–4.6)
LYMPHOCYTES NFR BLD AUTO: 24.7 %
MCH RBC QN AUTO: 30.7 PG (ref 27–31)
MCHC RBC AUTO-ENTMCNC: 34 G/DL (ref 33–36)
MCV RBC AUTO: 90.2 FL (ref 80–94)
MONOCYTES # BLD AUTO: 0.67 X10(3)/MCL (ref 0.1–1.3)
MONOCYTES NFR BLD AUTO: 8.2 %
NEUTROPHILS # BLD AUTO: 4.89 X10(3)/MCL (ref 2.1–9.2)
NEUTROPHILS NFR BLD AUTO: 59.9 %
NRBC BLD AUTO-RTO: 0 %
PLATELET # BLD AUTO: 235 X10(3)/MCL (ref 130–400)
PMV BLD AUTO: 9.2 FL (ref 7.4–10.4)
POTASSIUM SERPL-SCNC: 4.3 MMOL/L (ref 3.5–5.1)
PROT SERPL-MCNC: 7.1 GM/DL (ref 6.4–8.3)
PROTHROMBIN TIME: 13.4 SECONDS (ref 12.5–14.5)
RBC # BLD AUTO: 4.5 X10(6)/MCL (ref 4.7–6.1)
SODIUM SERPL-SCNC: 138 MMOL/L (ref 136–145)
TROPONIN I SERPL-MCNC: <0.01 NG/ML (ref 0–0.04)
WBC # SPEC AUTO: 8.17 X10(3)/MCL (ref 4.5–11.5)

## 2024-03-19 PROCEDURE — 96361 HYDRATE IV INFUSION ADD-ON: CPT

## 2024-03-19 PROCEDURE — 51702 INSERT TEMP BLADDER CATH: CPT

## 2024-03-19 PROCEDURE — 93005 ELECTROCARDIOGRAM TRACING: CPT

## 2024-03-19 PROCEDURE — 96374 THER/PROPH/DIAG INJ IV PUSH: CPT

## 2024-03-19 PROCEDURE — 85610 PROTHROMBIN TIME: CPT | Performed by: STUDENT IN AN ORGANIZED HEALTH CARE EDUCATION/TRAINING PROGRAM

## 2024-03-19 PROCEDURE — 96375 TX/PRO/DX INJ NEW DRUG ADDON: CPT

## 2024-03-19 PROCEDURE — 85025 COMPLETE CBC W/AUTO DIFF WBC: CPT | Performed by: STUDENT IN AN ORGANIZED HEALTH CARE EDUCATION/TRAINING PROGRAM

## 2024-03-19 PROCEDURE — 83880 ASSAY OF NATRIURETIC PEPTIDE: CPT | Performed by: STUDENT IN AN ORGANIZED HEALTH CARE EDUCATION/TRAINING PROGRAM

## 2024-03-19 PROCEDURE — 84484 ASSAY OF TROPONIN QUANT: CPT | Performed by: STUDENT IN AN ORGANIZED HEALTH CARE EDUCATION/TRAINING PROGRAM

## 2024-03-19 PROCEDURE — 80053 COMPREHEN METABOLIC PANEL: CPT | Performed by: STUDENT IN AN ORGANIZED HEALTH CARE EDUCATION/TRAINING PROGRAM

## 2024-03-19 PROCEDURE — 99285 EMERGENCY DEPT VISIT HI MDM: CPT | Mod: 25

## 2024-03-19 PROCEDURE — 96376 TX/PRO/DX INJ SAME DRUG ADON: CPT

## 2024-03-19 PROCEDURE — 82550 ASSAY OF CK (CPK): CPT | Performed by: STUDENT IN AN ORGANIZED HEALTH CARE EDUCATION/TRAINING PROGRAM

## 2024-03-20 LAB
ALBUMIN SERPL-MCNC: 3.6 G/DL (ref 3.5–5)
ALBUMIN/GLOB SERPL: 1.2 RATIO (ref 1.1–2)
ALP SERPL-CCNC: 71 UNIT/L (ref 40–150)
ALT SERPL-CCNC: 35 UNIT/L (ref 0–55)
APPEARANCE UR: CLEAR
AST SERPL-CCNC: 27 UNIT/L (ref 5–34)
BACTERIA #/AREA URNS AUTO: ABNORMAL /HPF
BASOPHILS # BLD AUTO: 0.05 X10(3)/MCL
BASOPHILS NFR BLD AUTO: 0.8 %
BILIRUB SERPL-MCNC: 0.3 MG/DL
BILIRUB UR QL STRIP.AUTO: NEGATIVE
BUN SERPL-MCNC: 16 MG/DL (ref 8.4–25.7)
CALCIUM SERPL-MCNC: 9.4 MG/DL (ref 8.4–10.2)
CHLORIDE SERPL-SCNC: 103 MMOL/L (ref 98–107)
CK SERPL-CCNC: 113 U/L (ref 30–200)
CO2 SERPL-SCNC: 28 MMOL/L (ref 22–29)
COLOR UR AUTO: ABNORMAL
CREAT SERPL-MCNC: 0.93 MG/DL (ref 0.73–1.18)
D DIMER PPP IA.FEU-MCNC: 0.48 UG/ML FEU (ref 0–0.5)
EOSINOPHIL # BLD AUTO: 0.4 X10(3)/MCL (ref 0–0.9)
EOSINOPHIL NFR BLD AUTO: 6.5 %
ERYTHROCYTE [DISTWIDTH] IN BLOOD BY AUTOMATED COUNT: 12.7 % (ref 11.5–17)
GFR SERPLBLD CREATININE-BSD FMLA CKD-EPI: >60 MLS/MIN/1.73/M2
GLOBULIN SER-MCNC: 3 GM/DL (ref 2.4–3.5)
GLUCOSE SERPL-MCNC: 108 MG/DL (ref 74–100)
GLUCOSE UR QL STRIP.AUTO: NORMAL
HCT VFR BLD AUTO: 40.5 % (ref 42–52)
HGB BLD-MCNC: 13.5 G/DL (ref 14–18)
IMM GRANULOCYTES # BLD AUTO: 0.01 X10(3)/MCL (ref 0–0.04)
IMM GRANULOCYTES NFR BLD AUTO: 0.2 %
KETONES UR QL STRIP.AUTO: NEGATIVE
LEUKOCYTE ESTERASE UR QL STRIP.AUTO: NEGATIVE
LYMPHOCYTES # BLD AUTO: 1.52 X10(3)/MCL (ref 0.6–4.6)
LYMPHOCYTES NFR BLD AUTO: 24.8 %
MAGNESIUM SERPL-MCNC: 2.1 MG/DL (ref 1.6–2.6)
MCH RBC QN AUTO: 30.4 PG (ref 27–31)
MCHC RBC AUTO-ENTMCNC: 33.3 G/DL (ref 33–36)
MCV RBC AUTO: 91.2 FL (ref 80–94)
MONOCYTES # BLD AUTO: 0.45 X10(3)/MCL (ref 0.1–1.3)
MONOCYTES NFR BLD AUTO: 7.4 %
NEUTROPHILS # BLD AUTO: 3.69 X10(3)/MCL (ref 2.1–9.2)
NEUTROPHILS NFR BLD AUTO: 60.3 %
NITRITE UR QL STRIP.AUTO: NEGATIVE
NRBC BLD AUTO-RTO: 0 %
OHS QRS DURATION: 88 MS
OHS QTC CALCULATION: 407 MS
PH UR STRIP.AUTO: 6 [PH]
PLATELET # BLD AUTO: 229 X10(3)/MCL (ref 130–400)
PMV BLD AUTO: 9.3 FL (ref 7.4–10.4)
POTASSIUM SERPL-SCNC: 4.4 MMOL/L (ref 3.5–5.1)
PROT SERPL-MCNC: 6.6 GM/DL (ref 6.4–8.3)
PROT UR QL STRIP.AUTO: ABNORMAL
RBC # BLD AUTO: 4.44 X10(6)/MCL (ref 4.7–6.1)
RBC #/AREA URNS AUTO: >100 /HPF
RBC UR QL AUTO: ABNORMAL
SODIUM SERPL-SCNC: 137 MMOL/L (ref 136–145)
SP GR UR STRIP.AUTO: 1.02 (ref 1–1.03)
SQUAMOUS #/AREA URNS LPF: ABNORMAL /HPF
TROPONIN I SERPL-MCNC: <0.01 NG/ML (ref 0–0.04)
TROPONIN I SERPL-MCNC: <0.01 NG/ML (ref 0–0.04)
UROBILINOGEN UR STRIP-ACNC: NORMAL
WBC # SPEC AUTO: 6.12 X10(3)/MCL (ref 4.5–11.5)
WBC #/AREA URNS AUTO: ABNORMAL /HPF

## 2024-03-20 PROCEDURE — 21400001 HC TELEMETRY ROOM

## 2024-03-20 PROCEDURE — 83735 ASSAY OF MAGNESIUM: CPT | Performed by: NURSE PRACTITIONER

## 2024-03-20 PROCEDURE — 85379 FIBRIN DEGRADATION QUANT: CPT | Performed by: STUDENT IN AN ORGANIZED HEALTH CARE EDUCATION/TRAINING PROGRAM

## 2024-03-20 PROCEDURE — 85025 COMPLETE CBC W/AUTO DIFF WBC: CPT | Performed by: NURSE PRACTITIONER

## 2024-03-20 PROCEDURE — 25500020 PHARM REV CODE 255: Performed by: STUDENT IN AN ORGANIZED HEALTH CARE EDUCATION/TRAINING PROGRAM

## 2024-03-20 PROCEDURE — 81001 URINALYSIS AUTO W/SCOPE: CPT | Performed by: STUDENT IN AN ORGANIZED HEALTH CARE EDUCATION/TRAINING PROGRAM

## 2024-03-20 PROCEDURE — 84484 ASSAY OF TROPONIN QUANT: CPT | Performed by: PHYSICIAN ASSISTANT

## 2024-03-20 PROCEDURE — 11000001 HC ACUTE MED/SURG PRIVATE ROOM

## 2024-03-20 PROCEDURE — 63600175 PHARM REV CODE 636 W HCPCS: Mod: JZ,JG | Performed by: NURSE PRACTITIONER

## 2024-03-20 PROCEDURE — 25000003 PHARM REV CODE 250: Performed by: STUDENT IN AN ORGANIZED HEALTH CARE EDUCATION/TRAINING PROGRAM

## 2024-03-20 PROCEDURE — 25000003 PHARM REV CODE 250: Performed by: PHYSICIAN ASSISTANT

## 2024-03-20 PROCEDURE — 25000003 PHARM REV CODE 250: Performed by: NURSE PRACTITIONER

## 2024-03-20 PROCEDURE — 84484 ASSAY OF TROPONIN QUANT: CPT | Performed by: STUDENT IN AN ORGANIZED HEALTH CARE EDUCATION/TRAINING PROGRAM

## 2024-03-20 PROCEDURE — 63600175 PHARM REV CODE 636 W HCPCS: Performed by: STUDENT IN AN ORGANIZED HEALTH CARE EDUCATION/TRAINING PROGRAM

## 2024-03-20 PROCEDURE — 25000242 PHARM REV CODE 250 ALT 637 W/ HCPCS: Performed by: STUDENT IN AN ORGANIZED HEALTH CARE EDUCATION/TRAINING PROGRAM

## 2024-03-20 PROCEDURE — 80053 COMPREHEN METABOLIC PANEL: CPT | Performed by: NURSE PRACTITIONER

## 2024-03-20 RX ORDER — DICLOFENAC SODIUM 75 MG/1
75 TABLET, DELAYED RELEASE ORAL 2 TIMES DAILY PRN
Status: ON HOLD | COMMUNITY
End: 2024-03-24 | Stop reason: HOSPADM

## 2024-03-20 RX ORDER — CLOPIDOGREL BISULFATE 75 MG/1
75 TABLET ORAL DAILY
Status: CANCELLED | OUTPATIENT
Start: 2024-03-20

## 2024-03-20 RX ORDER — AMLODIPINE BESYLATE 5 MG/1
5 TABLET ORAL DAILY
Status: DISCONTINUED | OUTPATIENT
Start: 2024-03-20 | End: 2024-03-24 | Stop reason: HOSPADM

## 2024-03-20 RX ORDER — ONDANSETRON HYDROCHLORIDE 2 MG/ML
4 INJECTION, SOLUTION INTRAVENOUS
Status: COMPLETED | OUTPATIENT
Start: 2024-03-20 | End: 2024-03-20

## 2024-03-20 RX ORDER — ACETAMINOPHEN 325 MG/1
650 TABLET ORAL EVERY 8 HOURS PRN
Status: DISCONTINUED | OUTPATIENT
Start: 2024-03-20 | End: 2024-03-24 | Stop reason: HOSPADM

## 2024-03-20 RX ORDER — ISOSORBIDE MONONITRATE 60 MG/1
60 TABLET, EXTENDED RELEASE ORAL DAILY
Status: DISCONTINUED | OUTPATIENT
Start: 2024-03-20 | End: 2024-03-24 | Stop reason: HOSPADM

## 2024-03-20 RX ORDER — MORPHINE SULFATE 4 MG/ML
4 INJECTION, SOLUTION INTRAMUSCULAR; INTRAVENOUS
Status: COMPLETED | OUTPATIENT
Start: 2024-03-20 | End: 2024-03-20

## 2024-03-20 RX ORDER — RANOLAZINE 500 MG/1
1000 TABLET, EXTENDED RELEASE ORAL 2 TIMES DAILY
Status: DISCONTINUED | OUTPATIENT
Start: 2024-03-20 | End: 2024-03-24 | Stop reason: HOSPADM

## 2024-03-20 RX ORDER — MORPHINE SULFATE 4 MG/ML
2 INJECTION, SOLUTION INTRAMUSCULAR; INTRAVENOUS EVERY 4 HOURS PRN
Status: DISCONTINUED | OUTPATIENT
Start: 2024-03-20 | End: 2024-03-24 | Stop reason: HOSPADM

## 2024-03-20 RX ORDER — CLOPIDOGREL BISULFATE 75 MG/1
75 TABLET ORAL DAILY
Status: DISCONTINUED | OUTPATIENT
Start: 2024-03-20 | End: 2024-03-20

## 2024-03-20 RX ORDER — AMOXICILLIN AND CLAVULANATE POTASSIUM 875; 125 MG/1; MG/1
1 TABLET, FILM COATED ORAL 2 TIMES DAILY
Status: ON HOLD | COMMUNITY
Start: 2024-03-19 | End: 2024-03-24 | Stop reason: HOSPADM

## 2024-03-20 RX ORDER — SODIUM CHLORIDE 9 MG/ML
INJECTION, SOLUTION INTRAVENOUS CONTINUOUS
Status: ACTIVE | OUTPATIENT
Start: 2024-03-20 | End: 2024-03-21

## 2024-03-20 RX ORDER — LISINOPRIL 20 MG/1
20 TABLET ORAL DAILY
Status: DISCONTINUED | OUTPATIENT
Start: 2024-03-20 | End: 2024-03-24 | Stop reason: HOSPADM

## 2024-03-20 RX ORDER — LIDOCAINE HYDROCHLORIDE 20 MG/ML
JELLY TOPICAL
Status: COMPLETED | OUTPATIENT
Start: 2024-03-20 | End: 2024-03-20

## 2024-03-20 RX ORDER — GLUCAGON 1 MG
1 KIT INJECTION
Status: DISCONTINUED | OUTPATIENT
Start: 2024-03-20 | End: 2024-03-24 | Stop reason: HOSPADM

## 2024-03-20 RX ORDER — ASPIRIN 81 MG/1
81 TABLET ORAL EVERY MORNING
Status: DISCONTINUED | OUTPATIENT
Start: 2024-03-20 | End: 2024-03-20

## 2024-03-20 RX ORDER — ONDANSETRON HYDROCHLORIDE 2 MG/ML
4 INJECTION, SOLUTION INTRAVENOUS EVERY 6 HOURS PRN
Status: DISCONTINUED | OUTPATIENT
Start: 2024-03-20 | End: 2024-03-24 | Stop reason: HOSPADM

## 2024-03-20 RX ORDER — IBUPROFEN 200 MG
24 TABLET ORAL
Status: DISCONTINUED | OUTPATIENT
Start: 2024-03-20 | End: 2024-03-24 | Stop reason: HOSPADM

## 2024-03-20 RX ORDER — IBUPROFEN 200 MG
16 TABLET ORAL
Status: DISCONTINUED | OUTPATIENT
Start: 2024-03-20 | End: 2024-03-24 | Stop reason: HOSPADM

## 2024-03-20 RX ORDER — ACETAMINOPHEN 325 MG/1
650 TABLET ORAL EVERY 4 HOURS PRN
Status: DISCONTINUED | OUTPATIENT
Start: 2024-03-20 | End: 2024-03-24 | Stop reason: HOSPADM

## 2024-03-20 RX ORDER — BUPROPION HYDROCHLORIDE 150 MG/1
150 TABLET, EXTENDED RELEASE ORAL DAILY
COMMUNITY

## 2024-03-20 RX ORDER — METOPROLOL TARTRATE 25 MG/1
12.5 TABLET ORAL 2 TIMES DAILY
Status: DISCONTINUED | OUTPATIENT
Start: 2024-03-20 | End: 2024-03-24 | Stop reason: HOSPADM

## 2024-03-20 RX ORDER — ATORVASTATIN CALCIUM 40 MG/1
80 TABLET, FILM COATED ORAL NIGHTLY
Status: DISCONTINUED | OUTPATIENT
Start: 2024-03-20 | End: 2024-03-24 | Stop reason: HOSPADM

## 2024-03-20 RX ORDER — NITROGLYCERIN 0.4 MG/1
0.4 TABLET SUBLINGUAL
Status: COMPLETED | OUTPATIENT
Start: 2024-03-20 | End: 2024-03-20

## 2024-03-20 RX ADMIN — LISINOPRIL 20 MG: 20 TABLET ORAL at 08:03

## 2024-03-20 RX ADMIN — ISOSORBIDE MONONITRATE 60 MG: 60 TABLET, EXTENDED RELEASE ORAL at 08:03

## 2024-03-20 RX ADMIN — ATORVASTATIN CALCIUM 80 MG: 40 TABLET, FILM COATED ORAL at 09:03

## 2024-03-20 RX ADMIN — MORPHINE SULFATE 2 MG: 4 INJECTION, SOLUTION INTRAMUSCULAR; INTRAVENOUS at 03:03

## 2024-03-20 RX ADMIN — CEFTRIAXONE SODIUM 1 G: 1 INJECTION, POWDER, FOR SOLUTION INTRAMUSCULAR; INTRAVENOUS at 06:03

## 2024-03-20 RX ADMIN — SODIUM CHLORIDE: 9 INJECTION, SOLUTION INTRAVENOUS at 09:03

## 2024-03-20 RX ADMIN — LIDOCAINE HYDROCHLORIDE 10 ML: 20 JELLY TOPICAL at 02:03

## 2024-03-20 RX ADMIN — MORPHINE SULFATE 4 MG: 4 INJECTION, SOLUTION INTRAMUSCULAR; INTRAVENOUS at 02:03

## 2024-03-20 RX ADMIN — MORPHINE SULFATE 4 MG: 4 INJECTION, SOLUTION INTRAMUSCULAR; INTRAVENOUS at 05:03

## 2024-03-20 RX ADMIN — ONDANSETRON 4 MG: 2 INJECTION INTRAMUSCULAR; INTRAVENOUS at 02:03

## 2024-03-20 RX ADMIN — NITROGLYCERIN 0.4 MG: 0.4 TABLET, ORALLY DISINTEGRATING SUBLINGUAL at 04:03

## 2024-03-20 RX ADMIN — SODIUM CHLORIDE: 9 INJECTION, SOLUTION INTRAVENOUS at 08:03

## 2024-03-20 RX ADMIN — RANOLAZINE 1000 MG: 500 TABLET, EXTENDED RELEASE ORAL at 09:03

## 2024-03-20 RX ADMIN — RANOLAZINE 1000 MG: 500 TABLET, EXTENDED RELEASE ORAL at 08:03

## 2024-03-20 RX ADMIN — IOHEXOL 100 ML: 350 INJECTION, SOLUTION INTRAVENOUS at 04:03

## 2024-03-20 RX ADMIN — METOPROLOL TARTRATE 12.5 MG: 25 TABLET, FILM COATED ORAL at 09:03

## 2024-03-20 RX ADMIN — MORPHINE SULFATE 2 MG: 4 INJECTION, SOLUTION INTRAMUSCULAR; INTRAVENOUS at 09:03

## 2024-03-20 RX ADMIN — SODIUM CHLORIDE, POTASSIUM CHLORIDE, SODIUM LACTATE AND CALCIUM CHLORIDE 1000 ML: 600; 310; 30; 20 INJECTION, SOLUTION INTRAVENOUS at 02:03

## 2024-03-20 NOTE — ED NOTES
3 way cath placed. Initial clot upon placement then tea-colored urine there after. No visual of clots actively draining. 400mL output. Bladder scanner post initial drainage still with approx 200mL. Starting to irrigate

## 2024-03-20 NOTE — PLAN OF CARE
Initial cm dc planning assessment conducted with wife Vanessa Reynoso due to pt resting. Pt is , 2 children, no living will or POA.    03/20/24 1345   Discharge Assessment   Assessment Type Discharge Planning Assessment   Confirmed/corrected address, phone number and insurance Yes   Confirmed Demographics Correct on Facesheet   Source of Information family  (grisel Reynoso)   When was your last doctors appointment?   (pcp Dr. Duffy)   Communicated EZEQUIEL with patient/caregiver Date not available/Unable to determine   People in Home alone   Do you expect to return to your current living situation? Yes   Do you have help at home or someone to help you manage your care at home? Yes   Who are your caregiver(s) and their phone number(s)? grisel Reynoso [phone]   Prior to hospitilization cognitive status: Unable to Assess   Current cognitive status: Unable to Assess   Walking or Climbing Stairs Difficulty yes   Walking or Climbing Stairs ambulation difficulty, requires equipment   Mobility Management quad and straight cane   Dressing/Bathing Difficulty yes   Dressing/Bathing bathing difficulty, requires equipment   Dressing/Bathing Management shower chair   Home Accessibility stairs to enter home   Number of Stairs, Main Entrance four   Home Layout Able to live on 1st floor   Equipment Currently Used at Home shower chair;cane, quad;cane, straight   Readmission within 30 days? No   Patient currently being followed by outpatient case management? No   Do you currently have service(s) that help you manage your care at home? No   Do you take prescription medications? Yes  (fills with [facility] [employer])   Do you have prescription coverage? Yes   Coverage [facility] Medicare   Do you have any problems affording any of your prescribed medications? No   Is the patient taking medications as prescribed? yes   Who is going to help you get home at discharge? wife   How do you get to doctors appointments? family or friend will  provide;car, drives self   Are you on dialysis? No   Do you take coumadin? No   Discharge Plan A Other  (TBD)   DME Needed Upon Discharge  other (see comments)  (TBD)   Discharge Plan discussed with: Patient   Transition of Care Barriers None

## 2024-03-20 NOTE — ED NOTES
"Pt arrived to ED 18 from [facility]. Assumed care of patient at this time. Pt c/o severe abdominal and suprapubic pain actively, unable to sit. Pt attempted to urinate but only able to produce blood clot. States minor hematuria since November "only streaking" but yesterday blood was significant with pain. Saw/called Dr. Corrales, placed on Augmentin. States came in tonight due to uncontrolled pain. Pt also states had some chest pain with SOB on exertion. Took SL Nitro x1 with immediate relief. Pt states had abnormal stress test last week and was told he has new blockages at 40%. Pt GCS 15 now, in pain and anxious, respirations regular, unlabored, abdomen distended, firm, tender to palpation with tenderness to suprapubic region, skin pink, warm, dry. Bladder scanner revealed 599mL x 3 scans. Dr. Mckee notified.  "

## 2024-03-20 NOTE — ED PROVIDER NOTES
Encounter Date: 3/19/2024       History     Chief Complaint   Patient presents with    Chest Pain     Intermittent Left anterior chest pressure with diaphoresis and mid back burning and sob started since this morning, took oxy in afternoon which gave relief. Sob worse with activity. Denies nausea and vomiting. Trace blood in urine for a while, today pt reports blood clots in urine     Alex Reynoso is a 54 y.o. male with a past medical history of HTN, DM, CAD who presents to the ED for abdominal pain and hematuria.  He also reports some chest pain and shortness of breath at his worse with exertion.  Patient has a significant history of coronary artery disease including multiple stents and CABG in the past.  Patient underwent a left heart catheterization on 03/14 that showed severe native disease and patent stents.  Denies any fevers or chills.         Review of patient's allergies indicates:  No Known Allergies  Past Medical History:   Diagnosis Date    Acid reflux     Arthritis     Atherosclerosis of native arteries of extremities with intermittent claudication, unspecified extremity     Back pain     CAD (coronary artery disease), native coronary artery     Coronary artery disease     Coronary artery disease involving native coronary artery of native heart, unspecified whether angina present     Depression     GERD (gastroesophageal reflux disease)     High cholesterol     HTN (hypertension)     Hyperglycemia     Obesity     Prostate cancer     Seasonal allergies     Shortness of breath     SOB (shortness of breath)     ST elevation (STEMI) myocardial infarction of unspecified site     Tobacco use      Past Surgical History:   Procedure Laterality Date    CHOLECYSTECTOMY      COLONOSCOPY      CORONARY ANGIOGRAPHY N/A 06/21/2023    Procedure: ANGIOGRAM, CORONARY ARTERY;  Surgeon: Raji Hughes MD;  Location: [facility] CATH LAB;  Service: Cardiology;  Laterality: N/A;    CORONARY ANGIOGRAPHY N/A 10/09/2023    Procedure:  ANGIOGRAM, CORONARY ARTERY;  Surgeon: Dawit Rodarte MD;  Location: [facility] CATH LAB;  Service: Cardiology;  Laterality: N/A;    CORONARY ANGIOGRAPHY Left 3/12/2024    Procedure: ANGIOGRAM, CORONARY ARTERY;  Surgeon: Raji Hughes MD;  Location: [facility] CATH LAB;  Service: Cardiology;  Laterality: Left;  +/- PCI    CORONARY ARTERY BYPASS GRAFT (CABG) N/A 06/27/2023    Procedure: CORONARY ARTERY BYPASS GRAFT (CABG);  Surgeon: Jhony Sharma MD;  Location: [facility] OR;  Service: Cardiovascular;  Laterality: N/A;  ECHO NOTIFIED    CORONARY BYPASS GRAFT ANGIOGRAPHY  3/12/2024    Procedure: Bypass graft study;  Surgeon: Raji Hughes MD;  Location: [facility] CATH LAB;  Service: Cardiology;;    ENDOSCOPIC HARVEST OF VEIN N/A 06/27/2023    Procedure: SURGICAL PROCUREMENT, VEIN, ENDOSCOPIC;  Surgeon: Jhony Sharma MD;  Location: [facility];  Service: Cardiovascular;  Laterality: N/A;    INSTANTANEOUS WAVE-FREE RATIO (IFR) N/A 3/12/2024    Procedure: Instantaneous Wave-Free Ratio (IFR);  Surgeon: Raji Hughes MD;  Location: [facility] CATH LAB;  Service: Cardiology;  Laterality: N/A;    INTRAVASCULAR ULTRASOUND, CORONARY N/A 10/09/2023    Procedure: Ultrasound-coronary;  Surgeon: Dawit Rodarte MD;  Location: [facility] CATH LAB;  Service: Cardiology;  Laterality: N/A;    LEFT HEART CATHETERIZATION Left 12/01/2022      Moderate noncritical multivessel CAD with stenosis up to 60%    LEFT HEART CATHETERIZATION Left 06/21/2023    Procedure: Left heart cath;  Surgeon: Raji Hughes MD;  Location: [facility] CATH LAB;  Service: Cardiology;  Laterality: Left;  LHC +/- PCI    LEFT HEART CATHETERIZATION Left 3/12/2024    Procedure: Left heart cath;  Surgeon: Raji Hughes MD;  Location: [facility] CATH LAB;  Service: Cardiology;  Laterality: Left;    LIPOMA RESECTION N/A 03/30/2023    Procedure: EXCISION, LIPOMA (excision of post neck lipoma);  Surgeon: Samuel Cruz MD;  Location: [facility] OR;  Service: General;  Laterality: N/A;  10 x 5 lipoma     PLATING OF STERNUM  N/A 06/27/2023    Procedure: PLATING, STERNAL;  Surgeon: Jhony Sharma MD;  Location: [facility] OR;  Service: Cardiovascular;  Laterality: N/A;    PROSTATECTOMY      STENT, DRUG ELUTING, SINGLE VESSEL, CORONARY  10/09/2023    Procedure: Stent, Drug Eluting, Single Vessel, Coronary;  Surgeon: Dawit Rodarte MD;  Location: [facility] CATH LAB;  Service: Cardiology;;    VENTRICULOGRAM, LEFT  06/21/2023    Procedure: Ventriculogram, Left;  Surgeon: Raji Hughes MD;  Location: [facility] CATH LAB;  Service: Cardiology;;     Family History   Problem Relation Age of Onset    Hypertension Mother     Diabetes Mother     Prostate cancer Father      Social History     Tobacco Use    Smoking status: Former     Average packs/day: 0.5 packs/day for 34.2 years (16.7 ttl pk-yrs)     Types: Cigarettes     Start date: 1990    Smokeless tobacco: Never   Substance Use Topics    Alcohol use: Not Currently     Comment: socially    Drug use: Not Currently     Review of Systems   Respiratory:  Positive for chest tightness and shortness of breath.    Gastrointestinal:  Positive for abdominal pain.   Genitourinary:  Positive for dysuria and hematuria.       Physical Exam     Initial Vitals [03/19/24 2139]   BP Pulse Resp Temp SpO2   (!) 154/80 69 20 98.1 °F (36.7 °C) 98 %      MAP       --         Physical Exam    Nursing note and vitals reviewed.  Constitutional: He appears well-developed.   Eyes: EOM are normal. Pupils are equal, round, and reactive to light.   Cardiovascular:  Normal rate and regular rhythm.           No murmur heard.  Pulmonary/Chest: Breath sounds normal. No respiratory distress. He has no wheezes. He has no rales.   Abdominal: Abdomen is soft. He exhibits distension. There is abdominal tenderness.     Neurological: He is alert and oriented to person, place, and time.   Skin: Skin is warm. Capillary refill takes less than 2 seconds. No rash noted.   Well-healed surgical scar         ED Course   Procedures  Labs Reviewed   COMPREHENSIVE  METABOLIC PANEL - Abnormal; Notable for the following components:       Result Value    Glucose Level 102 (*)     Creatinine 1.25 (*)     All other components within normal limits   CBC WITH DIFFERENTIAL - Abnormal; Notable for the following components:    RBC 4.50 (*)     Hgb 13.8 (*)     Hct 40.6 (*)     All other components within normal limits   URINALYSIS, REFLEX TO URINE CULTURE - Abnormal; Notable for the following components:    Protein, UA 1+ (*)     Blood, UA 3+ (*)     RBC, UA >100 (*)     All other components within normal limits   B-TYPE NATRIURETIC PEPTIDE - Normal   TROPONIN I - Normal   PROTIME-INR - Normal   D DIMER, QUANTITATIVE - Normal   TROPONIN I - Normal   CK - Normal   CBC W/ AUTO DIFFERENTIAL    Narrative:     The following orders were created for panel order CBC auto differential.  Procedure                               Abnormality         Status                     ---------                               -----------         ------                     CBC with Differential[[id number]]       Abnormal            Final result                 Please view results for these tests on the individual orders.          Imaging Results              CT Abdomen Pelvis With IV Contrast NO Oral Contrast (Preliminary result)  Result time 03/20/24 04:36:04      Preliminary result by Chuy Brown Jr., MD (03/20/24 04:36:04)                   Narrative:    START OF REPORT:  Technique: CT of the abdomen and pelvis was performed with axial images as well as sagittal and coronal reconstruction images with intravenous contrast but without oral contrast.    Comparison: Comparison is with study dated 2021-09-29 19:22:13.    Clinical History: Abdominal pain, acute, nonlocalized; Gross hematuria with clots.    Dosage Information: Automated Exposure Control was utilized.    Findings:  Lines and Tubes: None.  Thorax:  Lungs: There is minimal nonspecific dependent change at the lung bases. No focal infiltrate  or consolidation is seen.  Pleura: No effusions or thickening are seen. No pneumothorax is seen in the visualized lung bases.  Heart: The heart size is within normal limits.  Abdomen:  Abdominal Wall: No abdominal wall pathology is seen.  Liver: Mild fatty infiltration of the liver is present. The liver otherwise appears unremarkable.  Biliary System: No intrahepatic or extrahepatic biliary duct dilatation is seen.  Gallbladder: Surgical clips are seen in the gallbladder fossa consistent with prior cholecystectomy.  Pancreas: The pancreas appears unremarkable.  Spleen: The spleen appears unremarkable.  Adrenals: The adrenal glands appear unremarkable.  Kidneys: The kidneys appear unremarkable with no stones cysts masses or hydronephrosis.  Aorta: There is mild calcification of the abdominal aorta and its branches.  IVC: Unremarkable.  Bowel:  Esophagus: The visualized distal esophagus appears unremarkable.  Stomach: The stomach appears unremarkable.  Duodenum: Unremarkable appearing duodenum.  Small Bowel: The small bowel appears unremarkable.  Colon: Nondistended.  Appendix: The appendix appears unremarkable and is partially seen on Image 125, Series 2.  Peritoneum: No intraperitoneal free air or ascites is seen.    Pelvis:  Bladder: The bladder is nondistended and cannot be definitively evaluated. A coto catheter is in place. There are a few intraluminal air foci within the urinary bladder, presumed due to catheterization.  Male:  Prostate gland: The prostate gland appears unremarkable.    Bony structures:  Dorsal Spine: There is subtle multilevel spondylosis of the visualized dorsal spine.  Bony Pelvis: The visualized bony structures of the pelvis appear unremarkable.      Impression:  1. No acute intraabdominal or pelvic solid organ or bowel pathology identified. Details and findings as discussed.                                         X-Ray Chest 1 View (Final result)  Result time 03/19/24 21:59:32    Procedure changed from X-Ray Chest PA And Lateral     Final result by Parish Kelley MD (03/19/24 21:59:32)                   Impression:      No acute cardiopulmonary process.      Electronically signed by: Parish Kelley  Date:    03/19/2024  Time:    21:59               Narrative:    EXAMINATION:  XR CHEST 1 VIEW    CLINICAL HISTORY:  Chest Pain;    TECHNIQUE:  Single view of the chest    COMPARISON:  01/29/2024    FINDINGS:  No focal opacification, pleural effusion, or pneumothorax.    The cardiomediastinal silhouette is within normal limits with postsurgical changes of median sternotomy.    No acute osseous abnormality.                                       Medications   cefTRIAXone (Rocephin) 1 g in dextrose 5 % in water (D5W) 100 mL IVPB (MB+) (has no administration in time range)   lactated ringers bolus 1,000 mL (0 mLs Intravenous Stopped 3/20/24 0405)   morphine injection 4 mg (4 mg Intravenous Given 3/20/24 0248)   ondansetron injection 4 mg (4 mg Intravenous Given 3/20/24 0248)   LIDOcaine HCl 2% urojet (10 mLs Mucous Membrane Given 3/20/24 0249)   iohexoL (OMNIPAQUE 350) injection 100 mL (100 mLs Intravenous Given 3/20/24 0409)   nitroGLYCERIN SL tablet 0.4 mg (0.4 mg Sublingual Given 3/20/24 0449)   morphine injection 4 mg (4 mg Intravenous Given 3/20/24 0516)     Medical Decision Making  Problems Addressed:  Abdominal pain, unspecified abdominal location: acute illness or injury  Chest pain: acute illness or injury  Hematuria, unspecified type: acute illness or injury    Amount and/or Complexity of Data Reviewed  Labs: ordered.  Radiology: ordered. Decision-making details documented in ED Course.    Risk  Prescription drug management.  Decision regarding hospitalization.      Differential diagnosis (includes but is not limited to):   ACS, arrhythmia, PE, urinary retention, mass, SHARIF, UTI, malignancy, progression of patient's known disease    MDM Narrative  54-year-old male presents for  evaluation of chest pain, abdominal pain, nausea, shortness of breath, and hematuria with inability to urinate.  Indwelling Fleming placed and continuous bladder irrigation initiated.  Labs are pending.  EKG reviewed.  Chest x-ray reviewed.  CT of the abdomen pelvis pending.  Pain and nausea control as needed.  Aspirin given prior to arrival.  Continue pulse oximetry and telemetry monitoring.    Update:  Labs reviewed.  Imaging reviewed.  D-dimer negative.  CT reviewed, no acute pathology noted.  Troponin negative x2.  However, despite multiple rounds of pain medications and antiemetics, patient continues with abdominal pain as well as nausea and inability to tolerate p.o..  Given the patient's high risk status as well as his inability to tolerate p.o., will proceed with hospital admission for IV fluids, antiemetics, pain control, and evaluation by Urology.  Urology consulted, appreciate recommendations.  Given the patient's extensive CAD history as well as his continued episodes of chest pain, will consult cardiology since he was very well known to the cis service, cardiology consulted, appreciate recommendations.  Case discussed with hospitalist, will admit.  Patient and his family member who is present at the bedside agree with the plan of care and have no questions at this time.    Dispo: Admit    My independent radiology interpretation: as above  Point of care US (independently performed and interpreted):   Decision rules/clinical scoring:     Sepsis Perfusion Assessment:     Amount and/or Complexity of Data Reviewed  Independent historian: none   Summary of history:   External data reviewed: notes from previous ED visits, notes from clinic visits, and notes from outside facilities (through [facility])  Summary of data reviewed: Prior records reviewed  Risk and benefits of testing: discussed   Labs: ordered and reviewed  Radiology: ordered and independent interpretation performed (see above or ED  course)  ECG/medicine tests: ordered and independent interpretation performed (see above or ED course)  Discussion of management or test interpretation with external provider(s): discussed with hospitalist physician and discussed with urology, cardiology consultant   Summary of discussion: as above    Risk  Shared decision making  Narcotic pain administration  Parenteral medications requiring close monitoring  Decision regarding hospitalization      Critical Care  none    Data Reviewed/Counseling: I have personally reviewed the patient's vital signs, nursing notes, and other relevant tests, information, and imaging. I had a detailed discussion regarding the historical points, exam findings, and any diagnostic results supporting the discharge diagnosis. I personally performed the history, PE, MDM and procedures as documented above and agree with the scribe's documentation.    Portions of this note were dictated using voice recognition software. Although it was reviewed for accuracy, some inherent voice recognition errors may have occurred and may be present in this document.             ED Course as of 03/20/24 0557   Wed Mar 20, 2024   0231 X-Ray Chest 1 View  Independently visualized/reviewed by me during the ED visit.  - No acute lobar consolidation, no PTX [MC]   0231 EKG independently interpreted by me.  EKG: SR @ 65, no STEMI, 1st degree AVB (), Qtc 407 [MC]      ED Course User Index  [MC] Harley Mckee MD                           Clinical Impression:  Final diagnoses:  [R07.9] Chest pain  [R10.9] Abdominal pain, unspecified abdominal location (Primary)  [R31.9] Hematuria, unspecified type          ED Disposition Condition    Admit Stable                Harley Mckee MD  03/20/24 0557

## 2024-03-20 NOTE — CONSULTS
Alex Reynoso 1970  [id number]  3/20/2024    CONSULTING PHYSICIAN: Dr. Harley Mckee    REASON FOR CONSULTATION: hematuria, persistent abdominal pain    HPI:  The patient is a 54-year-old male with a past medical history of hypertension, diabetes, CAD who presented to the emergency room last night with complaints of abdominal pain, hematuria as well as chest pain and shortness of breath with exertion.  Does have a history of multiple stents and CABG in the past, on Plavix at home.  He recently underwent heart catheterization on 03/14 which revealed severe native disease with patent stents.  He has been hemodynamically stable and afebrile since arrival. He was having some difficulty urinating on arrival, three-way Coto was inserted and he was initiated on CBI.  Initially he had some return of clot, urine has been clear since. Lab work on arrival reveals WBC 8.17, H&H 13.8/40.6, BUN and creatinine 17.8/1.25; UA with clear light yellow urine, 3+ blood, negative nitrites, negative leukocyte esterase, greater than 100 RBC, no WBC, trace bacteria.  CT abdomen and pelvis reveals normal appearance of kidneys and bladder without stones, hydronephrosis; coto in bladder without any clot noted.    The patient has a history of prostate cancer s/p prostatectomy and radiation about 2-3 years ago. He is known to Dr. Demarcus Shaver. HE has been having some intermittent hematuria since November, typically occurs at the end of his stream. He states he was recently seen in the office and was planned to have CT for further evaluation which had not yet been performed.     His abdominal pain is resolved since three way placed and initiated on CBI. He has not had any further gross hematuria or clot since initial irrigation after coto placed. He reports some lower extremity swelling, cardiology is consulted. Family at bedside.     Past Medical History:   Diagnosis Date    Acid reflux     Arthritis     Atherosclerosis of native  arteries of extremities with intermittent claudication, unspecified extremity     Back pain     CAD (coronary artery disease), native coronary artery     Coronary artery disease     Coronary artery disease involving native coronary artery of native heart, unspecified whether angina present     Depression     GERD (gastroesophageal reflux disease)     High cholesterol     HTN (hypertension)     Hyperglycemia     Obesity     Prostate cancer     Seasonal allergies     Shortness of breath     SOB (shortness of breath)     ST elevation (STEMI) myocardial infarction of unspecified site     Tobacco use      Past Surgical History:   Procedure Laterality Date    CHOLECYSTECTOMY      COLONOSCOPY      CORONARY ANGIOGRAPHY N/A 06/21/2023    Procedure: ANGIOGRAM, CORONARY ARTERY;  Surgeon: Raji Hughes MD;  Location: [facility] CATH LAB;  Service: Cardiology;  Laterality: N/A;    CORONARY ANGIOGRAPHY N/A 10/09/2023    Procedure: ANGIOGRAM, CORONARY ARTERY;  Surgeon: Dawit Rodarte MD;  Location: [facility] CATH LAB;  Service: Cardiology;  Laterality: N/A;    CORONARY ANGIOGRAPHY Left 3/12/2024    Procedure: ANGIOGRAM, CORONARY ARTERY;  Surgeon: Raji Hughes MD;  Location: [facility] CATH LAB;  Service: Cardiology;  Laterality: Left;  +/- PCI    CORONARY ARTERY BYPASS GRAFT (CABG) N/A 06/27/2023    Procedure: CORONARY ARTERY BYPASS GRAFT (CABG);  Surgeon: Jhony Sharma MD;  Location: [facility];  Service: Cardiovascular;  Laterality: N/A;  ECHO NOTIFIED    CORONARY BYPASS GRAFT ANGIOGRAPHY  3/12/2024    Procedure: Bypass graft study;  Surgeon: Raji Hughes MD;  Location: [facility] CATH LAB;  Service: Cardiology;;    ENDOSCOPIC HARVEST OF VEIN N/A 06/27/2023    Procedure: SURGICAL PROCUREMENT, VEIN, ENDOSCOPIC;  Surgeon: Jhony Sharma MD;  Location: [facility];  Service: Cardiovascular;  Laterality: N/A;    INSTANTANEOUS WAVE-FREE RATIO (IFR) N/A 3/12/2024    Procedure: Instantaneous Wave-Free Ratio (IFR);  Surgeon: Raji Hughes MD;  Location: [facility]  CATH LAB;  Service: Cardiology;  Laterality: N/A;    INTRAVASCULAR ULTRASOUND, CORONARY N/A 10/09/2023    Procedure: Ultrasound-coronary;  Surgeon: Dawit Rodarte MD;  Location: [facility] CATH LAB;  Service: Cardiology;  Laterality: N/A;    LEFT HEART CATHETERIZATION Left 12/01/2022      Moderate noncritical multivessel CAD with stenosis up to 60%    LEFT HEART CATHETERIZATION Left 06/21/2023    Procedure: Left heart cath;  Surgeon: Rjai Hughes MD;  Location: [facility] CATH LAB;  Service: Cardiology;  Laterality: Left;  LHC +/- PCI    LEFT HEART CATHETERIZATION Left 3/12/2024    Procedure: Left heart cath;  Surgeon: Raji Hughes MD;  Location: [facility] CATH LAB;  Service: Cardiology;  Laterality: Left;    LIPOMA RESECTION N/A 03/30/2023    Procedure: EXCISION, LIPOMA (excision of post neck lipoma);  Surgeon: Samuel Cruz MD;  Location: [facility] OR;  Service: General;  Laterality: N/A;  10 x 5 lipoma     PLATING OF STERNUM N/A 06/27/2023    Procedure: PLATING, STERNAL;  Surgeon: Jhony Sharma MD;  Location: [facility] OR;  Service: Cardiovascular;  Laterality: N/A;    PROSTATECTOMY      STENT, DRUG ELUTING, SINGLE VESSEL, CORONARY  10/09/2023    Procedure: Stent, Drug Eluting, Single Vessel, Coronary;  Surgeon: Dawit Rodarte MD;  Location: [facility] CATH LAB;  Service: Cardiology;;    VENTRICULOGRAM, LEFT  06/21/2023    Procedure: Ventriculogram, Left;  Surgeon: Raji Hughes MD;  Location: [facility] CATH LAB;  Service: Cardiology;;     Family History   Problem Relation Age of Onset    Hypertension Mother     Diabetes Mother     Prostate cancer Father      Social History     Tobacco Use    Smoking status: Former     Average packs/day: 0.5 packs/day for 34.2 years (16.7 ttl pk-yrs)     Types: Cigarettes     Start date: 1990    Smokeless tobacco: Never   Substance Use Topics    Alcohol use: Not Currently     Comment: socially    Drug use: Not Currently     Current Facility-Administered Medications   Medication Dose Route Frequency Provider  Last Rate Last Admin    0.9%  NaCl infusion   Intravenous Continuous Gerardo Schmidt PA-C 75 mL/hr at 03/20/24 0854 New Bag at 03/20/24 0854    acetaminophen tablet 650 mg  650 mg Oral Q8H PRN Gerardo Schmidt PA-C        acetaminophen tablet 650 mg  650 mg Oral Q4H PRN Gerardo Schmidt PA-C        atorvastatin tablet 80 mg  80 mg Oral QHS Natalya Renee NP        dextrose 10% bolus 125 mL 125 mL  12.5 g Intravenous PRN Gerardo Schmidt PA-C        dextrose 10% bolus 250 mL 250 mL  25 g Intravenous PRN Gerardo Schmidt PA-C        glucagon (human recombinant) injection 1 mg  1 mg Intramuscular PRN Gerardo Schmidt PA-C        glucose chewable tablet 16 g  16 g Oral PRN Gerardo Schmidt PA-C        glucose chewable tablet 24 g  24 g Oral PRN Gerardo Schmidt PA-C        isosorbide mononitrate 24 hr tablet 60 mg  60 mg Oral Daily Natalya Renee NP   60 mg at 03/20/24 0854    lisinopriL tablet 20 mg  20 mg Oral Daily Natalya Renee NP   20 mg at 03/20/24 0854    metoprolol tartrate (LOPRESSOR) split tablet 12.5 mg  12.5 mg Oral BID Natalya Renee NP        morphine injection 2 mg  2 mg Intravenous Q4H PRN Harper Olson, CALLUM        ondansetron injection 4 mg  4 mg Intravenous Q6H PRN Harper Olson, CALLUM        ranolazine 12 hr tablet 1,000 mg  1,000 mg Oral BID Natalya Renee NP   1,000 mg at 03/20/24 0854     Current Outpatient Medications   Medication Sig Dispense Refill    albuterol (VENTOLIN HFA) 90 mcg/actuation inhaler Inhale 2 puffs into the lungs every 6 (six) hours as needed for Wheezing. Rescue      amoxicillin-clavulanate 875-125mg (AUGMENTIN) 875-125 mg per tablet Take 1 tablet by mouth 2 (two) times daily.      aspirin (ECOTRIN) 81 MG EC tablet Take 81 mg by mouth every morning. Stopped 3/26/23      atorvastatin (LIPITOR) 80 MG tablet Take 80 mg by mouth every evening.      buPROPion (WELLBUTRIN SR) 150 MG TBSR 12 hr tablet Take 150 mg by mouth once daily.      clopidogreL (PLAVIX) 75 mg tablet Take  1 tablet (75 mg total) by mouth once daily. 30 tablet 11    diclofenac (VOLTAREN) 75 MG EC tablet Take 75 mg by mouth 2 (two) times daily as needed.      isosorbide mononitrate (IMDUR) 60 MG 24 hr tablet Take 1 tablet (60 mg total) by mouth once daily. (Patient taking differently: Take 30 mg by mouth once daily.) 30 tablet 3    lisinopriL (PRINIVIL,ZESTRIL) 20 MG tablet Take 20 mg by mouth once daily.      metoprolol tartrate (LOPRESSOR) 25 MG tablet Take 0.5 tablets (12.5 mg total) by mouth 2 (two) times daily. (Patient taking differently: Take 25 mg by mouth 2 (two) times daily.) 30 tablet 1    nitroGLYCERIN (NITROSTAT) 0.4 MG SL tablet Place under the tongue.      oxyCODONE-acetaminophen (PERCOCET)  mg per tablet Take 1 tablet by mouth every 4 (four) hours as needed for Pain.      pregabalin (LYRICA) 100 MG capsule Take 100 mg by mouth 2 (two) times daily as needed.      ranolazine 1,000 mg PSRG Take 1,000 mg by mouth 2 (two) times daily.       Facility-Administered Medications Ordered in Other Encounters   Medication Dose Route Frequency Provider Last Rate Last Admin    fentaNYL 50 mcg/mL injection 25 mcg  25 mcg Intravenous Q5 Min PRN Noah Dumont DO        HYDROmorphone (PF) injection 0.2 mg  0.2 mg Intravenous Q5 Min PRN Noah Dumont DO        lactated ringers infusion   Intravenous Continuous Noah Dumont DO   Stopped at 03/30/23 1158    sodium chloride 0.9% flush 10 mL  10 mL Intravenous PRN Shaheen Felder MD        sodium chloride 0.9% flush 10 mL  10 mL Intravenous PRN Noah Dumont DO         Review of patient's allergies indicates:  No Known Allergies    ROS: 12 point review of systems negative other than the HPI    PHYSICAL EXAM:  Vitals:    03/20/24 0714 03/20/24 0722 03/20/24 0832 03/20/24 0901   BP:   113/77 105/72   Pulse:   (!) 56 (!) 57   Resp: 15   14   Temp:  97.4 °F (36.3 °C)     TempSrc:  Oral     SpO2:   99% 97%   Weight:       Height:           Intake/Output  Summary (Last 24 hours) at 3/20/2024 0921  Last data filed at 3/20/2024 0726  Gross per 24 hour   Intake 1000 ml   Output 300 ml   Net 700 ml       GEN: WN/WD NAD  HEENT: normocephalic, atraumatic, PERRLA, EOMI, OP clear, nares patent  CV: RRR  RESP: Even and unlabored  ABD: soft, NTND  : clear urine without clot with CBI in progress at slow rate.   EXT: no C/C/E  NEURO: no focal deficits, MAEW, AAOx4    LABS:  Recent Results (from the past 24 hour(s))   EKG 12-lead    Collection Time: 03/19/24  9:34 PM   Result Value Ref Range    QRS Duration 88 ms    [facility] QTC Calculation 407 ms   Comprehensive metabolic panel    Collection Time: 03/19/24 10:17 PM   Result Value Ref Range    Sodium Level 138 136 - 145 mmol/L    Potassium Level 4.3 3.5 - 5.1 mmol/L    Chloride 103 98 - 107 mmol/L    Carbon Dioxide 25 22 - 29 mmol/L    Glucose Level 102 (H) 74 - 100 mg/dL    Blood Urea Nitrogen 17.8 8.4 - 25.7 mg/dL    Creatinine 1.25 (H) 0.73 - 1.18 mg/dL    Calcium Level Total 9.6 8.4 - 10.2 mg/dL    Protein Total 7.1 6.4 - 8.3 gm/dL    Albumin Level 3.7 3.5 - 5.0 g/dL    Globulin 3.4 2.4 - 3.5 gm/dL    Albumin/Globulin Ratio 1.1 1.1 - 2.0 ratio    Bilirubin Total 0.2 <=1.5 mg/dL    Alkaline Phosphatase 92 40 - 150 unit/L    Alanine Aminotransferase 25 0 - 55 unit/L    Aspartate Aminotransferase 15 5 - 34 unit/L    eGFR >60 mls/min/1.73/m2   Brain natriuretic peptide    Collection Time: 03/19/24 10:17 PM   Result Value Ref Range    Natriuretic Peptide 34.9 <=100.0 pg/mL   Troponin I    Collection Time: 03/19/24 10:17 PM   Result Value Ref Range    Troponin-I <0.010 0.000 - 0.045 ng/mL   Protime-INR    Collection Time: 03/19/24 10:17 PM   Result Value Ref Range    PT 13.4 12.5 - 14.5 seconds    INR 1.0 <=1.3   CBC with Differential    Collection Time: 03/19/24 10:17 PM   Result Value Ref Range    WBC 8.17 4.50 - 11.50 x10(3)/mcL    RBC 4.50 (L) 4.70 - 6.10 x10(6)/mcL    Hgb 13.8 (L) 14.0 - 18.0 g/dL    Hct 40.6 (L) 42.0 - 52.0 %     MCV 90.2 80.0 - 94.0 fL    MCH 30.7 27.0 - 31.0 pg    MCHC 34.0 33.0 - 36.0 g/dL    RDW 12.4 11.5 - 17.0 %    Platelet 235 130 - 400 x10(3)/mcL    MPV 9.2 7.4 - 10.4 fL    Neut % 59.9 %    Lymph % 24.7 %    Mono % 8.2 %    Eos % 6.5 %    Basophil % 0.5 %    Lymph # 2.02 0.6 - 4.6 x10(3)/mcL    Neut # 4.89 2.1 - 9.2 x10(3)/mcL    Mono # 0.67 0.1 - 1.3 x10(3)/mcL    Eos # 0.53 0 - 0.9 x10(3)/mcL    Baso # 0.04 <=0.2 x10(3)/mcL    IG# 0.02 0 - 0.04 x10(3)/mcL    IG% 0.2 %    NRBC% 0.0 %   CPK    Collection Time: 03/19/24 10:17 PM   Result Value Ref Range    Creatine Kinase 113 30 - 200 U/L   D dimer, quantitative    Collection Time: 03/20/24  2:43 AM   Result Value Ref Range    D-Dimer 0.48 0.00 - 0.50 ug/mL FEU   Urinalysis, Reflex to Urine Culture    Collection Time: 03/20/24  3:26 AM    Specimen: Urine, Catheterized   Result Value Ref Range    Color, UA Light-Yellow Yellow, Light-Yellow, Colorless, Straw, Dark-Yellow    Appearance, UA Clear Clear    Specific Gravity, UA 1.016 1.005 - 1.030    pH, UA 6.0 5.0 - 8.5    Protein, UA 1+ (A) Negative    Glucose, UA Normal Negative, Normal    Ketones, UA Negative Negative    Blood, UA 3+ (A) Negative    Bilirubin, UA Negative Negative    Urobilinogen, UA Normal 0.2, 1.0, Normal    Nitrites, UA Negative Negative    Leukocyte Esterase, UA Negative Negative    WBC, UA None Seen None Seen, 0-2, 3-5, 0-5 /HPF    Bacteria, UA Trace None Seen, Trace /HPF    Squamous Epithelial Cells, UA None Seen None Seen /HPF    RBC, UA >100 (A) None Seen, 0-2, 3-5, 0-5 /HPF   Troponin I    Collection Time: 03/20/24  3:47 AM   Result Value Ref Range    Troponin-I <0.010 0.000 - 0.045 ng/mL       IMAGING:  CT Abdomen Pelvis With IV Contrast NO Oral Contrast [[id number]] Resulted: 03/20/24 0706   Order Status: Completed Updated: 03/20/24 0709   Narrative:     EXAMINATION:  CT ABDOMEN PELVIS WITH IV CONTRAST    CLINICAL HISTORY:  Abdominal pain, acute, nonlocalized;Gross hematuria with  clots;    TECHNIQUE:  Helical acquisition through the abdomen and pelvis with IV contrast.  Three plane reconstructions were provided for review. DLP 1727 mGycm. Automatic exposure control, adjustment of mA/kV or iterative reconstruction technique was used to reduce radiation.    COMPARISON:  29 September 2021    FINDINGS:  The limited imaged lung bases are clear.    Question hepatic steatosis.  The gallbladder is surgically absent.  No significant abnormality of the spleen, pancreas or adrenals.  No hydronephrosis or suspicious renal lesion.    No bowel obstruction. No significant inflammatory changes of the bowel.  Normal appendix.  No free air.    There is a Coto in the urinary bladder.  No pelvic free fluid.  The abdominal aorta is normal in caliber.  Moderate atherosclerotic disease.    Moderate degenerative change of the spine.   Impression:       No acute abdominopelvic findings.    No significant discrepancy with the preliminary report.      Electronically signed by: Eugene Saldivar  Date: 03/20/2024  Time: 07:06       ASSESSMENT:  -gross hematuria with clot retention - three way coto placed and initiated on CBI. CT normal, UA without evidence of infection  -h/o prostate cancer s/p prostatectomy and radiation 2-3 years ago  -CAD, h/o CABG on plavix at home - currently on hold    PLAN:  -Wean CBI as tolerated. Titrate to maintain relatively clear urine  -No indication for urgent urologic intervention, may need cystoscopy if hematuria does not clear with irrigation.   -Discussed with patient and his family at bedside.       Ricarda Pacheco NP

## 2024-03-20 NOTE — H&P
[facility] Medicine History & Physical Examination       Patient Name: Alex Reynoso  MRN: [id number]  Patient Class: IP- Inpatient   Admission Date: 3/19/2024   Admitting Physician: Aaron Raya MD  Length of Stay: 0  Attending Physician: Lavelle Tineo MD   Primary Care Provider: Glen Duffy NP  Face-to-Face encounter date: 03/20/2024  Code Status: Full code   Chief Complaint: Chest Pain (Intermittent Left anterior chest pressure with diaphoresis and mid back burning and sob started since this morning, took oxy in afternoon which gave relief. Sob worse with activity. Denies nausea and vomiting. Trace blood in urine for a while, today pt reports blood clots in urine)        Patient information was obtained from patient, patient's family, past medical records and ER records.     HISTORY OF PRESENT ILLNESS:   Alex Reynoso is a 54 y.o. male with a past medical history of essential hypertension, hyperlipidemia, CAD, GERD, prostate cancer, arthritis, anxiety, and depression presented to [facility] on 3/19/2024 for chest pain.  Patient reported left-sided chest pain with shortness of breath and diaphoresis that began yesterday morning. Patient reported intermittent sticking pain to left chest with radiation to back worse with movement.  Patient also reported intermittent abdominal pain and hematuria since November 2023.  Patient endorsed use of Plavix.  Patient reported yesterday he began to have urinary retention with increased abdominal pain.  Patient reported passing blood clots in the urine with gross hematuria following.  Patient denied fever, chills, dysuria, nausea, and vomiting.  Patient reported chest pain improved in ED after nitroglycerin and morphine.  Initial vital signs in ED were /80, pulse 69, respirations 20, temperature 36.7° C, and SpO2 98% on room air.  Labs revealed WBC 8.17, RBC 4.50, hemoglobin 13.8, hematocrit 40.6, BUN 17.8, creatinine 1.25,  glucose 102, BNP 34.9, , and undetectable troponin.  UA revealed 1+ protein, 3+ blood, and greater than 100 red blood cells.  EKG revealed normal sinus rhythm with heart rate of 65 beats per minute and first-degree AV block.  Chest x-ray revealed no acute cardiopulmonary process.  CT abdomen and pelvis with IV contrast revealed no acute abdominopelvic findings.  Indwelling Fleming was placed in ED with CBI.  Patient was given nitroglycerin tablet 0.5 mg, IV morphine 4 mg x 2, IV Zofran 4 mg, LR, and IV Rocephin 1 g in ED. Cardiology and neurology services were consulted. Patient was admitted to hospital medicine service for further medical management.     PAST MEDICAL HISTORY:   Essential hypertension  Hyperlipidemia  CAD  GERD   Prostate cancer  Arthritis  Anxiety  Depression    PAST SURGICAL HISTORY:     Past Surgical History:   Procedure Laterality Date    CHOLECYSTECTOMY      COLONOSCOPY      CORONARY ANGIOGRAPHY N/A 06/21/2023    Procedure: ANGIOGRAM, CORONARY ARTERY;  Surgeon: Raji Hughes MD;  Location: [facility] CATH LAB;  Service: Cardiology;  Laterality: N/A;    CORONARY ANGIOGRAPHY N/A 10/09/2023    Procedure: ANGIOGRAM, CORONARY ARTERY;  Surgeon: Dawit Rodarte MD;  Location: [facility] CATH LAB;  Service: Cardiology;  Laterality: N/A;    CORONARY ANGIOGRAPHY Left 3/12/2024    Procedure: ANGIOGRAM, CORONARY ARTERY;  Surgeon: Raji Hughes MD;  Location: [facility] CATH LAB;  Service: Cardiology;  Laterality: Left;  +/- PCI    CORONARY ARTERY BYPASS GRAFT (CABG) N/A 06/27/2023    Procedure: CORONARY ARTERY BYPASS GRAFT (CABG);  Surgeon: Jhony Sharma MD;  Location: [facility] OR;  Service: Cardiovascular;  Laterality: N/A;  ECHO NOTIFIED    CORONARY BYPASS GRAFT ANGIOGRAPHY  3/12/2024    Procedure: Bypass graft study;  Surgeon: Raji Hughes MD;  Location: [facility] CATH LAB;  Service: Cardiology;;    ENDOSCOPIC HARVEST OF VEIN N/A 06/27/2023    Procedure: SURGICAL PROCUREMENT, VEIN, ENDOSCOPIC;  Surgeon: Jhony Sharma MD;   Location: [facility] OR;  Service: Cardiovascular;  Laterality: N/A;    INSTANTANEOUS WAVE-FREE RATIO (IFR) N/A 3/12/2024    Procedure: Instantaneous Wave-Free Ratio (IFR);  Surgeon: Raji Hughes MD;  Location: [facility] CATH LAB;  Service: Cardiology;  Laterality: N/A;    INTRAVASCULAR ULTRASOUND, CORONARY N/A 10/09/2023    Procedure: Ultrasound-coronary;  Surgeon: Dawit Rodarte MD;  Location: [facility] CATH LAB;  Service: Cardiology;  Laterality: N/A;    LEFT HEART CATHETERIZATION Left 12/01/2022      Moderate noncritical multivessel CAD with stenosis up to 60%    LEFT HEART CATHETERIZATION Left 06/21/2023    Procedure: Left heart cath;  Surgeon: Raji Hughes MD;  Location: [facility] CATH LAB;  Service: Cardiology;  Laterality: Left;  LHC +/- PCI    LEFT HEART CATHETERIZATION Left 3/12/2024    Procedure: Left heart cath;  Surgeon: Raji Hughes MD;  Location: [facility] CATH LAB;  Service: Cardiology;  Laterality: Left;    LIPOMA RESECTION N/A 03/30/2023    Procedure: EXCISION, LIPOMA (excision of post neck lipoma);  Surgeon: Samuel Cruz MD;  Location: [facility] OR;  Service: General;  Laterality: N/A;  10 x 5 lipoma     PLATING OF STERNUM N/A 06/27/2023    Procedure: PLATING, STERNAL;  Surgeon: Jhony Sharma MD;  Location: [facility] OR;  Service: Cardiovascular;  Laterality: N/A;    PROSTATECTOMY      STENT, DRUG ELUTING, SINGLE VESSEL, CORONARY  10/09/2023    Procedure: Stent, Drug Eluting, Single Vessel, Coronary;  Surgeon: Dawit Rodarte MD;  Location: [facility] CATH LAB;  Service: Cardiology;;    VENTRICULOGRAM, LEFT  06/21/2023    Procedure: Ventriculogram, Left;  Surgeon: Raji Hughes MD;  Location: [facility] CATH LAB;  Service: Cardiology;;       ALLERGIES:   Patient has no known allergies.    FAMILY HISTORY:   Father: Hypertension, hyperlipidemia, MI, and cancer   Mother:  Hypertension, hyperlipemia, MI, and DM 2  Brother:  Hypertension and hyperlipidemia   Sister: Cancer    SOCIAL HISTORY:   Occasional alcohol use  Denies illicit drug  and tobacco use    HOME MEDICATIONS:     Prior to Admission medications    Medication Sig Start Date End Date Taking? Authorizing Provider   albuterol (VENTOLIN HFA) 90 mcg/actuation inhaler Inhale 2 puffs into the lungs every 6 (six) hours as needed for Wheezing. Rescue   Yes Provider, Historical   amoxicillin-clavulanate 875-125mg (AUGMENTIN) 875-125 mg per tablet Take 1 tablet by mouth 2 (two) times daily. 3/19/24  Yes Provider, Historical   aspirin (ECOTRIN) 81 MG EC tablet Take 81 mg by mouth every morning. Stopped 3/26/23   Yes Provider, Historical   atorvastatin (LIPITOR) 80 MG tablet Take 80 mg by mouth every evening. 9/28/22  Yes Provider, Historical   buPROPion (WELLBUTRIN SR) 150 MG TBSR 12 hr tablet Take 150 mg by mouth once daily.   Yes Provider, Historical   clopidogreL (PLAVIX) 75 mg tablet Take 1 tablet (75 mg total) by mouth once daily. 10/11/23  Yes Natalya Renee NP   diclofenac (VOLTAREN) 75 MG EC tablet Take 75 mg by mouth 2 (two) times daily as needed.   Yes Provider, Historical   isosorbide mononitrate (IMDUR) 60 MG 24 hr tablet Take 1 tablet (60 mg total) by mouth once daily.  Patient taking differently: Take 30 mg by mouth once daily. 10/12/23 10/11/24 Yes Natalya Renee NP   lisinopriL (PRINIVIL,ZESTRIL) 20 MG tablet Take 20 mg by mouth once daily. 10/13/23  Yes Provider, Historical   metoprolol tartrate (LOPRESSOR) 25 MG tablet Take 0.5 tablets (12.5 mg total) by mouth 2 (two) times daily.  Patient taking differently: Take 25 mg by mouth 2 (two) times daily. 7/1/23  Yes Jaswinder Jauregui PA-C   nitroGLYCERIN (NITROSTAT) 0.4 MG SL tablet Place under the tongue. 6/7/23  Yes Provider, Historical   oxyCODONE-acetaminophen (PERCOCET)  mg per tablet Take 1 tablet by mouth every 4 (four) hours as needed for Pain.   Yes Provider, Historical   pregabalin (LYRICA) 100 MG capsule Take 100 mg by mouth 2 (two) times daily as needed. 3/1/23  Yes Provider, Historical   ranolazine 1,000 mg PSRG Take  1,000 mg by mouth 2 (two) times daily.   Yes Provider, Historical       REVIEW OF SYSTEMS:   Except as documented, all other systems reviewed and negative     PHYSICAL EXAM:     VITAL SIGNS: 24 HRS MIN & MAX LAST   Temp  Min: 97.4 °F (36.3 °C)  Max: 98.1 °F (36.7 °C) 97.4 °F (36.3 °C)   BP  Min: 110/86  Max: 159/93 121/68   Pulse  Min: 56  Max: 69  (!) 58   Resp  Min: 12  Max: 21 15   SpO2  Min: 95 %  Max: 99 % 97 %       General appearance: Obese male in no apparent distress.  HEENNT: Atraumatic head.   Lungs: Decreased breath sounds at bilateral bases   Heart: Regular rate and rhythm.    Abdomen: Soft, non-distended, non-tender. Bowel sounds are normal. Peach colored urine noted in coto   Extremities: 1+ edema to bilateral lower extremities   Skin: No Rash. Warm and dry.   Neuro: Awake, alert, and oriented. Motor and sensory exams grossly intact.   Psych/mental status: Appropriate mood and affect. Responds appropriately to questions.     LABS AND IMAGING:     Recent Labs   Lab 03/19/24  2217   WBC 8.17   RBC 4.50*   HGB 13.8*   HCT 40.6*   MCV 90.2   MCH 30.7   MCHC 34.0   RDW 12.4      MPV 9.2       Recent Labs   Lab 03/19/24  2217      K 4.3   CO2 25   BUN 17.8   CREATININE 1.25*   CALCIUM 9.6   ALBUMIN 3.7   ALKPHOS 92   ALT 25   AST 15   BILITOT 0.2       Microbiology Results (last 7 days)       ** No results found for the last 168 hours. **             CT Abdomen Pelvis With IV Contrast NO Oral Contrast  Narrative: EXAMINATION:  CT ABDOMEN PELVIS WITH IV CONTRAST    CLINICAL HISTORY:  Abdominal pain, acute, nonlocalized;Gross hematuria with clots;    TECHNIQUE:  Helical acquisition through the abdomen and pelvis with IV contrast.  Three plane reconstructions were provided for review. DLP 1727 mGycm. Automatic exposure control, adjustment of mA/kV or iterative reconstruction technique was used to reduce radiation.    COMPARISON:  29 September 2021    FINDINGS:  The limited imaged lung bases are  clear.    Question hepatic steatosis.  The gallbladder is surgically absent.  No significant abnormality of the spleen, pancreas or adrenals.  No hydronephrosis or suspicious renal lesion.    No bowel obstruction. No significant inflammatory changes of the bowel.  Normal appendix.  No free air.    There is a Coto in the urinary bladder.  No pelvic free fluid.  The abdominal aorta is normal in caliber.  Moderate atherosclerotic disease.    Moderate degenerative change of the spine.  Impression: No acute abdominopelvic findings.    No significant discrepancy with the preliminary report.    Electronically signed by: Eugene Saldivar  Date:    03/20/2024  Time:    07:06        ASSESSMENT & PLAN:   Assessment:  Acute chest pain  Hematuria  Urinary retention  SHARIF  History of essential hypertension, hyperlipidemia, CAD, GERD, prostate cancer, arthritis, anxiety, and depression     Plan:  Cardiac monitoring  Trend troponin  Cardiology consulted, appreciate recommendations  Continue coto with CBI  NS @ 75 ml/hr x 24 hours  Urology consulted, appreciate recommendations   Continue appropriate home medications once med rec updated   Labs in a.m.    VTE Prophylaxis: SCDs    __________________________________________________________________________  INPATIENT LIST OF MEDICATIONS     Scheduled Meds:  Continuous Infusions:  PRN Meds:.morphine, ondansetron      Gerardo KAM PA-C, have reviewed and discussed the case with Dr. Lavelle Tineo MD   Please see the following addendum for further assessment and plan from there attending MD.    03/20/2024    ________________________________________________________________________________    MD Addendum:  I,  assumed care of this patient today at ---am/pm  For the patient encounter, I performed the substantive portion of the visit, I reviewed the PA documentation, treatment plan, and medical decision making.  I had face to face time with this patient     A. History:    B. Physical exam:    C.  Medical decision making:    Discharge Planning and Disposition: No mobility needs. Ambulating well. Good social support system.   Anticipated discharge    All diagnosis and differential diagnosis have been reviewed; assessment and plan has been documented; I have personally reviewed the labs and test results that are presently available; I have reviewed the patients medication list; I have reviewed the consulting providers response and recommendations. I have reviewed or attempted to review medical records based upon their availability.    All of the patient and family questions have been addressed and answered. Patient's is agreeable to the above stated plan. I will continue to monitor closely and make adjustments to medical management as needed.      03/20/2024

## 2024-03-20 NOTE — CONSULTS
Inpatient consult to Cardiology  Consult performed by: Natalya Renee NP  Consult ordered by: Harley Mckee MD  Reason for consult: Chest Pain Hx of CABG, CAD, & PCI              [facility]  Emergency Dept    Cardiology  Consult Note    Patient Name: Alex Reynoso  MRN: [id number]  Admission Date: 3/19/2024  Hospital Length of Stay: 0 days  Code Status: Prior   Attending Provider: Lavelle Tineo MD   Consulting Provider: Natalya Renee NP  Primary Care Physician: Glen Duffy NP  Principal Problem:<principal problem not specified>    Patient information was obtained from patient, past medical records, and ER records.     Subjective:     Reason for Consult: Chest Pain Hx of CABG, CAD, & PCI     HPI: 54-year-old male that is known to [facility]/Dr. Hughes with PMHx of CAD s/p PCI & CABG x3, HTN, HLD, Anexity, prostate cancer, GERD. He presented to [facility] ED with complaints of abdominal pain & hematuria. He also reported assocated SOB and chest pain that is worse with exertion. Of note he had a CABG x3 (6.27.23) and a recent LHC (3.12.24) that revealed, RCA 40%, Patent LIMA to LAD & SVG to LAD, known occluded VG to RCA no intervention was done. ED course: VS on admit HR 69, /80, RR 20, SpO2 98%, Temp 98.1F. Lab work revealed: WBC 8.17, H/H 13.8/40.6, , INR 1.0, D-Dimer 0.48, Na 138, K 4.3, BUN/Cr 17.8/1.25, Ca 9.6, BNP 34.9, Trop <0.010 x2. EKG shows NSR with no acute ST changes. [facility] was consulted for Chest pain with a Hx of CAD, CABG, & PCI.       PMH: CAD, HTN, HLD, Claudication, Arthritis, Depression, Anxiety, Prostate Cancer, GERD, Hyperglycemia, Morbid Obesity  PSH: LHC, CABG, Cholecystectomy, Colonoscopy, Lipoma Excision, Prostatectomy, Ventriculogram    Family History: Father - HTN, Cancer, MI, HLD; Mother - HTN, DM II, MI, HLD; Brother - HTN, HLD; Sister - Cancer   Social History: Current smoker (1-2 per week; previously 1/2 PPD). Reports occasionally alcohol use. Denies Illicit drug  use.      Previous Cardiac Diagnostics:   [facility] (3.12.24):  Severe native vessel coronary artery disease  40% eccentric stenosis in the proximal RCA with widely patent mid to distal RCA stents.  IFR of 0.98  Patent LIMA to LAD  Patent SVG to OM  Known occluded vein graft to RCA  Mildly elevated LVEDP  Plan:   Aggressive risk factor modification  Aggressive up titration of antianginal therapy    [facility]/PET (2.6.24):  The study quality is good.   This is probably an abnormal perfusion study. Study is consistent with ischemia.   This scan is suggestive of low risk for future cardiovascular events.   Small reversible perfusion abnormality of mild intensity in the inferior region. SSS 2 SRS 0.   The left ventricular cavity is noted to be normal on the stress studies. The stress left ventricular ejection fraction was calculated to be 71% and left ventricular global function is normal. The rest left ventricular cavity is noted to be normal. The rest left ventricular ejection fraction was calculated to be 64% and rest left ventricular global function is normal.   When compared to the resting ejection fraction (64%), the stress ejection fraction (71%) has increased.   There was a rise in myocardial blood flow between rest and stress.  Global myocardial blood flow reserve was 3.84.  Normal global coronary flow reserve is suggestive of low coronary event risk.    Abdominal US (1.22.24):  The study quality is below average.  No AAA visualized.     ECHO (10.9.23):  Left Ventricle: The left ventricle is normal in size. Normal wall thickness. Normal wall motion. There is normal systolic function with a visually estimated ejection fraction of 55 - 60%. Right Ventricle: Normal right ventricular cavity size. Wall thickness is normal. Right ventricle wall motion  is normal. Systolic function is normal. Aortic Valve: The aortic valve is a trileaflet valve.  IVC/SVC: Normal venous pressure at 3 mmHg. Pericardium: There is a trivial  effusion. No indication of cardiac tamponade.     [facility] (10.9.23):  Left main:  Large caliber switched bifurcates into circumflex and LAD. Minimal. LAD:  Moderate-to-large caliber vessel with proximal severe 90% stenosis. Circumflex:  Moderate-to-large caliber vessel with severe diffuse disease scattered throughout.  Proximally just prior to a bifurcation of the OM and circumflex there is a greater than 70% lesion.  RCA:  99% midvessel stenosis.  Diffuse disease scattered throughout.  Lima-lad:  Is widely patent.  Anastomotic site 40-50% noted from tenting  SVG-OM1:  Widely patent. SVG-PDA:  Unable to locate SVG to PDA (noted to be small and diseased on surgical run report). LVEDP:  16 mmHg  Aortic valve:  No stenosis.   Intervention:  The Mid RCA lesion was 99% stenosed with 0% stenosis post-intervention. The Dist RCA lesion was 90% stenosed with 0% stenosis post-intervention. Dist RCA lesion: A STENT SYNERGY XD 2.5X32MM stent was successfully placed.     [facility] (9.28.23):  The study quality is good.  This is an abnormal perfusion study. Study is consistent with ischemia. This scan is suggestive of moderate risk for future cardiovascular events. Medium reversible perfusion abnormality of moderate intensity in the inferior region. SSS 4 SRS 0. No change with prone imaging is noted. The left ventricular cavity is noted to be normal on the stress study. The left ventricular ejection fraction was calculated to be 50% and left ventricular global function is normal.     CABG X 3 (6.27.23):  LIMA to LAD  RSVG to OM  RSVG to PDA  *vein conduit was exteremly poor with very small caliber and many branches in both legs  Placing of sternal defect with the [place] sternal plating system and stainless steel wires      [facility] (6.21.23):  Dominance: right. Left main: no obstructive disease with <10% epicardial stenosis. Left anterior descending artery:  Ostial/proximal 80% stenosis.  Diffuse luminal irregularities in the mid to distal  LAD.  Diagonal branch is free of any obstructive disease.  Distal LAD is noted to fill the right PDA. Circumflex artery:  70-80% disease in the proximal obtuse marginal 1 branch which is a medium to large caliber vessel. Right coronary artery:  95% mid RCA lesion.  The RPDA appears to be subtotally occluded with competitive flow noted.  Collaterals noted from the distal LAD that fills the RPDA.  Left ventriculography:  EF 65%  Hemodynamics:  LVEDP = 13     [facility] (11.8.22):  The study quality is average. The left ventricle is normal in size. Global left ventricular systolic function is normal. The left ventricular ejection fraction is 60%. Concentric left ventricular hypertrophy is present. It is mild. Insufficient TR to calculate pulmonary artery pressure.     Calcium Score (11.3.22):  Total calcium score 875.3 compatible with high coronary heart disease risk.         Review of patient's allergies indicates:  No Known Allergies  Current Facility-Administered Medications on File Prior to Encounter   Medication    fentaNYL 50 mcg/mL injection 25 mcg    HYDROmorphone (PF) injection 0.2 mg    lactated ringers infusion    sodium chloride 0.9% flush 10 mL    sodium chloride 0.9% flush 10 mL     Current Outpatient Medications on File Prior to Encounter   Medication Sig    albuterol (VENTOLIN HFA) 90 mcg/actuation inhaler Inhale 2 puffs into the lungs every 6 (six) hours as needed for Wheezing. Rescue    amoxicillin-clavulanate 875-125mg (AUGMENTIN) 875-125 mg per tablet Take 1 tablet by mouth 2 (two) times daily.    aspirin (ECOTRIN) 81 MG EC tablet Take 81 mg by mouth every morning. Stopped 3/26/23    atorvastatin (LIPITOR) 80 MG tablet Take 80 mg by mouth every evening.    buPROPion (WELLBUTRIN SR) 150 MG TBSR 12 hr tablet Take 150 mg by mouth once daily.    clopidogreL (PLAVIX) 75 mg tablet Take 1 tablet (75 mg total) by mouth once daily.    diclofenac (VOLTAREN) 75 MG EC tablet Take 75 mg by mouth 2 (two) times daily as  needed.    isosorbide mononitrate (IMDUR) 60 MG 24 hr tablet Take 1 tablet (60 mg total) by mouth once daily. (Patient taking differently: Take 30 mg by mouth once daily.)    lisinopriL (PRINIVIL,ZESTRIL) 20 MG tablet Take 20 mg by mouth once daily.    metoprolol tartrate (LOPRESSOR) 25 MG tablet Take 0.5 tablets (12.5 mg total) by mouth 2 (two) times daily. (Patient taking differently: Take 25 mg by mouth 2 (two) times daily.)    nitroGLYCERIN (NITROSTAT) 0.4 MG SL tablet Place under the tongue.    oxyCODONE-acetaminophen (PERCOCET)  mg per tablet Take 1 tablet by mouth every 4 (four) hours as needed for Pain.    pregabalin (LYRICA) 100 MG capsule Take 100 mg by mouth 2 (two) times daily as needed.    ranolazine 1,000 mg PSRG Take 1,000 mg by mouth 2 (two) times daily.         Review of Systems   Constitutional:  Negative for chills and fatigue.   Respiratory:  Negative for chest tightness and shortness of breath.    Cardiovascular:  Negative for chest pain and palpitations.   Gastrointestinal:  Negative for abdominal pain, nausea and vomiting.   Musculoskeletal: Negative.    Skin: Negative.    Neurological: Negative.    Psychiatric/Behavioral: Negative.         Objective:     Vital Signs (Most Recent):  Temp: 97.4 °F (36.3 °C) (03/20/24 0722)  Pulse: (!) 58 (03/20/24 0659)  Resp: 15 (03/20/24 0714)  BP: 121/68 (03/20/24 0659)  SpO2: 97 % (03/20/24 0659) Vital Signs (24h Range):  Temp:  [97.4 °F (36.3 °C)-98.1 °F (36.7 °C)] 97.4 °F (36.3 °C)  Pulse:  [56-69] 58  Resp:  [12-21] 15  SpO2:  [95 %-99 %] 97 %  BP: (110-159)/(63-93) 121/68   Weight: 121.1 kg (267 lb)  Body mass index is 43.09 kg/m².  SpO2: 97 %       Intake/Output Summary (Last 24 hours) at 3/20/2024 0804  Last data filed at 3/20/2024 0726  Gross per 24 hour   Intake 1000 ml   Output 300 ml   Net 700 ml     Lines/Drains/Airways       Drain  Duration                  Urethral Catheter 03/20/24 0308 Triple-lumen 22 Fr. <1 day              Peripheral  Intravenous Line  Duration                  Peripheral IV - Single Lumen 03/20/24 0245 18 G Anterior;Distal;Left Upper Arm <1 day                  Significant Labs:  Recent Results (from the past 72 hour(s))   EKG 12-lead    Collection Time: 03/19/24  9:34 PM   Result Value Ref Range    QRS Duration 88 ms    [facility] QTC Calculation 407 ms   Comprehensive metabolic panel    Collection Time: 03/19/24 10:17 PM   Result Value Ref Range    Sodium Level 138 136 - 145 mmol/L    Potassium Level 4.3 3.5 - 5.1 mmol/L    Chloride 103 98 - 107 mmol/L    Carbon Dioxide 25 22 - 29 mmol/L    Glucose Level 102 (H) 74 - 100 mg/dL    Blood Urea Nitrogen 17.8 8.4 - 25.7 mg/dL    Creatinine 1.25 (H) 0.73 - 1.18 mg/dL    Calcium Level Total 9.6 8.4 - 10.2 mg/dL    Protein Total 7.1 6.4 - 8.3 gm/dL    Albumin Level 3.7 3.5 - 5.0 g/dL    Globulin 3.4 2.4 - 3.5 gm/dL    Albumin/Globulin Ratio 1.1 1.1 - 2.0 ratio    Bilirubin Total 0.2 <=1.5 mg/dL    Alkaline Phosphatase 92 40 - 150 unit/L    Alanine Aminotransferase 25 0 - 55 unit/L    Aspartate Aminotransferase 15 5 - 34 unit/L    eGFR >60 mls/min/1.73/m2   Brain natriuretic peptide    Collection Time: 03/19/24 10:17 PM   Result Value Ref Range    Natriuretic Peptide 34.9 <=100.0 pg/mL   Troponin I    Collection Time: 03/19/24 10:17 PM   Result Value Ref Range    Troponin-I <0.010 0.000 - 0.045 ng/mL   Protime-INR    Collection Time: 03/19/24 10:17 PM   Result Value Ref Range    PT 13.4 12.5 - 14.5 seconds    INR 1.0 <=1.3   CBC with Differential    Collection Time: 03/19/24 10:17 PM   Result Value Ref Range    WBC 8.17 4.50 - 11.50 x10(3)/mcL    RBC 4.50 (L) 4.70 - 6.10 x10(6)/mcL    Hgb 13.8 (L) 14.0 - 18.0 g/dL    Hct 40.6 (L) 42.0 - 52.0 %    MCV 90.2 80.0 - 94.0 fL    MCH 30.7 27.0 - 31.0 pg    MCHC 34.0 33.0 - 36.0 g/dL    RDW 12.4 11.5 - 17.0 %    Platelet 235 130 - 400 x10(3)/mcL    MPV 9.2 7.4 - 10.4 fL    Neut % 59.9 %    Lymph % 24.7 %    Mono % 8.2 %    Eos % 6.5 %    Basophil %  0.5 %    Lymph # 2.02 0.6 - 4.6 x10(3)/mcL    Neut # 4.89 2.1 - 9.2 x10(3)/mcL    Mono # 0.67 0.1 - 1.3 x10(3)/mcL    Eos # 0.53 0 - 0.9 x10(3)/mcL    Baso # 0.04 <=0.2 x10(3)/mcL    IG# 0.02 0 - 0.04 x10(3)/mcL    IG% 0.2 %    NRBC% 0.0 %   CPK    Collection Time: 03/19/24 10:17 PM   Result Value Ref Range    Creatine Kinase 113 30 - 200 U/L   D dimer, quantitative    Collection Time: 03/20/24  2:43 AM   Result Value Ref Range    D-Dimer 0.48 0.00 - 0.50 ug/mL FEU   Urinalysis, Reflex to Urine Culture    Collection Time: 03/20/24  3:26 AM    Specimen: Urine, Catheterized   Result Value Ref Range    Color, UA Light-Yellow Yellow, Light-Yellow, Colorless, Straw, Dark-Yellow    Appearance, UA Clear Clear    Specific Gravity, UA 1.016 1.005 - 1.030    pH, UA 6.0 5.0 - 8.5    Protein, UA 1+ (A) Negative    Glucose, UA Normal Negative, Normal    Ketones, UA Negative Negative    Blood, UA 3+ (A) Negative    Bilirubin, UA Negative Negative    Urobilinogen, UA Normal 0.2, 1.0, Normal    Nitrites, UA Negative Negative    Leukocyte Esterase, UA Negative Negative    WBC, UA None Seen None Seen, 0-2, 3-5, 0-5 /HPF    Bacteria, UA Trace None Seen, Trace /HPF    Squamous Epithelial Cells, UA None Seen None Seen /HPF    RBC, UA >100 (A) None Seen, 0-2, 3-5, 0-5 /HPF   Troponin I    Collection Time: 03/20/24  3:47 AM   Result Value Ref Range    Troponin-I <0.010 0.000 - 0.045 ng/mL     Significant Imaging:  Imaging Results              CT Abdomen Pelvis With IV Contrast NO Oral Contrast (Final result)  Result time 03/20/24 07:06:41      Final result by Eugene Saldivar MD (03/20/24 07:06:41)                   Impression:      No acute abdominopelvic findings.    No significant discrepancy with the preliminary report.      Electronically signed by: Eugene Saldivar  Date:    03/20/2024  Time:    07:06               Narrative:    EXAMINATION:  CT ABDOMEN PELVIS WITH IV CONTRAST    CLINICAL HISTORY:  Abdominal pain, acute,  nonlocalized;Gross hematuria with clots;    TECHNIQUE:  Helical acquisition through the abdomen and pelvis with IV contrast.  Three plane reconstructions were provided for review. DLP 1727 mGycm. Automatic exposure control, adjustment of mA/kV or iterative reconstruction technique was used to reduce radiation.    COMPARISON:  29 September 2021    FINDINGS:  The limited imaged lung bases are clear.    Question hepatic steatosis.  The gallbladder is surgically absent.  No significant abnormality of the spleen, pancreas or adrenals.  No hydronephrosis or suspicious renal lesion.    No bowel obstruction. No significant inflammatory changes of the bowel.  Normal appendix.  No free air.    There is a Fleming in the urinary bladder.  No pelvic free fluid.  The abdominal aorta is normal in caliber.  Moderate atherosclerotic disease.    Moderate degenerative change of the spine.                        Preliminary result by Chuy Brown Jr., MD (03/20/24 04:36:04)                   Impression:    1. No acute intraabdominal or pelvic solid organ or bowel pathology identified. Details and findings as discussed.               Narrative:    START OF REPORT:  Technique: CT of the abdomen and pelvis was performed with axial images as well as sagittal and coronal reconstruction images with intravenous contrast but without oral contrast.    Comparison: Comparison is with study dated 2021-09-29 19:22:13.    Clinical History: Abdominal pain, acute, nonlocalized; Gross hematuria with clots.    Dosage Information: Automated Exposure Control was utilized.    Findings:  Lines and Tubes: None.  Thorax:  Lungs: There is minimal nonspecific dependent change at the lung bases. No focal infiltrate or consolidation is seen.  Pleura: No effusions or thickening are seen. No pneumothorax is seen in the visualized lung bases.  Heart: The heart size is within normal limits.  Abdomen:  Abdominal Wall: No abdominal wall pathology is  seen.  Liver: Mild fatty infiltration of the liver is present. The liver otherwise appears unremarkable.  Biliary System: No intrahepatic or extrahepatic biliary duct dilatation is seen.  Gallbladder: Surgical clips are seen in the gallbladder fossa consistent with prior cholecystectomy.  Pancreas: The pancreas appears unremarkable.  Spleen: The spleen appears unremarkable.  Adrenals: The adrenal glands appear unremarkable.  Kidneys: The kidneys appear unremarkable with no stones cysts masses or hydronephrosis.  Aorta: There is mild calcification of the abdominal aorta and its branches.  IVC: Unremarkable.  Bowel:  Esophagus: The visualized distal esophagus appears unremarkable.  Stomach: The stomach appears unremarkable.  Duodenum: Unremarkable appearing duodenum.  Small Bowel: The small bowel appears unremarkable.  Colon: Nondistended.  Appendix: The appendix appears unremarkable and is partially seen on Image 125, Series 2.  Peritoneum: No intraperitoneal free air or ascites is seen.    Pelvis:  Bladder: The bladder is nondistended and cannot be definitively evaluated. A coto catheter is in place. There are a few intraluminal air foci within the urinary bladder, presumed due to catheterization.  Male:  Prostate gland: The prostate gland appears unremarkable.    Bony structures:  Dorsal Spine: There is subtle multilevel spondylosis of the visualized dorsal spine.  Bony Pelvis: The visualized bony structures of the pelvis appear unremarkable.                                         X-Ray Chest 1 View (Final result)  Result time 03/19/24 21:59:32   Procedure changed from X-Ray Chest PA And Lateral     Final result by Parish Kelley MD (03/19/24 21:59:32)                   Impression:      No acute cardiopulmonary process.      Electronically signed by: Parish Kelley  Date:    03/19/2024  Time:    21:59               Narrative:    EXAMINATION:  XR CHEST 1 VIEW    CLINICAL HISTORY:  Chest  Pain;    TECHNIQUE:  Single view of the chest    COMPARISON:  01/29/2024    FINDINGS:  No focal opacification, pleural effusion, or pneumothorax.    The cardiomediastinal silhouette is within normal limits with postsurgical changes of median sternotomy.    No acute osseous abnormality.                                    EKG:       Telemetry:  NSR    Physical Exam  Constitutional:       Appearance: Normal appearance.   HENT:      Head: Normocephalic.      Nose: Nose normal.      Mouth/Throat:      Mouth: Mucous membranes are moist.   Cardiovascular:      Rate and Rhythm: Normal rate and regular rhythm.      Pulses: Normal pulses.      Heart sounds: Normal heart sounds. No murmur heard.  Pulmonary:      Effort: No respiratory distress.      Breath sounds: Normal breath sounds.   Abdominal:      General: Abdomen is flat. There is no distension.      Palpations: Abdomen is soft.   Skin:     General: Skin is warm.   Neurological:      Mental Status: He is alert and oriented to person, place, and time.   Psychiatric:         Mood and Affect: Mood normal.         Behavior: Behavior normal.         Judgment: Judgment normal.       Home Medications:   Current Facility-Administered Medications on File Prior to Encounter   Medication Dose Route Frequency Provider Last Rate Last Admin    fentaNYL 50 mcg/mL injection 25 mcg  25 mcg Intravenous Q5 Min PRN Noah Dumont DO        HYDROmorphone (PF) injection 0.2 mg  0.2 mg Intravenous Q5 Min PRN Noah Dumont DO        lactated ringers infusion   Intravenous Continuous Noah Dumont DO   Stopped at 03/30/23 1158    sodium chloride 0.9% flush 10 mL  10 mL Intravenous PRN Shaheen Felder MD        sodium chloride 0.9% flush 10 mL  10 mL Intravenous PRN Noah Dumont DO         Current Outpatient Medications on File Prior to Encounter   Medication Sig Dispense Refill    albuterol (VENTOLIN HFA) 90 mcg/actuation inhaler Inhale 2 puffs into the lungs every 6 (six)  hours as needed for Wheezing. Rescue      amoxicillin-clavulanate 875-125mg (AUGMENTIN) 875-125 mg per tablet Take 1 tablet by mouth 2 (two) times daily.      aspirin (ECOTRIN) 81 MG EC tablet Take 81 mg by mouth every morning. Stopped 3/26/23      atorvastatin (LIPITOR) 80 MG tablet Take 80 mg by mouth every evening.      buPROPion (WELLBUTRIN SR) 150 MG TBSR 12 hr tablet Take 150 mg by mouth once daily.      clopidogreL (PLAVIX) 75 mg tablet Take 1 tablet (75 mg total) by mouth once daily. 30 tablet 11    diclofenac (VOLTAREN) 75 MG EC tablet Take 75 mg by mouth 2 (two) times daily as needed.      isosorbide mononitrate (IMDUR) 60 MG 24 hr tablet Take 1 tablet (60 mg total) by mouth once daily. (Patient taking differently: Take 30 mg by mouth once daily.) 30 tablet 3    lisinopriL (PRINIVIL,ZESTRIL) 20 MG tablet Take 20 mg by mouth once daily.      metoprolol tartrate (LOPRESSOR) 25 MG tablet Take 0.5 tablets (12.5 mg total) by mouth 2 (two) times daily. (Patient taking differently: Take 25 mg by mouth 2 (two) times daily.) 30 tablet 1    nitroGLYCERIN (NITROSTAT) 0.4 MG SL tablet Place under the tongue.      oxyCODONE-acetaminophen (PERCOCET)  mg per tablet Take 1 tablet by mouth every 4 (four) hours as needed for Pain.      pregabalin (LYRICA) 100 MG capsule Take 100 mg by mouth 2 (two) times daily as needed.      ranolazine 1,000 mg PSRG Take 1,000 mg by mouth 2 (two) times daily.       Current Inpatient Medications:    Current Facility-Administered Medications:     morphine injection 2 mg, 2 mg, Intravenous, Q4H PRN, Harper Olson FNP    ondansetron injection 4 mg, 4 mg, Intravenous, Q6H PRN, Harper Olson FNP    Current Outpatient Medications:     albuterol (VENTOLIN HFA) 90 mcg/actuation inhaler, Inhale 2 puffs into the lungs every 6 (six) hours as needed for Wheezing. Rescue, Disp: , Rfl:     amoxicillin-clavulanate 875-125mg (AUGMENTIN) 875-125 mg per tablet, Take 1 tablet by mouth 2 (two)  times daily., Disp: , Rfl:     aspirin (ECOTRIN) 81 MG EC tablet, Take 81 mg by mouth every morning. Stopped 3/26/23, Disp: , Rfl:     atorvastatin (LIPITOR) 80 MG tablet, Take 80 mg by mouth every evening., Disp: , Rfl:     buPROPion (WELLBUTRIN SR) 150 MG TBSR 12 hr tablet, Take 150 mg by mouth once daily., Disp: , Rfl:     clopidogreL (PLAVIX) 75 mg tablet, Take 1 tablet (75 mg total) by mouth once daily., Disp: 30 tablet, Rfl: 11    diclofenac (VOLTAREN) 75 MG EC tablet, Take 75 mg by mouth 2 (two) times daily as needed., Disp: , Rfl:     isosorbide mononitrate (IMDUR) 60 MG 24 hr tablet, Take 1 tablet (60 mg total) by mouth once daily. (Patient taking differently: Take 30 mg by mouth once daily.), Disp: 30 tablet, Rfl: 3    lisinopriL (PRINIVIL,ZESTRIL) 20 MG tablet, Take 20 mg by mouth once daily., Disp: , Rfl:     metoprolol tartrate (LOPRESSOR) 25 MG tablet, Take 0.5 tablets (12.5 mg total) by mouth 2 (two) times daily. (Patient taking differently: Take 25 mg by mouth 2 (two) times daily.), Disp: 30 tablet, Rfl: 1    nitroGLYCERIN (NITROSTAT) 0.4 MG SL tablet, Place under the tongue., Disp: , Rfl:     oxyCODONE-acetaminophen (PERCOCET)  mg per tablet, Take 1 tablet by mouth every 4 (four) hours as needed for Pain., Disp: , Rfl:     pregabalin (LYRICA) 100 MG capsule, Take 100 mg by mouth 2 (two) times daily as needed., Disp: , Rfl:     ranolazine 1,000 mg PSRG, Take 1,000 mg by mouth 2 (two) times daily., Disp: , Rfl:     Facility-Administered Medications Ordered in Other Encounters:     fentaNYL 50 mcg/mL injection 25 mcg, 25 mcg, Intravenous, Q5 Min PRN, Noah Dumont, DO    HYDROmorphone (PF) injection 0.2 mg, 0.2 mg, Intravenous, Q5 Min PRN, Noah Dumont, DO    lactated ringers infusion, , Intravenous, Continuous, Noah Dumont DO, Stopped at 03/30/23 1158    sodium chloride 0.9% flush 10 mL, 10 mL, Intravenous, PRN, Shaheen Felder MD    sodium chloride 0.9% flush 10 mL, 10 mL,  Intravenous, PRN, Baudoin, Noah E, DO  VTE Risk Mitigation (From admission, onward)      None          Assessment:   Chest Pain    - negative troponin X2     - EKG: NSR with no acute ST changes   CAD s/p CABG x 3 & PCI    - LHC (3.12.24): 40% eccentric stenosis in the proximal RCA with widely patent mid to distal RCA stents.  IFR of 0.98, Patent LIMA to LAD, Patent SVG to OM, Known occluded vein graft to RCA    - LHC (10.9.23): The Mid RCA lesion was 99% stenosed with 0% stenosis post-intervention. The Dist RCA lesion was 90% stenosed with 0% stenosis post-intervention. Dist RCA lesion: A STENT SYNERGY XD 2.5X32MM stent was successfully placed.    - CABG (6.27.23): LIMA to LAD, SV to OM, SV to PDA  Hematuria    - H&H stable (13.8/40.6) on admit   HTN  HLD  Claudication  Arthritis  Depression  Anxiety  Prostate Cancer    - s/p Prostatectomy   GERD  Hyperglycemia  Morbid Obesity  No know history of GI Bleed     Plan:   EKG reviewed  Restart home IMDUR 60mg, Lisinopril 20mg, Lopressor 12.5mg, Ranexa 1,000mg, Atorvastatin 80mg   Start Amlodipine  Would ideally like restart ASA and Plavix due to recent PCI on (10.9.23) ~ Will await clearance from Urology   No need for LHC due to recent LHC on (3.12.24)  AM labs: CBC, CMP, Mag      Thank you for your consult.     Natalya Renee NP  Cardiology  [facility] - Emergency Dept  03/20/2024    I agree with the findings of the complexity of problems addressed and take responsibility for the plan's risks and complications. I approved the plan documented by Natalya Renee NP.  Increase antianginal meds

## 2024-03-21 LAB
ALBUMIN SERPL-MCNC: 3.4 G/DL (ref 3.5–5)
ALBUMIN/GLOB SERPL: 1.3 RATIO (ref 1.1–2)
ALP SERPL-CCNC: 63 UNIT/L (ref 40–150)
ALT SERPL-CCNC: 30 UNIT/L (ref 0–55)
AST SERPL-CCNC: 19 UNIT/L (ref 5–34)
BASOPHILS # BLD AUTO: 0.04 X10(3)/MCL
BASOPHILS NFR BLD AUTO: 0.6 %
BILIRUB SERPL-MCNC: 0.5 MG/DL
BUN SERPL-MCNC: 13.2 MG/DL (ref 8.4–25.7)
CALCIUM SERPL-MCNC: 9 MG/DL (ref 8.4–10.2)
CHLORIDE SERPL-SCNC: 102 MMOL/L (ref 98–107)
CO2 SERPL-SCNC: 27 MMOL/L (ref 22–29)
CREAT SERPL-MCNC: 0.95 MG/DL (ref 0.73–1.18)
EOSINOPHIL # BLD AUTO: 0.36 X10(3)/MCL (ref 0–0.9)
EOSINOPHIL NFR BLD AUTO: 5 %
ERYTHROCYTE [DISTWIDTH] IN BLOOD BY AUTOMATED COUNT: 12.6 % (ref 11.5–17)
GFR SERPLBLD CREATININE-BSD FMLA CKD-EPI: >60 MLS/MIN/1.73/M2
GLOBULIN SER-MCNC: 2.7 GM/DL (ref 2.4–3.5)
GLUCOSE SERPL-MCNC: 104 MG/DL (ref 74–100)
HCT VFR BLD AUTO: 36.9 % (ref 42–52)
HGB BLD-MCNC: 12.5 G/DL (ref 14–18)
IMM GRANULOCYTES # BLD AUTO: 0.03 X10(3)/MCL (ref 0–0.04)
IMM GRANULOCYTES NFR BLD AUTO: 0.4 %
LYMPHOCYTES # BLD AUTO: 1.35 X10(3)/MCL (ref 0.6–4.6)
LYMPHOCYTES NFR BLD AUTO: 18.7 %
MCH RBC QN AUTO: 30.3 PG (ref 27–31)
MCHC RBC AUTO-ENTMCNC: 33.9 G/DL (ref 33–36)
MCV RBC AUTO: 89.6 FL (ref 80–94)
MONOCYTES # BLD AUTO: 0.59 X10(3)/MCL (ref 0.1–1.3)
MONOCYTES NFR BLD AUTO: 8.2 %
NEUTROPHILS # BLD AUTO: 4.85 X10(3)/MCL (ref 2.1–9.2)
NEUTROPHILS NFR BLD AUTO: 67.1 %
NRBC BLD AUTO-RTO: 0 %
PLATELET # BLD AUTO: 219 X10(3)/MCL (ref 130–400)
PMV BLD AUTO: 9.9 FL (ref 7.4–10.4)
POCT GLUCOSE: 114 MG/DL (ref 70–110)
POCT GLUCOSE: 127 MG/DL (ref 70–110)
POCT GLUCOSE: 127 MG/DL (ref 70–110)
POCT GLUCOSE: 151 MG/DL (ref 70–110)
POTASSIUM SERPL-SCNC: 4.6 MMOL/L (ref 3.5–5.1)
PROT SERPL-MCNC: 6.1 GM/DL (ref 6.4–8.3)
RBC # BLD AUTO: 4.12 X10(6)/MCL (ref 4.7–6.1)
SODIUM SERPL-SCNC: 136 MMOL/L (ref 136–145)
WBC # SPEC AUTO: 7.22 X10(3)/MCL (ref 4.5–11.5)

## 2024-03-21 PROCEDURE — 25000003 PHARM REV CODE 250: Performed by: STUDENT IN AN ORGANIZED HEALTH CARE EDUCATION/TRAINING PROGRAM

## 2024-03-21 PROCEDURE — 63600175 PHARM REV CODE 636 W HCPCS: Mod: JZ,JG | Performed by: NURSE PRACTITIONER

## 2024-03-21 PROCEDURE — 21400001 HC TELEMETRY ROOM

## 2024-03-21 PROCEDURE — 80053 COMPREHEN METABOLIC PANEL: CPT | Performed by: PHYSICIAN ASSISTANT

## 2024-03-21 PROCEDURE — 25000003 PHARM REV CODE 250: Performed by: NURSE PRACTITIONER

## 2024-03-21 PROCEDURE — 11000001 HC ACUTE MED/SURG PRIVATE ROOM

## 2024-03-21 PROCEDURE — 85025 COMPLETE CBC W/AUTO DIFF WBC: CPT | Performed by: PHYSICIAN ASSISTANT

## 2024-03-21 RX ORDER — PANTOPRAZOLE SODIUM 40 MG/1
40 TABLET, DELAYED RELEASE ORAL DAILY
Status: DISCONTINUED | OUTPATIENT
Start: 2024-03-21 | End: 2024-03-24 | Stop reason: HOSPADM

## 2024-03-21 RX ORDER — MUPIROCIN 20 MG/G
OINTMENT TOPICAL 2 TIMES DAILY
Status: DISCONTINUED | OUTPATIENT
Start: 2024-03-21 | End: 2024-03-24 | Stop reason: HOSPADM

## 2024-03-21 RX ORDER — BENZONATATE 100 MG/1
100 CAPSULE ORAL 3 TIMES DAILY
Status: DISCONTINUED | OUTPATIENT
Start: 2024-03-21 | End: 2024-03-24 | Stop reason: HOSPADM

## 2024-03-21 RX ADMIN — METOPROLOL TARTRATE 12.5 MG: 25 TABLET, FILM COATED ORAL at 08:03

## 2024-03-21 RX ADMIN — ISOSORBIDE MONONITRATE 60 MG: 60 TABLET, EXTENDED RELEASE ORAL at 08:03

## 2024-03-21 RX ADMIN — MORPHINE SULFATE 2 MG: 4 INJECTION, SOLUTION INTRAMUSCULAR; INTRAVENOUS at 07:03

## 2024-03-21 RX ADMIN — AMLODIPINE BESYLATE 5 MG: 5 TABLET ORAL at 08:03

## 2024-03-21 RX ADMIN — RANOLAZINE 1000 MG: 500 TABLET, EXTENDED RELEASE ORAL at 08:03

## 2024-03-21 RX ADMIN — LISINOPRIL 20 MG: 20 TABLET ORAL at 08:03

## 2024-03-21 RX ADMIN — MORPHINE SULFATE 2 MG: 4 INJECTION, SOLUTION INTRAMUSCULAR; INTRAVENOUS at 01:03

## 2024-03-21 RX ADMIN — BENZONATATE 100 MG: 100 CAPSULE ORAL at 08:03

## 2024-03-21 RX ADMIN — MORPHINE SULFATE 2 MG: 4 INJECTION, SOLUTION INTRAMUSCULAR; INTRAVENOUS at 08:03

## 2024-03-21 RX ADMIN — PANTOPRAZOLE SODIUM 40 MG: 40 TABLET, DELAYED RELEASE ORAL at 04:03

## 2024-03-21 RX ADMIN — MUPIROCIN: 20 OINTMENT TOPICAL at 08:03

## 2024-03-21 RX ADMIN — ATORVASTATIN CALCIUM 80 MG: 40 TABLET, FILM COATED ORAL at 08:03

## 2024-03-21 NOTE — PROGRESS NOTES
[facility]    Cardiology  Progress Note    Patient Name: Alex Reynoso  MRN: [id number]  Admission Date: 3/19/2024  Hospital Length of Stay: 1 days  Code Status: Full Code   Attending Physician: Alia Landis DO   Primary Care Physician: Glen Duffy NP  Expected Discharge Date:   Principal Problem:<principal problem not specified>    Subjective:     Reason for Consult: Chest Pain Hx of CABG, CAD, & PCI      HPI: 54-year-old male that is known to [facility]/Dr. Hughes with PMHx of CAD s/p PCI & CABG x3, HTN, HLD, Anexity, prostate cancer, GERD. He presented to [facility] ED with complaints of abdominal pain & hematuria. He also reported assocated SOB and chest pain that is worse with exertion. Of note he had a CABG x3 (6.27.23) and a recent LHC (3.12.24) that revealed, RCA 40%, Patent LIMA to LAD & SVG to LAD, known occluded VG to RCA no intervention was done. ED course: VS on admit HR 69, /80, RR 20, SpO2 98%, Temp 98.1F. Lab work revealed: WBC 8.17, H/H 13.8/40.6, , INR 1.0, D-Dimer 0.48, Na 138, K 4.3, BUN/Cr 17.8/1.25, Ca 9.6, BNP 34.9, Trop <0.010 x2. EKG shows NSR with no acute ST changes. [facility] was consulted for Chest pain with a Hx of CAD, CABG, & PCI.     Hospital Course:   3.21.24: He denies SOB, CP, or palpitations. Coto catheter remains in place. Yellow/clear noted in coto bag. H&H remains stable.    PMH: CAD, HTN, HLD, Claudication, Arthritis, Depression, Anxiety, Prostate Cancer, GERD, Hyperglycemia, Morbid Obesity  PSH: LHC, CABG, Cholecystectomy, Colonoscopy, Lipoma Excision, Prostatectomy, Ventriculogram    Family History: Father - HTN, Cancer, MI, HLD; Mother - HTN, DM II, MI, HLD; Brother - HTN, HLD; Sister - Cancer   Social History: Current smoker (1-2 per week; previously 1/2 PPD). Reports occasionally alcohol use. Denies Illicit drug use.      Previous Cardiac Diagnostics:   [facility] (3.12.24):  Severe native vessel coronary artery disease  40% eccentric stenosis  in the proximal RCA with widely patent mid to distal RCA stents.  IFR of 0.98  Patent LIMA to LAD  Patent SVG to OM  Known occluded vein graft to RCA  Mildly elevated LVEDP  Plan:   Aggressive risk factor modification  Aggressive up titration of antianginal therapy     [facility]/PET (2.6.24):  The study quality is good.   This is probably an abnormal perfusion study. Study is consistent with ischemia.   This scan is suggestive of low risk for future cardiovascular events.   Small reversible perfusion abnormality of mild intensity in the inferior region. SSS 2 SRS 0.   The left ventricular cavity is noted to be normal on the stress studies. The stress left ventricular ejection fraction was calculated to be 71% and left ventricular global function is normal. The rest left ventricular cavity is noted to be normal. The rest left ventricular ejection fraction was calculated to be 64% and rest left ventricular global function is normal.   When compared to the resting ejection fraction (64%), the stress ejection fraction (71%) has increased.   There was a rise in myocardial blood flow between rest and stress.  Global myocardial blood flow reserve was 3.84.  Normal global coronary flow reserve is suggestive of low coronary event risk.     Abdominal US (1.22.24):  The study quality is below average.  No AAA visualized.     ECHO (10.9.23):  Left Ventricle: The left ventricle is normal in size. Normal wall thickness. Normal wall motion. There is normal systolic function with a visually estimated ejection fraction of 55 - 60%. Right Ventricle: Normal right ventricular cavity size. Wall thickness is normal. Right ventricle wall motion  is normal. Systolic function is normal. Aortic Valve: The aortic valve is a trileaflet valve.  IVC/SVC: Normal venous pressure at 3 mmHg. Pericardium: There is a trivial effusion. No indication of cardiac tamponade.     LHC (10.9.23):  Left main:  Large caliber switched bifurcates into circumflex and  LAD. Minimal. LAD:  Moderate-to-large caliber vessel with proximal severe 90% stenosis. Circumflex:  Moderate-to-large caliber vessel with severe diffuse disease scattered throughout.  Proximally just prior to a bifurcation of the OM and circumflex there is a greater than 70% lesion.  RCA:  99% midvessel stenosis.  Diffuse disease scattered throughout.  Lima-lad:  Is widely patent.  Anastomotic site 40-50% noted from tenting  SVG-OM1:  Widely patent. SVG-PDA:  Unable to locate SVG to PDA (noted to be small and diseased on surgical run report). LVEDP:  16 mmHg  Aortic valve:  No stenosis.   Intervention:  The Mid RCA lesion was 99% stenosed with 0% stenosis post-intervention. The Dist RCA lesion was 90% stenosed with 0% stenosis post-intervention. Dist RCA lesion: A STENT SYNERGY XD [id number] stent was successfully placed.     MPI (9.28.23):  The study quality is good.  This is an abnormal perfusion study. Study is consistent with ischemia. This scan is suggestive of moderate risk for future cardiovascular events. Medium reversible perfusion abnormality of moderate intensity in the inferior region. SSS 4 SRS 0. No change with prone imaging is noted. The left ventricular cavity is noted to be normal on the stress study. The left ventricular ejection fraction was calculated to be 50% and left ventricular global function is normal.      CABG X 3 (6.27.23):  LIMA to LAD  RSVG to OM  RSVG to PDA  *vein conduit was exteremly poor with very small caliber and many branches in both legs  Placing of sternal defect with the [employer] sternal plating system and stainless steel wires      [facility] (6.21.23):  Dominance: right. Left main: no obstructive disease with <10% epicardial stenosis. Left anterior descending artery:  Ostial/proximal 80% stenosis.  Diffuse luminal irregularities in the mid to distal LAD.  Diagonal branch is free of any obstructive disease.  Distal LAD is noted to fill the right PDA. Circumflex artery:  70-80%  disease in the proximal obtuse marginal 1 branch which is a medium to large caliber vessel. Right coronary artery:  95% mid RCA lesion.  The RPDA appears to be subtotally occluded with competitive flow noted.  Collaterals noted from the distal LAD that fills the RPDA.  Left ventriculography:  EF 65%  Hemodynamics:  LVEDP = 13     TTE (11.8.22):  The study quality is average. The left ventricle is normal in size. Global left ventricular systolic function is normal. The left ventricular ejection fraction is 60%. Concentric left ventricular hypertrophy is present. It is mild. Insufficient TR to calculate pulmonary artery pressure.     Calcium Score (11.3.22):  Total calcium score 875.3 compatible with high coronary heart disease risk.        Review of Systems   Constitutional: Negative for chills and fever.   Cardiovascular:  Negative for chest pain, palpitations and syncope.   Respiratory:  Negative for cough and shortness of breath.    Musculoskeletal: Negative.    Gastrointestinal:  Negative for abdominal pain and nausea.   Neurological: Negative.    Psychiatric/Behavioral: Negative.       Objective:     Vital Signs (Most Recent):  Temp: 98.2 °F (36.8 °C) (03/21/24 1100)  Pulse: 66 (03/21/24 1100)  Resp: 18 (03/21/24 1318)  BP: 112/73 (03/21/24 1100)  SpO2: 96 % (03/21/24 0700) Vital Signs (24h Range):  Temp:  [97.5 °F (36.4 °C)-98.2 °F (36.8 °C)] 98.2 °F (36.8 °C)  Pulse:  [61-74] 66  Resp:  [16-19] 18  SpO2:  [95 %-97 %] 96 %  BP: (104-124)/(58-74) 112/73   Weight: 121.1 kg (267 lb)  Body mass index is 43.09 kg/m².  SpO2: 96 %       Intake/Output Summary (Last 24 hours) at 3/21/2024 1408  Last data filed at 3/21/2024 1100  Gross per 24 hour   Intake 1993.2 ml   Output 5175 ml   Net -3181.8 ml     Lines/Drains/Airways       Drain  Duration                  Urethral Catheter 03/20/24 0308 Triple-lumen 22 Fr. 1 day              Peripheral Intravenous Line  Duration                  Peripheral IV - Single Lumen  03/20/24 0245 18 G Anterior;Distal;Left Upper Arm 1 day                  Significant Labs:   Recent Results (from the past 72 hour(s))   EKG 12-lead    Collection Time: 03/19/24  9:34 PM   Result Value Ref Range    QRS Duration 88 ms    [facility] QTC Calculation 407 ms   Comprehensive metabolic panel    Collection Time: 03/19/24 10:17 PM   Result Value Ref Range    Sodium Level 138 136 - 145 mmol/L    Potassium Level 4.3 3.5 - 5.1 mmol/L    Chloride 103 98 - 107 mmol/L    Carbon Dioxide 25 22 - 29 mmol/L    Glucose Level 102 (H) 74 - 100 mg/dL    Blood Urea Nitrogen 17.8 8.4 - 25.7 mg/dL    Creatinine 1.25 (H) 0.73 - 1.18 mg/dL    Calcium Level Total 9.6 8.4 - 10.2 mg/dL    Protein Total 7.1 6.4 - 8.3 gm/dL    Albumin Level 3.7 3.5 - 5.0 g/dL    Globulin 3.4 2.4 - 3.5 gm/dL    Albumin/Globulin Ratio 1.1 1.1 - 2.0 ratio    Bilirubin Total 0.2 <=1.5 mg/dL    Alkaline Phosphatase 92 40 - 150 unit/L    Alanine Aminotransferase 25 0 - 55 unit/L    Aspartate Aminotransferase 15 5 - 34 unit/L    eGFR >60 mls/min/1.73/m2   Brain natriuretic peptide    Collection Time: 03/19/24 10:17 PM   Result Value Ref Range    Natriuretic Peptide 34.9 <=100.0 pg/mL   Troponin I    Collection Time: 03/19/24 10:17 PM   Result Value Ref Range    Troponin-I <0.010 0.000 - 0.045 ng/mL   Protime-INR    Collection Time: 03/19/24 10:17 PM   Result Value Ref Range    PT 13.4 12.5 - 14.5 seconds    INR 1.0 <=1.3   CBC with Differential    Collection Time: 03/19/24 10:17 PM   Result Value Ref Range    WBC 8.17 4.50 - 11.50 x10(3)/mcL    RBC 4.50 (L) 4.70 - 6.10 x10(6)/mcL    Hgb 13.8 (L) 14.0 - 18.0 g/dL    Hct 40.6 (L) 42.0 - 52.0 %    MCV 90.2 80.0 - 94.0 fL    MCH 30.7 27.0 - 31.0 pg    MCHC 34.0 33.0 - 36.0 g/dL    RDW 12.4 11.5 - 17.0 %    Platelet 235 130 - 400 x10(3)/mcL    MPV 9.2 7.4 - 10.4 fL    Neut % 59.9 %    Lymph % 24.7 %    Mono % 8.2 %    Eos % 6.5 %    Basophil % 0.5 %    Lymph # 2.02 0.6 - 4.6 x10(3)/mcL    Neut # 4.89 2.1 - 9.2  x10(3)/mcL    Mono # 0.67 0.1 - 1.3 x10(3)/mcL    Eos # 0.53 0 - 0.9 x10(3)/mcL    Baso # 0.04 <=0.2 x10(3)/mcL    IG# 0.02 0 - 0.04 x10(3)/mcL    IG% 0.2 %    NRBC% 0.0 %   CPK    Collection Time: 03/19/24 10:17 PM   Result Value Ref Range    Creatine Kinase 113 30 - 200 U/L   D dimer, quantitative    Collection Time: 03/20/24  2:43 AM   Result Value Ref Range    D-Dimer 0.48 0.00 - 0.50 ug/mL FEU   Urinalysis, Reflex to Urine Culture    Collection Time: 03/20/24  3:26 AM    Specimen: Urine, Catheterized   Result Value Ref Range    Color, UA Light-Yellow Yellow, Light-Yellow, Colorless, Straw, Dark-Yellow    Appearance, UA Clear Clear    Specific Gravity, UA 1.016 1.005 - 1.030    pH, UA 6.0 5.0 - 8.5    Protein, UA 1+ (A) Negative    Glucose, UA Normal Negative, Normal    Ketones, UA Negative Negative    Blood, UA 3+ (A) Negative    Bilirubin, UA Negative Negative    Urobilinogen, UA Normal 0.2, 1.0, Normal    Nitrites, UA Negative Negative    Leukocyte Esterase, UA Negative Negative    WBC, UA None Seen None Seen, 0-2, 3-5, 0-5 /HPF    Bacteria, UA Trace None Seen, Trace /HPF    Squamous Epithelial Cells, UA None Seen None Seen /HPF    RBC, UA >100 (A) None Seen, 0-2, 3-5, 0-5 /HPF   Troponin I    Collection Time: 03/20/24  3:47 AM   Result Value Ref Range    Troponin-I <0.010 0.000 - 0.045 ng/mL   Troponin I    Collection Time: 03/20/24  9:19 AM   Result Value Ref Range    Troponin-I <0.010 0.000 - 0.045 ng/mL   Magnesium    Collection Time: 03/20/24  9:19 AM   Result Value Ref Range    Magnesium Level 2.10 1.60 - 2.60 mg/dL   Comprehensive Metabolic Panel    Collection Time: 03/20/24  9:19 AM   Result Value Ref Range    Sodium Level 137 136 - 145 mmol/L    Potassium Level 4.4 3.5 - 5.1 mmol/L    Chloride 103 98 - 107 mmol/L    Carbon Dioxide 28 22 - 29 mmol/L    Glucose Level 108 (H) 74 - 100 mg/dL    Blood Urea Nitrogen 16.0 8.4 - 25.7 mg/dL    Creatinine 0.93 0.73 - 1.18 mg/dL    Calcium Level Total 9.4 8.4 -  10.2 mg/dL    Protein Total 6.6 6.4 - 8.3 gm/dL    Albumin Level 3.6 3.5 - 5.0 g/dL    Globulin 3.0 2.4 - 3.5 gm/dL    Albumin/Globulin Ratio 1.2 1.1 - 2.0 ratio    Bilirubin Total 0.3 <=1.5 mg/dL    Alkaline Phosphatase 71 40 - 150 unit/L    Alanine Aminotransferase 35 0 - 55 unit/L    Aspartate Aminotransferase 27 5 - 34 unit/L    eGFR >60 mls/min/1.73/m2   CBC with Differential    Collection Time: 03/20/24  9:19 AM   Result Value Ref Range    WBC 6.12 4.50 - 11.50 x10(3)/mcL    RBC 4.44 (L) 4.70 - 6.10 x10(6)/mcL    Hgb 13.5 (L) 14.0 - 18.0 g/dL    Hct 40.5 (L) 42.0 - 52.0 %    MCV 91.2 80.0 - 94.0 fL    MCH 30.4 27.0 - 31.0 pg    MCHC 33.3 33.0 - 36.0 g/dL    RDW 12.7 11.5 - 17.0 %    Platelet 229 130 - 400 x10(3)/mcL    MPV 9.3 7.4 - 10.4 fL    Neut % 60.3 %    Lymph % 24.8 %    Mono % 7.4 %    Eos % 6.5 %    Basophil % 0.8 %    Lymph # 1.52 0.6 - 4.6 x10(3)/mcL    Neut # 3.69 2.1 - 9.2 x10(3)/mcL    Mono # 0.45 0.1 - 1.3 x10(3)/mcL    Eos # 0.40 0 - 0.9 x10(3)/mcL    Baso # 0.05 <=0.2 x10(3)/mcL    IG# 0.01 0 - 0.04 x10(3)/mcL    IG% 0.2 %    NRBC% 0.0 %   Comprehensive Metabolic Panel    Collection Time: 03/21/24  3:43 AM   Result Value Ref Range    Sodium Level 136 136 - 145 mmol/L    Potassium Level 4.6 3.5 - 5.1 mmol/L    Chloride 102 98 - 107 mmol/L    Carbon Dioxide 27 22 - 29 mmol/L    Glucose Level 104 (H) 74 - 100 mg/dL    Blood Urea Nitrogen 13.2 8.4 - 25.7 mg/dL    Creatinine 0.95 0.73 - 1.18 mg/dL    Calcium Level Total 9.0 8.4 - 10.2 mg/dL    Protein Total 6.1 (L) 6.4 - 8.3 gm/dL    Albumin Level 3.4 (L) 3.5 - 5.0 g/dL    Globulin 2.7 2.4 - 3.5 gm/dL    Albumin/Globulin Ratio 1.3 1.1 - 2.0 ratio    Bilirubin Total 0.5 <=1.5 mg/dL    Alkaline Phosphatase 63 40 - 150 unit/L    Alanine Aminotransferase 30 0 - 55 unit/L    Aspartate Aminotransferase 19 5 - 34 unit/L    eGFR >60 mls/min/1.73/m2   CBC with Differential    Collection Time: 03/21/24  3:43 AM   Result Value Ref Range    WBC 7.22 4.50 -  11.50 x10(3)/mcL    RBC 4.12 (L) 4.70 - 6.10 x10(6)/mcL    Hgb 12.5 (L) 14.0 - 18.0 g/dL    Hct 36.9 (L) 42.0 - 52.0 %    MCV 89.6 80.0 - 94.0 fL    MCH 30.3 27.0 - 31.0 pg    MCHC 33.9 33.0 - 36.0 g/dL    RDW 12.6 11.5 - 17.0 %    Platelet 219 130 - 400 x10(3)/mcL    MPV 9.9 7.4 - 10.4 fL    Neut % 67.1 %    Lymph % 18.7 %    Mono % 8.2 %    Eos % 5.0 %    Basophil % 0.6 %    Lymph # 1.35 0.6 - 4.6 x10(3)/mcL    Neut # 4.85 2.1 - 9.2 x10(3)/mcL    Mono # 0.59 0.1 - 1.3 x10(3)/mcL    Eos # 0.36 0 - 0.9 x10(3)/mcL    Baso # 0.04 <=0.2 x10(3)/mcL    IG# 0.03 0 - 0.04 x10(3)/mcL    IG% 0.4 %    NRBC% 0.0 %   POCT glucose    Collection Time: 03/21/24  5:45 AM   Result Value Ref Range    POCT Glucose 114 (H) 70 - 110 mg/dL     Telemetry:  NSR    Physical Exam  Constitutional:       Appearance: Normal appearance.   HENT:      Head: Normocephalic.      Nose: Nose normal.   Cardiovascular:      Rate and Rhythm: Normal rate and regular rhythm.      Pulses: Normal pulses.      Heart sounds: Normal heart sounds. No murmur heard.  Pulmonary:      Effort: Pulmonary effort is normal. No respiratory distress.      Breath sounds: Normal breath sounds.   Abdominal:      General: Abdomen is flat.   Skin:     General: Skin is warm.   Neurological:      Mental Status: He is alert and oriented to person, place, and time.   Psychiatric:         Mood and Affect: Mood normal.         Behavior: Behavior normal.         Judgment: Judgment normal.       Current Inpatient Medications:    Current Facility-Administered Medications:     acetaminophen tablet 650 mg, 650 mg, Oral, Q8H PRN, Gerardo Schmidt PA-C    acetaminophen tablet 650 mg, 650 mg, Oral, Q4H PRN, Gerardo Schmidt PA-C    amLODIPine tablet 5 mg, 5 mg, Oral, Daily, Natalya Renee NP, 5 mg at 03/21/24 0850    atorvastatin tablet 80 mg, 80 mg, Oral, QHS, Natalya Renee NP, 80 mg at 03/20/24 2110    dextrose 10% bolus 125 mL 125 mL, 12.5 g, Intravenous, PRN, Gerardo Schmidt PA-C     dextrose 10% bolus 250 mL 250 mL, 25 g, Intravenous, PRN, Gerardo Schmidt PA-C    glucagon (human recombinant) injection 1 mg, 1 mg, Intramuscular, PRN, Gerardo Schmidt PA-C    glucose chewable tablet 16 g, 16 g, Oral, PRN, Gerardo Schmidt PA-C    glucose chewable tablet 24 g, 24 g, Oral, PRN, Gerardo Schmidt PA-C    isosorbide mononitrate 24 hr tablet 60 mg, 60 mg, Oral, Daily, Víctor, Natalya, NP, 60 mg at 03/21/24 0850    lisinopriL tablet 20 mg, 20 mg, Oral, Daily, Víctor, Natalya, NP, 20 mg at 03/21/24 0850    metoprolol tartrate (LOPRESSOR) split tablet 12.5 mg, 12.5 mg, Oral, BID, Víctor, Natalya, NP, 12.5 mg at 03/21/24 0850    morphine injection 2 mg, 2 mg, Intravenous, Q4H PRN, Harper Olson, CALLUM, 2 mg at 03/21/24 1318    ondansetron injection 4 mg, 4 mg, Intravenous, Q6H PRN, Harper Olson, FNP    ranolazine 12 hr tablet 1,000 mg, 1,000 mg, Oral, BID, Víctor, Natalya, NP, 1,000 mg at 03/21/24 0850    Facility-Administered Medications Ordered in Other Encounters:     fentaNYL 50 mcg/mL injection 25 mcg, 25 mcg, Intravenous, Q5 Min PRN, Noah Dumont DO    HYDROmorphone (PF) injection 0.2 mg, 0.2 mg, Intravenous, Q5 Min PRN, Noah Dumont DO    lactated ringers infusion, , Intravenous, Continuous, Noah Dumont DO, Stopped at 03/30/23 1158    sodium chloride 0.9% flush 10 mL, 10 mL, Intravenous, PRN, Shaheen Felder MD    sodium chloride 0.9% flush 10 mL, 10 mL, Intravenous, PRN, Noah Dumont DO  VTE Risk Mitigation (From admission, onward)           Ordered     Place sequential compression device  Until discontinued         03/20/24 0813                  Assessment:   Chest Pain    - negative troponin X2     - EKG: NSR with no acute ST changes   CAD s/p CABG x 3 & PCI    - LHC (3.12.24): 40% eccentric stenosis in the proximal RCA with widely patent mid to distal RCA stents.  IFR of 0.98, Patent LIMA to LAD, Patent SVG to OM, Known occluded vein graft to RCA    - C (10.9.23): The  Mid RCA lesion was 99% stenosed with 0% stenosis post-intervention. The Dist RCA lesion was 90% stenosed with 0% stenosis post-intervention. Dist RCA lesion: A STENT SYNERGY XD [id number] stent was successfully placed.    - CABG (6.27.23): LIMA to LAD, SV to OM, SV to PDA  Hematuria    - H&H stable (13.8/40.6) on admit   HTN  HLD  Claudication  Arthritis  Depression  Anxiety  Prostate Cancer    - s/p Prostatectomy   GERD  Hyperglycemia  Morbid Obesity  No know history of GI Bleed       Plan:   EKG reviewed  Cont IMDUR 60mg, Lisinopril 20mg, Lopressor 12.5mg, Ranexa 1,000mg, Atorvastatin 80mg, amlodipine   Would ideally like restart Plavix as soon as able due to recent PCI on (10.9.23) ~ Will await clearance from Urology   No need for LHC due to recent LHC on (3.12.24)  Will follow along for now          Natalya Renee NP  Cardiology  [facility] - [facility]  03/21/2024    I agree with the findings of the complexity of problems addressed and take responsibility for the plan's risks and complications. I approved the plan documented by Natalya Renee NP.

## 2024-03-21 NOTE — PROGRESS NOTES
[facility] Hospital Medicine Progress Note        Chief Complaint: Inpatient Follow-up for     HPI:   Alex Reynoso is a 54 y.o. male with a past medical history of essential hypertension, hyperlipidemia, CAD, GERD, prostate cancer, arthritis, anxiety, and depression presented to [facility] on 3/19/2024 for chest pain.  Patient reported left-sided chest pain with shortness of breath and diaphoresis that began yesterday morning. Patient reported intermittent sticking pain to left chest with radiation to back worse with movement.  Patient also reported intermittent abdominal pain and hematuria since November 2023.  Patient endorsed use of Plavix.  Patient reported yesterday he began to have urinary retention with increased abdominal pain.  Patient reported passing blood clots in the urine with gross hematuria following.  Patient denied fever, chills, dysuria, nausea, and vomiting.  Patient reported chest pain improved in ED after nitroglycerin and morphine.  Initial vital signs in ED were /80, pulse 69, respirations 20, temperature 36.7° C, and SpO2 98% on room air.  Labs revealed WBC 8.17, RBC 4.50, hemoglobin 13.8, hematocrit 40.6, BUN 17.8, creatinine 1.25, glucose 102, BNP 34.9, , and undetectable troponin.  UA revealed 1+ protein, 3+ blood, and greater than 100 red blood cells.  EKG revealed normal sinus rhythm with heart rate of 65 beats per minute and first-degree AV block.  Chest x-ray revealed no acute cardiopulmonary process.  CT abdomen and pelvis with IV contrast revealed no acute abdominopelvic findings.  Indwelling Fleming was placed in ED with CBI.  Patient was given nitroglycerin tablet 0.5 mg, IV morphine 4 mg x 2, IV Zofran 4 mg, LR, and IV Rocephin 1 g in ED. Cardiology and neurology services were consulted. Patient was admitted to hospital medicine service for further medical management.        Interval Hx:   Patient seen and examined by bedside.  No acute  overnight events.  Hematuria has cleared largely.  Patient says that he still has some intermittent chest pain on the left side however gets worse when he is coughing.  Says that this chest pain is different from his prior MI chest pain and it is worse only when coughs.  Doing okay otherwise.  Eating and drinking okay      Case was discussed with patient's nurse and  on the floor.    Objective/physical exam:  General: In no acute distress, afebrile  Chest: Clear to auscultation bilaterally  Heart: RRR, +S1, S2, no appreciable murmur  Abdomen: Soft, nontender, BS +  MSK: Warm, no lower extremity edema, no clubbing or cyanosis  Neurologic: Alert and oriented x4, Cranial nerve II-XII intact, Strength 5/5 in all 4 extremities    VITAL SIGNS: 24 HRS MIN & MAX LAST   Temp  Min: 97.5 °F (36.4 °C)  Max: 98.2 °F (36.8 °C) 98.2 °F (36.8 °C)   BP  Min: 104/58  Max: 124/72 112/73   Pulse  Min: 66  Max: 74  66   Resp  Min: 16  Max: 19 18   SpO2  Min: 95 %  Max: 97 % 96 %     I have reviewed the following labs:  Recent Labs   Lab 03/19/24 2217 03/20/24  0919 03/21/24  0343   WBC 8.17 6.12 7.22   RBC 4.50* 4.44* 4.12*   HGB 13.8* 13.5* 12.5*   HCT 40.6* 40.5* 36.9*   MCV 90.2 91.2 89.6   MCH 30.7 30.4 30.3   MCHC 34.0 33.3 33.9   RDW 12.4 12.7 12.6    229 219   MPV 9.2 9.3 9.9     Recent Labs   Lab 03/19/24 2217 03/20/24  0919 03/21/24  0343    137 136   K 4.3 4.4 4.6   CO2 25 28 27   BUN 17.8 16.0 13.2   CREATININE 1.25* 0.93 0.95   CALCIUM 9.6 9.4 9.0   MG  --  2.10  --    ALBUMIN 3.7 3.6 3.4*   ALKPHOS 92 71 63   ALT 25 35 30   AST 15 27 19   BILITOT 0.2 0.3 0.5     Microbiology Results (last 7 days)       ** No results found for the last 168 hours. **             See below for Radiology    Scheduled Med:   amLODIPine  5 mg Oral Daily    atorvastatin  80 mg Oral QHS    isosorbide mononitrate  60 mg Oral Daily    lisinopriL  20 mg Oral Daily    metoprolol tartrate  12.5 mg Oral BID    ranolazine  1,000  mg Oral BID      Continuous Infusions:     PRN Meds:  acetaminophen, acetaminophen, dextrose 10%, dextrose 10%, glucagon (human recombinant), glucose, glucose, morphine, ondansetron     Assessment/Plan:  Acute left sided chest pain- intermittent-rule out ACS, likely noncardiac, likely cough related  Hematuria- resolving  Urinary retention- now resolved  SHARIF- resolved  History of essential hypertension, hyperlipidemia, CAD status post CABG 7/2023, GERD, hx of prostate cancer status post prostatectomy, arthritis, anxiety, and depression         Cardiology and urology on board, appreciate their recommendations  Continue Cardiac monitoring  Troponin negative x3  Hematuria has cleared, CBI clamped.  Urology recommends resume as needed for gross hematuria clot retention   Hemoglobin remaining stable  Plan to hopefully void trial in a.m. if remains off CBI   He will need cystoscopy outpatient after discharge for further workup  Continue NS @ 75 ml/hr x 24 hours  Appropriate home medication for chronic medical condition has been resumed, aspirin and Plavix held per Cardiology duue to hematuria   Await Urology clearance to resume aspirin and Plavix   We will like to start as soon as possible due to recent PCI to prevent InStent thrombosis  Morning CBC, CMP ordered    VTE prophylaxis:  SCDs    Patient condition:  Stable    Anticipated discharge and Disposition:   To be determined      All diagnosis and differential diagnosis have been reviewed; assessment and plan has been documented; I have personally reviewed the labs and test results that are presently available; I have reviewed the patients medication list; I have reviewed the consulting providers response and recommendations. I have reviewed or attempted to review medical records based upon their availability    All of the patient's questions have been  addressed and answered. Patient's is agreeable to the above stated plan. I will continue to monitor closely and make  adjustments to medical management as needed.  _____________________________________________________________________    Nutrition Status:    Radiology:  I have personally reviewed the following imaging and agree with the radiologist.     CT Abdomen Pelvis With IV Contrast NO Oral Contrast  Narrative: EXAMINATION:  CT ABDOMEN PELVIS WITH IV CONTRAST    CLINICAL HISTORY:  Abdominal pain, acute, nonlocalized;Gross hematuria with clots;    TECHNIQUE:  Helical acquisition through the abdomen and pelvis with IV contrast.  Three plane reconstructions were provided for review. DLP 1727 mGycm. Automatic exposure control, adjustment of mA/kV or iterative reconstruction technique was used to reduce radiation.    COMPARISON:  29 September 2021    FINDINGS:  The limited imaged lung bases are clear.    Question hepatic steatosis.  The gallbladder is surgically absent.  No significant abnormality of the spleen, pancreas or adrenals.  No hydronephrosis or suspicious renal lesion.    No bowel obstruction. No significant inflammatory changes of the bowel.  Normal appendix.  No free air.    There is a Fleming in the urinary bladder.  No pelvic free fluid.  The abdominal aorta is normal in caliber.  Moderate atherosclerotic disease.    Moderate degenerative change of the spine.  Impression: No acute abdominopelvic findings.    No significant discrepancy with the preliminary report.    Electronically signed by: Eugene Saldivar  Date:    03/20/2024  Time:    07:06    Alia Landis DO  Department of Hospital Medicine  [facility]  03/21/2024

## 2024-03-21 NOTE — PLAN OF CARE
Problem: Infection  Goal: Absence of Infection Signs and Symptoms  Outcome: Ongoing, Progressing     Problem: Adult Inpatient Plan of Care  Goal: Plan of Care Review  Outcome: Ongoing, Progressing  Goal: Patient-Specific Goal (Individualized)  Outcome: Ongoing, Progressing  Goal: Absence of Hospital-Acquired Illness or Injury  Outcome: Ongoing, Progressing  Goal: Optimal Comfort and Wellbeing  Outcome: Ongoing, Progressing  Goal: Readiness for Transition of Care  Outcome: Ongoing, Progressing     Problem: Bariatric Environmental Safety  Goal: Safety Maintained with Care  Outcome: Ongoing, Progressing

## 2024-03-21 NOTE — PROGRESS NOTES
UROLOGY  PROGRESS  NOTE    Alex Reynoso 1970  [id number]  3/21/2024    Patient resting in recliner  No complaints at time of rounds  No coto discomfort  Has not required hand irrigation    VSS, afebrile   Urine output inaccurate secondary to CBI   WBC 7.2   H&H 12.5/36.9   BUN and creatinine 13.2/0.95    Intake/Output:  No intake/output data recorded.  I/O last 3 completed shifts:  In: 2633.2 [I.V.:1633.2; IV Piggyback:1000]  Out: 4875 [Urine:4875]     Exam:    NAD  Card: RRR  Resp: unlabored  Abd: soft, NTND  : clear urine with minimal sediment draining to  bag with CBI at slow rate; CBI clamped during rounds  Extremity: no C/C/E    Recent Results (from the past 24 hour(s))   Comprehensive Metabolic Panel    Collection Time: 03/21/24  3:43 AM   Result Value Ref Range    Sodium Level 136 136 - 145 mmol/L    Potassium Level 4.6 3.5 - 5.1 mmol/L    Chloride 102 98 - 107 mmol/L    Carbon Dioxide 27 22 - 29 mmol/L    Glucose Level 104 (H) 74 - 100 mg/dL    Blood Urea Nitrogen 13.2 8.4 - 25.7 mg/dL    Creatinine 0.95 0.73 - 1.18 mg/dL    Calcium Level Total 9.0 8.4 - 10.2 mg/dL    Protein Total 6.1 (L) 6.4 - 8.3 gm/dL    Albumin Level 3.4 (L) 3.5 - 5.0 g/dL    Globulin 2.7 2.4 - 3.5 gm/dL    Albumin/Globulin Ratio 1.3 1.1 - 2.0 ratio    Bilirubin Total 0.5 <=1.5 mg/dL    Alkaline Phosphatase 63 40 - 150 unit/L    Alanine Aminotransferase 30 0 - 55 unit/L    Aspartate Aminotransferase 19 5 - 34 unit/L    eGFR >60 mls/min/1.73/m2   CBC with Differential    Collection Time: 03/21/24  3:43 AM   Result Value Ref Range    WBC 7.22 4.50 - 11.50 x10(3)/mcL    RBC 4.12 (L) 4.70 - 6.10 x10(6)/mcL    Hgb 12.5 (L) 14.0 - 18.0 g/dL    Hct 36.9 (L) 42.0 - 52.0 %    MCV 89.6 80.0 - 94.0 fL    MCH 30.3 27.0 - 31.0 pg    MCHC 33.9 33.0 - 36.0 g/dL    RDW 12.6 11.5 - 17.0 %    Platelet 219 130 - 400 x10(3)/mcL    MPV 9.9 7.4 - 10.4 fL    Neut % 67.1 %    Lymph % 18.7 %    Mono % 8.2 %    Eos % 5.0 %    Basophil % 0.6 %    Lymph  # 1.35 0.6 - 4.6 x10(3)/mcL    Neut # 4.85 2.1 - 9.2 x10(3)/mcL    Mono # 0.59 0.1 - 1.3 x10(3)/mcL    Eos # 0.36 0 - 0.9 x10(3)/mcL    Baso # 0.04 <=0.2 x10(3)/mcL    IG# 0.03 0 - 0.04 x10(3)/mcL    IG% 0.4 %    NRBC% 0.0 %   POCT glucose    Collection Time: 03/21/24  5:45 AM   Result Value Ref Range    POCT Glucose 114 (H) 70 - 110 mg/dL       Assessment:  -gross hematuria with clot retention - three way coto placed and initiated on CBI. CT normal, UA without evidence of infection  -h/o prostate cancer s/p prostatectomy and radiation 2-3 years ago  -CAD, h/o CABG on plavix at home - currently on hold    Plan:  -CBI clamped. Resume as needed for gross hematuria or clot retention  -Hopefully can void trial in AM if remains off of CBI.   -Will need cystoscopy outpatient after discharge.       Ricarda Pacheco NP

## 2024-03-22 LAB
ALBUMIN SERPL-MCNC: 3.1 G/DL (ref 3.5–5)
ALBUMIN/GLOB SERPL: 1.2 RATIO (ref 1.1–2)
ALP SERPL-CCNC: 83 UNIT/L (ref 40–150)
ALT SERPL-CCNC: 26 UNIT/L (ref 0–55)
AST SERPL-CCNC: 14 UNIT/L (ref 5–34)
BASOPHILS # BLD AUTO: 0.03 X10(3)/MCL
BASOPHILS NFR BLD AUTO: 0.4 %
BILIRUB SERPL-MCNC: 0.2 MG/DL
BUN SERPL-MCNC: 14.7 MG/DL (ref 8.4–25.7)
CALCIUM SERPL-MCNC: 9 MG/DL (ref 8.4–10.2)
CHLORIDE SERPL-SCNC: 107 MMOL/L (ref 98–107)
CO2 SERPL-SCNC: 25 MMOL/L (ref 22–29)
CREAT SERPL-MCNC: 1.16 MG/DL (ref 0.73–1.18)
EOSINOPHIL # BLD AUTO: 0.33 X10(3)/MCL (ref 0–0.9)
EOSINOPHIL NFR BLD AUTO: 4.5 %
ERYTHROCYTE [DISTWIDTH] IN BLOOD BY AUTOMATED COUNT: 12.6 % (ref 11.5–17)
GFR SERPLBLD CREATININE-BSD FMLA CKD-EPI: >60 MLS/MIN/1.73/M2
GLOBULIN SER-MCNC: 2.6 GM/DL (ref 2.4–3.5)
GLUCOSE SERPL-MCNC: 120 MG/DL (ref 74–100)
HCT VFR BLD AUTO: 34.7 % (ref 42–52)
HGB BLD-MCNC: 11.6 G/DL (ref 14–18)
IMM GRANULOCYTES # BLD AUTO: 0.06 X10(3)/MCL (ref 0–0.04)
IMM GRANULOCYTES NFR BLD AUTO: 0.8 %
LYMPHOCYTES # BLD AUTO: 1.59 X10(3)/MCL (ref 0.6–4.6)
LYMPHOCYTES NFR BLD AUTO: 21.8 %
MAGNESIUM SERPL-MCNC: 2 MG/DL (ref 1.6–2.6)
MCH RBC QN AUTO: 30.4 PG (ref 27–31)
MCHC RBC AUTO-ENTMCNC: 33.4 G/DL (ref 33–36)
MCV RBC AUTO: 90.8 FL (ref 80–94)
MONOCYTES # BLD AUTO: 0.63 X10(3)/MCL (ref 0.1–1.3)
MONOCYTES NFR BLD AUTO: 8.6 %
NEUTROPHILS # BLD AUTO: 4.67 X10(3)/MCL (ref 2.1–9.2)
NEUTROPHILS NFR BLD AUTO: 63.9 %
NRBC BLD AUTO-RTO: 0 %
PLATELET # BLD AUTO: 207 X10(3)/MCL (ref 130–400)
PMV BLD AUTO: 9.6 FL (ref 7.4–10.4)
POCT GLUCOSE: 130 MG/DL (ref 70–110)
POCT GLUCOSE: 138 MG/DL (ref 70–110)
POCT GLUCOSE: 96 MG/DL (ref 70–110)
POTASSIUM SERPL-SCNC: 4 MMOL/L (ref 3.5–5.1)
PROT SERPL-MCNC: 5.7 GM/DL (ref 6.4–8.3)
RBC # BLD AUTO: 3.82 X10(6)/MCL (ref 4.7–6.1)
SODIUM SERPL-SCNC: 140 MMOL/L (ref 136–145)
WBC # SPEC AUTO: 7.31 X10(3)/MCL (ref 4.5–11.5)

## 2024-03-22 PROCEDURE — 11000001 HC ACUTE MED/SURG PRIVATE ROOM

## 2024-03-22 PROCEDURE — 85025 COMPLETE CBC W/AUTO DIFF WBC: CPT | Performed by: NURSE PRACTITIONER

## 2024-03-22 PROCEDURE — 83735 ASSAY OF MAGNESIUM: CPT | Performed by: NURSE PRACTITIONER

## 2024-03-22 PROCEDURE — 80053 COMPREHEN METABOLIC PANEL: CPT | Performed by: NURSE PRACTITIONER

## 2024-03-22 PROCEDURE — 25000003 PHARM REV CODE 250: Performed by: NURSE PRACTITIONER

## 2024-03-22 PROCEDURE — 63600175 PHARM REV CODE 636 W HCPCS: Mod: JZ,JG | Performed by: NURSE PRACTITIONER

## 2024-03-22 PROCEDURE — 25000003 PHARM REV CODE 250: Performed by: STUDENT IN AN ORGANIZED HEALTH CARE EDUCATION/TRAINING PROGRAM

## 2024-03-22 PROCEDURE — 25000003 PHARM REV CODE 250: Performed by: PHYSICIAN ASSISTANT

## 2024-03-22 PROCEDURE — 21400001 HC TELEMETRY ROOM

## 2024-03-22 RX ORDER — NAPROXEN SODIUM 220 MG/1
81 TABLET, FILM COATED ORAL DAILY
Status: DISCONTINUED | OUTPATIENT
Start: 2024-03-22 | End: 2024-03-22

## 2024-03-22 RX ORDER — CLOPIDOGREL BISULFATE 75 MG/1
75 TABLET ORAL DAILY
Status: DISCONTINUED | OUTPATIENT
Start: 2024-03-22 | End: 2024-03-24 | Stop reason: HOSPADM

## 2024-03-22 RX ADMIN — PANTOPRAZOLE SODIUM 40 MG: 40 TABLET, DELAYED RELEASE ORAL at 10:03

## 2024-03-22 RX ADMIN — RANOLAZINE 1000 MG: 500 TABLET, EXTENDED RELEASE ORAL at 11:03

## 2024-03-22 RX ADMIN — BENZONATATE 100 MG: 100 CAPSULE ORAL at 10:03

## 2024-03-22 RX ADMIN — ACETAMINOPHEN 650 MG: 325 TABLET, FILM COATED ORAL at 02:03

## 2024-03-22 RX ADMIN — AMLODIPINE BESYLATE 5 MG: 5 TABLET ORAL at 10:03

## 2024-03-22 RX ADMIN — RANOLAZINE 1000 MG: 500 TABLET, EXTENDED RELEASE ORAL at 08:03

## 2024-03-22 RX ADMIN — BENZONATATE 100 MG: 100 CAPSULE ORAL at 02:03

## 2024-03-22 RX ADMIN — METOPROLOL TARTRATE 12.5 MG: 25 TABLET, FILM COATED ORAL at 08:03

## 2024-03-22 RX ADMIN — CLOPIDOGREL BISULFATE 75 MG: 75 TABLET ORAL at 12:03

## 2024-03-22 RX ADMIN — MUPIROCIN: 20 OINTMENT TOPICAL at 08:03

## 2024-03-22 RX ADMIN — ATORVASTATIN CALCIUM 80 MG: 40 TABLET, FILM COATED ORAL at 08:03

## 2024-03-22 RX ADMIN — METOPROLOL TARTRATE 12.5 MG: 25 TABLET, FILM COATED ORAL at 10:03

## 2024-03-22 RX ADMIN — ISOSORBIDE MONONITRATE 60 MG: 60 TABLET, EXTENDED RELEASE ORAL at 10:03

## 2024-03-22 RX ADMIN — MORPHINE SULFATE 2 MG: 4 INJECTION, SOLUTION INTRAMUSCULAR; INTRAVENOUS at 06:03

## 2024-03-22 RX ADMIN — BENZONATATE 100 MG: 100 CAPSULE ORAL at 08:03

## 2024-03-22 RX ADMIN — MUPIROCIN: 20 OINTMENT TOPICAL at 10:03

## 2024-03-22 RX ADMIN — LISINOPRIL 20 MG: 20 TABLET ORAL at 10:03

## 2024-03-22 NOTE — PROGRESS NOTES
[facility] - Oncology Acute    Cardiology  Progress Note    Patient Name: Alex Reynoso  MRN: [id number]  Admission Date: 3/19/2024  Hospital Length of Stay: 2 days  Code Status: Full Code   Attending Physician: Alia Landis DO   Primary Care Physician: Glen Duffy NP  Expected Discharge Date:   Principal Problem:<principal problem not specified>    Subjective:     Reason for Consult: Chest Pain Hx of CABG, CAD, & PCI      HPI: 54-year-old male that is known to [facility]/Dr. Hughes with PMHx of CAD s/p PCI & CABG x3, HTN, HLD, Anexity, prostate cancer, GERD. He presented to [facility] ED with complaints of abdominal pain & hematuria. He also reported assocated SOB and chest pain that is worse with exertion. Of note he had a CABG x3 (6.27.23) and a recent LHC (3.12.24) that revealed, RCA 40%, Patent LIMA to LAD & SVG to LAD, known occluded VG to RCA no intervention was done. ED course: VS on admit HR 69, /80, RR 20, SpO2 98%, Temp 98.1F. Lab work revealed: WBC 8.17, H/H 13.8/40.6, , INR 1.0, D-Dimer 0.48, Na 138, K 4.3, BUN/Cr 17.8/1.25, Ca 9.6, BNP 34.9, Trop <0.010 x2. EKG shows NSR with no acute ST changes. [facility] was consulted for Chest pain with a Hx of CAD, CABG, & PCI.     Hospital Course:   3.21.24: He denies SOB, CP, or palpitations. Coto catheter remains in place. Yellow/clear noted in coto bag. H&H remains stable.  3.22.24: NAD, VSS. He denies CP, SOB, or palpitation. Coto remains in place, Urology gave clearance to restart Plavix    PMH: CAD, HTN, HLD, Claudication, Arthritis, Depression, Anxiety, Prostate Cancer, GERD, Hyperglycemia, Morbid Obesity  PSH: LHC, CABG, Cholecystectomy, Colonoscopy, Lipoma Excision, Prostatectomy, Ventriculogram    Family History: Father - HTN, Cancer, MI, HLD; Mother - HTN, DM II, MI, HLD; Brother - HTN, HLD; Sister - Cancer   Social History: Current smoker (1-2 per week; previously 1/2 PPD). Reports occasionally alcohol use. Denies Illicit drug use.       Previous Cardiac Diagnostics:   [facility] (3.12.24):  Severe native vessel coronary artery disease  40% eccentric stenosis in the proximal RCA with widely patent mid to distal RCA stents.  IFR of 0.98  Patent LIMA to LAD  Patent SVG to OM  Known occluded vein graft to RCA  Mildly elevated LVEDP  Plan:   Aggressive risk factor modification  Aggressive up titration of antianginal therapy     [facility]/PET (2.6.24):  The study quality is good.   This is probably an abnormal perfusion study. Study is consistent with ischemia.   This scan is suggestive of low risk for future cardiovascular events.   Small reversible perfusion abnormality of mild intensity in the inferior region. SSS 2 SRS 0.   The left ventricular cavity is noted to be normal on the stress studies. The stress left ventricular ejection fraction was calculated to be 71% and left ventricular global function is normal. The rest left ventricular cavity is noted to be normal. The rest left ventricular ejection fraction was calculated to be 64% and rest left ventricular global function is normal.   When compared to the resting ejection fraction (64%), the stress ejection fraction (71%) has increased.   There was a rise in myocardial blood flow between rest and stress.  Global myocardial blood flow reserve was 3.84.  Normal global coronary flow reserve is suggestive of low coronary event risk.     Abdominal US (1.22.24):  The study quality is below average.  No AAA visualized.     ECHO (10.9.23):  Left Ventricle: The left ventricle is normal in size. Normal wall thickness. Normal wall motion. There is normal systolic function with a visually estimated ejection fraction of 55 - 60%. Right Ventricle: Normal right ventricular cavity size. Wall thickness is normal. Right ventricle wall motion  is normal. Systolic function is normal. Aortic Valve: The aortic valve is a trileaflet valve.  IVC/SVC: Normal venous pressure at 3 mmHg. Pericardium: There is a trivial effusion. No  indication of cardiac tamponade.     [facility] (10.9.23):  Left main:  Large caliber switched bifurcates into circumflex and LAD. Minimal. LAD:  Moderate-to-large caliber vessel with proximal severe 90% stenosis. Circumflex:  Moderate-to-large caliber vessel with severe diffuse disease scattered throughout.  Proximally just prior to a bifurcation of the OM and circumflex there is a greater than 70% lesion.  RCA:  99% midvessel stenosis.  Diffuse disease scattered throughout.  Lima-lad:  Is widely patent.  Anastomotic site 40-50% noted from tenting  SVG-OM1:  Widely patent. SVG-PDA:  Unable to locate SVG to PDA (noted to be small and diseased on surgical run report). LVEDP:  16 mmHg  Aortic valve:  No stenosis.   Intervention:  The Mid RCA lesion was 99% stenosed with 0% stenosis post-intervention. The Dist RCA lesion was 90% stenosed with 0% stenosis post-intervention. Dist RCA lesion: A STENT SYNERGY XD 2.5X32MM stent was successfully placed.     [facility] (9.28.23):  The study quality is good.  This is an abnormal perfusion study. Study is consistent with ischemia. This scan is suggestive of moderate risk for future cardiovascular events. Medium reversible perfusion abnormality of moderate intensity in the inferior region. SSS 4 SRS 0. No change with prone imaging is noted. The left ventricular cavity is noted to be normal on the stress study. The left ventricular ejection fraction was calculated to be 50% and left ventricular global function is normal.      CABG X 3 (6.27.23):  LIMA to LAD  RSVG to OM  RSVG to PDA  *vein conduit was exteremly poor with very small caliber and many branches in both legs  Placing of sternal defect with the [employer] sternal plating system and stainless steel wires      [facility] (6.21.23):  Dominance: right. Left main: no obstructive disease with <10% epicardial stenosis. Left anterior descending artery:  Ostial/proximal 80% stenosis.  Diffuse luminal irregularities in the mid to distal LAD.  Diagonal  branch is free of any obstructive disease.  Distal LAD is noted to fill the right PDA. Circumflex artery:  70-80% disease in the proximal obtuse marginal 1 branch which is a medium to large caliber vessel. Right coronary artery:  95% mid RCA lesion.  The RPDA appears to be subtotally occluded with competitive flow noted.  Collaterals noted from the distal LAD that fills the RPDA.  Left ventriculography:  EF 65%  Hemodynamics:  LVEDP = 13     [facility] (11.8.22):  The study quality is average. The left ventricle is normal in size. Global left ventricular systolic function is normal. The left ventricular ejection fraction is 60%. Concentric left ventricular hypertrophy is present. It is mild. Insufficient TR to calculate pulmonary artery pressure.     Calcium Score (11.3.22):  Total calcium score 875.3 compatible with high coronary heart disease risk.        Review of Systems   Constitutional: Negative for chills and fever.   Cardiovascular:  Negative for chest pain and palpitations.   Respiratory:  Negative for cough and shortness of breath.    Musculoskeletal: Negative.    Gastrointestinal:  Negative for abdominal pain and nausea.   Neurological: Negative.    Psychiatric/Behavioral: Negative.       Objective:     Vital Signs (Most Recent):  Temp: 97.8 °F (36.6 °C) (03/22/24 0722)  Pulse: 81 (03/22/24 0722)  Resp: 20 (03/22/24 0722)  BP: 128/72 (03/22/24 1055)  SpO2: 97 % (03/22/24 0722) Vital Signs (24h Range):  Temp:  [97.6 °F (36.4 °C)-98.1 °F (36.7 °C)] 97.8 °F (36.6 °C)  Pulse:  [77-98] 81  Resp:  [17-20] 20  SpO2:  [95 %-97 %] 97 %  BP: (107-150)/(67-81) 128/72   Weight: 121.1 kg (267 lb)  Body mass index is 43.09 kg/m².  SpO2: 97 %       Intake/Output Summary (Last 24 hours) at 3/22/2024 1123  Last data filed at 3/22/2024 0300  Gross per 24 hour   Intake 540 ml   Output 2250 ml   Net -1710 ml       Lines/Drains/Airways       Drain  Duration                  Urethral Catheter 03/20/24 0308 Triple-lumen 22 Fr. 2 days               Peripheral Intravenous Line  Duration                  Peripheral IV - Single Lumen 03/20/24 0245 18 G Anterior;Distal;Left Upper Arm 2 days                  Significant Labs:   Recent Results (from the past 72 hour(s))   EKG 12-lead    Collection Time: 03/19/24  9:34 PM   Result Value Ref Range    QRS Duration 88 ms    [facility] QTC Calculation 407 ms   Comprehensive metabolic panel    Collection Time: 03/19/24 10:17 PM   Result Value Ref Range    Sodium Level 138 136 - 145 mmol/L    Potassium Level 4.3 3.5 - 5.1 mmol/L    Chloride 103 98 - 107 mmol/L    Carbon Dioxide 25 22 - 29 mmol/L    Glucose Level 102 (H) 74 - 100 mg/dL    Blood Urea Nitrogen 17.8 8.4 - 25.7 mg/dL    Creatinine 1.25 (H) 0.73 - 1.18 mg/dL    Calcium Level Total 9.6 8.4 - 10.2 mg/dL    Protein Total 7.1 6.4 - 8.3 gm/dL    Albumin Level 3.7 3.5 - 5.0 g/dL    Globulin 3.4 2.4 - 3.5 gm/dL    Albumin/Globulin Ratio 1.1 1.1 - 2.0 ratio    Bilirubin Total 0.2 <=1.5 mg/dL    Alkaline Phosphatase 92 40 - 150 unit/L    Alanine Aminotransferase 25 0 - 55 unit/L    Aspartate Aminotransferase 15 5 - 34 unit/L    eGFR >60 mls/min/1.73/m2   Brain natriuretic peptide    Collection Time: 03/19/24 10:17 PM   Result Value Ref Range    Natriuretic Peptide 34.9 <=100.0 pg/mL   Troponin I    Collection Time: 03/19/24 10:17 PM   Result Value Ref Range    Troponin-I <0.010 0.000 - 0.045 ng/mL   Protime-INR    Collection Time: 03/19/24 10:17 PM   Result Value Ref Range    PT 13.4 12.5 - 14.5 seconds    INR 1.0 <=1.3   CBC with Differential    Collection Time: 03/19/24 10:17 PM   Result Value Ref Range    WBC 8.17 4.50 - 11.50 x10(3)/mcL    RBC 4.50 (L) 4.70 - 6.10 x10(6)/mcL    Hgb 13.8 (L) 14.0 - 18.0 g/dL    Hct 40.6 (L) 42.0 - 52.0 %    MCV 90.2 80.0 - 94.0 fL    MCH 30.7 27.0 - 31.0 pg    MCHC 34.0 33.0 - 36.0 g/dL    RDW 12.4 11.5 - 17.0 %    Platelet 235 130 - 400 x10(3)/mcL    MPV 9.2 7.4 - 10.4 fL    Neut % 59.9 %    Lymph % 24.7 %    Mono % 8.2 %     Eos % 6.5 %    Basophil % 0.5 %    Lymph # 2.02 0.6 - 4.6 x10(3)/mcL    Neut # 4.89 2.1 - 9.2 x10(3)/mcL    Mono # 0.67 0.1 - 1.3 x10(3)/mcL    Eos # 0.53 0 - 0.9 x10(3)/mcL    Baso # 0.04 <=0.2 x10(3)/mcL    IG# 0.02 0 - 0.04 x10(3)/mcL    IG% 0.2 %    NRBC% 0.0 %   CPK    Collection Time: 03/19/24 10:17 PM   Result Value Ref Range    Creatine Kinase 113 30 - 200 U/L   D dimer, quantitative    Collection Time: 03/20/24  2:43 AM   Result Value Ref Range    D-Dimer 0.48 0.00 - 0.50 ug/mL FEU   Urinalysis, Reflex to Urine Culture    Collection Time: 03/20/24  3:26 AM    Specimen: Urine, Catheterized   Result Value Ref Range    Color, UA Light-Yellow Yellow, Light-Yellow, Colorless, Straw, Dark-Yellow    Appearance, UA Clear Clear    Specific Gravity, UA 1.016 1.005 - 1.030    pH, UA 6.0 5.0 - 8.5    Protein, UA 1+ (A) Negative    Glucose, UA Normal Negative, Normal    Ketones, UA Negative Negative    Blood, UA 3+ (A) Negative    Bilirubin, UA Negative Negative    Urobilinogen, UA Normal 0.2, 1.0, Normal    Nitrites, UA Negative Negative    Leukocyte Esterase, UA Negative Negative    WBC, UA None Seen None Seen, 0-2, 3-5, 0-5 /HPF    Bacteria, UA Trace None Seen, Trace /HPF    Squamous Epithelial Cells, UA None Seen None Seen /HPF    RBC, UA >100 (A) None Seen, 0-2, 3-5, 0-5 /HPF   Troponin I    Collection Time: 03/20/24  3:47 AM   Result Value Ref Range    Troponin-I <0.010 0.000 - 0.045 ng/mL   Troponin I    Collection Time: 03/20/24  9:19 AM   Result Value Ref Range    Troponin-I <0.010 0.000 - 0.045 ng/mL   Magnesium    Collection Time: 03/20/24  9:19 AM   Result Value Ref Range    Magnesium Level 2.10 1.60 - 2.60 mg/dL   Comprehensive Metabolic Panel    Collection Time: 03/20/24  9:19 AM   Result Value Ref Range    Sodium Level 137 136 - 145 mmol/L    Potassium Level 4.4 3.5 - 5.1 mmol/L    Chloride 103 98 - 107 mmol/L    Carbon Dioxide 28 22 - 29 mmol/L    Glucose Level 108 (H) 74 - 100 mg/dL    Blood Urea  Nitrogen 16.0 8.4 - 25.7 mg/dL    Creatinine 0.93 0.73 - 1.18 mg/dL    Calcium Level Total 9.4 8.4 - 10.2 mg/dL    Protein Total 6.6 6.4 - 8.3 gm/dL    Albumin Level 3.6 3.5 - 5.0 g/dL    Globulin 3.0 2.4 - 3.5 gm/dL    Albumin/Globulin Ratio 1.2 1.1 - 2.0 ratio    Bilirubin Total 0.3 <=1.5 mg/dL    Alkaline Phosphatase 71 40 - 150 unit/L    Alanine Aminotransferase 35 0 - 55 unit/L    Aspartate Aminotransferase 27 5 - 34 unit/L    eGFR >60 mls/min/1.73/m2   CBC with Differential    Collection Time: 03/20/24  9:19 AM   Result Value Ref Range    WBC 6.12 4.50 - 11.50 x10(3)/mcL    RBC 4.44 (L) 4.70 - 6.10 x10(6)/mcL    Hgb 13.5 (L) 14.0 - 18.0 g/dL    Hct 40.5 (L) 42.0 - 52.0 %    MCV 91.2 80.0 - 94.0 fL    MCH 30.4 27.0 - 31.0 pg    MCHC 33.3 33.0 - 36.0 g/dL    RDW 12.7 11.5 - 17.0 %    Platelet 229 130 - 400 x10(3)/mcL    MPV 9.3 7.4 - 10.4 fL    Neut % 60.3 %    Lymph % 24.8 %    Mono % 7.4 %    Eos % 6.5 %    Basophil % 0.8 %    Lymph # 1.52 0.6 - 4.6 x10(3)/mcL    Neut # 3.69 2.1 - 9.2 x10(3)/mcL    Mono # 0.45 0.1 - 1.3 x10(3)/mcL    Eos # 0.40 0 - 0.9 x10(3)/mcL    Baso # 0.05 <=0.2 x10(3)/mcL    IG# 0.01 0 - 0.04 x10(3)/mcL    IG% 0.2 %    NRBC% 0.0 %   Comprehensive Metabolic Panel    Collection Time: 03/21/24  3:43 AM   Result Value Ref Range    Sodium Level 136 136 - 145 mmol/L    Potassium Level 4.6 3.5 - 5.1 mmol/L    Chloride 102 98 - 107 mmol/L    Carbon Dioxide 27 22 - 29 mmol/L    Glucose Level 104 (H) 74 - 100 mg/dL    Blood Urea Nitrogen 13.2 8.4 - 25.7 mg/dL    Creatinine 0.95 0.73 - 1.18 mg/dL    Calcium Level Total 9.0 8.4 - 10.2 mg/dL    Protein Total 6.1 (L) 6.4 - 8.3 gm/dL    Albumin Level 3.4 (L) 3.5 - 5.0 g/dL    Globulin 2.7 2.4 - 3.5 gm/dL    Albumin/Globulin Ratio 1.3 1.1 - 2.0 ratio    Bilirubin Total 0.5 <=1.5 mg/dL    Alkaline Phosphatase 63 40 - 150 unit/L    Alanine Aminotransferase 30 0 - 55 unit/L    Aspartate Aminotransferase 19 5 - 34 unit/L    eGFR >60 mls/min/1.73/m2   CBC  with Differential    Collection Time: 03/21/24  3:43 AM   Result Value Ref Range    WBC 7.22 4.50 - 11.50 x10(3)/mcL    RBC 4.12 (L) 4.70 - 6.10 x10(6)/mcL    Hgb 12.5 (L) 14.0 - 18.0 g/dL    Hct 36.9 (L) 42.0 - 52.0 %    MCV 89.6 80.0 - 94.0 fL    MCH 30.3 27.0 - 31.0 pg    MCHC 33.9 33.0 - 36.0 g/dL    RDW 12.6 11.5 - 17.0 %    Platelet 219 130 - 400 x10(3)/mcL    MPV 9.9 7.4 - 10.4 fL    Neut % 67.1 %    Lymph % 18.7 %    Mono % 8.2 %    Eos % 5.0 %    Basophil % 0.6 %    Lymph # 1.35 0.6 - 4.6 x10(3)/mcL    Neut # 4.85 2.1 - 9.2 x10(3)/mcL    Mono # 0.59 0.1 - 1.3 x10(3)/mcL    Eos # 0.36 0 - 0.9 x10(3)/mcL    Baso # 0.04 <=0.2 x10(3)/mcL    IG# 0.03 0 - 0.04 x10(3)/mcL    IG% 0.4 %    NRBC% 0.0 %   POCT glucose    Collection Time: 03/21/24  5:45 AM   Result Value Ref Range    POCT Glucose 114 (H) 70 - 110 mg/dL   POCT glucose    Collection Time: 03/21/24 10:50 AM   Result Value Ref Range    POCT Glucose 151 (H) 70 - 110 mg/dL   POCT glucose    Collection Time: 03/21/24  4:38 PM   Result Value Ref Range    POCT Glucose 127 (H) 70 - 110 mg/dL   POCT glucose    Collection Time: 03/21/24  8:17 PM   Result Value Ref Range    POCT Glucose 127 (H) 70 - 110 mg/dL   Magnesium    Collection Time: 03/22/24  4:13 AM   Result Value Ref Range    Magnesium Level 2.00 1.60 - 2.60 mg/dL   Comprehensive Metabolic Panel    Collection Time: 03/22/24  4:13 AM   Result Value Ref Range    Sodium Level 140 136 - 145 mmol/L    Potassium Level 4.0 3.5 - 5.1 mmol/L    Chloride 107 98 - 107 mmol/L    Carbon Dioxide 25 22 - 29 mmol/L    Glucose Level 120 (H) 74 - 100 mg/dL    Blood Urea Nitrogen 14.7 8.4 - 25.7 mg/dL    Creatinine 1.16 0.73 - 1.18 mg/dL    Calcium Level Total 9.0 8.4 - 10.2 mg/dL    Protein Total 5.7 (L) 6.4 - 8.3 gm/dL    Albumin Level 3.1 (L) 3.5 - 5.0 g/dL    Globulin 2.6 2.4 - 3.5 gm/dL    Albumin/Globulin Ratio 1.2 1.1 - 2.0 ratio    Bilirubin Total 0.2 <=1.5 mg/dL    Alkaline Phosphatase 83 40 - 150 unit/L    Alanine  Aminotransferase 26 0 - 55 unit/L    Aspartate Aminotransferase 14 5 - 34 unit/L    eGFR >60 mls/min/1.73/m2   CBC with Differential    Collection Time: 03/22/24  4:13 AM   Result Value Ref Range    WBC 7.31 4.50 - 11.50 x10(3)/mcL    RBC 3.82 (L) 4.70 - 6.10 x10(6)/mcL    Hgb 11.6 (L) 14.0 - 18.0 g/dL    Hct 34.7 (L) 42.0 - 52.0 %    MCV 90.8 80.0 - 94.0 fL    MCH 30.4 27.0 - 31.0 pg    MCHC 33.4 33.0 - 36.0 g/dL    RDW 12.6 11.5 - 17.0 %    Platelet 207 130 - 400 x10(3)/mcL    MPV 9.6 7.4 - 10.4 fL    Neut % 63.9 %    Lymph % 21.8 %    Mono % 8.6 %    Eos % 4.5 %    Basophil % 0.4 %    Lymph # 1.59 0.6 - 4.6 x10(3)/mcL    Neut # 4.67 2.1 - 9.2 x10(3)/mcL    Mono # 0.63 0.1 - 1.3 x10(3)/mcL    Eos # 0.33 0 - 0.9 x10(3)/mcL    Baso # 0.03 <=0.2 x10(3)/mcL    IG# 0.06 (H) 0 - 0.04 x10(3)/mcL    IG% 0.8 %    NRBC% 0.0 %     Telemetry:  NSR    Physical Exam  Constitutional:       Appearance: Normal appearance.   HENT:      Head: Normocephalic.      Nose: Nose normal.   Cardiovascular:      Rate and Rhythm: Normal rate and regular rhythm.      Pulses: Normal pulses.      Heart sounds: Normal heart sounds. No murmur heard.  Pulmonary:      Effort: Pulmonary effort is normal. No respiratory distress.      Breath sounds: Normal breath sounds.   Abdominal:      General: Abdomen is flat.   Skin:     General: Skin is warm.   Neurological:      Mental Status: He is alert and oriented to person, place, and time.   Psychiatric:         Mood and Affect: Mood normal.         Behavior: Behavior normal.         Judgment: Judgment normal.       Current Inpatient Medications:    Current Facility-Administered Medications:     acetaminophen tablet 650 mg, 650 mg, Oral, Q8H PRN, Gerardo Schmidt PA-C    acetaminophen tablet 650 mg, 650 mg, Oral, Q4H PRN, Gerardo Schmidt PA-C    amLODIPine tablet 5 mg, 5 mg, Oral, Daily, Natalya Renee NP, 5 mg at 03/22/24 1055    atorvastatin tablet 80 mg, 80 mg, Oral, QHS, Natalya Renee NP, 80 mg at  03/21/24 2019    benzonatate capsule 100 mg, 100 mg, Oral, TID, BuxAlia, DO, 100 mg at 03/22/24 1055    clopidogreL tablet 75 mg, 75 mg, Oral, Daily, Natalya Renee NP    dextrose 10% bolus 125 mL 125 mL, 12.5 g, Intravenous, PRN, Gerardo Schmidt PA-C    dextrose 10% bolus 250 mL 250 mL, 25 g, Intravenous, PRN, Gerardo Schmidt PA-C    glucagon (human recombinant) injection 1 mg, 1 mg, Intramuscular, PRN, Gerardo Schmidt PA-C    glucose chewable tablet 16 g, 16 g, Oral, PRN, Gerardo Schmidt PA-C    glucose chewable tablet 24 g, 24 g, Oral, PRN, Gerardo Schmidt PA-C    isosorbide mononitrate 24 hr tablet 60 mg, 60 mg, Oral, Daily, Natalya Renee NP, 60 mg at 03/22/24 1055    lisinopriL tablet 20 mg, 20 mg, Oral, Daily, Natalya Renee NP, 20 mg at 03/22/24 1055    metoprolol tartrate (LOPRESSOR) split tablet 12.5 mg, 12.5 mg, Oral, BID, Natalya Renee NP, 12.5 mg at 03/22/24 1055    morphine injection 2 mg, 2 mg, Intravenous, Q4H PRN, Harper Olson, MADHAVIP, 2 mg at 03/22/24 0642    mupirocin 2 % ointment, , Nasal, BID, Alia Landis DO, Given at 03/22/24 1055    ondansetron injection 4 mg, 4 mg, Intravenous, Q6H PRN, Harper Olson, FNP    pantoprazole EC tablet 40 mg, 40 mg, Oral, Daily, Alia Landis DO, 40 mg at 03/22/24 1055    ranolazine 12 hr tablet 1,000 mg, 1,000 mg, Oral, BID, Natalya Renee NP, 1,000 mg at 03/22/24 1101    Facility-Administered Medications Ordered in Other Encounters:     fentaNYL 50 mcg/mL injection 25 mcg, 25 mcg, Intravenous, Q5 Min PRN, Noah Dumont DO    HYDROmorphone (PF) injection 0.2 mg, 0.2 mg, Intravenous, Q5 Min PRN, Noah Dumont DO    lactated ringers infusion, , Intravenous, Continuous, Noah Dumont DO, Stopped at 03/30/23 1158    sodium chloride 0.9% flush 10 mL, 10 mL, Intravenous, PRN, Shaheen Felder MD    sodium chloride 0.9% flush 10 mL, 10 mL, Intravenous, PRN, Noah Dumont DO  VTE Risk Mitigation (From admission, onward)            Ordered     Place sequential compression device  Until discontinued         03/20/24 0813                  Assessment:   Chest Pain    - negative troponin X2     - EKG: NSR with no acute ST changes   CAD s/p CABG x 3 & PCI    - LHC (3.12.24): 40% eccentric stenosis in the proximal RCA with widely patent mid to distal RCA stents.  IFR of 0.98, Patent LIMA to LAD, Patent SVG to OM, Known occluded vein graft to RCA    - LHC (10.9.23): The Mid RCA lesion was 99% stenosed with 0% stenosis post-intervention. The Dist RCA lesion was 90% stenosed with 0% stenosis post-intervention. Dist RCA lesion: A STENT SYNERGY XD 2.5X32MM stent was successfully placed.    - CABG (6.27.23): LIMA to LAD, SV to OM, SV to PDA  Hematuria    - H&H stable (13.8/40.6) on admit   HTN  HLD  Claudication  Arthritis  Depression  Anxiety  Prostate Cancer    - s/p Prostatectomy   GERD  Hyperglycemia  Morbid Obesity  No know history of GI Bleed       Plan:   EKG reviewed  Cont IMDUR 60mg, Lisinopril 20mg, Lopressor 12.5mg, Ranexa 1,000mg, Atorvastatin 80mg, amlodipine   Okay with Urology ~ restart Plavix today and restart ASA if urine remains clear  No need for LHC due to recent LHC on (3.12.24)  Cardiology to sign off, please reconsult if needed         Natalya Renee NP  Cardiology  [facility] - On[facility]  03/22/2024    I agree with the findings of the complexity of problems addressed and take responsibility for the plan's risks and complications. I approved the plan documented by Natalya Renee NP.  Meds reveiwed  Call with questions

## 2024-03-22 NOTE — NURSING
Nurses Note -- 4 Eyes      3/22/2024   11:21 AM      Skin assessed during: Admit      [x] No Altered Skin Integrity Present    [x]Prevention Measures Documented      [] Yes- Altered Skin Integrity Present or Discovered   [] LDA Added if Not in Epic (Describe Wound)   [] New Altered Skin Integrity was Present on Admit and Documented in LDA   [] Wound Image Taken    Wound Care Consulted? No    Attending Nurse:  Julian Martinez RN     Second RN/Staff Member:  Kirby Robertson RN

## 2024-03-22 NOTE — PROGRESS NOTES
UROLOGY  PROGRESS  NOTE    Alex Reynoso 1970  [id number]  3/22/2024    Patient ambulating in room  Fleming discontinued this morning  Awaiting pt to void  No acute events overnight    VSS, afebrile   Urine output inaccurate secondary to CBI   WBC 7.31   H&H 11.6/34.7   BUN and creatinine 14.7/1.16    Intake/Output:  No intake/output data recorded.  I/O last 3 completed shifts:  In: 2533.2 [P.O.:900; I.V.:1633.2]  Out: 6550 [Urine:6550]     Exam:    NAD  Card: RRR  Resp: unlabored  Abd: soft, NTND  : No urine available for assessment  Extremity: no C/C/E    Recent Results (from the past 24 hour(s))   POCT glucose    Collection Time: 03/21/24  4:38 PM   Result Value Ref Range    POCT Glucose 127 (H) 70 - 110 mg/dL   POCT glucose    Collection Time: 03/21/24  8:17 PM   Result Value Ref Range    POCT Glucose 127 (H) 70 - 110 mg/dL   Magnesium    Collection Time: 03/22/24  4:13 AM   Result Value Ref Range    Magnesium Level 2.00 1.60 - 2.60 mg/dL   Comprehensive Metabolic Panel    Collection Time: 03/22/24  4:13 AM   Result Value Ref Range    Sodium Level 140 136 - 145 mmol/L    Potassium Level 4.0 3.5 - 5.1 mmol/L    Chloride 107 98 - 107 mmol/L    Carbon Dioxide 25 22 - 29 mmol/L    Glucose Level 120 (H) 74 - 100 mg/dL    Blood Urea Nitrogen 14.7 8.4 - 25.7 mg/dL    Creatinine 1.16 0.73 - 1.18 mg/dL    Calcium Level Total 9.0 8.4 - 10.2 mg/dL    Protein Total 5.7 (L) 6.4 - 8.3 gm/dL    Albumin Level 3.1 (L) 3.5 - 5.0 g/dL    Globulin 2.6 2.4 - 3.5 gm/dL    Albumin/Globulin Ratio 1.2 1.1 - 2.0 ratio    Bilirubin Total 0.2 <=1.5 mg/dL    Alkaline Phosphatase 83 40 - 150 unit/L    Alanine Aminotransferase 26 0 - 55 unit/L    Aspartate Aminotransferase 14 5 - 34 unit/L    eGFR >60 mls/min/1.73/m2   CBC with Differential    Collection Time: 03/22/24  4:13 AM   Result Value Ref Range    WBC 7.31 4.50 - 11.50 x10(3)/mcL    RBC 3.82 (L) 4.70 - 6.10 x10(6)/mcL    Hgb 11.6 (L) 14.0 - 18.0 g/dL    Hct 34.7 (L) 42.0 - 52.0 %     MCV 90.8 80.0 - 94.0 fL    MCH 30.4 27.0 - 31.0 pg    MCHC 33.4 33.0 - 36.0 g/dL    RDW 12.6 11.5 - 17.0 %    Platelet 207 130 - 400 x10(3)/mcL    MPV 9.6 7.4 - 10.4 fL    Neut % 63.9 %    Lymph % 21.8 %    Mono % 8.6 %    Eos % 4.5 %    Basophil % 0.4 %    Lymph # 1.59 0.6 - 4.6 x10(3)/mcL    Neut # 4.67 2.1 - 9.2 x10(3)/mcL    Mono # 0.63 0.1 - 1.3 x10(3)/mcL    Eos # 0.33 0 - 0.9 x10(3)/mcL    Baso # 0.03 <=0.2 x10(3)/mcL    IG# 0.06 (H) 0 - 0.04 x10(3)/mcL    IG% 0.8 %    NRBC% 0.0 %       Assessment:  -gross hematuria with clot retention - three way coto placed and initiated on CBI. CT normal, UA without evidence of infection  -h/o prostate cancer s/p prostatectomy and radiation 2-3 years ago  -CAD, h/o CABG on plavix at home - currently on hold    Plan:  Void trial in progress  OK to resume plavix, if urine remains clear on plavix can add aspirin  OK for d/c home later today from urology standpoint once patient voiding  Will need f/u outpatient for cystoscopy  Discussed with patient and nursing      Ricarda Pacheco NP

## 2024-03-22 NOTE — PROGRESS NOTES
[facility] Hospital Medicine Progress Note        Chief Complaint: Inpatient Follow-up for     HPI:   Alex Reynoso is a 54 y.o. male with a past medical history of essential hypertension, hyperlipidemia, CAD, GERD, prostate cancer, arthritis, anxiety, and depression presented to [facility] on 3/19/2024 for chest pain.  Patient reported left-sided chest pain with shortness of breath and diaphoresis that began yesterday morning. Patient reported intermittent sticking pain to left chest with radiation to back worse with movement.  Patient also reported intermittent abdominal pain and hematuria since November 2023.  Patient endorsed use of Plavix.  Patient reported yesterday he began to have urinary retention with increased abdominal pain.  Patient reported passing blood clots in the urine with gross hematuria following.  Patient denied fever, chills, dysuria, nausea, and vomiting.  Patient reported chest pain improved in ED after nitroglycerin and morphine.  Initial vital signs in ED were /80, pulse 69, respirations 20, temperature 36.7° C, and SpO2 98% on room air.  Labs revealed WBC 8.17, RBC 4.50, hemoglobin 13.8, hematocrit 40.6, BUN 17.8, creatinine 1.25, glucose 102, BNP 34.9, , and undetectable troponin.  UA revealed 1+ protein, 3+ blood, and greater than 100 red blood cells.  EKG revealed normal sinus rhythm with heart rate of 65 beats per minute and first-degree AV block.  Chest x-ray revealed no acute cardiopulmonary process.  CT abdomen and pelvis with IV contrast revealed no acute abdominopelvic findings.  Indwelling Fleming was placed in ED with CBI.  Patient was given nitroglycerin tablet 0.5 mg, IV morphine 4 mg x 2, IV Zofran 4 mg, LR, and IV Rocephin 1 g in ED. Cardiology and neurology services were consulted. Patient was admitted to hospital medicine service for further medical management.        Interval Hx:   Patient seen and examined at bedside.  No acute  overnight events.  Voiding trial happening this morning.  No further hematuria noted.  No acute overnight events.  Denies any chest pain      Case was discussed with patient's nurse and  on the floor.    Objective/physical exam:  General: In no acute distress, afebrile  Chest: Clear to auscultation bilaterally  Heart: RRR, +S1, S2, no appreciable murmur  Abdomen: Soft, nontender, BS +  MSK: Warm, no lower extremity edema, no clubbing or cyanosis  Neurologic: Alert and oriented x4, Cranial nerve II-XII intact, Strength 5/5 in all 4 extremities    VITAL SIGNS: 24 HRS MIN & MAX LAST   Temp  Min: 97.6 °F (36.4 °C)  Max: 98.3 °F (36.8 °C) 98.3 °F (36.8 °C)   BP  Min: 107/69  Max: 150/74 119/70   Pulse  Min: 72  Max: 98  72   Resp  Min: 17  Max: 20 18   SpO2  Min: 95 %  Max: 97 % 97 %     I have reviewed the following labs:  Recent Labs   Lab 03/20/24 0919 03/21/24 0343 03/22/24 0413   WBC 6.12 7.22 7.31   RBC 4.44* 4.12* 3.82*   HGB 13.5* 12.5* 11.6*   HCT 40.5* 36.9* 34.7*   MCV 91.2 89.6 90.8   MCH 30.4 30.3 30.4   MCHC 33.3 33.9 33.4   RDW 12.7 12.6 12.6    219 207   MPV 9.3 9.9 9.6       Recent Labs   Lab 03/20/24 0919 03/21/24 0343 03/22/24  0413    136 140   K 4.4 4.6 4.0   CO2 28 27 25   BUN 16.0 13.2 14.7   CREATININE 0.93 0.95 1.16   CALCIUM 9.4 9.0 9.0   MG 2.10  --  2.00   ALBUMIN 3.6 3.4* 3.1*   ALKPHOS 71 63 83   ALT 35 30 26   AST 27 19 14   BILITOT 0.3 0.5 0.2       Microbiology Results (last 7 days)       ** No results found for the last 168 hours. **             See below for Radiology    Scheduled Med:   amLODIPine  5 mg Oral Daily    atorvastatin  80 mg Oral QHS    benzonatate  100 mg Oral TID    clopidogreL  75 mg Oral Daily    isosorbide mononitrate  60 mg Oral Daily    lisinopriL  20 mg Oral Daily    metoprolol tartrate  12.5 mg Oral BID    mupirocin   Nasal BID    pantoprazole  40 mg Oral Daily    ranolazine  1,000 mg Oral BID      Continuous Infusions:     PRN  Meds:  acetaminophen, acetaminophen, dextrose 10%, dextrose 10%, glucagon (human recombinant), glucose, glucose, morphine, ondansetron     Assessment/Plan:  Acute left sided chest pain- intermittent-rule out ACS, likely noncardiac, likely cough related  Hematuria- resolved  Urinary retention- now resolved  SHARIF- resolved  History of essential hypertension, hyperlipidemia, CAD status post CABG 7/2023, GERD, hx of prostate cancer status post prostatectomy, arthritis, anxiety, and depression         Cardiology and urology on board, appreciate their recommendations  Continue Cardiac monitoring  Troponin negative x3  Hematuria has cleared, CBI clamped.   Voiding trial in process   Can remove Fleming if doing well   Plavix okay to resume by Urology, if hematuria continues to be resolved can add aspirin  Hemoglobin remaining stable  He will need cystoscopy outpatient after discharge for further workup  Appropriate home medication for chronic medical condition has been resumed,   can likely be DC in a.m. if hematuria continues to be resolved.    VTE prophylaxis:  SCDs    Patient condition:  Stable    Anticipated discharge and Disposition:   To be determined      All diagnosis and differential diagnosis have been reviewed; assessment and plan has been documented; I have personally reviewed the labs and test results that are presently available; I have reviewed the patients medication list; I have reviewed the consulting providers response and recommendations. I have reviewed or attempted to review medical records based upon their availability    All of the patient's questions have been  addressed and answered. Patient's is agreeable to the above stated plan. I will continue to monitor closely and make adjustments to medical management as needed.  _____________________________________________________________________    Nutrition Status:    Radiology:  I have personally reviewed the following imaging and agree with the  radiologist.     CT Abdomen Pelvis With IV Contrast NO Oral Contrast  Narrative: EXAMINATION:  CT ABDOMEN PELVIS WITH IV CONTRAST    CLINICAL HISTORY:  Abdominal pain, acute, nonlocalized;Gross hematuria with clots;    TECHNIQUE:  Helical acquisition through the abdomen and pelvis with IV contrast.  Three plane reconstructions were provided for review. DLP 1727 mGycm. Automatic exposure control, adjustment of mA/kV or iterative reconstruction technique was used to reduce radiation.    COMPARISON:  29 September 2021    FINDINGS:  The limited imaged lung bases are clear.    Question hepatic steatosis.  The gallbladder is surgically absent.  No significant abnormality of the spleen, pancreas or adrenals.  No hydronephrosis or suspicious renal lesion.    No bowel obstruction. No significant inflammatory changes of the bowel.  Normal appendix.  No free air.    There is a Fleming in the urinary bladder.  No pelvic free fluid.  The abdominal aorta is normal in caliber.  Moderate atherosclerotic disease.    Moderate degenerative change of the spine.  Impression: No acute abdominopelvic findings.    No significant discrepancy with the preliminary report.    Electronically signed by: Eugene Saldivar  Date:    03/20/2024  Time:    07:06    Aila Landis DO  Department of Hospital Medicine  [facility]  03/22/2024

## 2024-03-23 LAB
POCT GLUCOSE: 113 MG/DL (ref 70–110)
POCT GLUCOSE: 125 MG/DL (ref 70–110)
POCT GLUCOSE: 136 MG/DL (ref 70–110)

## 2024-03-23 PROCEDURE — 21400001 HC TELEMETRY ROOM

## 2024-03-23 PROCEDURE — 25000003 PHARM REV CODE 250: Performed by: PHYSICIAN ASSISTANT

## 2024-03-23 PROCEDURE — 25000003 PHARM REV CODE 250: Performed by: STUDENT IN AN ORGANIZED HEALTH CARE EDUCATION/TRAINING PROGRAM

## 2024-03-23 PROCEDURE — 25000003 PHARM REV CODE 250: Performed by: NURSE PRACTITIONER

## 2024-03-23 RX ORDER — ASPIRIN 81 MG/1
81 TABLET ORAL DAILY
Status: DISCONTINUED | OUTPATIENT
Start: 2024-03-23 | End: 2024-03-24 | Stop reason: HOSPADM

## 2024-03-23 RX ADMIN — ACETAMINOPHEN 650 MG: 325 TABLET, FILM COATED ORAL at 04:03

## 2024-03-23 RX ADMIN — PANTOPRAZOLE SODIUM 40 MG: 40 TABLET, DELAYED RELEASE ORAL at 08:03

## 2024-03-23 RX ADMIN — AMLODIPINE BESYLATE 5 MG: 5 TABLET ORAL at 08:03

## 2024-03-23 RX ADMIN — ASPIRIN 81 MG: 81 TABLET, COATED ORAL at 01:03

## 2024-03-23 RX ADMIN — RANOLAZINE 1000 MG: 500 TABLET, EXTENDED RELEASE ORAL at 08:03

## 2024-03-23 RX ADMIN — MUPIROCIN: 20 OINTMENT TOPICAL at 08:03

## 2024-03-23 RX ADMIN — ISOSORBIDE MONONITRATE 60 MG: 60 TABLET, EXTENDED RELEASE ORAL at 08:03

## 2024-03-23 RX ADMIN — ATORVASTATIN CALCIUM 80 MG: 40 TABLET, FILM COATED ORAL at 08:03

## 2024-03-23 RX ADMIN — CLOPIDOGREL BISULFATE 75 MG: 75 TABLET ORAL at 08:03

## 2024-03-23 RX ADMIN — BENZONATATE 100 MG: 100 CAPSULE ORAL at 08:03

## 2024-03-23 RX ADMIN — BENZONATATE 100 MG: 100 CAPSULE ORAL at 02:03

## 2024-03-23 RX ADMIN — METOPROLOL TARTRATE 12.5 MG: 25 TABLET, FILM COATED ORAL at 08:03

## 2024-03-23 RX ADMIN — LISINOPRIL 20 MG: 20 TABLET ORAL at 08:03

## 2024-03-23 RX ADMIN — ACETAMINOPHEN 650 MG: 325 TABLET, FILM COATED ORAL at 08:03

## 2024-03-23 NOTE — PLAN OF CARE
Patient resting well, no complaints of pain, still states that he has small streaks of blood in urine

## 2024-03-23 NOTE — PROGRESS NOTES
UROLOGY FOLLOW-UP PROGRESS NOTE      SUBJECTIVE  Alex Reynoso is a 54 y.o. year old male hospital day 3 with   Chief Complaint   Patient presents with    Chest Pain     Intermittent Left anterior chest pressure with diaphoresis and mid back burning and sob started since this morning, took oxy in afternoon which gave relief. Sob worse with activity. Denies nausea and vomiting. Trace blood in urine for a while, today pt reports blood clots in urine        Doing well  Voidgn weill   Miimal Hematuria    VITAL SIGNS: 24 HR MIN & MAX LAST    Temp  Min: 97.5 °F (36.4 °C)  Max: 98 °F (36.7 °C)  97.7 °F (36.5 °C)        BP  Min: 106/67  Max: 134/83  117/71     Pulse  Min: 63  Max: 76  67     Resp  Min: 18  Max: 20  20    SpO2  Min: 95 %  Max: 97 %  97 %      Vitals:    03/23/24 0013 03/23/24 0434 03/23/24 0700 03/23/24 1115   BP: 134/83 125/69 128/78 117/71   Pulse: 72 73 63 67   Resp:   20 20   Temp: 97.7 °F (36.5 °C) 98 °F (36.7 °C) 97.9 °F (36.6 °C) 97.7 °F (36.5 °C)   TempSrc: Oral Oral Oral Oral   SpO2: 96% 96% 95% 97%   Weight:       Height:           Intake/Output:    Intake/Output Summary (Last 24 hours) at 3/23/2024 1335  Last data filed at 3/23/2024 0644  Gross per 24 hour   Intake --   Output 1000 ml   Net -1000 ml    I/O last 3 completed shifts:  In: -   Out: 2150 [Urine:2150] Diet diabetic     [facility]      IMAGING  na    PHYSICAL EXAMINATION    Constitutional:  Well developed, well nourished, no acute distress, non-toxic appearance   Respiratory:  Non-labored, no wheezing, clear  Cardiac:  RRR, no murmurs rubs gallops  Abdomen:   soft  :  na  Extremities:  No clubbing, cyanosis or edema.  TEDS and SCDs applied      ASSESSMENT  Hospital Day :3  There are no hospital problems to display for this patient.      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

## 2024-03-23 NOTE — PROGRESS NOTES
[facility] Hospital Medicine Progress Note        Chief Complaint: Inpatient Follow-up for     HPI:   Alex Reynoso is a 54 y.o. male with a past medical history of essential hypertension, hyperlipidemia, CAD, GERD, prostate cancer, arthritis, anxiety, and depression presented to [facility] on 3/19/2024 for chest pain.  Patient reported left-sided chest pain with shortness of breath and diaphoresis that began yesterday morning. Patient reported intermittent sticking pain to left chest with radiation to back worse with movement.  Patient also reported intermittent abdominal pain and hematuria since November 2023.  Patient endorsed use of Plavix.  Patient reported yesterday he began to have urinary retention with increased abdominal pain.  Patient reported passing blood clots in the urine with gross hematuria following.  Patient denied fever, chills, dysuria, nausea, and vomiting.  Patient reported chest pain improved in ED after nitroglycerin and morphine.  Initial vital signs in ED were /80, pulse 69, respirations 20, temperature 36.7° C, and SpO2 98% on room air.  Labs revealed WBC 8.17, RBC 4.50, hemoglobin 13.8, hematocrit 40.6, BUN 17.8, creatinine 1.25, glucose 102, BNP 34.9, , and undetectable troponin.  UA revealed 1+ protein, 3+ blood, and greater than 100 red blood cells.  EKG revealed normal sinus rhythm with heart rate of 65 beats per minute and first-degree AV block.  Chest x-ray revealed no acute cardiopulmonary process.  CT abdomen and pelvis with IV contrast revealed no acute abdominopelvic findings.  Indwelling Fleming was placed in ED with CBI.  Patient was given nitroglycerin tablet 0.5 mg, IV morphine 4 mg x 2, IV Zofran 4 mg, LR, and IV Rocephin 1 g in ED. Cardiology and neurology services were consulted. Patient was admitted to hospital medicine service for further medical management.        Interval Hx:   Patient seen and examined at bedside.  Wife and aunt  at bedside. No acute overnight events.  Fleming catheter removed. Pt denies any further clots.     Objective/physical exam:  General: In no acute distress, afebrile, obese  Chest: Clear to auscultation bilaterally  Heart: RRR, +S1, S2, no appreciable murmur  Abdomen: Soft, nontender, BS +  MSK: Warm, no lower extremity edema, no clubbing or cyanosis  Neurologic: Alert and oriented x4, Cranial nerve II-XII intact, Strength 5/5 in all 4 extremities    VITAL SIGNS: 24 HRS MIN & MAX LAST   Temp  Min: 97.5 °F (36.4 °C)  Max: 98 °F (36.7 °C) 97.7 °F (36.5 °C)   BP  Min: 106/67  Max: 134/83 117/71   Pulse  Min: 63  Max: 76  67   Resp  Min: 18  Max: 20 20   SpO2  Min: 95 %  Max: 97 % 97 %     I have reviewed the following labs:  Recent Labs   Lab 03/20/24  0919 03/21/24  0343 03/22/24  0413   WBC 6.12 7.22 7.31   RBC 4.44* 4.12* 3.82*   HGB 13.5* 12.5* 11.6*   HCT 40.5* 36.9* 34.7*   MCV 91.2 89.6 90.8   MCH 30.4 30.3 30.4   MCHC 33.3 33.9 33.4   RDW 12.7 12.6 12.6    219 207   MPV 9.3 9.9 9.6     Recent Labs   Lab 03/20/24  0919 03/21/24  0343 03/22/24  0413    136 140   K 4.4 4.6 4.0   CO2 28 27 25   BUN 16.0 13.2 14.7   CREATININE 0.93 0.95 1.16   CALCIUM 9.4 9.0 9.0   MG 2.10  --  2.00   ALBUMIN 3.6 3.4* 3.1*   ALKPHOS 71 63 83   ALT 35 30 26   AST 27 19 14   BILITOT 0.3 0.5 0.2     Microbiology Results (last 7 days)       ** No results found for the last 168 hours. **             See below for Radiology    Scheduled Med:   amLODIPine  5 mg Oral Daily    aspirin  81 mg Oral Daily    atorvastatin  80 mg Oral QHS    benzonatate  100 mg Oral TID    clopidogreL  75 mg Oral Daily    isosorbide mononitrate  60 mg Oral Daily    lisinopriL  20 mg Oral Daily    metoprolol tartrate  12.5 mg Oral BID    mupirocin   Nasal BID    pantoprazole  40 mg Oral Daily    ranolazine  1,000 mg Oral BID      Continuous Infusions:     PRN Meds:  acetaminophen, acetaminophen, dextrose 10%, dextrose 10%, glucagon (human recombinant),  glucose, glucose, morphine, ondansetron     Assessment/Plan:  Acute left sided chest pain- intermittent-rule out ACS, likely noncardiac, likely cough related  Hematuria- resolved  Urinary retention- now resolved  SHARIF- resolved  History of essential hypertension, hyperlipidemia, CAD status post CABG 7/2023, GERD, hx of prostate cancer status post prostatectomy, arthritis, anxiety, and depression       Cardiology evaluated patient for atypical chest pain/CAD, recommend continuing Imdur, lisinopril, Lopressor, Ranexa, atorvastatin, amlodipine and restarting aspirin if urine remained clear   Aspirin resumed 3/23, monitor for hematuria and if no further pt can likely DC home tomorrow  Urology evaluate the patient for crest hematuria with clot retention, CBI discontinued, Coto catheter discontinued-cystoscopy outpatient  Troponin negative x3  Hematuria has cleared, CBI and coto DC'ed  Plavix okay to resume by Urology, if hematuria continues to be resolved can add aspirin  DC 3/24 if no hematuria after starting aspirin     VTE prophylaxis:  SCDs    Patient condition:  Stable    Anticipated discharge and Disposition:   Home 3/24      All diagnosis and differential diagnosis have been reviewed; assessment and plan has been documented; I have personally reviewed the labs and test results that are presently available; I have reviewed the patients medication list; I have reviewed the consulting providers response and recommendations. I have reviewed or attempted to review medical records based upon their availability    All of the patient's questions have been  addressed and answered. Patient's is agreeable to the above stated plan. I will continue to monitor closely and make adjustments to medical management as needed.  _____________________________________________________________________    Nutrition Status:    Radiology:  I have personally reviewed the following imaging and agree with the radiologist.     CT Abdomen Pelvis  With IV Contrast NO Oral Contrast  Narrative: EXAMINATION:  CT ABDOMEN PELVIS WITH IV CONTRAST    CLINICAL HISTORY:  Abdominal pain, acute, nonlocalized;Gross hematuria with clots;    TECHNIQUE:  Helical acquisition through the abdomen and pelvis with IV contrast.  Three plane reconstructions were provided for review. DLP 1727 mGycm. Automatic exposure control, adjustment of mA/kV or iterative reconstruction technique was used to reduce radiation.    COMPARISON:  29 September 2021    FINDINGS:  The limited imaged lung bases are clear.    Question hepatic steatosis.  The gallbladder is surgically absent.  No significant abnormality of the spleen, pancreas or adrenals.  No hydronephrosis or suspicious renal lesion.    No bowel obstruction. No significant inflammatory changes of the bowel.  Normal appendix.  No free air.    There is a Fleming in the urinary bladder.  No pelvic free fluid.  The abdominal aorta is normal in caliber.  Moderate atherosclerotic disease.    Moderate degenerative change of the spine.  Impression: No acute abdominopelvic findings.    No significant discrepancy with the preliminary report.    Electronically signed by: Eugene Saldivar  Date:    03/20/2024  Time:    07:06    Thairy Reyes, DO  Department of Hospital Medicine  [facility]  03/23/2024

## 2024-03-23 NOTE — PLAN OF CARE
Problem: Infection  Goal: Absence of Infection Signs and Symptoms  Outcome: Ongoing, Progressing  Intervention: Prevent or Manage Infection  Flowsheets (Taken 3/22/2024 2012)  Infection Management: aseptic technique maintained     Problem: Adult Inpatient Plan of Care  Goal: Plan of Care Review  Outcome: Ongoing, Progressing  Flowsheets (Taken 3/22/2024 2012)  Plan of Care Reviewed With: patient  Goal: Patient-Specific Goal (Individualized)  Outcome: Ongoing, Progressing  Goal: Absence of Hospital-Acquired Illness or Injury  Outcome: Ongoing, Progressing  Intervention: Identify and Manage Fall Risk  Flowsheets (Taken 3/22/2024 2012)  Safety Promotion/Fall Prevention:   assistive device/personal item within reach   medications reviewed   nonskid shoes/socks when out of bed   side rails raised x 2  Intervention: Prevent Skin Injury  Flowsheets (Taken 3/22/2024 2012)  Body Position: position changed independently  Skin Protection:   adhesive use limited   incontinence pads utilized   tubing/devices free from skin contact  Intervention: Prevent and Manage VTE (Venous Thromboembolism) Risk  Flowsheets (Taken 3/22/2024 2012)  Activity Management: Ambulated to bathroom - L4  VTE Prevention/Management:   ambulation promoted   bleeding risk assessed   ROM (active) performed  Range of Motion:   active ROM (range of motion) encouraged   ROM (range of motion) performed  Intervention: Prevent Infection  Flowsheets (Taken 3/22/2024 2012)  Infection Prevention:   rest/sleep promoted   single patient room provided  Goal: Optimal Comfort and Wellbeing  Outcome: Ongoing, Progressing  Intervention: Monitor Pain and Promote Comfort  Flowsheets (Taken 3/22/2024 2012)  Pain Management Interventions:   care clustered   medication offered   pain management plan reviewed with patient/caregiver   pillow support provided   position adjusted  Intervention: Provide Person-Centered Care  Flowsheets (Taken 3/22/2024 2012)  Trust  Relationship/Rapport: care explained  Goal: Readiness for Transition of Care  Outcome: Ongoing, Progressing     Problem: Bariatric Environmental Safety  Goal: Safety Maintained with Care  Outcome: Ongoing, Progressing

## 2024-03-23 NOTE — PLAN OF CARE
Problem: Infection  Goal: Absence of Infection Signs and Symptoms  Outcome: Ongoing, Progressing     Problem: Adult Inpatient Plan of Care  Goal: Plan of Care Review  Outcome: Ongoing, Progressing  Goal: Patient-Specific Goal (Individualized)  Outcome: Ongoing, Progressing  Goal: Absence of Hospital-Acquired Illness or Injury  Outcome: Ongoing, Progressing  Goal: Optimal Comfort and Wellbeing  Outcome: Ongoing, Progressing  Goal: Readiness for Transition of Care  Outcome: Ongoing, Progressing     Problem: Bariatric Environmental Safety  Goal: Safety Maintained with Care  Outcome: Ongoing, Progressing     Problem: Fall Injury Risk  Goal: Absence of Fall and Fall-Related Injury  Outcome: Ongoing, Progressing

## 2024-03-24 VITALS
TEMPERATURE: 98 F | RESPIRATION RATE: 18 BRPM | OXYGEN SATURATION: 97 % | HEIGHT: 66 IN | DIASTOLIC BLOOD PRESSURE: 76 MMHG | SYSTOLIC BLOOD PRESSURE: 160 MMHG | WEIGHT: 267 LBS | HEART RATE: 68 BPM | BODY MASS INDEX: 42.91 KG/M2

## 2024-03-24 PROBLEM — R31.9 HEMATURIA: Status: ACTIVE | Noted: 2024-03-24

## 2024-03-24 LAB
BASOPHILS # BLD AUTO: 0.03 X10(3)/MCL
BASOPHILS NFR BLD AUTO: 0.4 %
EOSINOPHIL # BLD AUTO: 0.44 X10(3)/MCL (ref 0–0.9)
EOSINOPHIL NFR BLD AUTO: 6.1 %
ERYTHROCYTE [DISTWIDTH] IN BLOOD BY AUTOMATED COUNT: 12.6 % (ref 11.5–17)
HCT VFR BLD AUTO: 36.4 % (ref 42–52)
HGB BLD-MCNC: 12.2 G/DL (ref 14–18)
IMM GRANULOCYTES # BLD AUTO: 0.03 X10(3)/MCL (ref 0–0.04)
IMM GRANULOCYTES NFR BLD AUTO: 0.4 %
LYMPHOCYTES # BLD AUTO: 1.77 X10(3)/MCL (ref 0.6–4.6)
LYMPHOCYTES NFR BLD AUTO: 24.7 %
MCH RBC QN AUTO: 30.4 PG (ref 27–31)
MCHC RBC AUTO-ENTMCNC: 33.5 G/DL (ref 33–36)
MCV RBC AUTO: 90.8 FL (ref 80–94)
MONOCYTES # BLD AUTO: 0.61 X10(3)/MCL (ref 0.1–1.3)
MONOCYTES NFR BLD AUTO: 8.5 %
NEUTROPHILS # BLD AUTO: 4.28 X10(3)/MCL (ref 2.1–9.2)
NEUTROPHILS NFR BLD AUTO: 59.9 %
NRBC BLD AUTO-RTO: 0 %
PLATELET # BLD AUTO: 227 X10(3)/MCL (ref 130–400)
PMV BLD AUTO: 9.5 FL (ref 7.4–10.4)
RBC # BLD AUTO: 4.01 X10(6)/MCL (ref 4.7–6.1)
WBC # SPEC AUTO: 7.16 X10(3)/MCL (ref 4.5–11.5)

## 2024-03-24 PROCEDURE — 25000003 PHARM REV CODE 250: Performed by: STUDENT IN AN ORGANIZED HEALTH CARE EDUCATION/TRAINING PROGRAM

## 2024-03-24 PROCEDURE — 85025 COMPLETE CBC W/AUTO DIFF WBC: CPT | Performed by: STUDENT IN AN ORGANIZED HEALTH CARE EDUCATION/TRAINING PROGRAM

## 2024-03-24 PROCEDURE — 25000003 PHARM REV CODE 250: Performed by: NURSE PRACTITIONER

## 2024-03-24 RX ORDER — AMLODIPINE BESYLATE 5 MG/1
5 TABLET ORAL DAILY
Qty: 30 TABLET | Refills: 0 | Status: ON HOLD | OUTPATIENT
Start: 2024-03-25 | End: 2024-04-08 | Stop reason: HOSPADM

## 2024-03-24 RX ADMIN — ASPIRIN 81 MG: 81 TABLET, COATED ORAL at 09:03

## 2024-03-24 RX ADMIN — CLOPIDOGREL BISULFATE 75 MG: 75 TABLET ORAL at 09:03

## 2024-03-24 RX ADMIN — LISINOPRIL 20 MG: 20 TABLET ORAL at 09:03

## 2024-03-24 RX ADMIN — ISOSORBIDE MONONITRATE 60 MG: 60 TABLET, EXTENDED RELEASE ORAL at 09:03

## 2024-03-24 RX ADMIN — AMLODIPINE BESYLATE 5 MG: 5 TABLET ORAL at 09:03

## 2024-03-24 RX ADMIN — METOPROLOL TARTRATE 12.5 MG: 25 TABLET, FILM COATED ORAL at 09:03

## 2024-03-24 RX ADMIN — PANTOPRAZOLE SODIUM 40 MG: 40 TABLET, DELAYED RELEASE ORAL at 09:03

## 2024-03-24 RX ADMIN — BENZONATATE 100 MG: 100 CAPSULE ORAL at 09:03

## 2024-03-24 RX ADMIN — MUPIROCIN: 20 OINTMENT TOPICAL at 09:03

## 2024-03-24 RX ADMIN — RANOLAZINE 1000 MG: 500 TABLET, EXTENDED RELEASE ORAL at 09:03

## 2024-03-24 NOTE — DISCHARGE SUMMARY
[facility]  Oncology Acute  HOSPITAL MEDICINE - DISCHARGE SUMMARY    Patient Name: Alex Reynoso  MRN: [id number]  Admission Date: 3/19/2024  Discharge Date: 03/24/2024  Hospital Length of Stay: 4 days  Discharge Provider: Thairy G Reyes, MD  Primary Care Provider: Glen Duffy NP    HOSPITAL COURSE   54 y.o. male with a past medical history of essential hypertension, hyperlipidemia, CAD, GERD, prostate cancer, arthritis, anxiety, and depression presented to [facility] on 3/19/2024 for chest pain.  Patient reported left-sided chest pain with shortness of breath and diaphoresis that began yesterday morning. Patient reported intermittent sticking pain to left chest with radiation to back worse with movement.  Patient also reported intermittent abdominal pain and hematuria since November 2023.  Patient endorsed use of Plavix.  Patient reported yesterday he began to have urinary retention with increased abdominal pain.  Patient reported passing blood clots in the urine with gross hematuria following.  Patient denied fever, chills, dysuria, nausea, and vomiting.  Patient reported chest pain improved in ED after nitroglycerin and morphine.  Initial vital signs in ED were /80, pulse 69, respirations 20, temperature 36.7° C, and SpO2 98% on room air.  Labs revealed WBC 8.17, RBC 4.50, hemoglobin 13.8, hematocrit 40.6, BUN 17.8, creatinine 1.25, glucose 102, BNP 34.9, , and undetectable troponin.  UA revealed 1+ protein, 3+ blood, and greater than 100 red blood cells.  EKG revealed normal sinus rhythm with heart rate of 65 beats per minute and first-degree AV block.  Chest x-ray revealed no acute cardiopulmonary process.  CT abdomen and pelvis with IV contrast revealed no acute abdominopelvic findings.  Indwelling Fleming was placed in ED with CBI.  Patient was given nitroglycerin tablet 0.5 mg, IV morphine 4 mg x 2, IV Zofran 4 mg, LR, and IV Rocephin 1 g in ED. Cardiology and neurology services were  consulted. Patient was admitted to hospital medicine service for further medical management.     Patient underwent CBI until hematuria cleared.  Plavix and aspirin were resumed sequentially.  Patient does have pink to ensure and however this is likely trauma from CBI and Coto catheter.  Cleared for discharge by Urology with recommendation for outpatient cystoscopy.  Stable for discharge to home today.  PHYSICAL EXAM     Most Recent Vital Signs:  Temp: 97.5 °F (36.4 °C) (03/24/24 1133)  Pulse: 68 (03/24/24 1133)  Resp: 18 (03/24/24 1133)  BP: (!) 160/76 (03/24/24 1133)  SpO2: 97 % (03/24/24 1133)   GENERAL: In no acute distress, afebrile, obese  HEENT:  PERRLA  CHEST: Clear to auscultation bilaterally  HEART: S1, S2, no appreciable murmur  ABDOMEN: Soft, nontender, BS +  MSK: Warm, no lower extremity edema, no clubbing or cyanosis  NEUROLOGIC: Alert and oriented x4, moving all extremities with good strength     DISCHARGE DIAGNOSIS   Acute left sided chest pain- intermittent-rule out ACS, likely noncardiac, likely cough related  Hematuria- resolved  Urinary retention- now resolved  SHARIF- resolved  History of essential hypertension, hyperlipidemia, CAD status post CABG 7/2023, GERD, hx of prostate cancer status post prostatectomy, arthritis, anxiety, and depression       Cardiology evaluated patient for atypical chest pain/CAD, recommend continuing Imdur, lisinopril, Lopressor, Ranexa, atorvastatin, amlodipine and restarting aspirin   Aspirin resumed 3/23, no gross hematuria since, plavix also resumed during the admission, tolerating   Urology recommends cystoscopy outpatient  Troponin negative x3  Hematuria has cleared, CBI and coto DC'ed  _____________________________________________________________________________      DISCHARGE MED REC     Current Discharge Medication List        START taking these medications    Details   amLODIPine (NORVASC) 5 MG tablet Take 1 tablet (5 mg total) by mouth once daily.  Qty: 30  tablet, Refills: 0    Comments: .           CONTINUE these medications which have NOT CHANGED    Details   albuterol (VENTOLIN HFA) 90 mcg/actuation inhaler Inhale 2 puffs into the lungs every 6 (six) hours as needed for Wheezing. Rescue      aspirin (ECOTRIN) 81 MG EC tablet Take 81 mg by mouth every morning. Stopped 3/26/23      atorvastatin (LIPITOR) 80 MG tablet Take 80 mg by mouth every evening.      buPROPion (WELLBUTRIN SR) 150 MG TBSR 12 hr tablet Take 150 mg by mouth once daily.      clopidogreL (PLAVIX) 75 mg tablet Take 1 tablet (75 mg total) by mouth once daily.  Qty: 30 tablet, Refills: 11      isosorbide mononitrate (IMDUR) 60 MG 24 hr tablet Take 1 tablet (60 mg total) by mouth once daily.  Qty: 30 tablet, Refills: 3      lisinopriL (PRINIVIL,ZESTRIL) 20 MG tablet Take 20 mg by mouth once daily.      metoprolol tartrate (LOPRESSOR) 25 MG tablet Take 0.5 tablets (12.5 mg total) by mouth 2 (two) times daily.  Qty: 30 tablet, Refills: 1      nitroGLYCERIN (NITROSTAT) 0.4 MG SL tablet Place under the tongue.      oxyCODONE-acetaminophen (PERCOCET)  mg per tablet Take 1 tablet by mouth every 4 (four) hours as needed for Pain.      pregabalin (LYRICA) 100 MG capsule Take 100 mg by mouth 2 (two) times daily as needed.      ranolazine 1,000 mg PSRG Take 1,000 mg by mouth 2 (two) times daily.           STOP taking these medications       amoxicillin-clavulanate 875-125mg (AUGMENTIN) 875-125 mg per tablet Comments:   Reason for Stopping:         diclofenac (VOLTAREN) 75 MG EC tablet Comments:   Reason for Stopping:                  CONSULTS     Consults (From admission, onward)          Status Ordering Provider     Inpatient consult to Urology  Once        Provider:  Demarcus Shaver MD    Completed MOHSEN BAZAN     Inpatient consult to Cardiology  Once        Provider:  Valeriy Flores MD    Completed MOHSEN BAZAN              FOLLOW UP      Follow-up Information       Glen Duffy  NP Follow up.    Specialty: Family Medicine  Contact information:  [address] [address]  [phone]               Demarcus Shaver MD Follow up.    Specialty: Urology  Contact information:  [address] [address]  [phone]                                 DISCHARGE INSTRUCTIONS     Explained in detail to the patient about the discharge plan, medications, and follow-up visits. Pt understands and agrees with the treatment plan.  Discharged Condition: stable  Diet as tolerated  Activities as tolerated  Discharge to: Home or Self Care    TIME SPENT ON DISCHARGE   35 minutes        Thairy G Reyes, MD  Internal Medicine  Department of Hospital Medicine [facility] - Oncology Acute      This document was created using electronic dictation services.  Please excuse any errors that may have been made.  Contact me if any questions regarding documentation to clarify verbiage.

## 2024-03-24 NOTE — PLAN OF CARE
Problem: Infection  Goal: Absence of Infection Signs and Symptoms  Outcome: Met     Problem: Adult Inpatient Plan of Care  Goal: Plan of Care Review  Outcome: Met  Goal: Patient-Specific Goal (Individualized)  Outcome: Met  Goal: Absence of Hospital-Acquired Illness or Injury  Outcome: Met  Goal: Optimal Comfort and Wellbeing  Outcome: Met  Goal: Readiness for Transition of Care  Outcome: Met     Problem: Bariatric Environmental Safety  Goal: Safety Maintained with Care  Outcome: Met     Problem: Fall Injury Risk  Goal: Absence of Fall and Fall-Related Injury  Outcome: Met

## 2024-03-26 ENCOUNTER — PATIENT OUTREACH (OUTPATIENT)
Dept: ADMINISTRATIVE | Facility: CLINIC | Age: 54
End: 2024-03-26
Payer: MEDICARE

## 2024-03-26 NOTE — PROGRESS NOTES
C3 nurse attempted to contact Alex Reynoso  for a TCC post hospital discharge follow up call. No answer. The patient does not have a scheduled HOSFU appointment, and the pt does not have an [facility] PCP.

## 2024-03-27 NOTE — PROGRESS NOTES
C3 nurse made second attempt to contact Alex Reynoso for a TCC post hospital discharge follow up call.

## 2024-03-27 NOTE — PROGRESS NOTES
C3 nurse spoke with Aelx Reynoso  for a TCC post hospital discharge follow up call. The patient has a scheduled [facility] appointment with  with Glen Duffy NP (Family Medicine) on 4/4/24

## 2024-03-30 ENCOUNTER — HOSPITAL ENCOUNTER (EMERGENCY)
Facility: HOSPITAL | Age: 54
Discharge: HOME | End: 2024-03-30
Attending: STUDENT IN AN ORGANIZED HEALTH CARE EDUCATION/TRAINING PROGRAM
Payer: MEDICARE

## 2024-03-30 VITALS
SYSTOLIC BLOOD PRESSURE: 127 MMHG | HEIGHT: 66 IN | HEART RATE: 68 BPM | WEIGHT: 260.81 LBS | DIASTOLIC BLOOD PRESSURE: 77 MMHG | TEMPERATURE: 98 F | RESPIRATION RATE: 18 BRPM | OXYGEN SATURATION: 97 % | BODY MASS INDEX: 41.91 KG/M2

## 2024-03-30 DIAGNOSIS — R31.9 HEMATURIA, UNSPECIFIED TYPE: Primary | ICD-10-CM

## 2024-03-30 LAB
ALBUMIN SERPL-MCNC: 3.5 G/DL (ref 3.5–5)
ALBUMIN/GLOB SERPL: 1.1 RATIO (ref 1.1–2)
ALP SERPL-CCNC: 78 UNIT/L (ref 40–150)
ALT SERPL-CCNC: 26 UNIT/L (ref 0–55)
APPEARANCE UR: ABNORMAL
AST SERPL-CCNC: 16 UNIT/L (ref 5–34)
BACTERIA #/AREA URNS AUTO: ABNORMAL /HPF
BASOPHILS # BLD AUTO: 0.05 X10(3)/MCL
BASOPHILS NFR BLD AUTO: 0.7 %
BILIRUB SERPL-MCNC: 0.2 MG/DL
BILIRUB UR QL STRIP.AUTO: NEGATIVE
BUN SERPL-MCNC: 18.3 MG/DL (ref 8.4–25.7)
CALCIUM SERPL-MCNC: 9.4 MG/DL (ref 8.4–10.2)
CHLORIDE SERPL-SCNC: 104 MMOL/L (ref 98–107)
CO2 SERPL-SCNC: 23 MMOL/L (ref 22–29)
COLOR UR AUTO: ABNORMAL
CREAT SERPL-MCNC: 1 MG/DL (ref 0.73–1.18)
EOSINOPHIL # BLD AUTO: 0.48 X10(3)/MCL (ref 0–0.9)
EOSINOPHIL NFR BLD AUTO: 6.5 %
ERYTHROCYTE [DISTWIDTH] IN BLOOD BY AUTOMATED COUNT: 12.7 % (ref 11.5–17)
GFR SERPLBLD CREATININE-BSD FMLA CKD-EPI: >60 MLS/MIN/1.73/M2
GLOBULIN SER-MCNC: 3.3 GM/DL (ref 2.4–3.5)
GLUCOSE SERPL-MCNC: 126 MG/DL (ref 74–100)
GLUCOSE UR QL STRIP.AUTO: NORMAL
HCT VFR BLD AUTO: 37.9 % (ref 42–52)
HGB BLD-MCNC: 12.9 G/DL (ref 14–18)
IMM GRANULOCYTES # BLD AUTO: 0.01 X10(3)/MCL (ref 0–0.04)
IMM GRANULOCYTES NFR BLD AUTO: 0.1 %
KETONES UR QL STRIP.AUTO: NEGATIVE
LEUKOCYTE ESTERASE UR QL STRIP.AUTO: NEGATIVE
LYMPHOCYTES # BLD AUTO: 1.92 X10(3)/MCL (ref 0.6–4.6)
LYMPHOCYTES NFR BLD AUTO: 26.2 %
MCH RBC QN AUTO: 30.1 PG (ref 27–31)
MCHC RBC AUTO-ENTMCNC: 34 G/DL (ref 33–36)
MCV RBC AUTO: 88.6 FL (ref 80–94)
MONOCYTES # BLD AUTO: 0.62 X10(3)/MCL (ref 0.1–1.3)
MONOCYTES NFR BLD AUTO: 8.5 %
MUCOUS THREADS URNS QL MICRO: ABNORMAL /LPF
NEUTROPHILS # BLD AUTO: 4.25 X10(3)/MCL (ref 2.1–9.2)
NEUTROPHILS NFR BLD AUTO: 58 %
NITRITE UR QL STRIP.AUTO: NEGATIVE
NRBC BLD AUTO-RTO: 0 %
PH UR STRIP.AUTO: 6 [PH]
PLATELET # BLD AUTO: 252 X10(3)/MCL (ref 130–400)
PMV BLD AUTO: 9.1 FL (ref 7.4–10.4)
POTASSIUM SERPL-SCNC: 4 MMOL/L (ref 3.5–5.1)
PROT SERPL-MCNC: 6.8 GM/DL (ref 6.4–8.3)
PROT UR QL STRIP.AUTO: ABNORMAL
RBC # BLD AUTO: 4.28 X10(6)/MCL (ref 4.7–6.1)
RBC #/AREA URNS AUTO: >100 /HPF
RBC UR QL AUTO: ABNORMAL
SODIUM SERPL-SCNC: 138 MMOL/L (ref 136–145)
SP GR UR STRIP.AUTO: 1.03 (ref 1–1.03)
SQUAMOUS #/AREA URNS LPF: ABNORMAL /HPF
UROBILINOGEN UR STRIP-ACNC: NORMAL
WBC # SPEC AUTO: 7.33 X10(3)/MCL (ref 4.5–11.5)
WBC #/AREA URNS AUTO: ABNORMAL /HPF
YEAST BUDDING URNS QL: ABNORMAL /HPF

## 2024-03-30 PROCEDURE — 85025 COMPLETE CBC W/AUTO DIFF WBC: CPT | Performed by: NURSE PRACTITIONER

## 2024-03-30 PROCEDURE — 99283 EMERGENCY DEPT VISIT LOW MDM: CPT

## 2024-03-30 PROCEDURE — 81001 URINALYSIS AUTO W/SCOPE: CPT | Performed by: STUDENT IN AN ORGANIZED HEALTH CARE EDUCATION/TRAINING PROGRAM

## 2024-03-30 PROCEDURE — 80053 COMPREHEN METABOLIC PANEL: CPT | Performed by: NURSE PRACTITIONER

## 2024-03-30 NOTE — DISCHARGE INSTRUCTIONS
Please hold your Plavix for the next 2 days.      Follow-up with urology.  Please see contact information for [facility].  Please call [phone].  Please inform them you were seen in the emergency department again for blood in urine.    Return to the emergency department if any new or worsening symptoms, nausea, vomiting, unable to urinate, severe abdominal pain, fever, or any other concerns.

## 2024-03-30 NOTE — ED PROVIDER NOTES
Encounter Date: 3/29/2024       History     Chief Complaint   Patient presents with    Hematuria     Pt w/ hematuria w/ clots and pain x2 days. Recent hospitalization for hematuria, has urology appt on 5/1 w/ [facility] urology. HX: chemo and radiation for prostate cancer (2022), MI in 2023 (takes plavix), HTN.      See MDM    The history is provided by the patient. No  was used.     Review of patient's allergies indicates:  No Known Allergies  Past Medical History:   Diagnosis Date    Acid reflux     Arthritis     Atherosclerosis of native arteries of extremities with intermittent claudication, unspecified extremity     Back pain     CAD (coronary artery disease), native coronary artery     Coronary artery disease     Coronary artery disease involving native coronary artery of native heart, unspecified whether angina present     Depression     GERD (gastroesophageal reflux disease)     High cholesterol     HTN (hypertension)     Hyperglycemia     Obesity     Prostate cancer     Seasonal allergies     Shortness of breath     SOB (shortness of breath)     ST elevation (STEMI) myocardial infarction of unspecified site     Tobacco use      Past Surgical History:   Procedure Laterality Date    CHOLECYSTECTOMY      COLONOSCOPY      CORONARY ANGIOGRAPHY N/A 06/21/2023    Procedure: ANGIOGRAM, CORONARY ARTERY;  Surgeon: Raji Hughes MD;  Location: [facility] CATH LAB;  Service: Cardiology;  Laterality: N/A;    CORONARY ANGIOGRAPHY N/A 10/09/2023    Procedure: ANGIOGRAM, CORONARY ARTERY;  Surgeon: Dawit Rodarte MD;  Location: [facility] CATH LAB;  Service: Cardiology;  Laterality: N/A;    CORONARY ANGIOGRAPHY Left 3/12/2024    Procedure: ANGIOGRAM, CORONARY ARTERY;  Surgeon: Raji Hughes MD;  Location: [facility] CATH LAB;  Service: Cardiology;  Laterality: Left;  +/- PCI    CORONARY ARTERY BYPASS GRAFT (CABG) N/A 06/27/2023    Procedure: CORONARY ARTERY BYPASS GRAFT (CABG);  Surgeon: Jhony Sharma MD;  Location: [facility]  OR;  Service: Cardiovascular;  Laterality: N/A;  ECHO NOTIFIED    CORONARY BYPASS GRAFT ANGIOGRAPHY  3/12/2024    Procedure: Bypass graft study;  Surgeon: Raji Hughes MD;  Location: [facility] CATH LAB;  Service: Cardiology;;    ENDOSCOPIC HARVEST OF VEIN N/A 06/27/2023    Procedure: SURGICAL PROCUREMENT, VEIN, ENDOSCOPIC;  Surgeon: Jhony Sharma MD;  Location: [facility] OR;  Service: Cardiovascular;  Laterality: N/A;    INSTANTANEOUS WAVE-FREE RATIO (IFR) N/A 3/12/2024    Procedure: Instantaneous Wave-Free Ratio (IFR);  Surgeon: Raji Hughes MD;  Location: [facility] CATH LAB;  Service: Cardiology;  Laterality: N/A;    INTRAVASCULAR ULTRASOUND, CORONARY N/A 10/09/2023    Procedure: Ultrasound-coronary;  Surgeon: Dawit Rodarte MD;  Location: [facility] CATH LAB;  Service: Cardiology;  Laterality: N/A;    LEFT HEART CATHETERIZATION Left 12/01/2022      Moderate noncritical multivessel CAD with stenosis up to 60%    LEFT HEART CATHETERIZATION Left 06/21/2023    Procedure: Left heart cath;  Surgeon: Raji Hughes MD;  Location: [facility] CATH LAB;  Service: Cardiology;  Laterality: Left;  LHC +/- PCI    LEFT HEART CATHETERIZATION Left 3/12/2024    Procedure: Left heart cath;  Surgeon: Raji Hughes MD;  Location: [facility] CATH LAB;  Service: Cardiology;  Laterality: Left;    LIPOMA RESECTION N/A 03/30/2023    Procedure: EXCISION, LIPOMA (excision of post neck lipoma);  Surgeon: Samuel Cruz MD;  Location: [facility] OR;  Service: General;  Laterality: N/A;  10 x 5 lipoma     PLATING OF STERNUM N/A 06/27/2023    Procedure: PLATING, STERNAL;  Surgeon: Jhony Sharma MD;  Location: [facility] OR;  Service: Cardiovascular;  Laterality: N/A;    PROSTATECTOMY      STENT, DRUG ELUTING, SINGLE VESSEL, CORONARY  10/09/2023    Procedure: Stent, Drug Eluting, Single Vessel, Coronary;  Surgeon: Dawit Rodarte MD;  Location: [facility] CATH LAB;  Service: Cardiology;;    VENTRICULOGRAM, LEFT  06/21/2023    Procedure: Ventriculogram, Left;  Surgeon: Raji Hughes MD;   Location: [facility] CATH LAB;  Service: Cardiology;;     Family History   Problem Relation Age of Onset    Hypertension Mother     Diabetes Mother     Prostate cancer Father      Social History     Tobacco Use    Smoking status: Former     Average packs/day: 0.5 packs/day for 34.2 years (16.7 ttl pk-yrs)     Types: Cigarettes     Start date: 1990    Smokeless tobacco: Never   Substance Use Topics    Alcohol use: Not Currently     Comment: socially    Drug use: Not Currently     Review of Systems   Constitutional:  Negative for fever.   Respiratory:  Negative for cough and shortness of breath.    Cardiovascular:  Negative for chest pain.   Gastrointestinal:  Negative for abdominal pain.   Genitourinary:  Positive for hematuria. Negative for difficulty urinating and dysuria.   Musculoskeletal:  Negative for gait problem.   Skin:  Negative for color change.   Neurological:  Negative for dizziness, speech difficulty and headaches.   Psychiatric/Behavioral:  Negative for hallucinations and suicidal ideas.    All other systems reviewed and are negative.      Physical Exam     Initial Vitals [03/30/24 0004]   BP Pulse Resp Temp SpO2   130/77 85 18 98.2 °F (36.8 °C) 99 %      MAP       --         Physical Exam    Nursing note and vitals reviewed.  Constitutional: He appears well-developed and well-nourished.   HENT:   Head: Normocephalic.   Eyes: EOM are normal.   Neck: Neck supple.   Normal range of motion.  Cardiovascular:  Normal rate, regular rhythm, normal heart sounds and intact distal pulses.           Pulmonary/Chest: Breath sounds normal.   Abdominal: Abdomen is soft. Bowel sounds are normal.   Musculoskeletal:         General: Normal range of motion.      Cervical back: Normal range of motion and neck supple.     Neurological: He is alert and oriented to person, place, and time. He has normal strength.   Skin: Skin is warm and dry. Capillary refill takes less than 2 seconds.   Psychiatric: He has a normal mood and  affect. His behavior is normal. Judgment and thought content normal.         ED Course   Procedures  Labs Reviewed   URINALYSIS, REFLEX TO URINE CULTURE - Abnormal; Notable for the following components:       Result Value    Color, UA Red-brown (*)     Appearance, UA Turbid (*)     Protein, UA 2+ (*)     Blood, UA 3+ (*)     Budding Yeast, UA Few (*)     Mucous, UA Trace (*)     RBC, UA >100 (*)     All other components within normal limits   COMPREHENSIVE METABOLIC PANEL - Abnormal; Notable for the following components:    Glucose Level 126 (*)     All other components within normal limits   CBC WITH DIFFERENTIAL - Abnormal; Notable for the following components:    RBC 4.28 (*)     Hgb 12.9 (*)     Hct 37.9 (*)     All other components within normal limits   CBC W/ AUTO DIFFERENTIAL    Narrative:     The following orders were created for panel order CBC auto differential.  Procedure                               Abnormality         Status                     ---------                               -----------         ------                     CBC with Differential[[id number]]       Abnormal            Final result                 Please view results for these tests on the individual orders.          Imaging Results    None          Medications - No data to display  Medical Decision Making  Historian:  Patient.  Patient is a 54-year-old male  that presents with hematuria that has been present 2 days. Associated symptoms pelvic pain. Surrounding information is patient was just discharged in the hospital for hematuria requiring a three-way catheter and irrigation. Exacerbated by nothing. Relieved by nothing. Patient treatment prior to arrival none. Risk factors include none. Other history pertaining to this complaint nothing.   Assessment:  See physical exam.  DD:  Hematuria, hemorrhagic cystitis, bladder cancer  ED Course: History was obtained.  Physical was performed.  Bladder scan did not showed small amount of  urine.     Amount and/or Complexity of Data Reviewed  Labs: ordered.               ED Course as of 03/31/24 1406   Sat Mar 30, 2024   0117 CLINICAL HISTORY:  Abdominal pain, acute, nonlocalized;Gross hematuria with clots;     TECHNIQUE:  Helical acquisition through the abdomen and pelvis with IV contrast.  Three plane reconstructions were provided for review. DLP 1727 mGycm. Automatic exposure control, adjustment of mA/kV or iterative reconstruction technique was used to reduce radiation.     COMPARISON:  29 September 2021     FINDINGS:  The limited imaged lung bases are clear.     Question hepatic steatosis.  The gallbladder is surgically absent.  No significant abnormality of the spleen, pancreas or adrenals.  No hydronephrosis or suspicious renal lesion.     No bowel obstruction. No significant inflammatory changes of the bowel.  Normal appendix.  No free air.     There is a Fleming in the urinary bladder.  No pelvic free fluid.  The abdominal aorta is normal in caliber.  Moderate atherosclerotic disease.     Moderate degenerative change of the spine.     Impression:     No acute abdominopelvic findings.     No significant discrepancy with the preliminary report.        Electronically signed by: Eugene Saldivar  Date:                                            03/20/2024  Time:                                           07:06   [RP]      ED Course User Index  [RP] Jigar Paez MD                           Clinical Impression:  Final diagnoses:  [R31.9] Hematuria, unspecified type (Primary)          ED Disposition Condition    Discharge Stable          ED Prescriptions    None       Follow-up Information       Follow up With Specialties Details Why Contact Info    Glen Duffy NP Family Medicine   [address] [address]  [phone]      Primary Care  Call in 1 day  Please call [phone] for a primary care provider.    Demarcus Shaver MD Urology   [address] [place] [address]  [place] [address]  [phone]               Teddy Draper, [facility]  03/31/24 8085

## 2024-04-05 ENCOUNTER — ANESTHESIA EVENT (OUTPATIENT)
Dept: SURGERY | Facility: HOSPITAL | Age: 54
DRG: 669 | End: 2024-04-05
Payer: MEDICARE

## 2024-04-05 NOTE — ANESTHESIA PREPROCEDURE EVALUATION
04/05/2024 for 4/6/2024  Alex Reynoso is a 54 y.o., male.    Pre-op Diagnosis: Hematuria, unspecified type [R31.9]    Procedure(s):  CYSTOSCOPY  EVACUATION, HEMATOMA     Review of patient's allergies indicates:  No Known Allergies    Current Outpatient Medications   Medication Instructions    albuterol (VENTOLIN HFA) 90 mcg/actuation inhaler 2 puffs, Inhalation, Every 6 hours PRN, Rescue    amLODIPine (NORVASC) 5 mg, Oral, Daily    aspirin (ECOTRIN) 81 mg, Oral, Every morning, Stopped 3/26/23    atorvastatin (LIPITOR) 80 mg, Oral, Nightly    buPROPion (WELLBUTRIN SR) 150 mg, Oral, Daily    clopidogreL (PLAVIX) 75 mg, Oral, Daily    isosorbide mononitrate (IMDUR) 60 mg, Oral, Daily    lisinopriL (PRINIVIL,ZESTRIL) 20 mg, Oral, Daily    metoprolol tartrate (LOPRESSOR) 12.5 mg, Oral, 2 times daily    nitroGLYCERIN (NITROSTAT) 0.4 MG SL tablet Sublingual    oxyCODONE-acetaminophen (PERCOCET)  mg per tablet 1 tablet, Oral, Every 4 hours PRN    pregabalin (LYRICA) 100 mg, Oral, 2 times daily PRN    ranolazine 1,000 mg, Oral, 2 times daily   Last Plavix yesterday        Past Medical History:   Diagnosis Date    Acid reflux     Arthritis     Atherosclerosis of native arteries of extremities with intermittent claudication, unspecified extremity     Back pain     CAD (coronary artery disease), native coronary artery     Coronary artery disease     Coronary artery disease involving native coronary artery of native heart, unspecified whether angina present     Depression     GERD (gastroesophageal reflux disease)     High cholesterol     HTN (hypertension)     Hyperglycemia     Obesity     Prostate cancer     Seasonal allergies     Shortness of breath     SOB (shortness of breath)     ST elevation (STEMI) myocardial infarction of unspecified site     Tobacco use    PMH includes Morbid Obesity    Past Surgical History:   Procedure Laterality Date     CHOLECYSTECTOMY      COLONOSCOPY      CORONARY ANGIOGRAPHY N/A 06/21/2023    Procedure: ANGIOGRAM, CORONARY ARTERY;  Surgeon: Raji Hughes MD;  Location: [facility] CATH LAB;  Service: Cardiology;  Laterality: N/A;    CORONARY ANGIOGRAPHY N/A 10/09/2023    Procedure: ANGIOGRAM, CORONARY ARTERY;  Surgeon: Dawit Rodarte MD;  Location: [facility] CATH LAB;  Service: Cardiology;  Laterality: N/A;    CORONARY ANGIOGRAPHY Left 3/12/2024    Procedure: ANGIOGRAM, CORONARY ARTERY;  Surgeon: Raji Hughes MD;  Location: [facility] CATH LAB;  Service: Cardiology;  Laterality: Left;  +/- PCI    CORONARY ARTERY BYPASS GRAFT (CABG) N/A 06/27/2023    Procedure: CORONARY ARTERY BYPASS GRAFT (CABG);  Surgeon: Jhony Sharma MD;  Location: [facility] OR;  Service: Cardiovascular;  Laterality: N/A;  ECHO NOTIFIED    CORONARY BYPASS GRAFT ANGIOGRAPHY  3/12/2024    Procedure: Bypass graft study;  Surgeon: Raji Hughes MD;  Location: [facility] CATH LAB;  Service: Cardiology;;    ENDOSCOPIC HARVEST OF VEIN N/A 06/27/2023    Procedure: SURGICAL PROCUREMENT, VEIN, ENDOSCOPIC;  Surgeon: Jhony Sharma MD;  Location: [facility] OR;  Service: Cardiovascular;  Laterality: N/A;    INSTANTANEOUS WAVE-FREE RATIO (IFR) N/A 3/12/2024    Procedure: Instantaneous Wave-Free Ratio (IFR);  Surgeon: Raji Hughes MD;  Location: [facility] CATH LAB;  Service: Cardiology;  Laterality: N/A;    INTRAVASCULAR ULTRASOUND, CORONARY N/A 10/09/2023    Procedure: Ultrasound-coronary;  Surgeon: Dawit Rodarte MD;  Location: [facility] CATH LAB;  Service: Cardiology;  Laterality: N/A;    LEFT HEART CATHETERIZATION Left 12/01/2022      Moderate noncritical multivessel CAD with stenosis up to 60%    LEFT HEART CATHETERIZATION Left 06/21/2023    Procedure: Left heart cath;  Surgeon: Raji Hughes MD;  Location: [facility] CATH LAB;  Service: Cardiology;  Laterality: Left;  LHC +/- PCI    LEFT HEART CATHETERIZATION Left 3/12/2024    Procedure: Left heart cath;  Surgeon: Raji Hughes MD;  Location: [facility] CATH LAB;   Service: Cardiology;  Laterality: Left;    LIPOMA RESECTION N/A 03/30/2023    Procedure: EXCISION, LIPOMA (excision of post neck lipoma);  Surgeon: Samuel Cruz MD;  Location: [facility] OR;  Service: General;  Laterality: N/A;  10 x 5 lipoma     PLATING OF STERNUM N/A 06/27/2023    Procedure: PLATING, STERNAL;  Surgeon: Jhony Sharma MD;  Location: [facility] OR;  Service: Cardiovascular;  Laterality: N/A;    PROSTATECTOMY      STENT, DRUG ELUTING, SINGLE VESSEL, CORONARY  10/09/2023    Procedure: Stent, Drug Eluting, Single Vessel, Coronary;  Surgeon: Dawit Rodarte MD;  Location: [facility] CATH LAB;  Service: Cardiology;;    VENTRICULOGRAM, LEFT  06/21/2023    Procedure: Ventriculogram, Left;  Surgeon: Raji Hughes MD;  Location: [facility] CATH LAB;  Service: Cardiology;;     Recent Labs   Lab 03/30/24  0035 04/05/24  0900   WBC 7.33 10.99   RBC 4.28* 4.65*   HGB 12.9* 14.1   HCT 37.9* 41.7*   MCV 88.6 89.7   MCH 30.1 30.3   MCHC 34.0 33.8   RDW 12.7 12.8    252   MPV 9.1 9.3       Recent Labs   Lab 03/30/24  0035 04/05/24  0900    138   K 4.0 4.2   CO2 23 23   BUN 18.3 16.1   CREATININE 1.00 0.98   CALCIUM 9.4 10.0   ALBUMIN 3.5 3.9   ALKPHOS 78 88   ALT 26 25   AST 16 15   BILITOT 0.2 0.2       Recent Labs   Lab 04/05/24  1024   PTT 32.9   INR 1.0      Cardiac cath 3/12/2024  Coronary findings:   Dominance: right   Left main: no obstructive disease with <10% epicardial stenosis  Left anterior descending artery:  Ostial 90% stenosis.  Diffuse disease in the mid segment.  Competitive flow noted in the mid LAD from a patent LIMA graft.  Circumflex artery:  Severe 90% stenosis in the proximal obtuse marginal branch.  Right coronary artery:  Proximal eccentric 40% stenosis in the RCA.  Widely patent mid to distal RCA stents.     Graft angiogram:  LIMA to LAD:  Widely patent  SVG to OM:  Widely patent  SVG to RCA: Known to be occluded     Hemodynamics: LV/AO = no gradient             LVEDP = 24 mmHg      TTE 1/29/2024      Left Ventricle: The left ventricle is normal in size. Normal wall thickness. Normal wall motion. There is normal systolic function with a visually estimated ejection fraction of 60 - 65%. There is normal diastolic function.    Right Ventricle: Right ventricle was not well visualized due to poor acoustic window.    Aortic Valve: The aortic valve is a trileaflet valve.    IVC/SVC: Normal venous pressure at 3 mmHg.    ECG 3/20/2024  Vent. Rate : 065 BPM     Atrial Rate : 065 BPM      P-R Int : 214 ms          QRS Dur : 088 ms       QT Int : 392 ms       P-R-T Axes : 032 -49 064 degrees      QTc Int : 407 ms     Sinus rhythm with 1st degree A-V block   Left axis deviation   Nonspecific T wave abnormality   Abnormal ECG   Confirmed by Parish Mackenzie MD ([id number]) on 3/20/2024 6:58:36 AM     CXR 3/19/2024 - NACPD, prior median sternotomy    Pre-op Assessment    I have reviewed the Patient Summary Reports.    I have reviewed the NPO Status.   I have reviewed the Medications.     Review of Systems  Anesthesia Hx:  No problems with previous Anesthesia             Denies Family Hx of Anesthesia complications.    Denies Personal Hx of Anesthesia complications.                    Social:  Former Smoker Tob quit 3 months ago      Hematology/Oncology:                        --  Cancer in past history:                 Oncology Comments: Prostate Cancer     Cardiovascular:  Exercise tolerance: good   Hypertension  Past MI CAD       Denies Angina.   Denies Orthopnea.  Denies PND.  hyperlipidemia  Denies JEROME.    Functional Capacity good / => 4 METS                         Pulmonary:      Shortness of breath                  Hepatic/GI:     GERD             Musculoskeletal:  Musculoskeletal Normal                Neurological:  Denies TIA.  Denies CVA.                                    Endocrine:        Morbid Obesity / BMI > 40  Psych:    depression                Physical Exam  General: Well nourished, Alert and  Oriented    Airway:  Mallampati: IV   Mouth Opening: Normal  TM Distance: 4 - 6 cm  Tongue: Normal  Neck ROM: Normal ROM    Dental:  Intact  Missing most of rear molars & many premolars; denies loose teeth  Chest/Lungs:  Clear to auscultation    Heart:  Rate: Normal  Rhythm: Regular Rhythm  No pretibial edema  No carotid bruits      Anesthesia Plan  Type of Anesthesia, risks & benefits discussed:    Anesthesia Type: Gen ETT  Intra-op Monitoring Plan: Standard ASA Monitors  Post Op Pain Control Plan: multimodal analgesia  Induction:  IV  Airway Plan: Direct, Post-Induction  Informed Consent: Informed consent signed with the Patient and all parties understand the risks and agree with anesthesia plan.  All questions answered. Patient consented to blood products? Yes  ASA Score: 3  Day of Surgery Review of History & Physical: H&P Update referred to the surgeon/provider.    Ready For Surgery From Anesthesia Perspective.     .

## 2024-04-06 ENCOUNTER — ANESTHESIA (OUTPATIENT)
Dept: SURGERY | Facility: HOSPITAL | Age: 54
DRG: 669 | End: 2024-04-06
Payer: MEDICARE

## 2024-04-06 PROCEDURE — 63600175 PHARM REV CODE 636 W HCPCS

## 2024-04-06 PROCEDURE — 0T5B8ZZ DESTRUCTION OF BLADDER, VIA NATURAL OR ARTIFICIAL OPENING ENDOSCOPIC: ICD-10-PCS | Performed by: UROLOGY

## 2024-04-06 PROCEDURE — D9220A PRA ANESTHESIA: Mod: ANES,,, | Performed by: ANESTHESIOLOGY

## 2024-04-06 PROCEDURE — 25000003 PHARM REV CODE 250

## 2024-04-06 PROCEDURE — D9220A PRA ANESTHESIA: Mod: CRNA,,,

## 2024-04-06 RX ORDER — ROCURONIUM BROMIDE 10 MG/ML
INJECTION, SOLUTION INTRAVENOUS
Status: DISCONTINUED | OUTPATIENT
Start: 2024-04-06 | End: 2024-04-06

## 2024-04-06 RX ORDER — ONDANSETRON HYDROCHLORIDE 2 MG/ML
INJECTION, SOLUTION INTRAVENOUS
Status: DISCONTINUED | OUTPATIENT
Start: 2024-04-06 | End: 2024-04-06

## 2024-04-06 RX ORDER — LIDOCAINE HYDROCHLORIDE 20 MG/ML
INJECTION INTRAVENOUS
Status: DISCONTINUED | OUTPATIENT
Start: 2024-04-06 | End: 2024-04-06

## 2024-04-06 RX ORDER — PROPOFOL 10 MG/ML
VIAL (ML) INTRAVENOUS
Status: DISCONTINUED | OUTPATIENT
Start: 2024-04-06 | End: 2024-04-06

## 2024-04-06 RX ORDER — DEXAMETHASONE SODIUM PHOSPHATE 4 MG/ML
INJECTION, SOLUTION INTRA-ARTICULAR; INTRALESIONAL; INTRAMUSCULAR; INTRAVENOUS; SOFT TISSUE
Status: DISCONTINUED | OUTPATIENT
Start: 2024-04-06 | End: 2024-04-06

## 2024-04-06 RX ORDER — MIDAZOLAM HYDROCHLORIDE 1 MG/ML
INJECTION INTRAMUSCULAR; INTRAVENOUS
Status: DISCONTINUED | OUTPATIENT
Start: 2024-04-06 | End: 2024-04-06

## 2024-04-06 RX ORDER — PHENYLEPHRINE HCL IN 0.9% NACL 1 MG/10 ML
SYRINGE (ML) INTRAVENOUS
Status: DISCONTINUED | OUTPATIENT
Start: 2024-04-06 | End: 2024-04-06

## 2024-04-06 RX ORDER — FENTANYL CITRATE 50 UG/ML
INJECTION, SOLUTION INTRAMUSCULAR; INTRAVENOUS
Status: DISCONTINUED | OUTPATIENT
Start: 2024-04-06 | End: 2024-04-06

## 2024-04-06 RX ADMIN — ROCURONIUM BROMIDE 70 MG: 10 SOLUTION INTRAVENOUS at 07:04

## 2024-04-06 RX ADMIN — SUGAMMADEX 200 MG: 100 INJECTION, SOLUTION INTRAVENOUS at 08:04

## 2024-04-06 RX ADMIN — ONDANSETRON 4 MG: 2 INJECTION INTRAMUSCULAR; INTRAVENOUS at 08:04

## 2024-04-06 RX ADMIN — FENTANYL CITRATE 100 MCG: 50 INJECTION, SOLUTION INTRAMUSCULAR; INTRAVENOUS at 07:04

## 2024-04-06 RX ADMIN — LIDOCAINE HYDROCHLORIDE 80 MG: 20 INJECTION INTRAVENOUS at 07:04

## 2024-04-06 RX ADMIN — SODIUM CHLORIDE, SODIUM GLUCONATE, SODIUM ACETATE, POTASSIUM CHLORIDE AND MAGNESIUM CHLORIDE: 526; 502; 368; 37; 30 INJECTION, SOLUTION INTRAVENOUS at 07:04

## 2024-04-06 RX ADMIN — Medication 100 MCG: at 07:04

## 2024-04-06 RX ADMIN — DEXAMETHASONE SODIUM PHOSPHATE 8 MG: 4 INJECTION, SOLUTION INTRA-ARTICULAR; INTRALESIONAL; INTRAMUSCULAR; INTRAVENOUS; SOFT TISSUE at 07:04

## 2024-04-06 RX ADMIN — PROPOFOL 150 MG: 10 INJECTION, EMULSION INTRAVENOUS at 07:04

## 2024-04-06 RX ADMIN — MIDAZOLAM HYDROCHLORIDE 2 MG: 1 INJECTION, SOLUTION INTRAMUSCULAR; INTRAVENOUS at 07:04

## 2024-04-06 NOTE — TRANSFER OF CARE
"Anesthesia Transfer of Care Note    Patient: Alex Reynoso    Procedure(s) Performed: Procedure(s) (LRB):  CYSTOSCOPY (N/A)  EVACUATION, HEMATOMA (N/A)    Patient location: PACU    Anesthesia Type: general    Transport from OR: Transported from OR on room air with adequate spontaneous ventilation    Post pain: adequate analgesia    Post assessment: no apparent anesthetic complications    Post vital signs: stable    Level of consciousness: responds to stimulation    Nausea/Vomiting: no nausea/vomiting    Complications: none    Transfer of care protocol was followed      Last vitals: Visit Vitals  BP (!) 144/80 (BP Location: Right arm, Patient Position: Lying)   Pulse 69   Temp 36.8 °C (98.3 °F) (Oral)   Resp 18   Ht 5' 5.98" (1.676 m)   Wt 119.7 kg (264 lb)   SpO2 98%   BMI 42.63 kg/m²     "

## 2024-04-06 NOTE — ANESTHESIA PROCEDURE NOTES
Intubation    Date/Time: 4/6/2024 7:26 AM    Performed by: Eileen Vidal CRNA  Authorized by: Cas Palafox MD    Intubation:     Induction:  Intravenous    Intubated:  Postinduction    Mask Ventilation:  Easy mask    Attempts:  1    Attempted By:  CRNA    Method of Intubation:  Video laryngoscopy    Blade:  Noemy 3    Laryngeal View Grade: Grade IIA - cords partially seen      Difficult Airway Encountered?: No      Complications:  None    Airway Device:  Oral endotracheal tube    Airway Device Size:  7.0    Style/Cuff Inflation:  Cuffed    Inflation Amount (mL):  8    Tube secured:  22    Secured at:  The lips    Placement Verified By:  Capnometry    Complicating Factors:  None    Findings Post-Intubation:  BS equal bilateral and atraumatic/condition of teeth unchanged

## 2024-04-06 NOTE — ANESTHESIA POSTPROCEDURE EVALUATION
Anesthesia Post Evaluation    Patient: Alex Reynoso    Procedure(s) Performed: Procedure(s) (LRB):  CYSTOSCOPY (N/A)  EVACUATION, HEMATOMA (N/A)    Final Anesthesia Type: general      Patient location during evaluation: PACU  Patient participation: Yes- Able to Participate  Level of consciousness: awake and alert and oriented  Post-procedure vital signs: reviewed and stable  Pain management: adequate  Airway patency: patent    PONV status at discharge: No PONV  Anesthetic complications: no      Cardiovascular status: hemodynamically stable  Respiratory status: unassisted  Hydration status: euvolemic  Follow-up not needed.              Vitals Value Taken Time   /78 04/06/24 0902   Temp 36.8 °C (98.3 °F) 04/06/24 0436   Pulse 75 04/06/24 0904   Resp 18 04/06/24 0904   SpO2 94 % 04/06/24 0904   Vitals shown include unvalidated device data.      No case tracking events are documented in the log.      Pain/Sil Score: Pain Rating Prior to Med Admin: 9 (4/5/2024  7:49 PM)  Pain Rating Post Med Admin: 1 (4/5/2024  8:49 PM)  Sil Score: 10 (4/6/2024  9:00 AM)

## 2024-04-08 ENCOUNTER — HOSPITAL ENCOUNTER (INPATIENT)
Facility: HOSPITAL | Age: 54
LOS: 3 days | Discharge: HOME | DRG: 669 | End: 2024-04-13
Attending: EMERGENCY MEDICINE | Admitting: INTERNAL MEDICINE
Payer: MEDICARE

## 2024-04-08 ENCOUNTER — NURSE TRIAGE (OUTPATIENT)
Dept: ADMINISTRATIVE | Facility: CLINIC | Age: 54
End: 2024-04-08
Payer: MEDICARE

## 2024-04-08 DIAGNOSIS — N30.40 CHRONIC RADIATION CYSTITIS: ICD-10-CM

## 2024-04-08 DIAGNOSIS — Z85.46 PERSONAL HISTORY OF PROSTATE CANCER: ICD-10-CM

## 2024-04-08 DIAGNOSIS — R31.0 GROSS HEMATURIA: ICD-10-CM

## 2024-04-08 DIAGNOSIS — R33.8 CLOT RETENTION OF URINE: Primary | ICD-10-CM

## 2024-04-08 DIAGNOSIS — N30.40 RADIATION CYSTITIS: ICD-10-CM

## 2024-04-08 PROCEDURE — 99285 EMERGENCY DEPT VISIT HI MDM: CPT | Mod: 25

## 2024-04-08 PROCEDURE — 51702 INSERT TEMP BLADDER CATH: CPT

## 2024-04-09 LAB
ALBUMIN SERPL-MCNC: 3.6 G/DL (ref 3.5–5)
ALBUMIN/GLOB SERPL: 1 RATIO (ref 1.1–2)
ALP SERPL-CCNC: 78 UNIT/L (ref 40–150)
ALT SERPL-CCNC: 21 UNIT/L (ref 0–55)
APPEARANCE UR: ABNORMAL
AST SERPL-CCNC: 14 UNIT/L (ref 5–34)
BACTERIA #/AREA URNS AUTO: ABNORMAL /HPF
BASOPHILS # BLD AUTO: 0.03 X10(3)/MCL
BASOPHILS NFR BLD AUTO: 0.4 %
BILIRUB SERPL-MCNC: 0.2 MG/DL
BILIRUB UR QL STRIP.AUTO: NEGATIVE
BUN SERPL-MCNC: 18.4 MG/DL (ref 8.4–25.7)
CALCIUM SERPL-MCNC: 9.8 MG/DL (ref 8.4–10.2)
CHLORIDE SERPL-SCNC: 104 MMOL/L (ref 98–107)
CO2 SERPL-SCNC: 28 MMOL/L (ref 22–29)
COLOR UR AUTO: ABNORMAL
CREAT SERPL-MCNC: 1.13 MG/DL (ref 0.73–1.18)
EOSINOPHIL # BLD AUTO: 0.41 X10(3)/MCL (ref 0–0.9)
EOSINOPHIL NFR BLD AUTO: 5.3 %
ERYTHROCYTE [DISTWIDTH] IN BLOOD BY AUTOMATED COUNT: 12.6 % (ref 11.5–17)
GFR SERPLBLD CREATININE-BSD FMLA CKD-EPI: >60 MLS/MIN/1.73/M2
GLOBULIN SER-MCNC: 3.5 GM/DL (ref 2.4–3.5)
GLUCOSE SERPL-MCNC: 111 MG/DL (ref 74–100)
GLUCOSE UR QL STRIP.AUTO: NEGATIVE
HCT VFR BLD AUTO: 37.3 % (ref 42–52)
HGB BLD-MCNC: 12.4 G/DL (ref 14–18)
IMM GRANULOCYTES # BLD AUTO: 0.03 X10(3)/MCL (ref 0–0.04)
IMM GRANULOCYTES NFR BLD AUTO: 0.4 %
INR PPP: 1
KETONES UR QL STRIP.AUTO: NEGATIVE
LEUKOCYTE ESTERASE UR QL STRIP.AUTO: NEGATIVE
LYMPHOCYTES # BLD AUTO: 1.57 X10(3)/MCL (ref 0.6–4.6)
LYMPHOCYTES NFR BLD AUTO: 20.3 %
MCH RBC QN AUTO: 30.3 PG (ref 27–31)
MCHC RBC AUTO-ENTMCNC: 33.2 G/DL (ref 33–36)
MCV RBC AUTO: 91.2 FL (ref 80–94)
MONOCYTES # BLD AUTO: 0.55 X10(3)/MCL (ref 0.1–1.3)
MONOCYTES NFR BLD AUTO: 7.1 %
NEUTROPHILS # BLD AUTO: 5.16 X10(3)/MCL (ref 2.1–9.2)
NEUTROPHILS NFR BLD AUTO: 66.5 %
NITRITE UR QL STRIP.AUTO: NEGATIVE
NRBC BLD AUTO-RTO: 0 %
PH UR STRIP.AUTO: 7 [PH]
PLATELET # BLD AUTO: 233 X10(3)/MCL (ref 130–400)
PMV BLD AUTO: 9.3 FL (ref 7.4–10.4)
POTASSIUM SERPL-SCNC: 4.4 MMOL/L (ref 3.5–5.1)
PROT SERPL-MCNC: 7.1 GM/DL (ref 6.4–8.3)
PROT UR QL STRIP.AUTO: ABNORMAL
PROTHROMBIN TIME: 13.4 SECONDS (ref 12.5–14.5)
RBC # BLD AUTO: 4.09 X10(6)/MCL (ref 4.7–6.1)
RBC #/AREA URNS AUTO: >100 /HPF
RBC UR QL AUTO: ABNORMAL
SODIUM SERPL-SCNC: 138 MMOL/L (ref 136–145)
SP GR UR STRIP.AUTO: 1.02 (ref 1–1.03)
SQUAMOUS #/AREA URNS LPF: ABNORMAL /HPF
TROPONIN I SERPL-MCNC: <0.01 NG/ML (ref 0–0.04)
TROPONIN I SERPL-MCNC: <0.01 NG/ML (ref 0–0.04)
UROBILINOGEN UR STRIP-ACNC: 0.2
WBC # SPEC AUTO: 7.75 X10(3)/MCL (ref 4.5–11.5)
WBC #/AREA URNS AUTO: ABNORMAL /HPF

## 2024-04-09 PROCEDURE — 80053 COMPREHEN METABOLIC PANEL: CPT | Performed by: EMERGENCY MEDICINE

## 2024-04-09 PROCEDURE — 85610 PROTHROMBIN TIME: CPT | Performed by: EMERGENCY MEDICINE

## 2024-04-09 PROCEDURE — G0378 HOSPITAL OBSERVATION PER HR: HCPCS

## 2024-04-09 PROCEDURE — 25000003 PHARM REV CODE 250: Performed by: EMERGENCY MEDICINE

## 2024-04-09 PROCEDURE — 25000003 PHARM REV CODE 250: Performed by: INTERNAL MEDICINE

## 2024-04-09 PROCEDURE — 84484 ASSAY OF TROPONIN QUANT: CPT | Performed by: PHYSICIAN ASSISTANT

## 2024-04-09 PROCEDURE — 81001 URINALYSIS AUTO W/SCOPE: CPT | Performed by: EMERGENCY MEDICINE

## 2024-04-09 PROCEDURE — 25000003 PHARM REV CODE 250: Performed by: PHYSICIAN ASSISTANT

## 2024-04-09 PROCEDURE — 85025 COMPLETE CBC W/AUTO DIFF WBC: CPT | Performed by: EMERGENCY MEDICINE

## 2024-04-09 RX ORDER — DOCUSATE SODIUM 100 MG/1
100 CAPSULE, LIQUID FILLED ORAL 2 TIMES DAILY PRN
Status: DISCONTINUED | OUTPATIENT
Start: 2024-04-09 | End: 2024-04-13 | Stop reason: HOSPADM

## 2024-04-09 RX ORDER — METOPROLOL TARTRATE 25 MG/1
12.5 TABLET ORAL 2 TIMES DAILY
Status: DISCONTINUED | OUTPATIENT
Start: 2024-04-09 | End: 2024-04-13 | Stop reason: HOSPADM

## 2024-04-09 RX ORDER — GLUCAGON 1 MG
1 KIT INJECTION
Status: DISCONTINUED | OUTPATIENT
Start: 2024-04-09 | End: 2024-04-13 | Stop reason: HOSPADM

## 2024-04-09 RX ORDER — LISINOPRIL 2.5 MG/1
2.5 TABLET ORAL DAILY
Status: DISCONTINUED | OUTPATIENT
Start: 2024-04-10 | End: 2024-04-09

## 2024-04-09 RX ORDER — OXYCODONE AND ACETAMINOPHEN 10; 325 MG/1; MG/1
1 TABLET ORAL EVERY 4 HOURS PRN
Status: DISCONTINUED | OUTPATIENT
Start: 2024-04-09 | End: 2024-04-13 | Stop reason: HOSPADM

## 2024-04-09 RX ORDER — ATORVASTATIN CALCIUM 40 MG/1
80 TABLET, FILM COATED ORAL NIGHTLY
Status: DISCONTINUED | OUTPATIENT
Start: 2024-04-09 | End: 2024-04-13 | Stop reason: HOSPADM

## 2024-04-09 RX ORDER — ONDANSETRON HYDROCHLORIDE 2 MG/ML
4 INJECTION, SOLUTION INTRAVENOUS EVERY 4 HOURS PRN
Status: DISCONTINUED | OUTPATIENT
Start: 2024-04-09 | End: 2024-04-13 | Stop reason: HOSPADM

## 2024-04-09 RX ORDER — HYOSCYAMINE SULFATE 0.12 MG/1
0.12 TABLET SUBLINGUAL EVERY 4 HOURS PRN
Status: DISCONTINUED | OUTPATIENT
Start: 2024-04-09 | End: 2024-04-13 | Stop reason: HOSPADM

## 2024-04-09 RX ORDER — RANOLAZINE 500 MG/1
1000 TABLET, EXTENDED RELEASE ORAL 2 TIMES DAILY
Status: DISCONTINUED | OUTPATIENT
Start: 2024-04-09 | End: 2024-04-13 | Stop reason: HOSPADM

## 2024-04-09 RX ORDER — OXYCODONE AND ACETAMINOPHEN 10; 325 MG/1; MG/1
1 TABLET ORAL
Status: COMPLETED | OUTPATIENT
Start: 2024-04-09 | End: 2024-04-09

## 2024-04-09 RX ORDER — IBUPROFEN 200 MG
16 TABLET ORAL
Status: DISCONTINUED | OUTPATIENT
Start: 2024-04-09 | End: 2024-04-13 | Stop reason: HOSPADM

## 2024-04-09 RX ORDER — POLYETHYLENE GLYCOL 3350 17 G/17G
17 POWDER, FOR SOLUTION ORAL 2 TIMES DAILY PRN
Status: DISCONTINUED | OUTPATIENT
Start: 2024-04-09 | End: 2024-04-13 | Stop reason: HOSPADM

## 2024-04-09 RX ORDER — CIPROFLOXACIN 500 MG/1
500 TABLET ORAL EVERY 12 HOURS
Status: DISCONTINUED | OUTPATIENT
Start: 2024-04-09 | End: 2024-04-13 | Stop reason: HOSPADM

## 2024-04-09 RX ORDER — ACETAMINOPHEN 325 MG/1
650 TABLET ORAL EVERY 8 HOURS PRN
Status: DISCONTINUED | OUTPATIENT
Start: 2024-04-09 | End: 2024-04-13 | Stop reason: HOSPADM

## 2024-04-09 RX ORDER — BUPROPION HYDROCHLORIDE 150 MG/1
150 TABLET, EXTENDED RELEASE ORAL DAILY
Status: DISCONTINUED | OUTPATIENT
Start: 2024-04-10 | End: 2024-04-13 | Stop reason: HOSPADM

## 2024-04-09 RX ORDER — IBUPROFEN 200 MG
24 TABLET ORAL
Status: DISCONTINUED | OUTPATIENT
Start: 2024-04-09 | End: 2024-04-13 | Stop reason: HOSPADM

## 2024-04-09 RX ORDER — ALBUTEROL SULFATE 90 UG/1
2 AEROSOL, METERED RESPIRATORY (INHALATION) EVERY 6 HOURS PRN
Status: DISCONTINUED | OUTPATIENT
Start: 2024-04-09 | End: 2024-04-13 | Stop reason: HOSPADM

## 2024-04-09 RX ORDER — PREGABALIN 50 MG/1
100 CAPSULE ORAL 2 TIMES DAILY PRN
Status: DISCONTINUED | OUTPATIENT
Start: 2024-04-09 | End: 2024-04-13 | Stop reason: HOSPADM

## 2024-04-09 RX ORDER — SODIUM CHLORIDE 0.9 % (FLUSH) 0.9 %
10 SYRINGE (ML) INJECTION EVERY 12 HOURS PRN
Status: DISCONTINUED | OUTPATIENT
Start: 2024-04-09 | End: 2024-04-13 | Stop reason: HOSPADM

## 2024-04-09 RX ORDER — NITROGLYCERIN 0.4 MG/1
0.4 TABLET SUBLINGUAL EVERY 5 MIN PRN
Status: DISCONTINUED | OUTPATIENT
Start: 2024-04-09 | End: 2024-04-13 | Stop reason: HOSPADM

## 2024-04-09 RX ORDER — ACETAMINOPHEN 325 MG/1
650 TABLET ORAL EVERY 4 HOURS PRN
Status: DISCONTINUED | OUTPATIENT
Start: 2024-04-09 | End: 2024-04-13 | Stop reason: HOSPADM

## 2024-04-09 RX ORDER — ISOSORBIDE MONONITRATE 60 MG/1
60 TABLET, EXTENDED RELEASE ORAL DAILY
Status: DISCONTINUED | OUTPATIENT
Start: 2024-04-10 | End: 2024-04-13 | Stop reason: HOSPADM

## 2024-04-09 RX ADMIN — OXYCODONE AND ACETAMINOPHEN 1 TABLET: 10; 325 TABLET ORAL at 03:04

## 2024-04-09 RX ADMIN — CIPROFLOXACIN HYDROCHLORIDE 500 MG: 500 TABLET, FILM COATED ORAL at 09:04

## 2024-04-09 RX ADMIN — RANOLAZINE 1000 MG: 500 TABLET, EXTENDED RELEASE ORAL at 09:04

## 2024-04-09 RX ADMIN — OXYCODONE AND ACETAMINOPHEN 1 TABLET: 10; 325 TABLET ORAL at 08:04

## 2024-04-09 RX ADMIN — ATORVASTATIN CALCIUM 80 MG: 40 TABLET, FILM COATED ORAL at 08:04

## 2024-04-09 RX ADMIN — OXYCODONE AND ACETAMINOPHEN 1 TABLET: 10; 325 TABLET ORAL at 07:04

## 2024-04-09 RX ADMIN — METOPROLOL TARTRATE 12.5 MG: 25 TABLET, FILM COATED ORAL at 09:04

## 2024-04-09 NOTE — CONSULTS
Alex Reynoso 1970  [id number]  4/9/2024    CONSULTING PHYSICIAN: Manny    Reason for consult: clot retention    HPI: Mr. Reynoso is a 54-year-old male with a past medical history of HTN, HLD, prostate cancer status post prostatectomy and radiation about 3 years ago followed by Dr. Demarcus Shaver, STEMI, CAD s/p CABG presented to the emergency department with clot hematuria. Of note,  he was recently admitted with the same thing, taken to the OR for clot evacuation and fulguration and there was no identified source of bleeding. Urine cleared with continuous irrigation and he was discharged home. A 3 way Fleming was placed in the ER, hand irrigated and CBI initiated.       Past Medical History:   Diagnosis Date    Acid reflux     Arthritis     Atherosclerosis of native arteries of extremities with intermittent claudication, unspecified extremity     Back pain     CAD (coronary artery disease), native coronary artery     Coronary artery disease     Coronary artery disease involving native coronary artery of native heart, unspecified whether angina present     Depression     GERD (gastroesophageal reflux disease)     High cholesterol     HTN (hypertension)     Hyperglycemia     Obesity     Prostate cancer     Seasonal allergies     Shortness of breath     SOB (shortness of breath)     ST elevation (STEMI) myocardial infarction of unspecified site     Tobacco use      Past Surgical History:   Procedure Laterality Date    CHOLECYSTECTOMY      COLONOSCOPY      CORONARY ANGIOGRAPHY N/A 06/21/2023    Procedure: ANGIOGRAM, CORONARY ARTERY;  Surgeon: Raji Hughes MD;  Location: [facility] CATH LAB;  Service: Cardiology;  Laterality: N/A;    CORONARY ANGIOGRAPHY N/A 10/09/2023    Procedure: ANGIOGRAM, CORONARY ARTERY;  Surgeon: Dawit Rodarte MD;  Location: [facility] CATH LAB;  Service: Cardiology;  Laterality: N/A;    CORONARY ANGIOGRAPHY Left 3/12/2024    Procedure: ANGIOGRAM, CORONARY ARTERY;  Surgeon: Raji Hughes MD;  Location:  [facility] CATH LAB;  Service: Cardiology;  Laterality: Left;  +/- PCI    CORONARY ARTERY BYPASS GRAFT (CABG) N/A 06/27/2023    Procedure: CORONARY ARTERY BYPASS GRAFT (CABG);  Surgeon: Jhony Sharma MD;  Location: [facility];  Service: Cardiovascular;  Laterality: N/A;  ECHO NOTIFIED    CORONARY BYPASS GRAFT ANGIOGRAPHY  3/12/2024    Procedure: Bypass graft study;  Surgeon: Raji Hughes MD;  Location: [facility] CATH LAB;  Service: Cardiology;;    CYSTOSCOPY N/A 4/6/2024    Procedure: CYSTOSCOPY;  Surgeon: Timbo Villalobos MD;  Location: [facility];  Service: Urology;  Laterality: N/A;  Cystoscopy with clot evacuation, fulguration and any other indicated procedures    ENDOSCOPIC HARVEST OF VEIN N/A 06/27/2023    Procedure: SURGICAL PROCUREMENT, VEIN, ENDOSCOPIC;  Surgeon: Jhony Sharma MD;  Location: [facility];  Service: Cardiovascular;  Laterality: N/A;    EVACUATION OF HEMATOMA N/A 4/6/2024    Procedure: EVACUATION, HEMATOMA;  Surgeon: Timbo Villalobos MD;  Location: [facility];  Service: Urology;  Laterality: N/A;    INSTANTANEOUS WAVE-FREE RATIO (IFR) N/A 3/12/2024    Procedure: Instantaneous Wave-Free Ratio (IFR);  Surgeon: Raji Hughes MD;  Location: [facility] CATH LAB;  Service: Cardiology;  Laterality: N/A;    INTRAVASCULAR ULTRASOUND, CORONARY N/A 10/09/2023    Procedure: Ultrasound-coronary;  Surgeon: Dawit Rodarte MD;  Location: [facility] CATH LAB;  Service: Cardiology;  Laterality: N/A;    LEFT HEART CATHETERIZATION Left 12/01/2022      Moderate noncritical multivessel CAD with stenosis up to 60%    LEFT HEART CATHETERIZATION Left 06/21/2023    Procedure: Left heart cath;  Surgeon: Raji Hughes MD;  Location: [facility] CATH LAB;  Service: Cardiology;  Laterality: Left;  LHC +/- PCI    LEFT HEART CATHETERIZATION Left 3/12/2024    Procedure: Left heart cath;  Surgeon: Raji Hughes MD;  Location: [facility] CATH LAB;  Service: Cardiology;  Laterality: Left;    LIPOMA RESECTION N/A 03/30/2023    Procedure: EXCISION, LIPOMA (excision of  post neck lipoma);  Surgeon: Samuel Cruz MD;  Location: [facility] OR;  Service: General;  Laterality: N/A;  10 x 5 lipoma     PLATING OF STERNUM N/A 06/27/2023    Procedure: PLATING, STERNAL;  Surgeon: Jhony Sharma MD;  Location: [facility] OR;  Service: Cardiovascular;  Laterality: N/A;    PROSTATECTOMY      STENT, DRUG ELUTING, SINGLE VESSEL, CORONARY  10/09/2023    Procedure: Stent, Drug Eluting, Single Vessel, Coronary;  Surgeon: Dawit Rodarte MD;  Location: [facility] CATH LAB;  Service: Cardiology;;    VENTRICULOGRAM, LEFT  06/21/2023    Procedure: Ventriculogram, Left;  Surgeon: Raji Hughes MD;  Location: [facility] CATH LAB;  Service: Cardiology;;     Family History   Problem Relation Age of Onset    Hypertension Mother     Diabetes Mother     Prostate cancer Father      Social History     Tobacco Use    Smoking status: Former     Average packs/day: 0.5 packs/day for 34.3 years (16.7 ttl pk-yrs)     Types: Cigarettes     Start date: 1990    Smokeless tobacco: Never   Substance Use Topics    Alcohol use: Not Currently     Comment: socially    Drug use: Not Currently     Current Facility-Administered Medications   Medication Dose Route Frequency Provider Last Rate Last Admin    acetaminophen tablet 650 mg  650 mg Oral Q8H PRN Tomasa Raya PA-MIKA        acetaminophen tablet 650 mg  650 mg Oral Q4H PRN Tomasa Raya PA-MIKA        atorvastatin tablet 80 mg  80 mg Oral QHS Tomasa Raya PA-C        [START ON 4/10/2024] buPROPion TBSR 12 hr tablet 150 mg  150 mg Oral Daily Tomasa Raya PA-C        dextrose 10% bolus 125 mL 125 mL  12.5 g Intravenous PRN Tomasa Raya PA-MIKA        dextrose 10% bolus 250 mL 250 mL  25 g Intravenous PRN Tomasa Raya PA-C        docusate sodium capsule 100 mg  100 mg Oral BID PRN Tomasa Raya PA-MIKA        glucagon (human recombinant) injection 1 mg  1 mg Intramuscular PRN Tomasa Raya PA-MIKA        glucose chewable tablet 16 g  16 g Oral PRN Tomasa Raya  PA-MIKA        glucose chewable tablet 24 g  24 g Oral PRN Tomasa Raya PA-C        ondansetron injection 4 mg  4 mg Intravenous Q4H PRN Tomasa Raya PA-C        oxyCODONE-acetaminophen  mg per tablet 1 tablet  1 tablet Oral Q4H PRN Tomasa Raya PA-C   1 tablet at 04/09/24 1521    polyethylene glycol packet 17 g  17 g Oral BID PRN Tomaas Raya PA-C        pregabalin capsule 100 mg  100 mg Oral BID PRN Tomasa Raya PA-C        sodium chloride 0.9% flush 10 mL  10 mL Intravenous Q12H PRN Tomasa Raya PA-C         Current Outpatient Medications   Medication Sig Dispense Refill    albuterol (VENTOLIN HFA) 90 mcg/actuation inhaler Inhale 2 puffs into the lungs every 6 (six) hours as needed for Wheezing. Rescue      aspirin (ECOTRIN) 81 MG EC tablet Take 81 mg by mouth every morning. Stopped 3/26/23      atorvastatin (LIPITOR) 80 MG tablet Take 80 mg by mouth every evening.      buPROPion (WELLBUTRIN SR) 150 MG TBSR 12 hr tablet Take 150 mg by mouth once daily.      clopidogreL (PLAVIX) 75 mg tablet Take 1 tablet (75 mg total) by mouth once daily. 30 tablet 11    isosorbide mononitrate (IMDUR) 60 MG 24 hr tablet Take 1 tablet (60 mg total) by mouth once daily. 30 tablet 3    metoprolol tartrate (LOPRESSOR) 25 MG tablet Take 0.5 tablets (12.5 mg total) by mouth 2 (two) times daily. 30 tablet 1    nitroGLYCERIN (NITROSTAT) 0.4 MG SL tablet Place under the tongue.      oxyCODONE-acetaminophen (PERCOCET)  mg per tablet Take 1 tablet by mouth every 4 (four) hours as needed for Pain.      pregabalin (LYRICA) 100 MG capsule Take 100 mg by mouth 2 (two) times daily as needed.      ranolazine 1,000 mg PSRG Take 1,000 mg by mouth 2 (two) times daily.      ciprofloxacin HCl (CIPRO) 500 MG tablet Take 1 tablet (500 mg total) by mouth every 12 (twelve) hours. for 10 days 20 tablet 0    lisinopriL (PRINIVIL,ZESTRIL) 2.5 MG tablet Take 1 tablet (2.5 mg total) by mouth once daily. 30 tablet 0      Facility-Administered Medications Ordered in Other Encounters   Medication Dose Route Frequency Provider Last Rate Last Admin    fentaNYL 50 mcg/mL injection 25 mcg  25 mcg Intravenous Q5 Min PRN Noah Dumont DO        HYDROmorphone (PF) injection 0.2 mg  0.2 mg Intravenous Q5 Min PRN Noah Dumont DO        lactated ringers infusion   Intravenous Continuous Noah Dumont DO   Stopped at 03/30/23 1158    sodium chloride 0.9% flush 10 mL  10 mL Intravenous PRN Shaheen Felder MD        sodium chloride 0.9% flush 10 mL  10 mL Intravenous PRN Noah Dumont DO         Review of patient's allergies indicates:  No Known Allergies    ROS: 12 point review of systems negative other than the HPI    PHYSICAL EXAM:  Vitals:    04/09/24 1101 04/09/24 1201 04/09/24 1501 04/09/24 1521   BP: (!) 161/85 (!) 135/93 115/72    Pulse: 66 65 61    Resp:    18   Temp:       TempSrc:       SpO2: 99% 96% 100%    Weight:       Height:           Intake/Output Summary (Last 24 hours) at 4/9/2024 1644  Last data filed at 4/9/2024 1129  Gross per 24 hour   Intake --   Output 05530 ml   Net -91326 ml       GEN: WN/WD NAD  HEENT: NCAT, PERRLA, EOMI, OP clear, nares patent  CV: RRR  RESP: Even and unlabored  ABD: obese, soft, NTND  : clear urine with min CBI  EXT: no C/C/E  NEURO: no focal deficits, MAEW, AAOx4      LABS:  Recent Results (from the past 24 hour(s))   Comprehensive metabolic panel    Collection Time: 04/09/24 12:35 AM   Result Value Ref Range    Sodium Level 138 136 - 145 mmol/L    Potassium Level 4.4 3.5 - 5.1 mmol/L    Chloride 104 98 - 107 mmol/L    Carbon Dioxide 28 22 - 29 mmol/L    Glucose Level 111 (H) 74 - 100 mg/dL    Blood Urea Nitrogen 18.4 8.4 - 25.7 mg/dL    Creatinine 1.13 0.73 - 1.18 mg/dL    Calcium Level Total 9.8 8.4 - 10.2 mg/dL    Protein Total 7.1 6.4 - 8.3 gm/dL    Albumin Level 3.6 3.5 - 5.0 g/dL    Globulin 3.5 2.4 - 3.5 gm/dL    Albumin/Globulin Ratio 1.0 (L) 1.1 - 2.0 ratio     Bilirubin Total 0.2 <=1.5 mg/dL    Alkaline Phosphatase 78 40 - 150 unit/L    Alanine Aminotransferase 21 0 - 55 unit/L    Aspartate Aminotransferase 14 5 - 34 unit/L    eGFR >60 mls/min/1.73/m2   Protime-INR    Collection Time: 04/09/24 12:35 AM   Result Value Ref Range    PT 13.4 12.5 - 14.5 seconds    INR 1.0 <=1.3   CBC with Differential    Collection Time: 04/09/24 12:35 AM   Result Value Ref Range    WBC 7.75 4.50 - 11.50 x10(3)/mcL    RBC 4.09 (L) 4.70 - 6.10 x10(6)/mcL    Hgb 12.4 (L) 14.0 - 18.0 g/dL    Hct 37.3 (L) 42.0 - 52.0 %    MCV 91.2 80.0 - 94.0 fL    MCH 30.3 27.0 - 31.0 pg    MCHC 33.2 33.0 - 36.0 g/dL    RDW 12.6 11.5 - 17.0 %    Platelet 233 130 - 400 x10(3)/mcL    MPV 9.3 7.4 - 10.4 fL    Neut % 66.5 %    Lymph % 20.3 %    Mono % 7.1 %    Eos % 5.3 %    Basophil % 0.4 %    Lymph # 1.57 0.6 - 4.6 x10(3)/mcL    Neut # 5.16 2.1 - 9.2 x10(3)/mcL    Mono # 0.55 0.1 - 1.3 x10(3)/mcL    Eos # 0.41 0 - 0.9 x10(3)/mcL    Baso # 0.03 <=0.2 x10(3)/mcL    IG# 0.03 0 - 0.04 x10(3)/mcL    IG% 0.4 %    NRBC% 0.0 %   Urinalysis, Reflex to Urine Culture    Collection Time: 04/09/24  3:05 AM    Specimen: Urine, Catheterized   Result Value Ref Range    Color, UA Red (A) Yellow, Light-Yellow, Colorless, Straw, Dark-Yellow    Appearance, UA Bloody (A) Clear    Specific Gravity, UA 1.025 1.005 - 1.030    pH, UA 7.0 5.0 - 8.5    Protein, UA 2+ (A) Negative    Glucose, UA Negative Negative, Normal    Ketones, UA Negative Negative    Blood, UA 3+ (A) Negative    Bilirubin, UA Negative Negative    Urobilinogen, UA 0.2 0.2, 1.0, Normal    Nitrites, UA Negative Negative    Leukocyte Esterase, UA Negative Negative    WBC, UA 0-2 None Seen, 0-2, 3-5, 0-5 /HPF    Bacteria, UA None Seen None Seen, Rare, Occasional /HPF    Squamous Epithelial Cells, UA None Seen /HPF    RBC, UA >100 (A) None Seen, 0-2, 3-5, 0-5 /HPF         IMAGING:  None      ASSESSMENT:  54-year-old male admitted with recurrent hematuria, had some clot  retention which was relieved in the emergency department. He has a history of prostate cancer with prostatectomy and radiation 2-3 years ago. He was recently admitted with the same thi ng, taken to the OR for clot evacuation and fulguration and there was no identified source of bleeding. 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 H&H stable. Hemodynamically stable.       PLAN:  Consult wound care for HBO treatments. Continue to titrate CBI and clamp if he remains clear. Levsin PRN bladder spasms. No indication for surgical intervention at this time.     CALLUM Faria

## 2024-04-09 NOTE — PLAN OF CARE
Pt is , 4 children, no living will or POA.    04/09/24 1152   Discharge Assessment   Assessment Type Discharge Planning Assessment   Confirmed/corrected address, phone number and insurance Yes   Confirmed Demographics Correct on Facesheet   Source of Information patient   When was your last doctors appointment?   (PCP [facility])   Communicated EZEQUIEL with patient/caregiver Date not available/Unable to determine   People in Home spouse   Do you expect to return to your current living situation? Yes   Do you have help at home or someone to help you manage your care at home? Yes   Who are your caregiver(s) and their phone number(s)? wife Vanessa foote [phone]   Prior to hospitilization cognitive status: Unable to Assess   Current cognitive status: Alert/Oriented   Walking or Climbing Stairs Difficulty yes   Walking or Climbing Stairs ambulation difficulty, requires equipment   Mobility Management rollator, quad cane   Dressing/Bathing Difficulty yes   Dressing/Bathing bathing difficulty, requires equipment   Dressing/Bathing Management shower chair   Home Accessibility stairs to enter home   Number of Stairs, Main Entrance five   Home Layout Able to live on 1st floor   Equipment Currently Used at Home cane, quad;rollator;blood pressure machine   Readmission within 30 days? Yes   Patient currently being followed by outpatient case management? No   Do you currently have service(s) that help you manage your care at home? No   Do you take prescription medications? Yes  (Fills with [facility])   Do you have prescription coverage? Yes   Coverage [facility] Medicare   Do you have any problems affording any of your prescribed medications? No   Is the patient taking medications as prescribed? yes   Who is going to help you get home at discharge? Vanessa Lainezmier, spouse   How do you get to doctors appointments? car, drives self;family or friend will provide   Are you on dialysis? No   Do you take coumadin? No   Discharge  Plan A Other  (tbd)   DME Needed Upon Discharge  other (see comments)  (tbd)   Discharge Plan discussed with: Patient   Transition of Care Barriers None   Physical Activity   On average, how many days per week do you engage in moderate to strenuous exercise (like a brisk walk)? 0 days   On average, how many minutes do you engage in exercise at this level? 0 min   Financial Resource Strain   How hard is it for you to pay for the very basics like food, housing, medical care, and heating? Somewhat   Housing Stability   In the last 12 months, was there a time when you were not able to pay the mortgage or rent on time? N   In the last 12 months, how many places have you lived? 1   In the last 12 months, was there a time when you did not have a steady place to sleep or slept in a shelter (including now)? N   Transportation Needs   In the past 12 months, has lack of transportation kept you from medical appointments or from getting medications? no   In the past 12 months, has lack of transportation kept you from meetings, work, or from getting things needed for daily living? No   Food Insecurity   Within the past 12 months, you worried that your food would run out before you got the money to buy more. Never true   Within the past 12 months, the food you bought just didn't last and you didn't have money to get more. Never true   Stress   Do you feel stress - tense, restless, nervous, or anxious, or unable to sleep at night because your mind is troubled all the time - these days? To some exte  (reports ability to manage/ declined mental health resources.)   Social Connections   In a typical week, how many times do you talk on the phone with family, friends, or neighbors? More than 3   How often do you get together with friends or relatives? More than 3   How often do you attend Mandaen or Anabaptist services? More than 4   Do you belong to any clubs or organizations such as Mandaen groups, unions, fraternal or athletic groups,  or school groups? No   How often do you attend meetings of the clubs or organizations you belong to? Never   Are you , , , , never , or living with a partner?    Alcohol Use   Q1: How often do you have a drink containing alcohol? Monthly or l   Q2: How many drinks containing alcohol do you have on a typical day when you are drinking? 1 or 2   Q3: How often do you have six or more drinks on one occasion? Never   OTHER   Name(s) of People in Home wife Vanessa Reynoso

## 2024-04-09 NOTE — ED PROVIDER NOTES
Encounter Date: 4/8/2024    SCRIBE #1 NOTE: I, Didier Sigala, am scribing for, and in the presence of,  Mahnaz Bryant MD. I have scribed the following portions of the note - Other sections scribed: HPI,ROS,PE.       History     Chief Complaint   Patient presents with    Hematuria     Hematuria @ approx 10pm, states its grossly bloody with clots. Had cystocopy on 03/06/24 here. Urologist is SOLOMON Shaver. Started back on plavix this morning.      53 y/o male with PMHx of CAD s.p. CABG, HTN, Obesity, Prostate cancer, and STEMI presents to ED c/o hematuria onset today. Pt reports having a cystoscopy on 4/6. He reports he urinated blood with clots ~2200 on 4/8. He reports starting back on plavix on 4/8.     Urology: Demarcus Shaver MD    The history is provided by the patient. No  was used.     Review of patient's allergies indicates:  No Known Allergies  Past Medical History:   Diagnosis Date    Acid reflux     Arthritis     Atherosclerosis of native arteries of extremities with intermittent claudication, unspecified extremity     Back pain     CAD (coronary artery disease), native coronary artery     Coronary artery disease     Coronary artery disease involving native coronary artery of native heart, unspecified whether angina present     Depression     GERD (gastroesophageal reflux disease)     High cholesterol     HTN (hypertension)     Hyperglycemia     Obesity     Prostate cancer     Seasonal allergies     Shortness of breath     SOB (shortness of breath)     ST elevation (STEMI) myocardial infarction of unspecified site     Tobacco use      Past Surgical History:   Procedure Laterality Date    CHOLECYSTECTOMY      COLONOSCOPY      CORONARY ANGIOGRAPHY N/A 06/21/2023    Procedure: ANGIOGRAM, CORONARY ARTERY;  Surgeon: Raji Hughes MD;  Location: [facility] CATH LAB;  Service: Cardiology;  Laterality: N/A;    CORONARY ANGIOGRAPHY N/A 10/09/2023    Procedure: ANGIOGRAM, CORONARY ARTERY;  Surgeon: Aster  Dawit MAN MD;  Location: [facility] CATH LAB;  Service: Cardiology;  Laterality: N/A;    CORONARY ANGIOGRAPHY Left 3/12/2024    Procedure: ANGIOGRAM, CORONARY ARTERY;  Surgeon: Raji Hughes MD;  Location: [facility] CATH LAB;  Service: Cardiology;  Laterality: Left;  +/- PCI    CORONARY ARTERY BYPASS GRAFT (CABG) N/A 06/27/2023    Procedure: CORONARY ARTERY BYPASS GRAFT (CABG);  Surgeon: Jhony Sharma MD;  Location: [facility] OR;  Service: Cardiovascular;  Laterality: N/A;  ECHO NOTIFIED    CORONARY BYPASS GRAFT ANGIOGRAPHY  3/12/2024    Procedure: Bypass graft study;  Surgeon: Raji Hughes MD;  Location: [facility] CATH LAB;  Service: Cardiology;;    CYSTOSCOPY N/A 4/6/2024    Procedure: CYSTOSCOPY;  Surgeon: Timbo Villalobos MD;  Location: [facility];  Service: Urology;  Laterality: N/A;  Cystoscopy with clot evacuation, fulguration and any other indicated procedures    ENDOSCOPIC HARVEST OF VEIN N/A 06/27/2023    Procedure: SURGICAL PROCUREMENT, VEIN, ENDOSCOPIC;  Surgeon: Jhony Sharma MD;  Location: [facility];  Service: Cardiovascular;  Laterality: N/A;    EVACUATION OF HEMATOMA N/A 4/6/2024    Procedure: EVACUATION, HEMATOMA;  Surgeon: Timbo Villalobos MD;  Location: [facility];  Service: Urology;  Laterality: N/A;    INSTANTANEOUS WAVE-FREE RATIO (IFR) N/A 3/12/2024    Procedure: Instantaneous Wave-Free Ratio (IFR);  Surgeon: Raji Hughes MD;  Location: [facility] CATH LAB;  Service: Cardiology;  Laterality: N/A;    INTRAVASCULAR ULTRASOUND, CORONARY N/A 10/09/2023    Procedure: Ultrasound-coronary;  Surgeon: Dawit Rodarte MD;  Location: [facility] CATH LAB;  Service: Cardiology;  Laterality: N/A;    LEFT HEART CATHETERIZATION Left 12/01/2022      Moderate noncritical multivessel CAD with stenosis up to 60%    LEFT HEART CATHETERIZATION Left 06/21/2023    Procedure: Left heart cath;  Surgeon: Raji Hughes MD;  Location: [facility] CATH LAB;  Service: Cardiology;  Laterality: Left;  LHC +/- PCI    LEFT HEART CATHETERIZATION Left 3/12/2024     Procedure: Left heart cath;  Surgeon: Raji Hughes MD;  Location: [facility] CATH LAB;  Service: Cardiology;  Laterality: Left;    LIPOMA RESECTION N/A 03/30/2023    Procedure: EXCISION, LIPOMA (excision of post neck lipoma);  Surgeon: Samuel Cruz MD;  Location: [facility] OR;  Service: General;  Laterality: N/A;  10 x 5 lipoma     PLATING OF STERNUM N/A 06/27/2023    Procedure: PLATING, STERNAL;  Surgeon: Jhony Sharma MD;  Location: [facility] OR;  Service: Cardiovascular;  Laterality: N/A;    PROSTATECTOMY      STENT, DRUG ELUTING, SINGLE VESSEL, CORONARY  10/09/2023    Procedure: Stent, Drug Eluting, Single Vessel, Coronary;  Surgeon: Dawit Rodarte MD;  Location: [facility] CATH LAB;  Service: Cardiology;;    VENTRICULOGRAM, LEFT  06/21/2023    Procedure: Ventriculogram, Left;  Surgeon: Raji Hughes MD;  Location: [facility] CATH LAB;  Service: Cardiology;;     Family History   Problem Relation Age of Onset    Hypertension Mother     Diabetes Mother     Prostate cancer Father      Social History     Tobacco Use    Smoking status: Former     Average packs/day: 0.5 packs/day for 34.3 years (16.7 ttl pk-yrs)     Types: Cigarettes     Start date: 1990    Smokeless tobacco: Never   Substance Use Topics    Alcohol use: Not Currently     Comment: socially    Drug use: Not Currently     Review of Systems   Gastrointestinal:  Negative for nausea and vomiting.   Genitourinary:  Positive for hematuria.       Physical Exam     Initial Vitals [04/08/24 2309]   BP Pulse Resp Temp SpO2   (!) 161/88 73 18 98.3 °F (36.8 °C) 99 %      MAP       --         Physical Exam    Nursing note and vitals reviewed.  Constitutional: He appears well-developed and well-nourished. He is not diaphoretic. No distress.   HENT:   Head: Normocephalic and atraumatic.   Right Ear: External ear normal.   Left Ear: External ear normal.   Eyes: Conjunctivae are normal.   Neck: Neck supple.   Normal range of motion.  Cardiovascular:  Normal rate.           Pulmonary/Chest:  No respiratory distress.   Abdominal: He exhibits no distension.   Genitourinary:    Genitourinary Comments: 3-way catheter in place, with grossly bloody urine.      Musculoskeletal:         General: Normal range of motion.      Cervical back: Normal range of motion and neck supple.     Neurological: He is alert and oriented to person, place, and time. GCS score is 15. GCS eye subscore is 4. GCS verbal subscore is 5. GCS motor subscore is 6.   Skin: Skin is warm and dry. No pallor.   Psychiatric: He has a normal mood and affect.         ED Course   Procedures  Labs Reviewed   COMPREHENSIVE METABOLIC PANEL - Abnormal; Notable for the following components:       Result Value    Glucose Level 111 (*)     Albumin/Globulin Ratio 1.0 (*)     All other components within normal limits   URINALYSIS, REFLEX TO URINE CULTURE - Abnormal; Notable for the following components:    Color, UA Red (*)     Appearance, UA Bloody (*)     Protein, UA 2+ (*)     Blood, UA 3+ (*)     RBC, UA >100 (*)     All other components within normal limits   CBC WITH DIFFERENTIAL - Abnormal; Notable for the following components:    RBC 4.09 (*)     Hgb 12.4 (*)     Hct 37.3 (*)     All other components within normal limits   PROTIME-INR - Normal   CBC W/ AUTO DIFFERENTIAL    Narrative:     The following orders were created for panel order CBC auto differential.  Procedure                               Abnormality         Status                     ---------                               -----------         ------                     CBC with Differential[[id number]]       Abnormal            Final result                 Please view results for these tests on the individual orders.          Imaging Results    None          Medications   oxyCODONE-acetaminophen  mg per tablet 1 tablet (1 tablet Oral Given 4/9/24 0748)             Scribe Attestation:   Scribe #1: I performed the above scribed service and the documentation accurately describes the  services I performed. I attest to the accuracy of the note.    Attending Attestation:           Physician Attestation for Scribe:  Physician Attestation Statement for Scribe #1: I, Mahnaz Bryant MD, reviewed documentation, as scribed by Didier Sigala in my presence, and it is both accurate and complete.         Medical Decision Making  The differential diagnosis includes, but is not limited to, cystitis, clot retention, anemia, renal failure    ED management:  Fleming placed and bladder irrigation started  Urine initially cleared quickly however became frankly bloody soon after so continuous irrigation was started   Labs are stable   Neurology consulted and admitted to Medicine    Problems Addressed:  Chronic radiation cystitis: chronic illness or injury with exacerbation, progression, or side effects of treatment  Clot retention of urine: acute illness or injury that poses a threat to life or bodily functions    Amount and/or Complexity of Data Reviewed  External Data Reviewed: notes.     Details: Reviewed patient's recent hospitalization-he would presented to the emergency department on 04/05 with urinary clot retention.  Urinalysis showed 2+ protein, 2+ blood, 500 leukocyte esterase, 11-20 RBCs, 59 WBCs and few bacteria.  Fleming catheter was placed in the ED. Pt underwent Cystoscopy with bladder fulguration 4/6/24  Labs: ordered. Decision-making details documented in ED Course.    Risk  Prescription drug management.  Decision regarding hospitalization.            ED Course as of 04/09/24 0916 Tue Apr 09, 2024   0757 Urology consult: spoke with ROBERTO Ramachandran and they will see in consult  [KM]   0758 [facility] medicine  [MM]      ED Course User Index  [KM] Mhanaz Bryant MD  [MM] Monie Londono                           Clinical Impression:  Final diagnoses:  [R33.8] Clot retention of urine (Primary)  [N30.40] Chronic radiation cystitis          ED Disposition Condition    Observation Stable                 Mahnaz Bryant MD  04/09/24 0916

## 2024-04-09 NOTE — TELEPHONE ENCOUNTER
D/c'd today. Passing blood clots in urine and c/o of dribbling blood.   Care advice provided per protocol, with recommendation to go to ED at this time. Pt VU.   Reason for Disposition   Passing pure blood or large blood clots (i.e., size > a dime)  (Exception: Linda or small strands.)    Additional Information   Negative: Shock suspected (e.g., cold/pale/clammy skin, too weak to stand, low BP, rapid pulse)   Negative: Sounds like a life-threatening emergency to the triager   Negative: Recent back or abdominal injury   Negative: Recent genital injury   Negative: [1] Unable to urinate (or only a few drops) > 4 hours AND [2] bladder feels very full (e.g., palpable bladder or strong urge to urinate)   Negative: [1] Diffuse (all over) muscle pains in the shoulders, arms, legs, and back AND [2] dark (cola or tea-colored) or red-colored urine    Protocols used: Urine - Blood In-A-

## 2024-04-09 NOTE — H&P
[facility] Hospital Medicine History & Physical Examination       Patient Name: Alex Reynoso  MRN: [id number]  Patient Class: OP- Observation   Admission Date: 4/8/2024   Admitting Physician: Yeyo Benz MD   Length of Stay: 0  Attending Physician: Yeyo Benz MD   Primary Care Provider: Glne Duffy NP  Face-to-Face encounter date: 04/09/2024  Code Status: Full Code   Chief Complaint: Hematuria (Hematuria @ approx 10pm, states its grossly bloody with clots. Had cystocopy on 03/06/24 here. Urologist is SOLOMON Shaver. Started back on plavix this morning. )        Patient information was obtained from patient, patient's family, past medical records and ER records.     HISTORY OF PRESENT ILLNESS:   Alex Reynoso is a 54 y.o. Black or  male with a past medical history of hypertension, hyperlipidemia, coronary artery disease status post CABG on Plavix and baby aspirin, GERD, anxiety/depression and prostate cancer status post prostatectomy and radiation.  Patient had admission to hospital medicine services from 04/05/2024 to 04/08/2024 for gross hematuria. Patient underwent cystoscopy with bladder fulguration on 04/06/2024 with Dr. Timbo Villalobos.  Patient was treated for ESBL E coli cystitis.  Plavix and aspirin was resumed on 04/07/2024.  He was voiding spontaneously with no hematuria and therefore discharged home. The patient presented to [facility] on 4/8/2024 with a primary complaint hematuria.  Patient reports he was sitting down watching TV last night (4/8/2024) at 8:00 PM when he felt pressure and large clotted blood was passed.  After he reports hematuria started and soon after patient was unable to urinate prompting him to present to the ED for further evaluation.  Patient was complaining of bilateral flank pain, weakness, centralized chest pain which began today and improved to a 2/10 after receiving his pain medicine, constipation for the last 3 days.  Patient  reports he normally has a bowel movement every day.  He denies complaints of nausea, vomiting, diarrhea and shortness of breath.  Patient was follow with Dr. Demarcus Shaver, urologist.    Upon presentation to the ED, temperature 98.3° F, heart rate 73, blood pressure 161/88, respiratory rate 18 and SpO2 99% on room air.  Labs with H&H 12.4/37.3, WBC 7.75.  UA with no squamous epithelial cells, no bacteria, 0-2 WBCs, greater than 100 RBCs, negative leukocyte esterase, 3+ blood, 2+ protein.  In ED patient received [place].  Urology consulted.  Three way catheter was placed in the ED. Patient is admitted to hospital medicine services for further medical management.    PAST MEDICAL HISTORY:     Past Medical History:   Diagnosis Date    Acid reflux     Arthritis     Atherosclerosis of native arteries of extremities with intermittent claudication, unspecified extremity     Back pain     CAD (coronary artery disease), native coronary artery     Coronary artery disease     Coronary artery disease involving native coronary artery of native heart, unspecified whether angina present     Depression     GERD (gastroesophageal reflux disease)     High cholesterol     HTN (hypertension)     Hyperglycemia     Obesity     Prostate cancer     Seasonal allergies     Shortness of breath     SOB (shortness of breath)     ST elevation (STEMI) myocardial infarction of unspecified site     Tobacco use        PAST SURGICAL HISTORY:     Past Surgical History:   Procedure Laterality Date    CHOLECYSTECTOMY      COLONOSCOPY      CORONARY ANGIOGRAPHY N/A 06/21/2023    Procedure: ANGIOGRAM, CORONARY ARTERY;  Surgeon: Raji Hughes MD;  Location: [facility] CATH LAB;  Service: Cardiology;  Laterality: N/A;    CORONARY ANGIOGRAPHY N/A 10/09/2023    Procedure: ANGIOGRAM, CORONARY ARTERY;  Surgeon: Dawit Rodarte MD;  Location: [facility] CATH LAB;  Service: Cardiology;  Laterality: N/A;    CORONARY ANGIOGRAPHY Left 3/12/2024    Procedure: ANGIOGRAM, CORONARY  ARTERY;  Surgeon: Raji Hughes MD;  Location: [facility] CATH LAB;  Service: Cardiology;  Laterality: Left;  +/- PCI    CORONARY ARTERY BYPASS GRAFT (CABG) N/A 06/27/2023    Procedure: CORONARY ARTERY BYPASS GRAFT (CABG);  Surgeon: Jhony Sharma MD;  Location: [facility] OR;  Service: Cardiovascular;  Laterality: N/A;  ECHO NOTIFIED    CORONARY BYPASS GRAFT ANGIOGRAPHY  3/12/2024    Procedure: Bypass graft study;  Surgeon: Raji Hughes MD;  Location: [facility] CATH LAB;  Service: Cardiology;;    CYSTOSCOPY N/A 4/6/2024    Procedure: CYSTOSCOPY;  Surgeon: Timbo Villalobos MD;  Location: [facility] OR;  Service: Urology;  Laterality: N/A;  Cystoscopy with clot evacuation, fulguration and any other indicated procedures    ENDOSCOPIC HARVEST OF VEIN N/A 06/27/2023    Procedure: SURGICAL PROCUREMENT, VEIN, ENDOSCOPIC;  Surgeon: Jhony Sharma MD;  Location: [facility];  Service: Cardiovascular;  Laterality: N/A;    EVACUATION OF HEMATOMA N/A 4/6/2024    Procedure: EVACUATION, HEMATOMA;  Surgeon: Timbo Villalobos MD;  Location: [facility];  Service: Urology;  Laterality: N/A;    INSTANTANEOUS WAVE-FREE RATIO (IFR) N/A 3/12/2024    Procedure: Instantaneous Wave-Free Ratio (IFR);  Surgeon: aRji Hughes MD;  Location: [facility] CATH LAB;  Service: Cardiology;  Laterality: N/A;    INTRAVASCULAR ULTRASOUND, CORONARY N/A 10/09/2023    Procedure: Ultrasound-coronary;  Surgeon: Dawit Rodarte MD;  Location: [facility] CATH LAB;  Service: Cardiology;  Laterality: N/A;    LEFT HEART CATHETERIZATION Left 12/01/2022      Moderate noncritical multivessel CAD with stenosis up to 60%    LEFT HEART CATHETERIZATION Left 06/21/2023    Procedure: Left heart cath;  Surgeon: Raji Hughes MD;  Location: [facility] CATH LAB;  Service: Cardiology;  Laterality: Left;  LHC +/- PCI    LEFT HEART CATHETERIZATION Left 3/12/2024    Procedure: Left heart cath;  Surgeon: Raji Hughes MD;  Location: [facility] CATH LAB;  Service: Cardiology;  Laterality: Left;    LIPOMA RESECTION N/A  03/30/2023    Procedure: EXCISION, LIPOMA (excision of post neck lipoma);  Surgeon: Samuel Cruz MD;  Location: [facility] OR;  Service: General;  Laterality: N/A;  10 x 5 lipoma     PLATING OF STERNUM N/A 06/27/2023    Procedure: PLATING, STERNAL;  Surgeon: Jhony Sharma MD;  Location: [facility] OR;  Service: Cardiovascular;  Laterality: N/A;    PROSTATECTOMY      STENT, DRUG ELUTING, SINGLE VESSEL, CORONARY  10/09/2023    Procedure: Stent, Drug Eluting, Single Vessel, Coronary;  Surgeon: Dawit Rodarte MD;  Location: [facility] CATH LAB;  Service: Cardiology;;    VENTRICULOGRAM, LEFT  06/21/2023    Procedure: Ventriculogram, Left;  Surgeon: Raji Hughes MD;  Location: [facility] CATH LAB;  Service: Cardiology;;       ALLERGIES:   Patient has no known allergies.    FAMILY HISTORY:   Mother: Hypertension, diabetes mellitus, myocardial infarction  Father:, hypertension, myocardial infarction and prostate cancer    SOCIAL HISTORY:   Former smoker who quit several months ago   Drinks alcohol on holidays   Denies illicit drug use      HOME MEDICATIONS:     Prior to Admission medications    Medication Sig Start Date End Date Taking? Authorizing Provider   albuterol (VENTOLIN HFA) 90 mcg/actuation inhaler Inhale 2 puffs into the lungs every 6 (six) hours as needed for Wheezing. Rescue    Provider, Historical   aspirin (ECOTRIN) 81 MG EC tablet Take 81 mg by mouth every morning. Stopped 3/26/23    Provider, Historical   atorvastatin (LIPITOR) 80 MG tablet Take 80 mg by mouth every evening. 9/28/22   Provider, Historical   buPROPion (WELLBUTRIN SR) 150 MG TBSR 12 hr tablet Take 150 mg by mouth once daily.    Provider, Historical   ciprofloxacin HCl (CIPRO) 500 MG tablet Take 1 tablet (500 mg total) by mouth every 12 (twelve) hours. for 10 days 4/8/24 4/18/24  Saloni Saab MD   clopidogreL (PLAVIX) 75 mg tablet Take 1 tablet (75 mg total) by mouth once daily. 10/11/23   Natalya Renee NP   isosorbide mononitrate (IMDUR) 60 MG 24 hr  tablet Take 1 tablet (60 mg total) by mouth once daily. 10/12/23 10/11/24  Natalya Renee NP   lisinopriL (PRINIVIL,ZESTRIL) 2.5 MG tablet Take 1 tablet (2.5 mg total) by mouth once daily. 4/8/24   Saloni Saab MD   metoprolol tartrate (LOPRESSOR) 25 MG tablet Take 0.5 tablets (12.5 mg total) by mouth 2 (two) times daily. 7/1/23   Jaswinder Jauregui, MARLENY   nitroGLYCERIN (NITROSTAT) 0.4 MG SL tablet Place under the tongue. 6/7/23   Provider, Historical   oxyCODONE-acetaminophen (PERCOCET)  mg per tablet Take 1 tablet by mouth every 4 (four) hours as needed for Pain.    Provider, Historical   pregabalin (LYRICA) 100 MG capsule Take 100 mg by mouth 2 (two) times daily as needed. 3/1/23   Provider, Historical   ranolazine 1,000 mg PSRG Take 1,000 mg by mouth 2 (two) times daily.    Provider, Historical       REVIEW OF SYSTEMS:   Except as documented, all other systems reviewed and negative     PHYSICAL EXAM:     VITAL SIGNS: 24 HRS MIN & MAX LAST   Temp  Min: 98.3 °F (36.8 °C)  Max: 98.3 °F (36.8 °C) 98.3 °F (36.8 °C)   BP  Min: 134/95  Max: 161/85 (!) 161/85   Pulse  Min: 57  Max: 79  66   Resp  Min: 18  Max: 19 19   SpO2  Min: 98 %  Max: 100 % 99 %       General appearance: Well-developed, well-nourished male in no apparent distress.  No family at bedside.  HEENT: Atraumatic head. Moist mucous membranes of oral cavity.  Lungs: Clear to auscultation bilaterally.   Heart: Regular rate and rhythm.   Abdomen: Soft, non-distended, non-tender. Bowel sounds are normal.   Extremities: No cyanosis, clubbing. No deformities.  Genitourinary:  3 way catheter to gravity drainage with light yellow urine and no hematuria.  Skin: No Rash. Warm and dry.  Neuro: Awake, alert and oriented. Motor and sensory exams grossly intact.  Psych/mental status: Appropriate mood and affect. Cooperative. Responds appropriately to questions.       LABS AND IMAGING:     Recent Labs   Lab 04/06/24  0431 04/08/24  0331 04/09/24  0035   WBC  6.96 5.98 7.75   RBC 4.28* 3.91* 4.09*   HGB 13.1* 11.8* 12.4*   HCT 38.5* 35.7* 37.3*   MCV 90.0 91.3 91.2   MCH 30.6 30.2 30.3   MCHC 34.0 33.1 33.2   RDW 12.8 12.7 12.6    202 233   MPV 9.3 9.5 9.3       Recent Labs   Lab 04/05/24  0900 04/06/24  0431 04/08/24  0331 04/09/24  0035    139 140 138   K 4.2 4.3 4.7 4.4   CO2 23 28 25 28   BUN 16.1 18.2 16.2 18.4   CREATININE 0.98 1.05 0.94 1.13   CALCIUM 10.0 9.2 9.2 9.8   ALBUMIN 3.9 3.5  --  3.6   ALKPHOS 88 89  --  78   ALT 25 26  --  21   AST 15 14  --  14   BILITOT 0.2 0.3  --  0.2       Microbiology Results (last 7 days)       ** No results found for the last 168 hours. **             CT Abdomen Pelvis With IV Contrast NO Oral Contrast  Narrative: EXAMINATION:  CT ABDOMEN PELVIS WITH IV CONTRAST    CLINICAL HISTORY:  Abdominal pain, acute, nonlocalized;Gross hematuria with clots;    TECHNIQUE:  Helical acquisition through the abdomen and pelvis with IV contrast.  Three plane reconstructions were provided for review. DLP 1727 mGycm. Automatic exposure control, adjustment of mA/kV or iterative reconstruction technique was used to reduce radiation.    COMPARISON:  29 September 2021    FINDINGS:  The limited imaged lung bases are clear.    Question hepatic steatosis.  The gallbladder is surgically absent.  No significant abnormality of the spleen, pancreas or adrenals.  No hydronephrosis or suspicious renal lesion.    No bowel obstruction. No significant inflammatory changes of the bowel.  Normal appendix.  No free air.    There is a Fleming in the urinary bladder.  No pelvic free fluid.  The abdominal aorta is normal in caliber.  Moderate atherosclerotic disease.    Moderate degenerative change of the spine.  Impression: No acute abdominopelvic findings.    No significant discrepancy with the preliminary report.    Electronically signed by: Eugene Saldivar  Date:    03/20/2024  Time:    07:06        ASSESSMENT & PLAN:   Assessment:  Hematuria   Normocytic  anemia   History of hypertension, hyperlipidemia, coronary artery disease status post CABG on Plavix and baby aspirin, GERD, anxiety/depression and prostate cancer status post prostatectomy and radiation    Plan:  - Urology consulted.  Appreciate recommendations   - Hold anticoagulation at this time   - Hemoglobin stable continue to monitor transfusing if decreases below 8  - Colace and MiraLax for constipation  - Trend troponin and telemetry monitoring ordered for chest pain  - Resume appropriate home medications when deemed necessary   - Labs in AM      VTE Prophylaxis: will be placed on SCD for DVT prophylaxis and will be advised to be as mobile as possible and sit in a chair as tolerated          Discharge Planning and Disposition: Anticipated discharge to be determined.    ITomasa PA, have reviewed and discussed the case with Dr. Yeyo Benz MD    Please see the following addendum for further assessment and plan from there attending MD.    CASEY Heller-MIKA  04/09/2024    Dr. Benz 04/09/2024-patient is a 54-year-old  male with history of prostate cancer/prostatectomy around 3 years ago.  Also has history of CABG followed by MI and stents x3 on Plavix and aspirin.  Patient was recently discharged for [facility] secondary to gross hematuria.  Patient was taken to OR for cystoscopy with no findings of bleeding source.  His hematuria resolved with CBI.  Also on that admission he had a urinary tract infection with positive ESBL organisms.  Upon discharge he was placed back on aspirin and Plavix and at home he developed again gross hematuria.  Patient came to emergency room [facility] where he was admitted with CBI.  Antiplatelets were placed on hold.  Already his urine is clear.  He was seen by Urology with plans for HBO treatments.    Antiplatelets on hold at the present moment .    Agree with assessment and plans done by CASEY Raya.   Some corrections were done to HPI/physical examination as well as assessment and plans at the time of my evaluation.  We will continue present management.  Continue CBI/keep off platelets and follow urology recommendations.  Patient belongs to [employer] and he definitely needs his antiplatelets.        SOLOMON MATOS    04/09/2024

## 2024-04-09 NOTE — NURSING
Nurses Note -- 4 Eyes      4/9/2024   6:46 PM      Skin assessed during: Admit      [x] No Altered Skin Integrity Present    [x]Prevention Measures Documented      [] Yes- Altered Skin Integrity Present or Discovered   [] LDA Added if Not in Epic (Describe Wound)   [] New Altered Skin Integrity was Present on Admit and Documented in LDA   [] Wound Image Taken    Wound Care Consulted? No    Attending Nurse:  Gerardo Fox RN      Second RN/Staff Member:  Rita BAILEY

## 2024-04-10 PROBLEM — N30.40 RADIATION CYSTITIS: Status: ACTIVE | Noted: 2024-04-10

## 2024-04-10 PROBLEM — Z85.46 PERSONAL HISTORY OF PROSTATE CANCER: Status: ACTIVE | Noted: 2024-04-10

## 2024-04-10 PROBLEM — R33.8 CLOT RETENTION OF URINE: Status: ACTIVE | Noted: 2024-04-10

## 2024-04-10 LAB
ALBUMIN SERPL-MCNC: 3.7 G/DL (ref 3.5–5)
ALBUMIN/GLOB SERPL: 1.2 RATIO (ref 1.1–2)
ALP SERPL-CCNC: 87 UNIT/L (ref 40–150)
ALT SERPL-CCNC: 28 UNIT/L (ref 0–55)
AST SERPL-CCNC: 20 UNIT/L (ref 5–34)
BASOPHILS # BLD AUTO: 0.04 X10(3)/MCL
BASOPHILS NFR BLD AUTO: 0.5 %
BILIRUB SERPL-MCNC: 0.3 MG/DL
BUN SERPL-MCNC: 16.1 MG/DL (ref 8.4–25.7)
CALCIUM SERPL-MCNC: 9.3 MG/DL (ref 8.4–10.2)
CHLORIDE SERPL-SCNC: 105 MMOL/L (ref 98–107)
CO2 SERPL-SCNC: 22 MMOL/L (ref 22–29)
CREAT SERPL-MCNC: 0.93 MG/DL (ref 0.73–1.18)
EOSINOPHIL # BLD AUTO: 0.35 X10(3)/MCL (ref 0–0.9)
EOSINOPHIL NFR BLD AUTO: 4.5 %
ERYTHROCYTE [DISTWIDTH] IN BLOOD BY AUTOMATED COUNT: 12.6 % (ref 11.5–17)
GFR SERPLBLD CREATININE-BSD FMLA CKD-EPI: >60 MLS/MIN/1.73/M2
GLOBULIN SER-MCNC: 3 GM/DL (ref 2.4–3.5)
GLUCOSE SERPL-MCNC: 110 MG/DL (ref 74–100)
HCT VFR BLD AUTO: 37.9 % (ref 42–52)
HGB BLD-MCNC: 12.8 G/DL (ref 14–18)
IMM GRANULOCYTES # BLD AUTO: 0.03 X10(3)/MCL (ref 0–0.04)
IMM GRANULOCYTES NFR BLD AUTO: 0.4 %
LYMPHOCYTES # BLD AUTO: 1.51 X10(3)/MCL (ref 0.6–4.6)
LYMPHOCYTES NFR BLD AUTO: 19.3 %
MCH RBC QN AUTO: 30.3 PG (ref 27–31)
MCHC RBC AUTO-ENTMCNC: 33.8 G/DL (ref 33–36)
MCV RBC AUTO: 89.8 FL (ref 80–94)
MONOCYTES # BLD AUTO: 0.57 X10(3)/MCL (ref 0.1–1.3)
MONOCYTES NFR BLD AUTO: 7.3 %
NEUTROPHILS # BLD AUTO: 5.33 X10(3)/MCL (ref 2.1–9.2)
NEUTROPHILS NFR BLD AUTO: 68 %
NRBC BLD AUTO-RTO: 0 %
PLATELET # BLD AUTO: 256 X10(3)/MCL (ref 130–400)
PMV BLD AUTO: 9.5 FL (ref 7.4–10.4)
POTASSIUM SERPL-SCNC: 4.3 MMOL/L (ref 3.5–5.1)
PROT SERPL-MCNC: 6.7 GM/DL (ref 6.4–8.3)
RBC # BLD AUTO: 4.22 X10(6)/MCL (ref 4.7–6.1)
SODIUM SERPL-SCNC: 135 MMOL/L (ref 136–145)
TROPONIN I SERPL-MCNC: <0.01 NG/ML (ref 0–0.04)
WBC # SPEC AUTO: 7.83 X10(3)/MCL (ref 4.5–11.5)

## 2024-04-10 PROCEDURE — 80053 COMPREHEN METABOLIC PANEL: CPT | Performed by: PHYSICIAN ASSISTANT

## 2024-04-10 PROCEDURE — 21400001 HC TELEMETRY ROOM

## 2024-04-10 PROCEDURE — G0277 HBOT, FULL BODY CHAMBER, 30M: HCPCS

## 2024-04-10 PROCEDURE — 84484 ASSAY OF TROPONIN QUANT: CPT | Performed by: PHYSICIAN ASSISTANT

## 2024-04-10 PROCEDURE — 85025 COMPLETE CBC W/AUTO DIFF WBC: CPT | Performed by: PHYSICIAN ASSISTANT

## 2024-04-10 PROCEDURE — 25000003 PHARM REV CODE 250: Performed by: PHYSICIAN ASSISTANT

## 2024-04-10 PROCEDURE — 5A05121 EXTRACORPOREAL HYPERBARIC OXYGENATION, INTERMITTENT: ICD-10-PCS | Performed by: EMERGENCY MEDICINE

## 2024-04-10 PROCEDURE — 25000003 PHARM REV CODE 250: Performed by: INTERNAL MEDICINE

## 2024-04-10 RX ADMIN — CIPROFLOXACIN HYDROCHLORIDE 500 MG: 500 TABLET, FILM COATED ORAL at 08:04

## 2024-04-10 RX ADMIN — POLYETHYLENE GLYCOL 3350 17 G: 17 POWDER, FOR SOLUTION ORAL at 09:04

## 2024-04-10 RX ADMIN — RANOLAZINE 1000 MG: 500 TABLET, EXTENDED RELEASE ORAL at 08:04

## 2024-04-10 RX ADMIN — BUPROPION HYDROCHLORIDE 150 MG: 150 TABLET, EXTENDED RELEASE ORAL at 08:04

## 2024-04-10 RX ADMIN — METOPROLOL TARTRATE 12.5 MG: 25 TABLET, FILM COATED ORAL at 08:04

## 2024-04-10 RX ADMIN — ISOSORBIDE MONONITRATE 60 MG: 60 TABLET, EXTENDED RELEASE ORAL at 08:04

## 2024-04-10 RX ADMIN — ATORVASTATIN CALCIUM 80 MG: 40 TABLET, FILM COATED ORAL at 08:04

## 2024-04-10 RX ADMIN — OXYCODONE AND ACETAMINOPHEN 1 TABLET: 10; 325 TABLET ORAL at 03:04

## 2024-04-10 RX ADMIN — OXYCODONE AND ACETAMINOPHEN 1 TABLET: 10; 325 TABLET ORAL at 11:04

## 2024-04-10 NOTE — PLAN OF CARE
Explained LOMAS via phone-Patient was admitted obs>24h but has been upgraded to IP. Emailed LOMAS to Niurka for delivery.

## 2024-04-10 NOTE — CONSULTS
[facility] - 9th Floor Med Surg  Wound Care  Consult Note    Patient Name: Alex Reynoso  MRN: [id number]  Admission Date: 4/8/2024  Hospital Length of Stay: 0 days  Attending Physician: Yeyo Benz MD  Primary Care Provider: Glen Duffy NP     Consults Hyeprbarics   Subjective:     History of Present Illness:  Patien is 54year male who has hx of Prostate cancer for which he underwent a prostatectomy with radiation approximately 2 years ago.  He was admitted to [facility] on 4/5/24-4/8/24 for eneida hematuria requiring CBI and also treated for ESBL E coli  cystiits. During this stay he had bladder cystoscopy with clot removal and fulgiration by Dr Villalobos. This  is not the first time he has required CBI due to eneida hematuria with retention as he was treated in March of this year.   He had been restarted on his Plavix and asprin prior to discharge  due to signifcant hx of CAD requiring CABG and stents in the past. Unfortunately, he returned to the ER on the evening of the 8th for reoccurrance of urinary retention with eneida hematuria. He now has a coto catheter in place and comes down to the department for hyperbaric therapy. Today is his first treatment. He does tell me he quit smoking 3-4 months ago. 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  He denies history of sinus issues, heart failure, seizures or cataracts or glaucoma. He is alittle anxious about the treatment but we walked through what to expect.  His most recent echo and Chest films were reviewed by me prior to going in to the chamber.    PMH: HTN, CAD, Obesity, Prosatate cancer, Depression,GERD, SOB, hx nicotine use, Chronic back and neck pain  PSH: prostatectomy, CABG, Coronary stent, cholecystectomy  SH: lives in [place], [place] with wife / has 4 grown children  He used to be a  but had an accident several years ago. Never had surgery but has chronic  pain    Scheduled Meds:   atorvastatin  80 mg Oral QHS    buPROPion  150 mg Oral Daily    ciprofloxacin HCl  500 mg Oral Q12H    isosorbide mononitrate  60 mg Oral Daily    metoprolol tartrate  12.5 mg Oral BID    ranolazine  1,000 mg Oral BID     Continuous Infusions:  PRN Meds:acetaminophen, acetaminophen, albuterol, dextrose 10%, dextrose 10%, docusate sodium, glucagon (human recombinant), glucose, glucose, hyoscyamine, nitroGLYCERIN, ondansetron, oxyCODONE-acetaminophen, polyethylene glycol, pregabalin, sodium chloride 0.9%    Review of patient's allergies indicates:  No Known Allergies     Past Medical History:   Diagnosis Date    Acid reflux     Arthritis     Atherosclerosis of native arteries of extremities with intermittent claudication, unspecified extremity     Back pain     CAD (coronary artery disease), native coronary artery     Coronary artery disease     Coronary artery disease involving native coronary artery of native heart, unspecified whether angina present     Depression     GERD (gastroesophageal reflux disease)     High cholesterol     HTN (hypertension)     Hyperglycemia     Obesity     Prostate cancer     Seasonal allergies     Shortness of breath     SOB (shortness of breath)     ST elevation (STEMI) myocardial infarction of unspecified site     Tobacco use      Past Surgical History:   Procedure Laterality Date    CHOLECYSTECTOMY      COLONOSCOPY      CORONARY ANGIOGRAPHY N/A 06/21/2023    Procedure: ANGIOGRAM, CORONARY ARTERY;  Surgeon: Raji Hughes MD;  Location: [facility] CATH LAB;  Service: Cardiology;  Laterality: N/A;    CORONARY ANGIOGRAPHY N/A 10/09/2023    Procedure: ANGIOGRAM, CORONARY ARTERY;  Surgeon: Dawit Rodarte MD;  Location: [facility] CATH LAB;  Service: Cardiology;  Laterality: N/A;    CORONARY ANGIOGRAPHY Left 3/12/2024    Procedure: ANGIOGRAM, CORONARY ARTERY;  Surgeon: Raji Hughes MD;  Location: [facility] CATH LAB;  Service: Cardiology;  Laterality: Left;  +/- PCI    CORONARY  ARTERY BYPASS GRAFT (CABG) N/A 06/27/2023    Procedure: CORONARY ARTERY BYPASS GRAFT (CABG);  Surgeon: Jhony Sharma MD;  Location: [facility];  Service: Cardiovascular;  Laterality: N/A;  ECHO NOTIFIED    CORONARY BYPASS GRAFT ANGIOGRAPHY  3/12/2024    Procedure: Bypass graft study;  Surgeon: Raji Hughes MD;  Location: [facility] CATH LAB;  Service: Cardiology;;    CYSTOSCOPY N/A 4/6/2024    Procedure: CYSTOSCOPY;  Surgeon: Timbo Villalobos MD;  Location: [facility];  Service: Urology;  Laterality: N/A;  Cystoscopy with clot evacuation, fulguration and any other indicated procedures    ENDOSCOPIC HARVEST OF VEIN N/A 06/27/2023    Procedure: SURGICAL PROCUREMENT, VEIN, ENDOSCOPIC;  Surgeon: Jhony Sharma MD;  Location: [facility];  Service: Cardiovascular;  Laterality: N/A;    EVACUATION OF HEMATOMA N/A 4/6/2024    Procedure: EVACUATION, HEMATOMA;  Surgeon: Timbo Villalobos MD;  Location: [facility];  Service: Urology;  Laterality: N/A;    INSTANTANEOUS WAVE-FREE RATIO (IFR) N/A 3/12/2024    Procedure: Instantaneous Wave-Free Ratio (IFR);  Surgeon: Raji Hughes MD;  Location: [facility] CATH LAB;  Service: Cardiology;  Laterality: N/A;    INTRAVASCULAR ULTRASOUND, CORONARY N/A 10/09/2023    Procedure: Ultrasound-coronary;  Surgeon: Dawit Rodarte MD;  Location: [facility] CATH LAB;  Service: Cardiology;  Laterality: N/A;    LEFT HEART CATHETERIZATION Left 12/01/2022      Moderate noncritical multivessel CAD with stenosis up to 60%    LEFT HEART CATHETERIZATION Left 06/21/2023    Procedure: Left heart cath;  Surgeon: Raji Hughes MD;  Location: [facility] CATH LAB;  Service: Cardiology;  Laterality: Left;  LHC +/- PCI    LEFT HEART CATHETERIZATION Left 3/12/2024    Procedure: Left heart cath;  Surgeon: Raji Hughes MD;  Location: [facility] CATH LAB;  Service: Cardiology;  Laterality: Left;    LIPOMA RESECTION N/A 03/30/2023    Procedure: EXCISION, LIPOMA (excision of post neck lipoma);  Surgeon: Samuel Cruz MD;  Location: [facility] OR;   Service: General;  Laterality: N/A;  10 x 5 lipoma     PLATING OF STERNUM N/A 06/27/2023    Procedure: PLATING, STERNAL;  Surgeon: Jhony Sharma MD;  Location: [facility] OR;  Service: Cardiovascular;  Laterality: N/A;    PROSTATECTOMY      STENT, DRUG ELUTING, SINGLE VESSEL, CORONARY  10/09/2023    Procedure: Stent, Drug Eluting, Single Vessel, Coronary;  Surgeon: Dawit Rodarte MD;  Location: [facility] CATH LAB;  Service: Cardiology;;    VENTRICULOGRAM, LEFT  06/21/2023    Procedure: Ventriculogram, Left;  Surgeon: Raji Hughes MD;  Location: [facility] CATH LAB;  Service: Cardiology;;       Family History       Problem Relation (Age of Onset)    Diabetes Mother    Hypertension Mother    Prostate cancer Father          Tobacco Use    Smoking status: Former     Average packs/day: 0.5 packs/day for 34.3 years (16.7 ttl pk-yrs)     Types: Cigarettes     Start date: 1990    Smokeless tobacco: Never   Substance and Sexual Activity    Alcohol use: Not Currently     Comment: socially    Drug use: Not Currently    Sexual activity: Not Currently     Review of Systems   Constitutional:  Positive for activity change.   HENT: Negative.     Respiratory: Negative.     Cardiovascular: Negative.    Gastrointestinal:         Obese protuberant belly   Endocrine: Negative.    Genitourinary:  Positive for difficulty urinating and hematuria.   Musculoskeletal:  Positive for arthralgias, back pain and neck pain.   Neurological:  Positive for weakness.   Hematological: Negative.    Psychiatric/Behavioral: Negative.     All other systems reviewed and are negative.      Objective:     Vital Signs (Most Recent):  Temp: 98 °F (36.7 °C) (04/10/24 1301)  Pulse: 69 (04/10/24 1301)  Resp: 18 (04/10/24 0309)  BP: 132/84 (04/10/24 1301)  SpO2: 98 % (04/10/24 1301) Vital Signs (24h Range):  Temp:  [97.8 °F (36.6 °C)-98.6 °F (37 °C)] 98 °F (36.7 °C)  Pulse:  [61-72] 69  Resp:  [18-19] 18  SpO2:  [97 %-100 %] 98 %  BP: (115-153)/(72-87) 132/84     Weight: 121.1 kg  "(267 lb)  Body mass index is 43.09 kg/m².     Physical Exam  Vitals and nursing note reviewed.   Constitutional:       Appearance: He is obese.   HENT:      Head: Normocephalic and atraumatic.      Right Ear: Tympanic membrane normal.      Left Ear: Tympanic membrane normal.      Nose: Nose normal.   Eyes:      Extraocular Movements: Extraocular movements intact.      Pupils: Pupils are equal, round, and reactive to light.   Cardiovascular:      Rate and Rhythm: Normal rate and regular rhythm.      Pulses: Normal pulses.      Heart sounds: Normal heart sounds.   Pulmonary:      Effort: Pulmonary effort is normal.   Abdominal:      Palpations: Abdomen is soft.      Comments: Obese / no bladder fullness / coto in place   Genitourinary:     Comments: Coto catheter in place  Musculoskeletal:         General: Normal range of motion.      Cervical back: Normal range of motion.   Skin:     General: Skin is warm.      Capillary Refill: Capillary refill takes less than 2 seconds.   Neurological:      Mental Status: He is alert and oriented to person, place, and time. Mental status is at baseline.   Psychiatric:         Mood and Affect: Mood normal.         Behavior: Behavior normal.          Laboratory:  A1C: No results for input(s): "HGBA1C" in the last 4320 hours.  CBC:   Recent Labs   Lab 04/10/24  0439   WBC 7.83   RBC 4.22*   HGB 12.8*   HCT 37.9*      MCV 89.8   MCH 30.3   MCHC 33.8     CMP:   Recent Labs   Lab 04/10/24  0440   CALCIUM 9.3   ALBUMIN 3.7   *   K 4.3   CO2 22   BUN 16.1   CREATININE 0.93   ALKPHOS 87   ALT 28   AST 20   BILITOT 0.3     Coagulation:   Recent Labs   Lab 04/09/24  0035   INR 1.0       Diagnostic Results:    X-Ray Chest 1 View  Order: [id number]  Status: Final result       Visible to patient: Yes (not seen)       Next appt: 04/23/2024 at 08:00 AM in Radiology ([facility] CT1 [address] LIMIT)    0 Result Notes  Details    Reading Physician Reading Date Result Priority   Parish Kelley " MD MIKA  [phone] 3/19/2024 STAT     Narrative & Impression  EXAMINATION:  XR CHEST 1 VIEW     CLINICAL HISTORY:  Chest Pain;     TECHNIQUE:  Single view of the chest     COMPARISON:  01/29/2024     FINDINGS:  No focal opacification, pleural effusion, or pneumothorax.     The cardiomediastinal silhouette is within normal limits with postsurgical changes of median sternotomy.     No acute osseous abnormality.     Impression:     No acute cardiopulmonary process.        Electronically signed by: Parish Kelley  Date:                                            03/19/2024  Time:                                           21:59           Exam Ended: 03/19/24 21:53 CDT         Reason for Exam  Priority: STAT  Dx: Chest pain, unspecified type [R07.9 (ICD-10-CM)]     View Images Vital Vitrea     Show images for Echo Saline Bubble? No  Summary         Left Ventricle: The left ventricle is normal in size. Normal wall thickness. Normal wall motion. There is normal systolic function with a visually estimated ejection fraction of 60 - 65%. There is normal diastolic function.    Right Ventricle: Right ventricle was not well visualized due to poor acoustic window.    Aortic Valve: The aortic valve is a trileaflet valve.    IVC/SVC: Normal venous pressure at 3 mmHg.       Assessment/Plan:   Problem list:  Recurrent eneida hematuria causing retention  Radiation cystitis  Chronic use antiplatelet therapy  CAD s/p CABG and stents  Nicotine use by hx  Chronic pain  Pre-Diabetes    Plan of care:    We have been asked to evaluate and treat patient's symptomatic radiation cystitis with hyperbaric therapy. He was brought down to the department today and after explanation and consent were obtained, he had his first treatment.  Rationale for HBO2:  Radiation has been known to damage the DNA of cancerous cells but also can damage normal tissue causing progressive obliterative arteritis which will result in hypo-cellular, hypo-vascular and  hypoxic surrounding tissue.  Hyperbaric oxygen therapy can stimulate angiogenesis and secondarily improve tissue oxygenation. It can reduce the fibrosis of the tissue and mobilize and induce an increase of stem cells and fibroblastic proliferation. It can help heal the post surgical wound and help avoid further surgeries  Patient has had radiation therapy for prostate cancer and has been suffering the radiation affects of recurrent hematuria that has required several hospitalizations, recurrent CBI and blood loss.  Adjuvent hyperbaric oxgyen therapy is being requested for his hemorrhagic radiation cystitis.   4.   Physician/Provider statement of         consent: I have discussed all of the         above with the patient. I have explained          the risks and benefits of HBO therapy,          possible side effects of HBO therapy,          and alternatives to HBO therapy. I have           answered the questions the patient          had today. The patient understands the          treatment plan and is willing to          undergo HBO therapy course.   5.    Treatment Orders:Start HBO at 2.0 MIROSLAVA          for 90 minutes each treatment. Start          with a  total of 20 HBO treatments, then           Perform utilization review.     Evaluating Physician/Provider: Dr. Cohn       The time spent including preparing to see the patient, obtaining patient history and assessment, evaluation of the plan of care, patient/caregiver counseling and education, orders, documentation, coordination of care, and other professional medical management activities for today's encounter was  _45   minutes    Tonia Cohn, DO  Wound Care  [facility] - 9th Floor Med Surg

## 2024-04-10 NOTE — NURSING
[facility] - 9th Floor Med Surg  Hyperbaric Medicine    Patient Name: Alex Reynoso  MRN: [id number]  Date: 4/10/2024  Diagnosis:   Active Problem List with Overview Notes    Diagnosis Date Noted    Gross hematuria 04/08/2024    Hematuria 03/24/2024    Chest pain 10/09/2023    Hx of CABG 06/29/2023     Status post coronary artery bypass grafting x3 on June 27, 2023.      Stable angina pectoris 06/21/2023    Lipoma of neck 03/30/2023          Subjective:     Patient ID: Alex Reynoso is a 54 y.o. male. Pt has h/o prostate cancer s/p prostatectomy and radiation approximately 3 years ago. Pt admitted for hematuria and HBO consulted for radiation cystitis.  Risks and benefits of treatment explained in detail. Pt instructed on safety measures as well as emergency evacuation procedures. Pt educated on VENTID and was instructed on how to clear ears. Pt verbalized understanding and signed consents. CXR ordered and Pt was cleared to begin treatment by HBO MD. Today is treatment number 1.          Assessment     Visit Information  Arrival Condition: Stable  Ambulatory Status: Wheelchair  Patient Identification Verified: Yes  Secondary Verification Process Completed: Yes  Patient is in Isolation Precaution?: No  Is patient oriented to person, place and time?: Yes  History Since Last Visit  All ordered tests and consults were completed.: Yes  Experienced change in activities of daily living that may affect risk of falls: No  Experienced any changes in pain level or management: No  Signs of symptoms of abuse and/or neglect since last visit?: No    Patient is alert and oriented times three.   BP: (!) 153/85  Pulse: 72  Resp: 20  Temp: 98.6 °F (37 °C)  Pain Level: 0  Left Vision Change: vision change denied  Right Vision Change: vision change denied  Left Hearing Change: hearing change denied  Right Hearing Change: hearing change denied  Respiratory WDL: WDL  Breath Sounds: All Fields  All Lung Fields Breath Sounds:  equal bilaterally, clear       Hyperbaric Pre inspection/Safety checklist:     Mattress Cleaned prior to tx?  [x] Yes  [] No  2 Patient Identifiers?  [x] Yes  [] No  Consent Obtained?  [x] Yes   [] No  Authorization Current?  [] Yes   [] No  [x] Auth not required  Cotton gown or 50/50 polyester/cotton blend with no more than 50% polyester?  [x] Yes  [] No  Patient voided?  [x] Yes  [] No  Drainage Tubes emptied and Secured?  [x] Yes  [] No  [] N/A  Patient Pregnant?  [] Yes  [] No  [x] N/A  All prohibited items removed?  [x] Yes  Tympanic Membranes Visualized?   [x] Yes   [] No   Diabetic pt eaten?   [] Yes   [] No   [x] N/A  Any cold/flu symptoms?   [] Yes  [x] No   Went over VENTID with pt?  [x] Yes  [] No    Fears and apprehensions verbalized?   [x] Yes   Ground wire secured?  [x] Yes    Hyperbaric Treatment Course:      Hyperbaric Treatment Order   2.00 MIROSLAVA x90 mins w/ 100% oxygen and no air breaks      HBO Treatment Course Details   Treatment Course Number: 1     Total Treatments Ordered: 20     Ordering Provider: Dr. hilton     Indications Other (Comments) (radiaiton Cystitis)     Treatment Start Date:  04/10/24       HBO Treatment Details   Treatment Number: 1     Inpatient Visit?: Yes     Total Treatment Length: 100     Adjusted Treatment Length: 120     Chamber Type: Monoplace     Chamber #: [id number]     Treatment Protocol: 2.0 MIROSLAVA x 90 minutes w/100% oxygen and no air break       Treatment Details   Dive Rate Down: 1.0 psi/minute     Dive Rate Up: 1.0 psi/minute     Compress Begins: 1.10     Clock Time: 1010     Tx Pressure Reached: 2.00     Clock Time: 1025     Decompress Begins: 2.00     Clock Time: 1140     Decompress Ends: 1.10     Clock Time: 1150       Treatment Details/Response:     Pt tolerated treatment without complications/complaints. Will bring down tomorrow if still admitted.     Post HBO Vital Signs  BP: (!) 140/80  Pulse: 80  Resp: 20  Temp: 98.2 °F (36.8 °C)  Pain Level: 0    Ear  Evaluation  Left Teed Scale Pre: Grade 0  Left Teed Scale Post: Grade 0  Right Teed Scale Pre: Grade 0  Right Teed Scale Post: Grade 0     Visit Discharge Information  Discharge Condition: Stable  Ambulatory Status: Wheelchair  Discharge Destination: Other (back to room)  Transportation: Other (pt transport)    Physician Supervision: The hyperbaric physician provided direct supervision and was immediately available to furnish assistance and direction throughout the performance of the procedure. A trained emergency response team and emergency services were available throughout procedure.     [facility] 9th Floor Med Surg  Hyperbaric Medicine

## 2024-04-10 NOTE — SUBJECTIVE & OBJECTIVE
Scheduled Meds:   atorvastatin  80 mg Oral QHS    buPROPion  150 mg Oral Daily    ciprofloxacin HCl  500 mg Oral Q12H    isosorbide mononitrate  60 mg Oral Daily    metoprolol tartrate  12.5 mg Oral BID    ranolazine  1,000 mg Oral BID     Continuous Infusions:  PRN Meds:acetaminophen, acetaminophen, albuterol, dextrose 10%, dextrose 10%, docusate sodium, glucagon (human recombinant), glucose, glucose, hyoscyamine, nitroGLYCERIN, ondansetron, oxyCODONE-acetaminophen, polyethylene glycol, pregabalin, sodium chloride 0.9%    Review of patient's allergies indicates:  No Known Allergies     Past Medical History:   Diagnosis Date    Acid reflux     Arthritis     Atherosclerosis of native arteries of extremities with intermittent claudication, unspecified extremity     Back pain     CAD (coronary artery disease), native coronary artery     Coronary artery disease     Coronary artery disease involving native coronary artery of native heart, unspecified whether angina present     Depression     GERD (gastroesophageal reflux disease)     High cholesterol     HTN (hypertension)     Hyperglycemia     Obesity     Prostate cancer     Seasonal allergies     Shortness of breath     SOB (shortness of breath)     ST elevation (STEMI) myocardial infarction of unspecified site     Tobacco use      Past Surgical History:   Procedure Laterality Date    CHOLECYSTECTOMY      COLONOSCOPY      CORONARY ANGIOGRAPHY N/A 06/21/2023    Procedure: ANGIOGRAM, CORONARY ARTERY;  Surgeon: Raji Hughes MD;  Location: [facility] CATH LAB;  Service: Cardiology;  Laterality: N/A;    CORONARY ANGIOGRAPHY N/A 10/09/2023    Procedure: ANGIOGRAM, CORONARY ARTERY;  Surgeon: Dawit Rodarte MD;  Location: [facility] CATH LAB;  Service: Cardiology;  Laterality: N/A;    CORONARY ANGIOGRAPHY Left 3/12/2024    Procedure: ANGIOGRAM, CORONARY ARTERY;  Surgeon: Raji Hughes MD;  Location: [facility] CATH LAB;  Service: Cardiology;  Laterality: Left;  +/- PCI    CORONARY ARTERY  BYPASS GRAFT (CABG) N/A 06/27/2023    Procedure: CORONARY ARTERY BYPASS GRAFT (CABG);  Surgeon: Jhony Sharma MD;  Location: [facility];  Service: Cardiovascular;  Laterality: N/A;  ECHO NOTIFIED    CORONARY BYPASS GRAFT ANGIOGRAPHY  3/12/2024    Procedure: Bypass graft study;  Surgeon: Raji Hughes MD;  Location: [facility] CATH LAB;  Service: Cardiology;;    CYSTOSCOPY N/A 4/6/2024    Procedure: CYSTOSCOPY;  Surgeon: Timbo Villalobos MD;  Location: [facility];  Service: Urology;  Laterality: N/A;  Cystoscopy with clot evacuation, fulguration and any other indicated procedures    ENDOSCOPIC HARVEST OF VEIN N/A 06/27/2023    Procedure: SURGICAL PROCUREMENT, VEIN, ENDOSCOPIC;  Surgeon: Jhony Sharma MD;  Location: [facility];  Service: Cardiovascular;  Laterality: N/A;    EVACUATION OF HEMATOMA N/A 4/6/2024    Procedure: EVACUATION, HEMATOMA;  Surgeon: Timbo Villalobos MD;  Location: [facility];  Service: Urology;  Laterality: N/A;    INSTANTANEOUS WAVE-FREE RATIO (IFR) N/A 3/12/2024    Procedure: Instantaneous Wave-Free Ratio (IFR);  Surgeon: Raji Hughes MD;  Location: [facility] CATH LAB;  Service: Cardiology;  Laterality: N/A;    INTRAVASCULAR ULTRASOUND, CORONARY N/A 10/09/2023    Procedure: Ultrasound-coronary;  Surgeon: Dawit Rodarte MD;  Location: [facility] CATH LAB;  Service: Cardiology;  Laterality: N/A;    LEFT HEART CATHETERIZATION Left 12/01/2022      Moderate noncritical multivessel CAD with stenosis up to 60%    LEFT HEART CATHETERIZATION Left 06/21/2023    Procedure: Left heart cath;  Surgeon: Raji Hughes MD;  Location: [facility] CATH LAB;  Service: Cardiology;  Laterality: Left;  LHC +/- PCI    LEFT HEART CATHETERIZATION Left 3/12/2024    Procedure: Left heart cath;  Surgeon: Raji Hughes MD;  Location: [facility] CATH LAB;  Service: Cardiology;  Laterality: Left;    LIPOMA RESECTION N/A 03/30/2023    Procedure: EXCISION, LIPOMA (excision of post neck lipoma);  Surgeon: Samuel Cruz MD;  Location: [facility];  Service:  General;  Laterality: N/A;  10 x 5 lipoma     PLATING OF STERNUM N/A 06/27/2023    Procedure: PLATING, STERNAL;  Surgeon: Jhony Sharma MD;  Location: [facility] OR;  Service: Cardiovascular;  Laterality: N/A;    PROSTATECTOMY      STENT, DRUG ELUTING, SINGLE VESSEL, CORONARY  10/09/2023    Procedure: Stent, Drug Eluting, Single Vessel, Coronary;  Surgeon: Dawit Rodarte MD;  Location: [facility] CATH LAB;  Service: Cardiology;;    VENTRICULOGRAM, LEFT  06/21/2023    Procedure: Ventriculogram, Left;  Surgeon: Raji Hughes MD;  Location: [facility] CATH LAB;  Service: Cardiology;;       Family History       Problem Relation (Age of Onset)    Diabetes Mother    Hypertension Mother    Prostate cancer Father          Tobacco Use    Smoking status: Former     Average packs/day: 0.5 packs/day for 34.3 years (16.7 ttl pk-yrs)     Types: Cigarettes     Start date: 1990    Smokeless tobacco: Never   Substance and Sexual Activity    Alcohol use: Not Currently     Comment: socially    Drug use: Not Currently    Sexual activity: Not Currently     Review of Systems   Constitutional:  Positive for activity change.   HENT: Negative.     Respiratory: Negative.     Cardiovascular: Negative.    Gastrointestinal:         Obese protuberant belly   Endocrine: Negative.    Genitourinary:  Positive for difficulty urinating and hematuria.   Musculoskeletal:  Positive for arthralgias, back pain and neck pain.   Neurological:  Positive for weakness.   Hematological: Negative.    Psychiatric/Behavioral: Negative.     All other systems reviewed and are negative.      Objective:     Vital Signs (Most Recent):  Temp: 98 °F (36.7 °C) (04/10/24 1301)  Pulse: 69 (04/10/24 1301)  Resp: 18 (04/10/24 0309)  BP: 132/84 (04/10/24 1301)  SpO2: 98 % (04/10/24 1301) Vital Signs (24h Range):  Temp:  [97.8 °F (36.6 °C)-98.6 °F (37 °C)] 98 °F (36.7 °C)  Pulse:  [61-72] 69  Resp:  [18-19] 18  SpO2:  [97 %-100 %] 98 %  BP: (115-153)/(72-87) 132/84     Weight: 121.1 kg (267  "lb)  Body mass index is 43.09 kg/m².     Physical Exam  Vitals and nursing note reviewed.   Constitutional:       Appearance: He is obese.   HENT:      Head: Normocephalic and atraumatic.      Right Ear: Tympanic membrane normal.      Left Ear: Tympanic membrane normal.      Nose: Nose normal.   Eyes:      Extraocular Movements: Extraocular movements intact.      Pupils: Pupils are equal, round, and reactive to light.   Cardiovascular:      Rate and Rhythm: Normal rate and regular rhythm.      Pulses: Normal pulses.      Heart sounds: Normal heart sounds.   Pulmonary:      Effort: Pulmonary effort is normal.   Abdominal:      Palpations: Abdomen is soft.      Comments: Obese / no bladder fullness / coto in place   Genitourinary:     Comments: Coto catheter in place  Musculoskeletal:         General: Normal range of motion.      Cervical back: Normal range of motion.   Skin:     General: Skin is warm.      Capillary Refill: Capillary refill takes less than 2 seconds.   Neurological:      Mental Status: He is alert and oriented to person, place, and time. Mental status is at baseline.   Psychiatric:         Mood and Affect: Mood normal.         Behavior: Behavior normal.          Laboratory:  A1C: No results for input(s): "HGBA1C" in the last 4320 hours.  CBC:   Recent Labs   Lab 04/10/24  0439   WBC 7.83   RBC 4.22*   HGB 12.8*   HCT 37.9*      MCV 89.8   MCH 30.3   MCHC 33.8     CMP:   Recent Labs   Lab 04/10/24  0440   CALCIUM 9.3   ALBUMIN 3.7   *   K 4.3   CO2 22   BUN 16.1   CREATININE 0.93   ALKPHOS 87   ALT 28   AST 20   BILITOT 0.3     Coagulation:   Recent Labs   Lab 04/09/24  0035   INR 1.0       Diagnostic Results:    X-Ray Chest 1 View  Order: [id number]  Status: Final result       Visible to patient: Yes (not seen)       Next appt: 04/23/2024 at 08:00 AM in Radiology ([facility] CT1 675 LB LIMIT)    0 Result Notes  Details    Reading Physician Reading Date Result Priority   Parish Kelley, " MD  [phone] 3/19/2024 STAT     Narrative & Impression  EXAMINATION:  XR CHEST 1 VIEW     CLINICAL HISTORY:  Chest Pain;     TECHNIQUE:  Single view of the chest     COMPARISON:  01/29/2024     FINDINGS:  No focal opacification, pleural effusion, or pneumothorax.     The cardiomediastinal silhouette is within normal limits with postsurgical changes of median sternotomy.     No acute osseous abnormality.     Impression:     No acute cardiopulmonary process.        Electronically signed by: Parish Kelley  Date:                                            03/19/2024  Time:                                           21:59           Exam Ended: 03/19/24 21:53 CDT         Reason for Exam  Priority: STAT  Dx: Chest pain, unspecified type [R07.9 (ICD-10-CM)]     View Images Vital Vitrea     Show images for Echo Saline Bubble? No  Summary         Left Ventricle: The left ventricle is normal in size. Normal wall thickness. Normal wall motion. There is normal systolic function with a visually estimated ejection fraction of 60 - 65%. There is normal diastolic function.    Right Ventricle: Right ventricle was not well visualized due to poor acoustic window.    Aortic Valve: The aortic valve is a trileaflet valve.    IVC/SVC: Normal venous pressure at 3 mmHg.

## 2024-04-10 NOTE — PROGRESS NOTES
[facility] Hospital Medicine Progress Note        Chief Complaint: Inpatient Follow-up for Hematuria (Hematuria @ approx 10pm, states its grossly bloody with clots. Had cystocopy on 03/06/24 here. Urologist is SOLOMON Shaver. Started back on plavix this morning. )    HPI: Alex Reynoso is a 54 y.o. Black or  male with a past medical history of hypertension, hyperlipidemia, coronary artery disease status post CABG on Plavix and baby aspirin, GERD, anxiety/depression and prostate cancer status post prostatectomy and radiation.  Patient had admission to hospital medicine services from 04/05/2024 to 04/08/2024 for gross hematuria. Patient underwent cystoscopy with bladder fulguration on 04/06/2024 with Dr. Timbo Villalobos.  Patient was treated for ESBL E coli cystitis.  Plavix and aspirin was resumed on 04/07/2024.  He was voiding spontaneously with no hematuria and therefore discharged home. The patient presented to [facility] on 4/8/2024 with a primary complaint hematuria.  Patient reports he was sitting down watching TV last night (4/8/2024) at 8:00 PM when he felt pressure and large clotted blood was passed.  After he reports hematuria started and soon after patient was unable to urinate prompting him to present to the ED for further evaluation.  Patient was complaining of bilateral flank pain, weakness, centralized chest pain which began today and improved to a 2/10 after receiving his pain medicine, constipation for the last 3 days.  Patient reports he normally has a bowel movement every day.  He denies complaints of nausea, vomiting, diarrhea and shortness of breath.  Patient was follow with Dr. Demarcus Shaver, urologist.     Upon presentation to the ED, temperature 98.3° F, heart rate 73, blood pressure 161/88, respiratory rate 18 and SpO2 99% on room air.  Labs with H&H 12.4/37.3, WBC 7.75.  UA with no squamous epithelial cells, no bacteria, 0-2 WBCs, greater than 100 RBCs,  negative leukocyte esterase, 3+ blood, 2+ protein.  In ED patient received [place].  Urology consulted.  Three way catheter was placed in the ED. Patient is admitted to hospital medicine services for further medical management.    Interval Hx:     4/10/24 dr blanche LORENZ for radiation induced cystitis w hematuria.  On CBI. No complains     Case was discussed with patient's nurse and  on the floor.    Objective/physical exam:  General appearance: Well-developed, well-nourished male in no apparent distress.  No family at bedside.  HEENT: Atraumatic head. Moist mucous membranes of oral cavity.  Lungs: Clear to auscultation bilaterally.   Heart: Regular rate and rhythm.   Abdomen: Soft, non-distended, non-tender. Bowel sounds are normal.   Extremities: No cyanosis, clubbing. No deformities.  Genitourinary:  3 way catheter to gravity drainage with light yellow urine and no hematuria.  Skin: No Rash. Warm and dry.  Neuro: Awake, alert and oriented. Motor and sensory exams grossly intact.  Psych/mental status: Appropriate mood and affect. Cooperative. Responds appropriately to questions.     VITAL SIGNS: 24 HRS MIN & MAX LAST   Temp  Min: 97.8 °F (36.6 °C)  Max: 98.6 °F (37 °C) 97.8 °F (36.6 °C)   BP  Min: 115/72  Max: 153/85 (!) 147/84   Pulse  Min: 61  Max: 72  67   Resp  Min: 18  Max: 19 18   SpO2  Min: 96 %  Max: 100 % 98 %     I have reviewed the following labs:  Recent Labs   Lab 04/08/24  0331 04/09/24  0035 04/10/24  0439   WBC 5.98 7.75 7.83   RBC 3.91* 4.09* 4.22*   HGB 11.8* 12.4* 12.8*   HCT 35.7* 37.3* 37.9*   MCV 91.3 91.2 89.8   MCH 30.2 30.3 30.3   MCHC 33.1 33.2 33.8   RDW 12.7 12.6 12.6    233 256   MPV 9.5 9.3 9.5     Recent Labs   Lab 04/06/24  0431 04/08/24  0331 04/09/24  0035 04/10/24  0440    140 138 135*   K 4.3 4.7 4.4 4.3   CO2 28 25 28 22   BUN 18.2 16.2 18.4 16.1   CREATININE 1.05 0.94 1.13 0.93   CALCIUM 9.2 9.2 9.8 9.3   ALBUMIN 3.5  --  3.6 3.7   ALKPHOS 89  --   78 87   ALT 26  --  21 28   AST 14  --  14 20   BILITOT 0.3  --  0.2 0.3     Microbiology Results (last 7 days)       ** No results found for the last 168 hours. **             See below for Radiology    Scheduled Med:   atorvastatin  80 mg Oral QHS    buPROPion  150 mg Oral Daily    ciprofloxacin HCl  500 mg Oral Q12H    isosorbide mononitrate  60 mg Oral Daily    metoprolol tartrate  12.5 mg Oral BID    ranolazine  1,000 mg Oral BID      Continuous Infusions:     PRN Meds:  acetaminophen, acetaminophen, albuterol, dextrose 10%, dextrose 10%, docusate sodium, glucagon (human recombinant), glucose, glucose, hyoscyamine, nitroGLYCERIN, ondansetron, oxyCODONE-acetaminophen, polyethylene glycol, pregabalin, sodium chloride 0.9%     Assessment/Plan:      Rad induced hemorrhagic cystitis  - post +esbl uti(discharged on cipro)  -readmitted for gross hematuria>>cbi  -HOB    Prostate Ca/prostatectomy/rad/rad induced hemorrhagic cystitis  - asa/plavix on hold  -CBI  - HOB    CAD/CAB/Stents x 3  - asa/plavix on hold      Plan- as above. Resume plavix/ as a asap secondary to CAD./CAB /MI/Stents x 3   - continue cipro for e.coli esbl + recent uti  -contiue HOB      History of hypertension, hyperlipidemia, coronary artery disease status post CABG on Plavix and baby aspirin, GERD, anxiety/depression and prostate cancer status post prostatectomy and radiation      VTE prophylaxis: scd    Patient condition:  Stable    Anticipated discharge and Disposition:   home       All diagnosis and differential diagnosis have been reviewed; assessment and plan has been documented; I have personally reviewed the labs and test results that are presently available; I have reviewed the patients medication list; I have reviewed the consulting providers response and recommendations. I have reviewed or attempted to review medical records based upon their availability    All of the patient's questions have been  addressed and answered. Patient's is  agreeable to the above stated plan. I will continue to monitor closely and make adjustments to medical management as needed.  _____________________________________________________________________    Nutrition Status:    Radiology:  I have personally reviewed the following imaging and agree with the radiologist.     CT Abdomen Pelvis With IV Contrast NO Oral Contrast  Narrative: EXAMINATION:  CT ABDOMEN PELVIS WITH IV CONTRAST    CLINICAL HISTORY:  Abdominal pain, acute, nonlocalized;Gross hematuria with clots;    TECHNIQUE:  Helical acquisition through the abdomen and pelvis with IV contrast.  Three plane reconstructions were provided for review. DLP 1727 mGycm. Automatic exposure control, adjustment of mA/kV or iterative reconstruction technique was used to reduce radiation.    COMPARISON:  29 September 2021    FINDINGS:  The limited imaged lung bases are clear.    Question hepatic steatosis.  The gallbladder is surgically absent.  No significant abnormality of the spleen, pancreas or adrenals.  No hydronephrosis or suspicious renal lesion.    No bowel obstruction. No significant inflammatory changes of the bowel.  Normal appendix.  No free air.    There is a Fleming in the urinary bladder.  No pelvic free fluid.  The abdominal aorta is normal in caliber.  Moderate atherosclerotic disease.    Moderate degenerative change of the spine.  Impression: No acute abdominopelvic findings.    No significant discrepancy with the preliminary report.    Electronically signed by: Eugene Saldivar  Date:    03/20/2024  Time:    07:06      Yeyo Benz MD  Department of Hospital Medicine   [facility]   04/10/2024

## 2024-04-10 NOTE — HPI
HBO session    Inpatient    54-year-old black male currently hospitalized at [facility] for recurrent obstructive hematuria requiring continuous bladder irrigation.  Urology consulted for adjuvant hyperbaric oxygen therapy with diagnosis of radiation cystitis with hematuria.  Patient had history of prostate cancer with radiation and prostatectomy about 3 years ago. Today is session #3

## 2024-04-11 PROCEDURE — G0277 HBOT, FULL BODY CHAMBER, 30M: HCPCS

## 2024-04-11 PROCEDURE — 25000003 PHARM REV CODE 250: Performed by: PHYSICIAN ASSISTANT

## 2024-04-11 PROCEDURE — 99183 HYPERBARIC OXYGEN THERAPY: CPT | Mod: ,,, | Performed by: EMERGENCY MEDICINE

## 2024-04-11 PROCEDURE — 21400001 HC TELEMETRY ROOM

## 2024-04-11 PROCEDURE — 25000003 PHARM REV CODE 250: Performed by: INTERNAL MEDICINE

## 2024-04-11 RX ADMIN — ISOSORBIDE MONONITRATE 60 MG: 60 TABLET, EXTENDED RELEASE ORAL at 08:04

## 2024-04-11 RX ADMIN — CIPROFLOXACIN HYDROCHLORIDE 500 MG: 500 TABLET, FILM COATED ORAL at 08:04

## 2024-04-11 RX ADMIN — ATORVASTATIN CALCIUM 80 MG: 40 TABLET, FILM COATED ORAL at 08:04

## 2024-04-11 RX ADMIN — DOCUSATE SODIUM 100 MG: 100 CAPSULE, LIQUID FILLED ORAL at 12:04

## 2024-04-11 RX ADMIN — POLYETHYLENE GLYCOL 3350 17 G: 17 POWDER, FOR SOLUTION ORAL at 12:04

## 2024-04-11 RX ADMIN — OXYCODONE AND ACETAMINOPHEN 1 TABLET: 10; 325 TABLET ORAL at 08:04

## 2024-04-11 RX ADMIN — METOPROLOL TARTRATE 12.5 MG: 25 TABLET, FILM COATED ORAL at 08:04

## 2024-04-11 RX ADMIN — RANOLAZINE 1000 MG: 500 TABLET, EXTENDED RELEASE ORAL at 08:04

## 2024-04-11 RX ADMIN — BUPROPION HYDROCHLORIDE 150 MG: 150 TABLET, EXTENDED RELEASE ORAL at 08:04

## 2024-04-11 NOTE — SUBJECTIVE & OBJECTIVE
Subjective:     HPI:  HBO session    Inpatient    54-year-old black male currently hospitalized at [facility] for recurrent obstructive hematuria requiring continuous bladder irrigation.  Urology consulted for adjuvant hyperbaric oxygen therapy with diagnosis of radiation cystitis with hematuria.  Patient had history of prostate cancer with radiation and prostatectomy about 3 years ago.  Patient had his first session yesterday on 4/10/24. Today will be second session.  He tolerated 1st session well yesterday    Hospital Course:                    Scheduled Meds:   atorvastatin  80 mg Oral QHS    buPROPion  150 mg Oral Daily    ciprofloxacin HCl  500 mg Oral Q12H    isosorbide mononitrate  60 mg Oral Daily    metoprolol tartrate  12.5 mg Oral BID    ranolazine  1,000 mg Oral BID     Continuous Infusions:  PRN Meds:acetaminophen, acetaminophen, albuterol, dextrose 10%, dextrose 10%, docusate sodium, glucagon (human recombinant), glucose, glucose, hyoscyamine, nitroGLYCERIN, ondansetron, oxyCODONE-acetaminophen, polyethylene glycol, pregabalin, sodium chloride 0.9%    Review of Systems   HENT:  Negative for congestion, ear discharge, ear pain, sinus pressure and sinus pain.    Respiratory:  Positive for shortness of breath.    Cardiovascular:  Negative for chest pain.     Objective:     Vital Signs (Most Recent):  Temp: 97.6 °F (36.4 °C) (04/11/24 0724)  Pulse: 63 (04/11/24 0724)  Resp: 18 (04/11/24 0850)  BP: 130/78 (04/11/24 0844)  SpO2: 98 % (04/11/24 0724) Vital Signs (24h Range):  Temp:  [97.6 °F (36.4 °C)-98.7 °F (37.1 °C)] 97.6 °F (36.4 °C)  Pulse:  [63-78] 63  Resp:  [16-18] 18  SpO2:  [97 %-98 %] 98 %  BP: (112-132)/(68-84) 130/78     Weight: 121.1 kg (267 lb)  Body mass index is 43.09 kg/m².     Physical Exam  Vitals reviewed.   Neurological:      Mental Status: He is alert.

## 2024-04-11 NOTE — PROGRESS NOTES
[facility] - 9th Floor Med Surg  Wound Care  Progress Note    Patient Name: Alex Reynoso  MRN: [id number]  Admission Date: 4/8/2024  Hospital Length of Stay: 1 days  Attending Physician: Yeyo Benz MD  Primary Care Provider: Glen Duffy NP     Subjective:     HPI:  HBO session    Inpatient    54-year-old black male currently hospitalized at [facility] for recurrent obstructive hematuria requiring continuous bladder irrigation.  Urology consulted for adjuvant hyperbaric oxygen therapy with diagnosis of radiation cystitis with hematuria.  Patient had history of prostate cancer with radiation and prostatectomy about 3 years ago.  Patient had his first session yesterday on 4/10/24. Today will be second session.  He tolerated 1st session well yesterday    Hospital Course:                    Scheduled Meds:   atorvastatin  80 mg Oral QHS    buPROPion  150 mg Oral Daily    ciprofloxacin HCl  500 mg Oral Q12H    isosorbide mononitrate  60 mg Oral Daily    metoprolol tartrate  12.5 mg Oral BID    ranolazine  1,000 mg Oral BID     Continuous Infusions:  PRN Meds:acetaminophen, acetaminophen, albuterol, dextrose 10%, dextrose 10%, docusate sodium, glucagon (human recombinant), glucose, glucose, hyoscyamine, nitroGLYCERIN, ondansetron, oxyCODONE-acetaminophen, polyethylene glycol, pregabalin, sodium chloride 0.9%    Review of Systems   HENT:  Negative for congestion, ear discharge, ear pain, sinus pressure and sinus pain.    Respiratory:  Positive for shortness of breath.    Cardiovascular:  Negative for chest pain.     Objective:     Vital Signs (Most Recent):  Temp: 97.6 °F (36.4 °C) (04/11/24 0724)  Pulse: 63 (04/11/24 0724)  Resp: 18 (04/11/24 0850)  BP: 130/78 (04/11/24 0844)  SpO2: 98 % (04/11/24 0724) Vital Signs (24h Range):  Temp:  [97.6 °F (36.4 °C)-98.7 °F (37.1 °C)] 97.6 °F (36.4 °C)  Pulse:  [63-78] 63  Resp:  [16-18] 18  SpO2:  [97 %-98 %] 98 %  BP: (112-132)/(68-84) 130/78     Weight: 121.1 kg  (267 lb)  Body mass index is 43.09 kg/m².     Physical Exam  Vitals reviewed.   Neurological:      Mental Status: He is alert.            Assessment/Plan:     Today will be 2nd session of adjuvant hyperbaric oxygen therapy at 2.0 MIROSLAVA for hemorrhagic radiation cystitis.  We were consulted by urology to initiate sessions but in general these require almost up to 60 sessions before improvements could be noted.  We can provide while he is an inpatient but discharge should not be delayed because of this.  We can resume after he is discharged.      Ellyn Hernandez MD  Wound Care  [facility] - 9th Floor Med Surg

## 2024-04-11 NOTE — PROGRESS NOTES
.UROLOGY  PROGRESS  NOTE    Alex Reynoso 1970  [id number]  4/11/2024    Had HBO treatment this morning  No complaints      Exam:    NAD  Card RRR  Resp unlabored  Abd obese, soft   slightly light brown tinged urine with min CBI  Extremity no C/C/E      No results found for this or any previous visit (from the past 24 hour(s)).      Assessment:  H/o prostate cancer with prostatectomy and radiation 2-3 years ago now with radiation cystitis      Plan:  Restart Plavix  Titrate CBI  HBO in AM  Following    CALLUM Faria

## 2024-04-11 NOTE — PROGRESS NOTES
[facility] Hospital Medicine Progress Note      Chief Complaint:  Blood in urine    HPI: (personally reviewed by me and is documented from initial H&P)   54 y.o. Black or  male with a past medical history of hypertension, hyperlipidemia, coronary artery disease status post CABG on Plavix and baby aspirin, GERD, anxiety/depression and prostate cancer status post prostatectomy and radiation.      Patient had admission to hospital medicine services from 04/05/2024 to 04/08/2024 for gross hematuria. Patient underwent cystoscopy with bladder fulguration on 04/06/2024 with Dr. Timbo Villalobos.  Patient was treated for ESBL E coli cystitis.  Plavix and aspirin was resumed on 04/07/2024.  He was voiding spontaneously with no hematuria and therefore discharged home.     The patient presented to [facility] on 4/8/2024 with a primary complaint hematuria.  Patient reports he was sitting down watching TV last night (4/8/2024) at 8:00 PM when he felt pressure and large clotted blood was passed.  After he reports hematuria started and soon after patient was unable to urinate prompting him to present to the ED for further evaluation.      Patient was complaining of bilateral flank pain, weakness, centralized chest pain which began today and improved to a 2/10 after receiving his pain medicine, constipation for the last 3 days.  Patient reports he normally has a bowel movement every day.  He denies complaints of nausea, vomiting, diarrhea and shortness of breath.  Patient was follow with Dr. Demarcus Shaver, urologist.     Upon presentation to the ED, temperature 98.3° F, heart rate 73, blood pressure 161/88, respiratory rate 18 and SpO2 99% on room air.  Labs with H&H 12.4/37.3, WBC 7.75.  UA with no squamous epithelial cells, no bacteria, 0-2 WBCs, greater than 100 RBCs, negative leukocyte esterase, 3+ blood, 2+ protein.  In ED patient received [place].  Urology consulted.  Three way catheter  was placed in the ED. Patient is admitted to hospital medicine services for further medical management.    Interval Hx:     Completed hyperbarics today.  Patient's brother is at bedside.  No overnight events reported; no new complaints.  __________________________________________________________________________________________________________________________________    Objective/physical exam:  Vital signs have been personally reviewed by me   General: Appears comfortable, no acute distress.    Integumentary: Warm, dry, intact.  Musculoskeletal: Purposeful movement noted.     Respiratory: No accessory muscle use. Breath sounds are equal.  Cardiovascular: Regular rate.       VITAL SIGNS: 24 HRS MIN & MAX LAST   Temp  Min: 97.6 °F (36.4 °C)  Max: 98.7 °F (37.1 °C) 97.6 °F (36.4 °C)   BP  Min: 112/68  Max: 132/84 130/78   Pulse  Min: 63  Max: 78  63   Resp  Min: 16  Max: 18 18   SpO2  Min: 97 %  Max: 98 % 98 %     CT Abdomen Pelvis With IV Contrast NO Oral Contrast  Narrative: EXAMINATION:  CT ABDOMEN PELVIS WITH IV CONTRAST    CLINICAL HISTORY:  Abdominal pain, acute, nonlocalized;Gross hematuria with clots;    TECHNIQUE:  Helical acquisition through the abdomen and pelvis with IV contrast.  Three plane reconstructions were provided for review. DLP 1727 mGycm. Automatic exposure control, adjustment of mA/kV or iterative reconstruction technique was used to reduce radiation.    COMPARISON:  29 September 2021    FINDINGS:  The limited imaged lung bases are clear.    Question hepatic steatosis.  The gallbladder is surgically absent.  No significant abnormality of the spleen, pancreas or adrenals.  No hydronephrosis or suspicious renal lesion.    No bowel obstruction. No significant inflammatory changes of the bowel.  Normal appendix.  No free air.    There is a Fleming in the urinary bladder.  No pelvic free fluid.  The abdominal aorta is normal in caliber.  Moderate atherosclerotic disease.    Moderate degenerative change  of the spine.  Impression: No acute abdominopelvic findings.    No significant discrepancy with the preliminary report.    Electronically signed by: Eugene Saldivar  Date:    03/20/2024  Time:    07:06    Recent Labs   Lab 04/08/24  0331 04/09/24  0035 04/10/24  0439   WBC 5.98 7.75 7.83   RBC 3.91* 4.09* 4.22*   HGB 11.8* 12.4* 12.8*   HCT 35.7* 37.3* 37.9*   MCV 91.3 91.2 89.8   MCH 30.2 30.3 30.3   MCHC 33.1 33.2 33.8   RDW 12.7 12.6 12.6    233 256   MPV 9.5 9.3 9.5       Recent Labs   Lab 04/06/24  0431 04/08/24  0331 04/09/24  0035 04/10/24  0440    140 138 135*   K 4.3 4.7 4.4 4.3   CO2 28 25 28 22   BUN 18.2 16.2 18.4 16.1   CREATININE 1.05 0.94 1.13 0.93   CALCIUM 9.2 9.2 9.8 9.3   ALBUMIN 3.5  --  3.6 3.7   ALKPHOS 89  --  78 87   ALT 26  --  21 28   AST 14  --  14 20   BILITOT 0.3  --  0.2 0.3     Microbiology Results (last 7 days)       ** No results found for the last 168 hours. **             Scheduled Med:   atorvastatin  80 mg Oral QHS    buPROPion  150 mg Oral Daily    ciprofloxacin HCl  500 mg Oral Q12H    isosorbide mononitrate  60 mg Oral Daily    metoprolol tartrate  12.5 mg Oral BID    ranolazine  1,000 mg Oral BID      PRN Meds:  acetaminophen, acetaminophen, albuterol, dextrose 10%, dextrose 10%, docusate sodium, glucagon (human recombinant), glucose, glucose, hyoscyamine, nitroGLYCERIN, ondansetron, oxyCODONE-acetaminophen, polyethylene glycol, pregabalin, sodium chloride 0.9%   __________________________________________________________________________________________________________________________________    Assessment/Plan:  Hemorrhagic cystitis  History of prostate cancer status post prostatectomy   Coronary artery disease status post CABG    Above present on admission     Anticipated discharge and Disposition when medically stable:  Pending.    _______________________________________________________________________________________________________________________________    ------hematuria, greater than 100 red blood cells with no red blood cell casts noted.  2+ proteinuria.  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  Continue CBI at discretion of urologist  Levsin ordered for bladder spasms.  No plans for surgical intervention at this time  Continue to hold aspirin statin therapy.      Continue supportive care  Continue checking vital signs q4hrs.     Nurse notified to page me if any changes occur     DVT prophylaxis initiated   Nutrition Status:   Diet Heart Healthy    Consults: urology   I have personally reviewed the specialist documentation and/or have spoken to the specialist with regard to the care of this patient; recommendations are noted above.     _______________________________________________________________________________________________________________________________    I have spent >30 minutes on the day of the visit; time spent includes face to face time and non-face to face time preparing to see the patient (eg, review of tests), independently reviewing and interpreting medical records, both past and current; documenting clinical information in the electronic or other health record, and communicating results to the patient/family/caregiver and care coordinator and nursing team.      All diagnosis and differential diagnosis have been reviewed,  interpreted and communicated appropriately to care team. assessment and plan has been documented; I have personally reviewed the labs and test results that are presently available and pertinent to this hospital course; I have reviewed medical records based upon their availability.    All of the patient's questions have been  addressed and answered. Patient's is agreeable to the above stated plan.   I will continue to monitor closely and make  adjustments to medical management as needed.    Chica Mo, DO   04/11/2024     This note was created with the assistance of Dragon voice recognition software. There may be transcription errors as a result of using this technology however minimal. Effort has been made to assure accuracy of transcription but any obvious errors or omissions should be clarified with the author of the document.

## 2024-04-12 PROCEDURE — G0277 HBOT, FULL BODY CHAMBER, 30M: HCPCS

## 2024-04-12 PROCEDURE — 25000003 PHARM REV CODE 250: Performed by: PHYSICIAN ASSISTANT

## 2024-04-12 PROCEDURE — 99183 HYPERBARIC OXYGEN THERAPY: CPT | Mod: ,,, | Performed by: EMERGENCY MEDICINE

## 2024-04-12 PROCEDURE — 25000003 PHARM REV CODE 250: Performed by: INTERNAL MEDICINE

## 2024-04-12 PROCEDURE — 21400001 HC TELEMETRY ROOM

## 2024-04-12 PROCEDURE — 25000003 PHARM REV CODE 250: Performed by: NURSE PRACTITIONER

## 2024-04-12 PROCEDURE — 63600175 PHARM REV CODE 636 W HCPCS: Performed by: PHYSICIAN ASSISTANT

## 2024-04-12 RX ORDER — CLOPIDOGREL BISULFATE 75 MG/1
75 TABLET ORAL DAILY
Status: DISCONTINUED | OUTPATIENT
Start: 2024-04-12 | End: 2024-04-13 | Stop reason: HOSPADM

## 2024-04-12 RX ADMIN — CIPROFLOXACIN HYDROCHLORIDE 500 MG: 500 TABLET, FILM COATED ORAL at 08:04

## 2024-04-12 RX ADMIN — OXYCODONE AND ACETAMINOPHEN 1 TABLET: 10; 325 TABLET ORAL at 08:04

## 2024-04-12 RX ADMIN — RANOLAZINE 1000 MG: 500 TABLET, EXTENDED RELEASE ORAL at 08:04

## 2024-04-12 RX ADMIN — METOPROLOL TARTRATE 12.5 MG: 25 TABLET, FILM COATED ORAL at 08:04

## 2024-04-12 RX ADMIN — ONDANSETRON 4 MG: 2 INJECTION INTRAMUSCULAR; INTRAVENOUS at 08:04

## 2024-04-12 RX ADMIN — CLOPIDOGREL BISULFATE 75 MG: 75 TABLET ORAL at 09:04

## 2024-04-12 RX ADMIN — ISOSORBIDE MONONITRATE 60 MG: 60 TABLET, EXTENDED RELEASE ORAL at 08:04

## 2024-04-12 RX ADMIN — BUPROPION HYDROCHLORIDE 150 MG: 150 TABLET, EXTENDED RELEASE ORAL at 08:04

## 2024-04-12 RX ADMIN — HYOSCYAMINE SULFATE 0.12 MG: 0.12 TABLET SUBLINGUAL at 08:04

## 2024-04-12 RX ADMIN — ATORVASTATIN CALCIUM 80 MG: 40 TABLET, FILM COATED ORAL at 08:04

## 2024-04-12 NOTE — NURSING
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      Stable angina pectoris 06/21/2023    Lipoma of neck 03/30/2023          Subjective:     Patient ID: Alex Reynoso is a 54 y.o. male. Pt has h/o prostate cancer s/p prostatectomy and radiation approximately 3 years ago. Pt admitted for hematuria and HBO consulted for radiation cystitis.  Risks and benefits of treatment explained in detail. Pt instructed on safety measures as well as emergency evacuation procedures. Pt educated on VENTID and was instructed on how to clear ears. Pt verbalized understanding and signed consents. CXR ordered and Pt was cleared to begin treatment by HBO MD. Today is treatment number 2.          Assessment     Visit Information  Arrival Condition: Stable  Ambulatory Status: Wheelchair  Patient Identification Verified: Yes  Secondary Verification Process Completed: Yes  Patient is in Isolation Precaution?: No  Is patient oriented to person, place and time?: Yes  History Since Last Visit  All ordered tests and consults were completed.: Yes  Experienced change in activities of daily living that may affect risk of falls: No  Experienced any changes in pain level or management: No  Signs of symptoms of abuse and/or neglect since last visit?: No    Patient is alert and oriented times three.   BP: 130/78  Pulse: 63  Resp: 20  Temp: 97.5 °F  (36.4 °C)  Pain Level: 0  Left Vision Change: vision change denied  Right Vision Change: vision change denied  Left Hearing Change: hearing change denied  Right Hearing Change: hearing change denied  Respiratory WDL: WDL  Breath Sounds: All Fields  All Lung Fields Breath Sounds: equal bilaterally, clear       Hyperbaric Pre inspection/Safety checklist:     Mattress Cleaned prior to tx?  [x] Yes  [] No  2 Patient Identifiers?  [x] Yes  [] No  Consent Obtained?  [x] Yes   [] No  Authorization Current?  [] Yes   [] No  [x] Auth not required  Cotton gown or 50/50 polyester/cotton blend with no more than 50% polyester?  [x] Yes  [] No  Patient voided?  [x] Yes  [] No  Drainage Tubes emptied and Secured?  [x] Yes  [] No  [] N/A  Patient Pregnant?  [] Yes  [] No  [x] N/A  All prohibited items removed?  [x] Yes  Tympanic Membranes Visualized?   [x] Yes   [] No   Diabetic pt eaten?   [] Yes   [] No   [x] N/A  Any cold/flu symptoms?   [] Yes  [x] No   Went over VENTID with pt?  [x] Yes  [] No    Fears and apprehensions verbalized?   [x] Yes   Ground wire secured?  [x] Yes    Hyperbaric Treatment Course:      Hyperbaric Treatment Order   2.00 MIROSLAVA x90 mins w/ 100% oxygen and no air breaks      HBO Treatment Course Details   Treatment Course Number: 1     Total Treatments Ordered: 20     Ordering Provider: Dr. Hernandez     Indications Other (Comments) (radiaiton cystitis)     Treatment Start Date:  04/10/24       HBO Treatment Details   Treatment Number: 2     Inpatient Visit?: Yes     Total Treatment Length: 120     Adjusted Treatment Length: 120     Chamber Type: Monoplace     Chamber #: [id number]     Treatment Protocol: 2.0 MIROSLAVA x 120 minutes w/100% oxygen and no air break       Treatment Details   Dive Rate Down: 1.5 psi/minute     Dive Rate Up: 1.5 psi/minute     Compress Begins: 1.10     Clock Time: 0930     Tx Pressure Reached: 2.00     Clock Time: 0940     Decompress Begins: 2.00     Clock Time: 1120     Decompress Ends:  1.10     Clock Time: 1130       Treatment Details/Response:     Pt tolerated treatment without complications/complaints. Will bring down tomorrow if still admitted.     Post HBO Vital Signs  BP: 137/78  Pulse: 74  Resp: 20  Temp: 97 °F (36.1 °C)  Pain Level: 0    Ear Evaluation  Left Teed Scale Pre: Grade 0  Left Teed Scale Post: Grade 0  Right Teed Scale Pre: Grade 0  Right Teed Scale Post: Grade 0     Visit Discharge Information  Discharge Condition: Stable  Ambulatory Status: Wheelchair  Discharge Destination: Home (Back to room)  Transportation: Private Auto (Pt transport)    Physician Supervision: The hyperbaric physician provided direct supervision and was immediately available to furnish assistance and direction throughout the performance of the procedure. A trained emergency response team and emergency services were available throughout procedure.     Abbigail Oswalt, LPN [facility] - 9th Floor Med Surg  Hyperbaric Medicine

## 2024-04-12 NOTE — NURSING
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      Stable angina pectoris 06/21/2023    Lipoma of neck 03/30/2023          Subjective:     Patient ID: Alex Reynoso is a 54 y.o. male. Pt has h/o prostate cancer s/p prostatectomy and radiation approximately 3 years ago. Pt admitted for hematuria and HBO consulted for radiation cystitis.  Risks and benefits of treatment explained in detail. Pt instructed on safety measures as well as emergency evacuation procedures. Pt educated on VENTID and was instructed on how to clear ears. Pt verbalized understanding and signed consents. CXR ordered and Pt was cleared to begin treatment by HBO MD. Today is treatment number 3.          Assessment     Visit Information  Arrival Condition: Stable  Ambulatory Status: Wheelchair  Patient Identification Verified: Yes  Secondary Verification Process Completed: Yes  Patient is in Isolation Precaution?: No  Is patient oriented to person, place and time?: Yes  History Since Last Visit  All ordered tests and consults were completed.: Yes  Experienced change in activities of daily living that may affect risk of falls: No  Experienced any changes in pain level or management: No  Signs of symptoms of abuse and/or neglect since last visit?: No    Patient is alert and oriented times three.   BP: 117/66  Pulse: 78  Resp: 20  Temp: 98 °F  (36.7 °C)  Pain Level: 0  Left Vision Change: vision change denied  Right Vision Change: vision change denied  Left Hearing Change: hearing change denied  Right Hearing Change: hearing change denied  Respiratory WDL: WDL  Breath Sounds: All Fields  All Lung Fields Breath Sounds: equal bilaterally, clear       Hyperbaric Pre inspection/Safety checklist:     Mattress Cleaned prior to tx?  [x] Yes  [] No  2 Patient Identifiers?  [x] Yes  [] No  Consent Obtained?  [x] Yes   [] No  Authorization Current?  [] Yes   [] No  [x] Auth not required  Cotton gown or 50/50 polyester/cotton blend with no more than 50% polyester?  [x] Yes  [] No  Patient voided?  [x] Yes  [] No  Drainage Tubes emptied and Secured?  [x] Yes  [] No  [] N/A  Patient Pregnant?  [] Yes  [] No  [x] N/A  All prohibited items removed?  [x] Yes  Tympanic Membranes Visualized?   [x] Yes   [] No   Diabetic pt eaten?   [] Yes   [] No   [x] N/A  Any cold/flu symptoms?   [] Yes  [x] No   Went over VENTID with pt?  [x] Yes  [] No    Fears and apprehensions verbalized?   [x] Yes   Ground wire secured?  [x] Yes    Hyperbaric Treatment Course:      Hyperbaric Treatment Order   2.00 MIROSLAVA x90 mins w/ 100% oxygen and no air breaks      HBO Treatment Course Details   Treatment Course Number: 1     Total Treatments Ordered: 20     Ordering Provider:      Indications Other (Comments) (radiation cystitis)     Treatment Start Date:  04/10/24       HBO Treatment Details   Treatment Number: 3     Inpatient Visit?: Yes     Total Treatment Length: 110     Adjusted Treatment Length: 120     Chamber Type: Monoplace     Chamber #: [id number]     Treatment Protocol: 2.0 MIROSLAVA x 120 minutes w/100% oxygen and no air break       Treatment Details   Dive Rate Down: 1.5 psi/minute     Dive Rate Up: 1.5 psi/minute     Compress Begins: 1.10     Clock Time: 0945     Tx Pressure Reached: 2.00     Clock Time: 0955     Decompress Begins: 2.00     Clock Time: 1125     Decompress Ends:  1.10     Clock Time: 1135       Treatment Details/Response:     Pt tolerated treatment without complications/complaints. Will bring down Monday if still admitted.     Post HBO Vital Signs  BP: 124/79  Pulse: 80  Resp: 20  Temp: 98.2 °F (36.8 °C)  Pain Level: 0    Ear Evaluation  Left Teed Scale Pre: Grade 0  Left Teed Scale Post: Grade 0  Right Teed Scale Pre: Grade 0  Right Teed Scale Post: Grade 0     Visit Discharge Information  Discharge Condition: Stable  Ambulatory Status: Wheelchair  Discharge Destination: Home (Back to room)  Transportation: Private Auto (Pt transport)    Physician Supervision: The hyperbaric physician provided direct supervision and was immediately available to furnish assistance and direction throughout the performance of the procedure. A trained emergency response team and emergency services were available throughout procedure.     Abbigail Oswalt, LPN [facility] - 9th Floor Med Surg  Hyperbaric Medicine

## 2024-04-12 NOTE — PROGRESS NOTES
[facility] Hospital Medicine Progress Note      Chief Complaint:  Blood in urine    HPI: (personally reviewed by me and is documented from initial H&P)   54 y.o. Black or  male with a past medical history of hypertension, hyperlipidemia, coronary artery disease status post CABG on Plavix and baby aspirin, GERD, anxiety/depression and prostate cancer status post prostatectomy and radiation.      Patient had admission to hospital medicine services from 04/05/2024 to 04/08/2024 for gross hematuria. Patient underwent cystoscopy with bladder fulguration on 04/06/2024 with Dr. Timbo Villalobos.  Patient was treated for ESBL E coli cystitis.  Plavix and aspirin was resumed on 04/07/2024.  He was voiding spontaneously with no hematuria and therefore discharged home.     The patient presented to [facility] on 4/8/2024 with a primary complaint hematuria.  Patient reports he was sitting down watching TV last night (4/8/2024) at 8:00 PM when he felt pressure and large clotted blood was passed.  After he reports hematuria started and soon after patient was unable to urinate prompting him to present to the ED for further evaluation.      Patient was complaining of bilateral flank pain, weakness, centralized chest pain which began today and improved to a 2/10 after receiving his pain medicine, constipation for the last 3 days.  Patient reports he normally has a bowel movement every day.  He denies complaints of nausea, vomiting, diarrhea and shortness of breath.  Patient was follow with Dr. Demarcus Shaver, urologist.     Upon presentation to the ED, temperature 98.3° F, heart rate 73, blood pressure 161/88, respiratory rate 18 and SpO2 99% on room air.  Labs with H&H 12.4/37.3, WBC 7.75.  UA with no squamous epithelial cells, no bacteria, 0-2 WBCs, greater than 100 RBCs, negative leukocyte esterase, 3+ blood, 2+ protein.  In ED patient received [place].  Urology consulted.  Three way catheter  was placed in the ED. Patient is admitted to hospital medicine services for further medical management.    Interval Hx:   No overnight events reported; no new complaints.    __________________________________________________________________________________________________________________________________    Objective/physical exam:  Vital signs have been personally reviewed by me   General: Appears comfortable, no acute distress.    Integumentary: Warm, dry, intact.  Musculoskeletal: Purposeful movement noted.     Respiratory: No accessory muscle use. Breath sounds are equal.  Cardiovascular: Regular rate.       VITAL SIGNS: 24 HRS MIN & MAX LAST   Temp  Min: 97.7 °F (36.5 °C)  Max: 98 °F (36.7 °C) 97.7 °F (36.5 °C)   BP  Min: 117/66  Max: 139/77 136/77   Pulse  Min: 60  Max: 66  65   Resp  Min: 18  Max: 20 20   SpO2  Min: 97 %  Max: 98 % 97 %     CT Abdomen Pelvis With IV Contrast NO Oral Contrast  Narrative: EXAMINATION:  CT ABDOMEN PELVIS WITH IV CONTRAST    CLINICAL HISTORY:  Abdominal pain, acute, nonlocalized;Gross hematuria with clots;    TECHNIQUE:  Helical acquisition through the abdomen and pelvis with IV contrast.  Three plane reconstructions were provided for review. DLP 1727 mGycm. Automatic exposure control, adjustment of mA/kV or iterative reconstruction technique was used to reduce radiation.    COMPARISON:  29 September 2021    FINDINGS:  The limited imaged lung bases are clear.    Question hepatic steatosis.  The gallbladder is surgically absent.  No significant abnormality of the spleen, pancreas or adrenals.  No hydronephrosis or suspicious renal lesion.    No bowel obstruction. No significant inflammatory changes of the bowel.  Normal appendix.  No free air.    There is a Fleming in the urinary bladder.  No pelvic free fluid.  The abdominal aorta is normal in caliber.  Moderate atherosclerotic disease.    Moderate degenerative change of the spine.  Impression: No acute abdominopelvic  findings.    No significant discrepancy with the preliminary report.    Electronically signed by: Eugene Saldivar  Date:    03/20/2024  Time:    07:06    Recent Labs   Lab 04/08/24  0331 04/09/24  0035 04/10/24  0439   WBC 5.98 7.75 7.83   RBC 3.91* 4.09* 4.22*   HGB 11.8* 12.4* 12.8*   HCT 35.7* 37.3* 37.9*   MCV 91.3 91.2 89.8   MCH 30.2 30.3 30.3   MCHC 33.1 33.2 33.8   RDW 12.7 12.6 12.6    233 256   MPV 9.5 9.3 9.5       Recent Labs   Lab 04/06/24  0431 04/08/24  0331 04/09/24  0035 04/10/24  0440    140 138 135*   K 4.3 4.7 4.4 4.3   CO2 28 25 28 22   BUN 18.2 16.2 18.4 16.1   CREATININE 1.05 0.94 1.13 0.93   CALCIUM 9.2 9.2 9.8 9.3   ALBUMIN 3.5  --  3.6 3.7   ALKPHOS 89  --  78 87   ALT 26  --  21 28   AST 14  --  14 20   BILITOT 0.3  --  0.2 0.3     Microbiology Results (last 7 days)       ** No results found for the last 168 hours. **             Scheduled Med:   atorvastatin  80 mg Oral QHS    buPROPion  150 mg Oral Daily    ciprofloxacin HCl  500 mg Oral Q12H    clopidogreL  75 mg Oral Daily    isosorbide mononitrate  60 mg Oral Daily    metoprolol tartrate  12.5 mg Oral BID    ranolazine  1,000 mg Oral BID      PRN Meds:  acetaminophen, acetaminophen, albuterol, dextrose 10%, dextrose 10%, docusate sodium, glucagon (human recombinant), glucose, glucose, hyoscyamine, nitroGLYCERIN, ondansetron, oxyCODONE-acetaminophen, polyethylene glycol, pregabalin, sodium chloride 0.9%   __________________________________________________________________________________________________________________________________    Assessment/Plan:  Hemorrhagic cystitis  History of prostate cancer status post prostatectomy   Coronary artery disease status post CABG    Above present on admission     Anticipated discharge and Disposition when medically stable:  Pending.   _______________________________________________________________________________________________________________________________    ------hematuria,  greater than 100 red blood cells with no red blood cell casts noted.  2+ proteinuria.  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  Continue CBI at discretion of urologist.  Plavix restarted will repeat H&H in the morning and monitor for increased blood clot/bleeding.    (Stents were placed 10/9/2023)  Patient will need hyperbaric set up an outpatient setting, preferentially in [place].    This will likely not be done until Monday but patient can likely discharge in a.m.  Levsin ordered for bladder spasms.    No plans for surgical intervention at this time    Continue supportive care  Continue checking vital signs q4hrs.     Nurse notified to page me if any changes occur     DVT prophylaxis initiated   Nutrition Status:   Diet Heart Healthy    Consults: urology   I have personally reviewed the specialist documentation and/or have spoken to the specialist with regard to the care of this patient; recommendations are noted above.     _______________________________________________________________________________________________________________________________    I have spent >30 minutes on the day of the visit; time spent includes face to face time and non-face to face time preparing to see the patient (eg, review of tests), independently reviewing and interpreting medical records, both past and current; documenting clinical information in the electronic or other health record, and communicating results to the patient/family/caregiver and care coordinator and nursing team.      All diagnosis and differential diagnosis have been reviewed,  interpreted and communicated appropriately to care team. assessment and plan has been documented; I have personally reviewed the labs and test results that are presently available and pertinent to this hospital course; I have reviewed medical records based upon their availability.    All of the patient's questions have been  addressed and  answered. Patient's is agreeable to the above stated plan.   I will continue to monitor closely and make adjustments to medical management as needed.    Chica Mo, DO   04/12/2024     This note was created with the assistance of Dragon voice recognition software. There may be transcription errors as a result of using this technology however minimal. Effort has been made to assure accuracy of transcription but any obvious errors or omissions should be clarified with the author of the document.

## 2024-04-12 NOTE — PROGRESS NOTES
[facility] - 9th Floor Med Surg  Wound Care  Progress Note    Patient Name: Alex Reynoso  MRN: [id number]  Admission Date: 4/8/2024  Hospital Length of Stay: 2 days  Attending Physician: Chica Mo DO  Primary Care Provider: Glen Duffy NP     Subjective:     HPI:  HBO session    Inpatient    54-year-old black male currently hospitalized at [facility] for recurrent obstructive hematuria requiring continuous bladder irrigation.  Urology consulted for adjuvant hyperbaric oxygen therapy with diagnosis of radiation cystitis with hematuria.  Patient had history of prostate cancer with radiation and prostatectomy about 3 years ago. Today is session #3    Hospital Course:                    Scheduled Meds:   atorvastatin  80 mg Oral QHS    buPROPion  150 mg Oral Daily    ciprofloxacin HCl  500 mg Oral Q12H    clopidogreL  75 mg Oral Daily    isosorbide mononitrate  60 mg Oral Daily    metoprolol tartrate  12.5 mg Oral BID    ranolazine  1,000 mg Oral BID     Continuous Infusions:  PRN Meds:acetaminophen, acetaminophen, albuterol, dextrose 10%, dextrose 10%, docusate sodium, glucagon (human recombinant), glucose, glucose, hyoscyamine, nitroGLYCERIN, ondansetron, oxyCODONE-acetaminophen, polyethylene glycol, pregabalin, sodium chloride 0.9%    Review of Systems   HENT:  Negative for ear discharge, ear pain, hearing loss, sinus pressure and sinus pain.    Respiratory:  Negative for chest tightness and shortness of breath.      Objective:     Vital Signs (Most Recent):  Temp: 98 °F (36.7 °C) (04/12/24 0800)  Pulse: 66 (04/12/24 0856)  Resp: 20 (04/12/24 0856)  BP: 123/71 (04/12/24 0856)  SpO2: 98 % (04/12/24 0800) Vital Signs (24h Range):  Temp:  [97.4 °F (36.3 °C)-98 °F (36.7 °C)] 98 °F (36.7 °C)  Pulse:  [60-67] 66  Resp:  [18-20] 20  SpO2:  [96 %-98 %] 98 %  BP: (117-126)/(66-75) 123/71     Weight: 121.1 kg (267 lb)  Body mass index is 43.09 kg/m².     Physical Exam  Vitals reviewed.   Pulmonary:       Effort: Pulmonary effort is normal.   Neurological:      General: No focal deficit present.      Mental Status: He is alert.          Laboratory:  CBC:   Recent Labs   Lab 04/10/24  0439   WBC 7.83   RBC 4.22*   HGB 12.8*   HCT 37.9*      MCV 89.8   MCH 30.3   MCHC 33.8     CMP:   Recent Labs   Lab 04/10/24  0440   CALCIUM 9.3   ALBUMIN 3.7   *   K 4.3   CO2 22   BUN 16.1   CREATININE 0.93   ALKPHOS 87   ALT 28   AST 20   BILITOT 0.3         Assessment/Plan:     Hemorrhagic radiation cystitis     Radiation damages the DNA of cancerous cells but also damages normal tissue causing progressive obliterative endoarteritis which can result in  hypocellular,avascular and hypoxic surrounding tissue.  Hyperbaric oxygen therapy stimulates angiogenesis and secondarily improves tissue oxygenation, reduces fibrosis and mobilizes and induces an increase of stem cells which could help improve the patient's microscopic healing, reduce the symptomatology as noted above and improve his quality of life.    HBO Treatment Orders:   HBO at 2.0 MIROSLAVA for 90 minutes    Will continue to give sessions while he was an inpatient.  Upon discharge, we will were resume on an outpatient basis    Physician Supervision: I provided direct supervision and was immediately available to furnish assistance and direction throughout the performance of the procedure.A trained emergency response team and emergency services were available throughout procedure. Pt had no complaints: no chest pain or sob or earache or sinus pressure        Ellyn Hernandez MD  Wound Care  [facility] - 9th Floor Med Surg

## 2024-04-12 NOTE — SUBJECTIVE & OBJECTIVE
Subjective:     HPI:  HBO session    Inpatient    54-year-old black male currently hospitalized at [facility] for recurrent obstructive hematuria requiring continuous bladder irrigation.  Urology consulted for adjuvant hyperbaric oxygen therapy with diagnosis of radiation cystitis with hematuria.  Patient had history of prostate cancer with radiation and prostatectomy about 3 years ago. Today is session #3    Hospital Course:                    Scheduled Meds:   atorvastatin  80 mg Oral QHS    buPROPion  150 mg Oral Daily    ciprofloxacin HCl  500 mg Oral Q12H    clopidogreL  75 mg Oral Daily    isosorbide mononitrate  60 mg Oral Daily    metoprolol tartrate  12.5 mg Oral BID    ranolazine  1,000 mg Oral BID     Continuous Infusions:  PRN Meds:acetaminophen, acetaminophen, albuterol, dextrose 10%, dextrose 10%, docusate sodium, glucagon (human recombinant), glucose, glucose, hyoscyamine, nitroGLYCERIN, ondansetron, oxyCODONE-acetaminophen, polyethylene glycol, pregabalin, sodium chloride 0.9%    Review of Systems   HENT:  Negative for ear discharge, ear pain, hearing loss, sinus pressure and sinus pain.    Respiratory:  Negative for chest tightness and shortness of breath.      Objective:     Vital Signs (Most Recent):  Temp: 98 °F (36.7 °C) (04/12/24 0800)  Pulse: 66 (04/12/24 0856)  Resp: 20 (04/12/24 0856)  BP: 123/71 (04/12/24 0856)  SpO2: 98 % (04/12/24 0800) Vital Signs (24h Range):  Temp:  [97.4 °F (36.3 °C)-98 °F (36.7 °C)] 98 °F (36.7 °C)  Pulse:  [60-67] 66  Resp:  [18-20] 20  SpO2:  [96 %-98 %] 98 %  BP: (117-126)/(66-75) 123/71     Weight: 121.1 kg (267 lb)  Body mass index is 43.09 kg/m².     Physical Exam  Vitals reviewed.   Pulmonary:      Effort: Pulmonary effort is normal.   Neurological:      General: No focal deficit present.      Mental Status: He is alert.          Laboratory:  CBC:   Recent Labs   Lab 04/10/24  0439   WBC 7.83   RBC 4.22*   HGB 12.8*   HCT 37.9*      MCV 89.8   MCH 30.3    MCHC 33.8     CMP:   Recent Labs   Lab 04/10/24  0440   CALCIUM 9.3   ALBUMIN 3.7   *   K 4.3   CO2 22   BUN 16.1   CREATININE 0.93   ALKPHOS 87   ALT 28   AST 20   BILITOT 0.3

## 2024-04-12 NOTE — PROGRESS NOTES
.UROLOGY  PROGRESS  NOTE    Alex Reynoso 1970  [id number]  4/12/2024    Patient sitting up in the be  Reports being nauseous  VSS, afebrile    Exam:    NAD  Card RRR  Resp unlabored   urine crystal clear with minimal CBI      No results found for this or any previous visit (from the past 24 hour(s)).      Assessment:  H/o prostate cancer with prostatectomy and radiation 2-3 years ago now with radiation cystitis       Plan:  Plavix restarted today  HBO today  Titrate CBI and hand irrigate as needed  Would like to keep an eye on his urine with restarting Plavix    CALLUM Faria

## 2024-04-13 VITALS
BODY MASS INDEX: 42.91 KG/M2 | SYSTOLIC BLOOD PRESSURE: 122 MMHG | TEMPERATURE: 98 F | RESPIRATION RATE: 16 BRPM | WEIGHT: 267 LBS | HEIGHT: 66 IN | OXYGEN SATURATION: 99 % | HEART RATE: 64 BPM | DIASTOLIC BLOOD PRESSURE: 67 MMHG

## 2024-04-13 LAB
ANION GAP SERPL CALC-SCNC: 9 MEQ/L
BUN SERPL-MCNC: 14.6 MG/DL (ref 8.4–25.7)
CALCIUM SERPL-MCNC: 9.2 MG/DL (ref 8.4–10.2)
CHLORIDE SERPL-SCNC: 105 MMOL/L (ref 98–107)
CO2 SERPL-SCNC: 24 MMOL/L (ref 22–29)
CREAT SERPL-MCNC: 0.89 MG/DL (ref 0.73–1.18)
CREAT/UREA NIT SERPL: 16
GFR SERPLBLD CREATININE-BSD FMLA CKD-EPI: >60 MLS/MIN/1.73/M2
GLUCOSE SERPL-MCNC: 115 MG/DL (ref 74–100)
HCT VFR BLD AUTO: 35.7 % (ref 42–52)
HGB BLD-MCNC: 11.9 G/DL (ref 14–18)
MAGNESIUM SERPL-MCNC: 2.1 MG/DL (ref 1.6–2.6)
PHOSPHATE SERPL-MCNC: 3.2 MG/DL (ref 2.3–4.7)
POTASSIUM SERPL-SCNC: 4.3 MMOL/L (ref 3.5–5.1)
SODIUM SERPL-SCNC: 138 MMOL/L (ref 136–145)

## 2024-04-13 PROCEDURE — 25000003 PHARM REV CODE 250: Performed by: INTERNAL MEDICINE

## 2024-04-13 PROCEDURE — 80048 BASIC METABOLIC PNL TOTAL CA: CPT | Performed by: STUDENT IN AN ORGANIZED HEALTH CARE EDUCATION/TRAINING PROGRAM

## 2024-04-13 PROCEDURE — 83735 ASSAY OF MAGNESIUM: CPT | Performed by: STUDENT IN AN ORGANIZED HEALTH CARE EDUCATION/TRAINING PROGRAM

## 2024-04-13 PROCEDURE — 84100 ASSAY OF PHOSPHORUS: CPT | Performed by: STUDENT IN AN ORGANIZED HEALTH CARE EDUCATION/TRAINING PROGRAM

## 2024-04-13 PROCEDURE — 85018 HEMOGLOBIN: CPT | Performed by: STUDENT IN AN ORGANIZED HEALTH CARE EDUCATION/TRAINING PROGRAM

## 2024-04-13 PROCEDURE — 25000003 PHARM REV CODE 250: Performed by: NURSE PRACTITIONER

## 2024-04-13 PROCEDURE — 25000003 PHARM REV CODE 250: Performed by: PHYSICIAN ASSISTANT

## 2024-04-13 RX ADMIN — ISOSORBIDE MONONITRATE 60 MG: 60 TABLET, EXTENDED RELEASE ORAL at 09:04

## 2024-04-13 RX ADMIN — BUPROPION HYDROCHLORIDE 150 MG: 150 TABLET, EXTENDED RELEASE ORAL at 09:04

## 2024-04-13 RX ADMIN — RANOLAZINE 1000 MG: 500 TABLET, EXTENDED RELEASE ORAL at 09:04

## 2024-04-13 RX ADMIN — CIPROFLOXACIN HYDROCHLORIDE 500 MG: 500 TABLET, FILM COATED ORAL at 09:04

## 2024-04-13 RX ADMIN — CLOPIDOGREL BISULFATE 75 MG: 75 TABLET ORAL at 09:04

## 2024-04-13 RX ADMIN — METOPROLOL TARTRATE 12.5 MG: 25 TABLET, FILM COATED ORAL at 09:04

## 2024-04-13 NOTE — DISCHARGE SUMMARY
[facility] Hospital Medicine Discharge Summary    Admit Date: 4/8/2024  Discharge Date and Time: 4/13/20241:30 PM  Admitting Physician:  Team  Discharging Physician: Chica Mo DO.  Primary Care Physician: Glen Duffy NP  Consults:  Urologist    Discharge Diagnoses:  Hemorrhagic cystitis  History of prostate cancer status post prostatectomy   Coronary artery disease status post CABG    Hospital Course:   Chief Complaint:  Blood in urine     HPI: (personally reviewed by me and is documented from initial H&P)   54 y.o. Black or  male with a past medical history of hypertension, hyperlipidemia, coronary artery disease status post CABG on Plavix and baby aspirin, GERD, anxiety/depression and prostate cancer status post prostatectomy and radiation.       Patient had admission to hospital medicine services from 04/05/2024 to 04/08/2024 for gross hematuria. Patient underwent cystoscopy with bladder fulguration on 04/06/2024 with Dr. Timbo Villalobos.  Patient was treated for ESBL E coli cystitis.  Plavix and aspirin was resumed on 04/07/2024.  He was voiding spontaneously with no hematuria and therefore discharged home.      The patient presented to [facility] on 4/8/2024 with a primary complaint hematuria.  Patient reports he was sitting down watching TV last night (4/8/2024) at 8:00 PM when he felt pressure and large clotted blood was passed.  After he reports hematuria started and soon after patient was unable to urinate prompting him to present to the ED for further evaluation.       Patient was complaining of bilateral flank pain, weakness, centralized chest pain which began today and improved to a 2/10 after receiving his pain medicine, constipation for the last 3 days.  Patient reports he normally has a bowel movement every day.  He denies complaints of nausea, vomiting, diarrhea and shortness of breath.  Patient was follow with Dr. Demarcus Shaver, urologist.      Upon presentation to the ED, temperature 98.3° F, heart rate 73, blood pressure 161/88, respiratory rate 18 and SpO2 99% on room air.  Labs with H&H 12.4/37.3, WBC 7.75.  UA with no squamous epithelial cells, no bacteria, 0-2 WBCs, greater than 100 RBCs, negative leukocyte esterase, 3+ blood, 2+ protein.  In ED patient received [place].  Urology consulted.  Three     Pt was seen and examined on the day of discharge.    Patient will follow up with urologist Dr. Kline who will determine whether or not continue hyperbaric as needed.    Patient has had no hematuria to atrial overnight with restarting of Plavix.    Urine Fleming catheter has been discontinued and patient is voiding appropriately.    He is eager for discharge his wife is present at bedside.    Patient to continue Cipro at discharge.    Patient's blood pressure medications have been held due to normal/low normal blood pressure readings.    He is advised to monitor blood pressure daily in maintain a blood pressure log, report readings to his PCP.    If blood pressures greater than 140/90 patient is advised to restart his lisinopril/blood pressure medications appropriately.      Hospital course and discharge care plan has been discussed with patient, patient voices understanding and agreement with plan. All questions have been answered to the best of my ability. Patient is advised to return to ED or call 911 in case of emergency and or if symptoms worsen.    Vitals:  VITAL SIGNS: 24 HRS MIN & MAX LAST   Temp  Min: 97.7 °F (36.5 °C)  Max: 98.4 °F (36.9 °C) 97.8 °F (36.6 °C)   BP  Min: 122/67  Max: 139/77 122/67   Pulse  Min: 61  Max: 70  64   Resp  Min: 16  Max: 20 16   SpO2  Min: 96 %  Max: 100 % 99 %       Physical Exam:  Urine Fleming catheter removed   Vital signs have been reviewed and are within normal limits.  General: Appears comfortable, no acute distress.  Integumentary: Warm, dry, intact.  Musculoskeletal: Purposeful movement noted.   Respiratory: No  accessory muscle use. Breath sounds are equal.  Cardiovascular: Regular rate. No peripheral edema.      Procedures Performed: No admission procedures for hospital encounter.     Significant Diagnostic Studies: See Full reports for all details    Recent Labs   Lab 04/08/24  0331 04/09/24  0035 04/10/24  0439 04/13/24  0440   WBC 5.98 7.75 7.83  --    RBC 3.91* 4.09* 4.22*  --    HGB 11.8* 12.4* 12.8* 11.9*   HCT 35.7* 37.3* 37.9* 35.7*   MCV 91.3 91.2 89.8  --    MCH 30.2 30.3 30.3  --    MCHC 33.1 33.2 33.8  --    RDW 12.7 12.6 12.6  --     233 256  --    MPV 9.5 9.3 9.5  --        Recent Labs   Lab 04/09/24  0035 04/10/24  0440 04/13/24  0440    135* 138   K 4.4 4.3 4.3   CO2 28 22 24   BUN 18.4 16.1 14.6   CREATININE 1.13 0.93 0.89   CALCIUM 9.8 9.3 9.2   MG  --   --  2.10   ALBUMIN 3.6 3.7  --    ALKPHOS 78 87  --    ALT 21 28  --    AST 14 20  --    BILITOT 0.2 0.3  --         Microbiology Results (last 7 days)       ** No results found for the last 168 hours. **             CT Abdomen Pelvis With IV Contrast NO Oral Contrast  Narrative: EXAMINATION:  CT ABDOMEN PELVIS WITH IV CONTRAST    CLINICAL HISTORY:  Abdominal pain, acute, nonlocalized;Gross hematuria with clots;    TECHNIQUE:  Helical acquisition through the abdomen and pelvis with IV contrast.  Three plane reconstructions were provided for review. DLP 1727 mGycm. Automatic exposure control, adjustment of mA/kV or iterative reconstruction technique was used to reduce radiation.    COMPARISON:  29 September 2021    FINDINGS:  The limited imaged lung bases are clear.    Question hepatic steatosis.  The gallbladder is surgically absent.  No significant abnormality of the spleen, pancreas or adrenals.  No hydronephrosis or suspicious renal lesion.    No bowel obstruction. No significant inflammatory changes of the bowel.  Normal appendix.  No free air.    There is a Fleming in the urinary bladder.  No pelvic free fluid.  The abdominal aorta is  normal in caliber.  Moderate atherosclerotic disease.    Moderate degenerative change of the spine.  Impression: No acute abdominopelvic findings.    No significant discrepancy with the preliminary report.    Electronically signed by: Eugene Saldivar  Date:    03/20/2024  Time:    07:06         Medication List        CONTINUE taking these medications      atorvastatin 80 MG tablet  Commonly known as: LIPITOR     ciprofloxacin HCl 500 MG tablet  Commonly known as: CIPRO  Take 1 tablet (500 mg total) by mouth every 12 (twelve) hours. for 10 days     clopidogreL 75 mg tablet  Commonly known as: PLAVIX  Take 1 tablet (75 mg total) by mouth once daily.     isosorbide mononitrate 60 MG 24 hr tablet  Commonly known as: IMDUR  Take 1 tablet (60 mg total) by mouth once daily.     metoprolol tartrate 25 MG tablet  Commonly known as: LOPRESSOR  Take 0.5 tablets (12.5 mg total) by mouth 2 (two) times daily.     nitroGLYCERIN 0.4 MG SL tablet  Commonly known as: NITROSTAT     oxyCODONE-acetaminophen  mg per tablet  Commonly known as: PERCOCET     pregabalin 100 MG capsule  Commonly known as: LYRICA     ranolazine 1,000 mg Psrg     VENTOLIN HFA 90 mcg/actuation inhaler  Generic drug: albuterol     WELLBUTRIN  MG TBSR 12 hr tablet  Generic drug: buPROPion            STOP taking these medications      aspirin 81 MG EC tablet  Commonly known as: ECOTRIN     lisinopriL 2.5 MG tablet  Commonly known as: PRINIVIL,ZESTRIL               Explained in detail to the patient about the discharge plan, medications, and follow-up visits. Pt understands and agrees with the treatment plan  Discharge Disposition: Home or Self Care   Discharged Condition: stable  Diet-   Dietary Orders (From admission, onward)       Start     Ordered    04/09/24 0950  Diet Heart Healthy  (Diet/Nutrition [facility])  Diet effective now         04/09/24 0949                   Medications Per [facility] med rec  Activities as tolerated   Follow-up Information       Tati  ROBERTO Carroll Follow up in 1 week(s).    Specialty: Family Medicine  Why: monitor BP, lisinopril has been held due to persistenly normal-low normal BP readings.   Patient is advised to monitor BP daily at home and restart medication if BP>140 (prior to PCP appointment).  Contact information:  [address] [place] [address]  [phone]               Craig Vidal MD Follow up in 1 week(s).    Specialty: Urology  Why: hematuria followup post hospitalization; evaluation for continued hyperbarics  Contact information:  [address] [facility] [address]  [facility] [address]  [phone]                           For further questions contact hospitalist office    Discharge time 33 minutes    For worsening symptoms, chest pain, shortness of breath, increased abdominal pain, high grade fever, stroke or stroke like symptoms, immediately go to the nearest Emergency Room or call 911 as soon as possible.      Chica Carpenter M.D, on 4/13/2024. at 1:30 PM.

## 2024-04-13 NOTE — PROGRESS NOTES
Date:  04/13/2024  urology progress note:  Urine is remaining crystal clear this morning despite being on a very very slow drip.  He denies any clotting last night in his having some bladder spasms.  Assessment hematuria likely radiation cystitis:  Recommendations:  Can DC Fleming catheter and give him a voiding trial can and can consider continuing hyperbaric as an outpatient.

## 2024-04-13 NOTE — PROGRESS NOTES
Inpatient Nutrition Evaluation    Admit Date: 4/8/2024   Total duration of encounter: 5 days   Patient Age: 54 y.o.    Nutrition Recommendation/Prescription     Continue heart healthy diet as tolerated  Antiemetic PRN  Monitor labs, intake and weight     Nutrition Assessment     Chart Review    Reason Seen: length of stay    Malnutrition Screening Tool Results   Have you recently lost weight without trying?: No  Have you been eating poorly because of a decreased appetite?: No   MST Score: 0   Diagnosis:  Hemorrhagic cystitis  Hematuria     Relevant Medical History: HTN, HLD, CAD s/p CABG, GERD, anxiety, depression, prostate cancer s/p prostatectomy and radiation    Scheduled Medications:  atorvastatin, 80 mg, QHS  buPROPion, 150 mg, Daily  ciprofloxacin HCl, 500 mg, Q12H  clopidogreL, 75 mg, Daily  isosorbide mononitrate, 60 mg, Daily  metoprolol tartrate, 12.5 mg, BID  ranolazine, 1,000 mg, BID    Continuous Infusions:   PRN Medications:   Current Facility-Administered Medications   Medication Dose Route Frequency Provider Last Rate Last Admin    acetaminophen tablet 650 mg  650 mg Oral Q8H PRN Tomasa Raya PA-C        acetaminophen tablet 650 mg  650 mg Oral Q4H PRN Tomasa Raya PA-C        albuterol inhaler 2 puff  2 puff Inhalation Q6H PRN Yeyo Benz MD        atorvastatin tablet 80 mg  80 mg Oral QHS Tomasa Raya PA-C   80 mg at 04/12/24 2044    buPROPion TBSR 12 hr tablet 150 mg  150 mg Oral Daily Tomasa Raya PA-C   150 mg at 04/13/24 0931    ciprofloxacin HCl tablet 500 mg  500 mg Oral Q12H Yeyo Benz MD   500 mg at 04/13/24 0931    clopidogreL tablet 75 mg  75 mg Oral Daily Madie Sandoval FNP   75 mg at 04/13/24 0931    dextrose 10% bolus 125 mL 125 mL  12.5 g Intravenous PRN Tomasa Raya PA-C        dextrose 10% bolus 250 mL 250 mL  25 g Intravenous PRN Tomasa Raya PA-C        docusate sodium capsule 100 mg  100 mg Oral BID PRN Tomasa Raya PA-C    100 mg at 04/11/24 1217    glucagon (human recombinant) injection 1 mg  1 mg Intramuscular PRN Tomasa Raya PA-C        glucose chewable tablet 16 g  16 g Oral PRN Tomasa Raya PA-C        glucose chewable tablet 24 g  24 g Oral PRN Tomasa Raya PA-C        hyoscyamine ODT tablet 0.125 mg  0.125 mg Sublingual Q4H PRN Madie Sandoval FNP   0.125 mg at 04/12/24 0856    isosorbide mononitrate 24 hr tablet 60 mg  60 mg Oral Daily Yeyo Benz MD   60 mg at 04/13/24 0931    metoprolol tartrate (LOPRESSOR) split tablet 12.5 mg  12.5 mg Oral BID Yeyo Benz MD   12.5 mg at 04/13/24 0931    nitroGLYCERIN SL tablet 0.4 mg  0.4 mg Sublingual Q5 Min PRN Yeyo Benz MD        ondansetron injection 4 mg  4 mg Intravenous Q4H PRN Tomasa Raya PA-C   4 mg at 04/12/24 0819    oxyCODONE-acetaminophen  mg per tablet 1 tablet  1 tablet Oral Q4H PRN Tomasa Raya PA-C   1 tablet at 04/12/24 2044    polyethylene glycol packet 17 g  17 g Oral BID PRN Tomasa Raya PA-C   17 g at 04/11/24 1217    pregabalin capsule 100 mg  100 mg Oral BID PRN Tomasa Raya PA-C        ranolazine 12 hr tablet 1,000 mg  1,000 mg Oral BID Yeyo Benz MD   1,000 mg at 04/13/24 0931    sodium chloride 0.9% flush 10 mL  10 mL Intravenous Q12H PRN Tomasa Raya PA-C         Facility-Administered Medications Ordered in Other Encounters   Medication Dose Route Frequency Provider Last Rate Last Admin    fentaNYL 50 mcg/mL injection 25 mcg  25 mcg Intravenous Q5 Min PRN Noah Dumont DO        HYDROmorphone (PF) injection 0.2 mg  0.2 mg Intravenous Q5 Min PRN Noah Dumont DO        lactated ringers infusion   Intravenous Continuous Noah Dumont DO   Stopped at 03/30/23 1158    sodium chloride 0.9% flush 10 mL  10 mL Intravenous PRN Shaheen Felder MD        sodium chloride 0.9% flush 10 mL  10 mL Intravenous PRN Noah Dumont DO           Recent Labs   Lab  "24  0331 24  0035 04/10/24  0439 04/10/24  0440 24  0440    138  --  135* 138   K 4.7 4.4  --  4.3 4.3   CALCIUM 9.2 9.8  --  9.3 9.2   PHOS  --   --   --   --  3.2   MG  --   --   --   --  2.10   CHLORIDE 106 104  --  105 105   CO2 25 28  --  22 24   BUN 16.2 18.4  --  16.1 14.6   CREATININE 0.94 1.13  --  0.93 0.89   EGFRNORACEVR >60 >60  --  >60 >60   GLUCOSE 109* 111*  --  110* 115*   BILITOT  --  0.2  --  0.3  --    ALKPHOS  --  78  --  87  --    ALT  --  21  --  28  --    AST  --  14  --  20  --    ALBUMIN  --  3.6  --  3.7  --    WBC 5.98 7.75 7.83  --   --    HGB 11.8* 12.4* 12.8*  --  11.9*   HCT 35.7* 37.3* 37.9*  --  35.7*     Nutrition Orders:  Diet Heart Healthy      Appetite/Oral Intake: good/% of meals  Factors Affecting Nutritional Intake: nausea  Food/Yarsani/Cultural Preferences: none reported  Food Allergies: no known food allergies  Last Bowel Movement: 04/10/24  Wound(s):      Comments    : Pt reports good appetite, eating outside food mostly. Reports nausea yesterday. Good appetite PTA. Denies unintentional weight loss. Weight stable per EMR weight history.    Anthropometrics    Height: 5' 6" (167.6 cm), Height Method: Stated  Last Weight: 121.1 kg (267 lb) (24 2318), Weight Method: Standard Scale  BMI (Calculated): 43.1  BMI Classification: obese grade III (BMI >/=40)        Ideal Body Weight (IBW), Male: 142 lb     % Ideal Body Weight, Male (lb): 188.03 %                 Usual Body Weight (UBW), k kg  % Usual Body Weight: 101.14     Usual Weight Provided By: EMR weight history and patient denies unintentional weight loss    Wt Readings from Last 5 Encounters:   24 121.1 kg (267 lb)   24 119.7 kg (264 lb)   24 118.3 kg (260 lb 12.9 oz)   24 121.1 kg (267 lb)   24 120 kg (264 lb 8.8 oz)     Weight Change(s) Since Admission:   Wt Readings from Last 1 Encounters:   24 2318 121.1 kg (267 lb)   24 2316 121.1 " kg (267 lb)   04/08/24 2309 121.1 kg (267 lb)   Admit Weight: 121.1 kg (267 lb) (04/08/24 2309), Weight Method: Stated    Patient Education     Not applicable.    Nutrition Goals & Monitoring     Dietitian will monitor: food and beverage intake, weight, and gastrointestinal profile    Nutrition Risk/Follow-Up: low (follow-up in 5-7 days)  Patients assigned 'low nutrition risk' status do not qualify for a full nutritional assessment but will be monitored and re-evaluated in a 5-7 day time period. Please consult if re-evaluation needed sooner.

## 2024-04-13 NOTE — NURSING
PT IV removed, pt educated on checking blood pressures every day and when to restart blood pressure medication. Pt educated on symptoms of hematuria and when to go to the ED.  All questions answered and no concerns noted. Pt going home via personal vehicle.

## 2024-04-13 NOTE — DISCHARGE INSTRUCTIONS
He is advised to monitor blood pressure daily in maintain a blood pressure log, report readings to his PCP.    If blood pressures greater than 140/90 patient is advised to restart his lisinopril/blood pressure medications appropriately.

## 2024-09-30 ENCOUNTER — HOSPITAL ENCOUNTER (EMERGENCY)
Facility: HOSPITAL | Age: 54
Discharge: HOME | End: 2024-09-30
Attending: STUDENT IN AN ORGANIZED HEALTH CARE EDUCATION/TRAINING PROGRAM
Payer: MEDICARE

## 2024-09-30 VITALS
DIASTOLIC BLOOD PRESSURE: 88 MMHG | SYSTOLIC BLOOD PRESSURE: 141 MMHG | HEART RATE: 60 BPM | RESPIRATION RATE: 13 BRPM | BODY MASS INDEX: 43.23 KG/M2 | WEIGHT: 269 LBS | TEMPERATURE: 98 F | HEIGHT: 66 IN | OXYGEN SATURATION: 97 %

## 2024-09-30 DIAGNOSIS — R07.9 ACUTE CHEST PAIN: ICD-10-CM

## 2024-09-30 DIAGNOSIS — R07.9 CHEST PAIN: ICD-10-CM

## 2024-09-30 LAB
ALBUMIN SERPL-MCNC: 3.7 G/DL (ref 3.5–5)
ALBUMIN/GLOB SERPL: 1.3 RATIO (ref 1.1–2)
ALP SERPL-CCNC: 72 UNIT/L (ref 40–150)
ALT SERPL-CCNC: 32 UNIT/L (ref 0–55)
ANION GAP SERPL CALC-SCNC: 5 MEQ/L
AST SERPL-CCNC: 21 UNIT/L (ref 5–34)
BASOPHILS # BLD AUTO: 0.03 X10(3)/MCL
BASOPHILS NFR BLD AUTO: 0.4 %
BILIRUB SERPL-MCNC: 0.4 MG/DL
BNP BLD-MCNC: 243 PG/ML
BUN SERPL-MCNC: 16.6 MG/DL (ref 8.4–25.7)
CALCIUM SERPL-MCNC: 9.4 MG/DL (ref 8.4–10.2)
CHLORIDE SERPL-SCNC: 107 MMOL/L (ref 98–107)
CO2 SERPL-SCNC: 27 MMOL/L (ref 22–29)
CREAT SERPL-MCNC: 1.05 MG/DL (ref 0.73–1.18)
CREAT/UREA NIT SERPL: 16
EOSINOPHIL # BLD AUTO: 0.28 X10(3)/MCL (ref 0–0.9)
EOSINOPHIL NFR BLD AUTO: 4.1 %
ERYTHROCYTE [DISTWIDTH] IN BLOOD BY AUTOMATED COUNT: 13 % (ref 11.5–17)
GFR SERPLBLD CREATININE-BSD FMLA CKD-EPI: >60 ML/MIN/1.73/M2
GLOBULIN SER-MCNC: 2.9 GM/DL (ref 2.4–3.5)
GLUCOSE SERPL-MCNC: 133 MG/DL (ref 74–100)
HCT VFR BLD AUTO: 40.9 % (ref 42–52)
HGB BLD-MCNC: 13.9 G/DL (ref 14–18)
IMM GRANULOCYTES # BLD AUTO: 0.02 X10(3)/MCL (ref 0–0.04)
IMM GRANULOCYTES NFR BLD AUTO: 0.3 %
LYMPHOCYTES # BLD AUTO: 1.48 X10(3)/MCL (ref 0.6–4.6)
LYMPHOCYTES NFR BLD AUTO: 21.5 %
MCH RBC QN AUTO: 30.4 PG (ref 27–31)
MCHC RBC AUTO-ENTMCNC: 34 G/DL (ref 33–36)
MCV RBC AUTO: 89.5 FL (ref 80–94)
MONOCYTES # BLD AUTO: 0.53 X10(3)/MCL (ref 0.1–1.3)
MONOCYTES NFR BLD AUTO: 7.7 %
NEUTROPHILS # BLD AUTO: 4.55 X10(3)/MCL (ref 2.1–9.2)
NEUTROPHILS NFR BLD AUTO: 66 %
NRBC BLD AUTO-RTO: 0 %
OHS QRS DURATION: 86 MS
OHS QTC CALCULATION: 418 MS
PLATELET # BLD AUTO: 235 X10(3)/MCL (ref 130–400)
PMV BLD AUTO: 9.5 FL (ref 7.4–10.4)
POTASSIUM SERPL-SCNC: 4.4 MMOL/L (ref 3.5–5.1)
PROT SERPL-MCNC: 6.6 GM/DL (ref 6.4–8.3)
RBC # BLD AUTO: 4.57 X10(6)/MCL (ref 4.7–6.1)
SODIUM SERPL-SCNC: 139 MMOL/L (ref 136–145)
TROPONIN I SERPL-MCNC: <0.01 NG/ML (ref 0–0.04)
TROPONIN I SERPL-MCNC: <0.01 NG/ML (ref 0–0.04)
WBC # BLD AUTO: 6.89 X10(3)/MCL (ref 4.5–11.5)

## 2024-09-30 PROCEDURE — 84484 ASSAY OF TROPONIN QUANT: CPT | Performed by: STUDENT IN AN ORGANIZED HEALTH CARE EDUCATION/TRAINING PROGRAM

## 2024-09-30 PROCEDURE — 84484 ASSAY OF TROPONIN QUANT: CPT | Performed by: EMERGENCY MEDICINE

## 2024-09-30 PROCEDURE — 80053 COMPREHEN METABOLIC PANEL: CPT | Performed by: EMERGENCY MEDICINE

## 2024-09-30 PROCEDURE — 93010 ELECTROCARDIOGRAM REPORT: CPT | Mod: ,,, | Performed by: STUDENT IN AN ORGANIZED HEALTH CARE EDUCATION/TRAINING PROGRAM

## 2024-09-30 PROCEDURE — 99285 EMERGENCY DEPT VISIT HI MDM: CPT | Mod: 25

## 2024-09-30 PROCEDURE — 63600175 PHARM REV CODE 636 W HCPCS: Performed by: STUDENT IN AN ORGANIZED HEALTH CARE EDUCATION/TRAINING PROGRAM

## 2024-09-30 PROCEDURE — 85025 COMPLETE CBC W/AUTO DIFF WBC: CPT | Performed by: EMERGENCY MEDICINE

## 2024-09-30 PROCEDURE — 25000003 PHARM REV CODE 250: Performed by: STUDENT IN AN ORGANIZED HEALTH CARE EDUCATION/TRAINING PROGRAM

## 2024-09-30 PROCEDURE — 83880 ASSAY OF NATRIURETIC PEPTIDE: CPT | Performed by: EMERGENCY MEDICINE

## 2024-09-30 PROCEDURE — 96374 THER/PROPH/DIAG INJ IV PUSH: CPT

## 2024-09-30 PROCEDURE — 93005 ELECTROCARDIOGRAM TRACING: CPT

## 2024-09-30 RX ORDER — ASPIRIN 325 MG
325 TABLET, DELAYED RELEASE (ENTERIC COATED) ORAL
Status: COMPLETED | OUTPATIENT
Start: 2024-09-30 | End: 2024-09-30

## 2024-09-30 RX ORDER — FUROSEMIDE 10 MG/ML
20 INJECTION INTRAMUSCULAR; INTRAVENOUS
Status: COMPLETED | OUTPATIENT
Start: 2024-09-30 | End: 2024-09-30

## 2024-09-30 RX ADMIN — ASPIRIN 325 MG: 325 TABLET, COATED ORAL at 09:09

## 2024-09-30 RX ADMIN — FUROSEMIDE 20 MG: 10 INJECTION, SOLUTION INTRAMUSCULAR; INTRAVENOUS at 10:09

## 2024-09-30 NOTE — DISCHARGE INSTRUCTIONS

## 2024-09-30 NOTE — ED PROVIDER NOTES
Encounter Date: 9/30/2024    SCRIBE #1 NOTE: I, Suman Bobo, am scribing for, and in the presence of,  Harley Mckee MD. I have scribed the following portions of the note - Other sections scribed: HPI, ROS, and PE..       History     Chief Complaint   Patient presents with    Chest Pain    Shortness of Breath     C/o SOB & CP x1 week. Pt states 1 episode of LUE numbness in that time. Denies N/V & dizziness. Hx open heart surgery & 3 stents. Admitted to [facility] last week, DC yesterday.      Alex Reynoso is a 54 y.o. male patient with a PMHx of CAD, depression, GERD, HLD, HTN, hx of MI who presents to the Emergency Department for evaluation of chest pain and shortness of breath which onset gradually 1 week ago. He states his BP spiked during hyperbaric chamber treatments causing CP prompting him to be brought to [facility] ER and admitted. He states the CP has not improved much and is pressure, heavy like. He also complains of  SOB that worsens with short distance walks. Patient denies any leg swelling, abd pain, light headedness, and dizziness.     Patient has hx of MI with stent placement. Cardiologist is Raji Hughes MD.      The history is provided by the patient. No  was used.     Review of patient's allergies indicates:  No Known Allergies  Past Medical History:   Diagnosis Date    Acid reflux     Arthritis     Atherosclerosis of native arteries of extremities with intermittent claudication, unspecified extremity     Back pain     CAD (coronary artery disease), native coronary artery     Coronary artery disease     Coronary artery disease involving native coronary artery of native heart, unspecified whether angina present     Depression     GERD (gastroesophageal reflux disease)     High cholesterol     HTN (hypertension)     Hyperglycemia     Obesity     Prostate cancer     Seasonal allergies     Shortness of breath     SOB (shortness of breath)     ST elevation (STEMI) myocardial infarction  of unspecified site     Tobacco use      Past Surgical History:   Procedure Laterality Date    CHOLECYSTECTOMY      COLONOSCOPY      CORONARY ANGIOGRAPHY N/A 06/21/2023    Procedure: ANGIOGRAM, CORONARY ARTERY;  Surgeon: Raji Hughes MD;  Location: [facility] CATH LAB;  Service: Cardiology;  Laterality: N/A;    CORONARY ANGIOGRAPHY N/A 10/09/2023    Procedure: ANGIOGRAM, CORONARY ARTERY;  Surgeon: Dawit Rodarte MD;  Location: [facility] CATH LAB;  Service: Cardiology;  Laterality: N/A;    CORONARY ANGIOGRAPHY Left 3/12/2024    Procedure: ANGIOGRAM, CORONARY ARTERY;  Surgeon: Raji Hughes MD;  Location: [facility] CATH LAB;  Service: Cardiology;  Laterality: Left;  +/- PCI    CORONARY ARTERY BYPASS GRAFT (CABG) N/A 06/27/2023    Procedure: CORONARY ARTERY BYPASS GRAFT (CABG);  Surgeon: Jhony Sharma MD;  Location: [facility];  Service: Cardiovascular;  Laterality: N/A;  ECHO NOTIFIED    CORONARY BYPASS GRAFT ANGIOGRAPHY  3/12/2024    Procedure: Bypass graft study;  Surgeon: Raji Hughes MD;  Location: [facility] CATH LAB;  Service: Cardiology;;    CYSTOSCOPY N/A 4/6/2024    Procedure: CYSTOSCOPY;  Surgeon: Timbo Villalobos MD;  Location: [facility];  Service: Urology;  Laterality: N/A;  Cystoscopy with clot evacuation, fulguration and any other indicated procedures    ENDOSCOPIC HARVEST OF VEIN N/A 06/27/2023    Procedure: SURGICAL PROCUREMENT, VEIN, ENDOSCOPIC;  Surgeon: Jhony Sharma MD;  Location: [facility];  Service: Cardiovascular;  Laterality: N/A;    EVACUATION OF HEMATOMA N/A 4/6/2024    Procedure: EVACUATION, HEMATOMA;  Surgeon: Timbo Villalobos MD;  Location: [facility];  Service: Urology;  Laterality: N/A;    INSTANTANEOUS WAVE-FREE RATIO (IFR) N/A 3/12/2024    Procedure: Instantaneous Wave-Free Ratio (IFR);  Surgeon: Raji Hughes MD;  Location: [facility] CATH LAB;  Service: Cardiology;  Laterality: N/A;    INTRAVASCULAR ULTRASOUND, CORONARY N/A 10/09/2023    Procedure: Ultrasound-coronary;  Surgeon: Dawit Rodarte MD;  Location:  [facility] CATH LAB;  Service: Cardiology;  Laterality: N/A;    LEFT HEART CATHETERIZATION Left 12/01/2022      Moderate noncritical multivessel CAD with stenosis up to 60%    LEFT HEART CATHETERIZATION Left 06/21/2023    Procedure: Left heart cath;  Surgeon: Raji Hughes MD;  Location: [facility] CATH LAB;  Service: Cardiology;  Laterality: Left;  LHC +/- PCI    LEFT HEART CATHETERIZATION Left 3/12/2024    Procedure: Left heart cath;  Surgeon: Raji Hughes MD;  Location: [facility] CATH LAB;  Service: Cardiology;  Laterality: Left;    LIPOMA RESECTION N/A 03/30/2023    Procedure: EXCISION, LIPOMA (excision of post neck lipoma);  Surgeon: Samuel Cruz MD;  Location: [facility] OR;  Service: General;  Laterality: N/A;  10 x 5 lipoma     PLATING OF STERNUM N/A 06/27/2023    Procedure: PLATING, STERNAL;  Surgeon: Jhony Sahrma MD;  Location: [facility] OR;  Service: Cardiovascular;  Laterality: N/A;    PROSTATECTOMY      STENT, DRUG ELUTING, SINGLE VESSEL, CORONARY  10/09/2023    Procedure: Stent, Drug Eluting, Single Vessel, Coronary;  Surgeon: Dawit Rodarte MD;  Location: [facility] CATH LAB;  Service: Cardiology;;    VENTRICULOGRAM, LEFT  06/21/2023    Procedure: Ventriculogram, Left;  Surgeon: Raij Hughes MD;  Location: [facility] CATH LAB;  Service: Cardiology;;     Family History   Problem Relation Name Age of Onset    Hypertension Mother Taylor Chandler     Diabetes Mother Taylor Chandler     Prostate cancer Father       Social History     Tobacco Use    Smoking status: Former     Average packs/day: 0.5 packs/day for 33.5 years (16.7 ttl pk-yrs)     Types: Cigarettes     Start date: 1990    Smokeless tobacco: Never   Substance Use Topics    Alcohol use: Not Currently     Comment: socially    Drug use: Not Currently     Review of Systems   Constitutional:  Negative for chills and fever.   HENT:  Negative for drooling and sore throat.    Eyes:  Negative for pain and redness.   Respiratory:  Positive for shortness of breath. Negative for  wheezing and stridor.    Cardiovascular:  Positive for chest pain. Negative for palpitations and leg swelling.   Gastrointestinal:  Negative for abdominal pain, nausea and vomiting.   Genitourinary:  Negative for dysuria and hematuria.   Musculoskeletal:  Negative for back pain, neck pain and neck stiffness.   Skin:  Negative for rash and wound.   Neurological:  Negative for dizziness, weakness, light-headedness and numbness.   Hematological:  Does not bruise/bleed easily.       Physical Exam     Initial Vitals [09/30/24 0754]   BP Pulse Resp Temp SpO2   (!) 157/86 63 20 97.7 °F (36.5 °C) 99 %      MAP       --         Physical Exam    Nursing note and vitals reviewed.  Constitutional: He appears well-developed.   Eyes: EOM are normal. Pupils are equal, round, and reactive to light.   Cardiovascular:  Normal rate and regular rhythm.           No murmur heard.  Pulmonary/Chest: Breath sounds normal. No respiratory distress. He has no wheezes. He has no rales.   Abdominal: Abdomen is soft. He exhibits no distension. There is no abdominal tenderness.     Skin: Skin is warm. Capillary refill takes less than 2 seconds. No rash noted.   Well-healed sternotomy scar.          ED Course   Procedures  Labs Reviewed   COMPREHENSIVE METABOLIC PANEL - Abnormal       Result Value    Sodium 139      Potassium 4.4      Chloride 107      CO2 27      Glucose 133 (*)     Blood Urea Nitrogen 16.6      Creatinine 1.05      Calcium 9.4      Protein Total 6.6      Albumin 3.7      Globulin 2.9      Albumin/Globulin Ratio 1.3      Bilirubin Total 0.4      ALP 72      ALT 32      AST 21      eGFR >60      Anion Gap 5.0      BUN/Creatinine Ratio 16     B-TYPE NATRIURETIC PEPTIDE - Abnormal    Natriuretic Peptide 243.0 (*)    CBC WITH DIFFERENTIAL - Abnormal    WBC 6.89      RBC 4.57 (*)     Hgb 13.9 (*)     Hct 40.9 (*)     MCV 89.5      MCH 30.4      MCHC 34.0      RDW 13.0      Platelet 235      MPV 9.5      Neut % 66.0      Lymph % 21.5       Mono % 7.7      Eos % 4.1      Basophil % 0.4      Lymph # 1.48      Neut # 4.55      Mono # 0.53      Eos # 0.28      Baso # 0.03      IG# 0.02      IG% 0.3      NRBC% 0.0     TROPONIN I - Normal    Troponin-I <0.010     TROPONIN I - Normal    Troponin-I <0.010     CBC W/ AUTO DIFFERENTIAL    Narrative:     The following orders were created for panel order CBC auto differential.  Procedure                               Abnormality         Status                     ---------                               -----------         ------                     CBC with Differential[[id number]]       Abnormal            Final result                 Please view results for these tests on the individual orders.        ECG Results              EKG 12-lead (Final result)        Collection Time Result Time QRS Duration OHS QTC Calculation    09/30/24 07:53:48 09/30/24 09:26:20 86 418                     Final result by Interface, Lab In [facility] (09/30/24 09:26:28)                   Narrative:    Test Reason : R07.9,    Vent. Rate : 060 BPM     Atrial Rate : 060 BPM     P-R Int : 216 ms          QRS Dur : 086 ms      QT Int : 418 ms       P-R-T Axes : 029 043 043 degrees     QTc Int : 418 ms    Sinus rhythm with 1st degree A-V block  Nonspecific T wave abnormality  Abnormal ECG  Confirmed by Jaswinder Bee MD ([id number]) on 9/30/2024 9:26:17 AM    Referred By:             Confirmed By:Jaswinder Bee MD                                     EKG 12-lead (Final result)  Result time 10/03/24 15:44:29      Final result by Unknown User (10/03/24 15:44:29)                                      Imaging Results              X-Ray Chest AP Portable (Final result)  Result time 09/30/24 08:45:15      Final result by Alexis Butler MD (09/30/24 08:45:15)                   Narrative:    EXAMINATION  XR CHEST AP PORTABLE    CLINICAL HISTORY  Chest pain, unspecified    TECHNIQUE  A total of 1 frontal image(s) submitted of the chest.    COMPARISON  19  March 2024    FINDINGS  Lines/tubes/devices: None visualized.    The cardiac silhouette and central vascular structures are unchanged.  The trachea is midline. No new or worsening consolidation is developed in the interval.  There is no large pleural effusion or convincing pneumothorax.    Regional osseous structures and extrathoracic soft tissues are similar.    IMPRESSION  No acute process or other adverse interval change.      Electronically signed by: Alexis Butler  Date:    09/30/2024  Time:    08:45                                     Medications   aspirin EC tablet 325 mg (325 mg Oral Given 9/30/24 0927)   furosemide injection 20 mg (20 mg Intravenous Given 9/30/24 1035)     Medical Decision Making  Problems Addressed:  Acute chest pain: acute illness or injury  Chest pain: acute illness or injury    Amount and/or Complexity of Data Reviewed  Radiology:  Decision-making details documented in ED Course.    Risk  OTC drugs.  Prescription drug management.      Differential diagnosis (includes but is not limited to):   ACS, arrhythmia, CHF, COPD, dehydration, kidney injury, electrolyte abnormalities    MDM Narrative  54-year-old male presents for evaluation of some chest discomfort and shortness of breath that has been present for the past several weeks.  Patient recently admitted to [facility] where he underwent a workup and was ruled out with plan for outpatient follow up.  He states he came here for 2nd opinion.  EKG reviewed.  Labs reviewed.  Case discussed with Cardiology, recommends repeat troponin if negative discharge home to follow up in the office with the patient's cardiologist as an outpatient.  Repeat troponin pending.    Update:  Delta troponin negative.  Blood pressure improving.  Symptoms resolved.  Patient states he feels well.  Patient agrees with the plan for discharge home and will follow up with his cardiologist in the office for further care.  Strict return precautions discussed and patient  verbalized understanding.  Patient is ambulatory at his baseline with a steady gait.  Patient stable on room air with no evidence of respiratory distress.    Dispo: Discharge    My independent radiology interpretation: as above  Point of care US (independently performed and interpreted):   Decision rules/clinical scoring:     Sepsis Perfusion Assessment:     Amount and/or Complexity of Data Reviewed  Independent historian: none   Summary of history:   External data reviewed: notes from previous ED visits and notes from clinic visits  Summary of data reviewed: Prior records reviewed  Risk and benefits of testing: discussed   Labs: ordered and reviewed  Radiology: ordered and independent interpretation performed (see above or ED course)  ECG/medicine tests: ordered and independent interpretation performed (see above or ED course)  Discussion of management or test interpretation with external provider(s): discussed with cardiology consultant   Summary of discussion: as above    Risk  Parenteral controlled substances   Drug therapy requiring intense monitoring for toxicity   Decision regarding hospitalization  Shared decision making     Critical Care  none    Data Reviewed/Counseling: I have personally reviewed the patient's vital signs, nursing notes, and other relevant tests, information, and imaging. I had a detailed discussion regarding the historical points, exam findings, and any diagnostic results supporting the discharge diagnosis. I personally performed the history, PE, MDM and procedures as documented above and agree with the scribe's documentation.    Portions of this note were dictated using voice recognition software. Although it was reviewed for accuracy, some inherent voice recognition errors may have occurred and may be present in this document.         Scribe Attestation:   Scribe #1: I performed the above scribed service and the documentation accurately describes the services I performed. I attest to  the accuracy of the note.    Attending Attestation:           Physician Attestation for Scribe:  Physician Attestation Statement for Scribe #1: I, Harley Mckee MD, reviewed documentation, as scribed by Suman Bobo in my presence, and it is both accurate and complete.             ED Course as of 10/14/24 0622   Mon Sep 30, 2024   0844 X-Ray Chest AP Portable  Independently visualized/reviewed by me during the ED visit.  - Sternotomy wires in place, cardiomegaly, no lobar consolidation [MC]   0845 EKG independently interpreted by me.  EKG: SR @ 60, no STEMI, Qtc 418 [MC]   0925 [facility] consulted [MC]   0948 Discussed with [facility], chart and case reviewed, recommends 2nd troponin and if negative, discharge home to follow up with Dr. Hughes in the office this week. [MC]   1339 I have reassessed the patient.  Patient is resting comfortably, no acute distress.  Vital signs stable.  Discussed all results including incidental findings.  Discussed need for follow up and discussed return precautions.  Answered all questions at this time.  Hemodynamically stable for continued outpatient management. Patient verbalized understanding and agreed to plan.    [MC]      ED Course User Index  [MC] Harley Mckee MD                           Clinical Impression:  Final diagnoses:  [R07.9] Acute chest pain  [R07.9] Chest pain          ED Disposition Condition    Discharge Stable          ED Prescriptions    None       Follow-up Information       Follow up With Specialties Details Why Contact Info    Raji Hughes MD Cardiology Follow up on 10/4/2024 @ 4:00PM [facility] [address]  [address]  [facility] [address]  [phone]      Glen Duffy NP Family Medicine Schedule an appointment as soon as possible for a visit   [address]  [facility] [address]  [phone]      [facility] - Emergency Dept Emergency Medicine  If symptoms worsen [address]  [address]  [phone]             Harley Mckee MD  10/14/24 0622

## 2024-11-12 RX ORDER — HYDROCHLOROTHIAZIDE 25 MG/1
25 TABLET ORAL DAILY
COMMUNITY

## 2024-11-12 RX ORDER — ASPIRIN 81 MG/1
81 TABLET ORAL DAILY
COMMUNITY

## 2024-11-13 ENCOUNTER — ANESTHESIA EVENT (OUTPATIENT)
Dept: SURGERY | Facility: HOSPITAL | Age: 54
End: 2024-11-13
Payer: MEDICARE

## 2024-11-26 ENCOUNTER — HOSPITAL ENCOUNTER (OUTPATIENT)
Dept: RADIOLOGY | Facility: HOSPITAL | Age: 54
Discharge: HOME | End: 2024-11-26
Attending: UROLOGY
Payer: MEDICARE

## 2024-11-26 DIAGNOSIS — N47.1 PHIMOSIS: ICD-10-CM

## 2024-11-26 PROCEDURE — 71046 X-RAY EXAM CHEST 2 VIEWS: CPT | Mod: TC

## 2024-11-27 NOTE — CLINICAL REVIEW
Plavix last dose 11/27/24. labs/CXR reviewed. EKG(10/4/24) reviewed. cardiology notes utd. Angio/Echo noted. CRA provided. chart review complete. ccv

## 2024-12-02 NOTE — PRE-PROCEDURE INSTRUCTIONS
"[facility]: Outpatient Surgery  Preprocedure Check-In Instructions     Your arrival time for your surgery or procedure is 9:30 am.  We ask patients to arrive about 2 hours before surgery to allow for enough time to review your health history & medications, start your IV, complete any outstanding labwork or tests, and meet your Anesthesiologist.    Expectations: "Because of inconsistent procedure completion times, an unexpected wait may occur. The Physicians would like you to be here to prepare in the event they run ahead of time. We will make you as comfortable as possible and keep you informed. We apologize in advance if this happens."    You will arrive at [facility], [facility] [place], [place].  Enter through the [place] entrance next to the Emergency Room, and come to the 6th floor to the Outpatient Surgery Department.     Visitory Policy:  You are allowed 2 adult visitors to be with you in the hospital. All hospital visitors should be in good current health.  No small children.     What to Bring:  Please have your ID, insurance cards, and all home medication bottles with you at check in.  Bring your CPAP machine if one is used at home.     Fasting:  Nothing to eat after midnight the night before your procedure. This includes no gum and/or tobacco products. You may have clear liquids limited to only: WATER, GATORADE, POWERADE and children can also have PEDIALYTE AND/OR APPLE JUICE , up until 2 hours before your arrival time.   Follow your doctor's instructions for taking any medications on the morning of your procedure.  If no instructions for taking medications were given, do not take any medications but bring your medications in their bottles to your procedure check in.     Follow your doctor's preoperative instructions regarding skin prep, bowel prep, bathing, or showering prior to your procedure.  If any special soaps were provided to you, please use according to " your doctor's instructions. If no instructions were given from your doctor, take a good bath or shower with antibacterial soap the night before and the morning of your procedure.  On the morning of procedure, wear loose, comfortable clothing.  No lotions, makeup, perfumes, colognes, deodorant, or jewelry to your procedure.  Removable items (glasses, contact lenses, dentures, retainers, hearing aids) need to be removed for your procedure.  Bring your storage containers for these items if you wear them.     Artificial nails, body jewelry, eyelash extensions, and/or hair extensions with metal clips are not allowed during your surgery.  If you currently wear any of these items, please arrange for them to be removed prior to your arrival to the hospital.     Outpatient or Same Day Surgeries:  Any patients receiving sedation/anesthesia are advised not to drive for 24 hours after their procedure.  We do not allow patients to drive themselves home after discharge.  If you are going home after your procedure, please have someone available to drive you home from the hospital.        You may call the Outpatient Surgery Department at [phone] with any questions or concerns.  We are looking forward to meeting you and taking great care of you for your procedure.  Thank you for choosing [facility] for your surgical needs.

## 2024-12-03 ENCOUNTER — HOSPITAL ENCOUNTER (OUTPATIENT)
Facility: HOSPITAL | Age: 54
Discharge: HOME | End: 2024-12-03
Attending: UROLOGY | Admitting: UROLOGY
Payer: MEDICARE

## 2024-12-03 ENCOUNTER — ANESTHESIA (OUTPATIENT)
Dept: SURGERY | Facility: HOSPITAL | Age: 54
End: 2024-12-03
Payer: MEDICARE

## 2024-12-03 VITALS
DIASTOLIC BLOOD PRESSURE: 80 MMHG | HEART RATE: 69 BPM | TEMPERATURE: 98 F | BODY MASS INDEX: 42.91 KG/M2 | RESPIRATION RATE: 18 BRPM | WEIGHT: 267 LBS | OXYGEN SATURATION: 97 % | SYSTOLIC BLOOD PRESSURE: 114 MMHG | HEIGHT: 66 IN

## 2024-12-03 DIAGNOSIS — N47.1 PHIMOSIS: Primary | ICD-10-CM

## 2024-12-03 PROCEDURE — 71000016 HC POSTOP RECOV ADDL HR: Performed by: UROLOGY

## 2024-12-03 PROCEDURE — 25000003 PHARM REV CODE 250: Performed by: NURSE ANESTHETIST, CERTIFIED REGISTERED

## 2024-12-03 PROCEDURE — 37000008 HC ANESTHESIA 1ST 15 MINUTES: Performed by: UROLOGY

## 2024-12-03 PROCEDURE — 37000009 HC ANESTHESIA EA ADD 15 MINS: Performed by: UROLOGY

## 2024-12-03 PROCEDURE — 36000707: Performed by: UROLOGY

## 2024-12-03 PROCEDURE — 63600175 PHARM REV CODE 636 W HCPCS: Performed by: NURSE ANESTHETIST, CERTIFIED REGISTERED

## 2024-12-03 PROCEDURE — 25000003 PHARM REV CODE 250: Performed by: ANESTHESIOLOGY

## 2024-12-03 PROCEDURE — 71000033 HC RECOVERY, INTIAL HOUR: Performed by: UROLOGY

## 2024-12-03 PROCEDURE — 71000015 HC POSTOP RECOV 1ST HR: Performed by: UROLOGY

## 2024-12-03 PROCEDURE — 25000003 PHARM REV CODE 250: Performed by: UROLOGY

## 2024-12-03 PROCEDURE — 63600175 PHARM REV CODE 636 W HCPCS: Mod: JZ,JG | Performed by: UROLOGY

## 2024-12-03 PROCEDURE — 36000706: Performed by: UROLOGY

## 2024-12-03 RX ORDER — CEFAZOLIN SODIUM 2 G/50ML
2 SOLUTION INTRAVENOUS ONCE
Status: DISCONTINUED | OUTPATIENT
Start: 2024-12-03 | End: 2024-12-03 | Stop reason: HOSPADM

## 2024-12-03 RX ORDER — FENTANYL CITRATE 50 UG/ML
INJECTION, SOLUTION INTRAMUSCULAR; INTRAVENOUS
Status: DISCONTINUED | OUTPATIENT
Start: 2024-12-03 | End: 2024-12-03

## 2024-12-03 RX ORDER — DIPHENHYDRAMINE HYDROCHLORIDE 50 MG/ML
12.5 INJECTION INTRAMUSCULAR; INTRAVENOUS ONCE AS NEEDED
Status: DISCONTINUED | OUTPATIENT
Start: 2024-12-03 | End: 2024-12-03 | Stop reason: HOSPADM

## 2024-12-03 RX ORDER — ONDANSETRON HYDROCHLORIDE 2 MG/ML
INJECTION, SOLUTION INTRAVENOUS
Status: DISCONTINUED | OUTPATIENT
Start: 2024-12-03 | End: 2024-12-03

## 2024-12-03 RX ORDER — MEPERIDINE HYDROCHLORIDE 25 MG/ML
12.5 INJECTION INTRAMUSCULAR; INTRAVENOUS; SUBCUTANEOUS EVERY 10 MIN PRN
Status: DISCONTINUED | OUTPATIENT
Start: 2024-12-03 | End: 2024-12-03 | Stop reason: HOSPADM

## 2024-12-03 RX ORDER — LIDOCAINE HYDROCHLORIDE 20 MG/ML
INJECTION, SOLUTION EPIDURAL; INFILTRATION; INTRACAUDAL; PERINEURAL
Status: DISCONTINUED | OUTPATIENT
Start: 2024-12-03 | End: 2024-12-03

## 2024-12-03 RX ORDER — DEXAMETHASONE SODIUM PHOSPHATE 4 MG/ML
INJECTION, SOLUTION INTRA-ARTICULAR; INTRALESIONAL; INTRAMUSCULAR; INTRAVENOUS; SOFT TISSUE
Status: DISCONTINUED | OUTPATIENT
Start: 2024-12-03 | End: 2024-12-03

## 2024-12-03 RX ORDER — BUPIVACAINE HYDROCHLORIDE 5 MG/ML
INJECTION, SOLUTION EPIDURAL; INTRACAUDAL
Status: DISCONTINUED | OUTPATIENT
Start: 2024-12-03 | End: 2024-12-03 | Stop reason: HOSPADM

## 2024-12-03 RX ORDER — PHENYLEPHRINE HCL IN 0.9% NACL 1 MG/10 ML
SYRINGE (ML) INTRAVENOUS
Status: DISCONTINUED | OUTPATIENT
Start: 2024-12-03 | End: 2024-12-03

## 2024-12-03 RX ORDER — ONDANSETRON HYDROCHLORIDE 2 MG/ML
4 INJECTION, SOLUTION INTRAVENOUS ONCE AS NEEDED
Status: DISCONTINUED | OUTPATIENT
Start: 2024-12-03 | End: 2024-12-03 | Stop reason: HOSPADM

## 2024-12-03 RX ORDER — CEFAZOLIN SODIUM 1 G/3ML
INJECTION, POWDER, FOR SOLUTION INTRAMUSCULAR; INTRAVENOUS
Status: DISCONTINUED | OUTPATIENT
Start: 2024-12-03 | End: 2024-12-03

## 2024-12-03 RX ORDER — SODIUM CHLORIDE, SODIUM LACTATE, POTASSIUM CHLORIDE, CALCIUM CHLORIDE 600; 310; 30; 20 MG/100ML; MG/100ML; MG/100ML; MG/100ML
125 INJECTION, SOLUTION INTRAVENOUS CONTINUOUS
Status: ACTIVE | OUTPATIENT
Start: 2024-12-03 | End: 2024-12-03

## 2024-12-03 RX ORDER — ROCURONIUM BROMIDE 10 MG/ML
INJECTION, SOLUTION INTRAVENOUS
Status: DISCONTINUED | OUTPATIENT
Start: 2024-12-03 | End: 2024-12-03

## 2024-12-03 RX ORDER — GLUCAGON 1 MG
1 KIT INJECTION
Status: DISCONTINUED | OUTPATIENT
Start: 2024-12-03 | End: 2024-12-03 | Stop reason: HOSPADM

## 2024-12-03 RX ORDER — IPRATROPIUM BROMIDE AND ALBUTEROL SULFATE 2.5; .5 MG/3ML; MG/3ML
3 SOLUTION RESPIRATORY (INHALATION) ONCE AS NEEDED
Status: DISCONTINUED | OUTPATIENT
Start: 2024-12-03 | End: 2024-12-03 | Stop reason: HOSPADM

## 2024-12-03 RX ORDER — OXYCODONE AND ACETAMINOPHEN 5; 325 MG/1; MG/1
1 TABLET ORAL
Status: DISCONTINUED | OUTPATIENT
Start: 2024-12-03 | End: 2024-12-03 | Stop reason: HOSPADM

## 2024-12-03 RX ORDER — PROPOFOL 10 MG/ML
VIAL (ML) INTRAVENOUS
Status: DISCONTINUED | OUTPATIENT
Start: 2024-12-03 | End: 2024-12-03

## 2024-12-03 RX ORDER — MIDAZOLAM HYDROCHLORIDE 1 MG/ML
INJECTION INTRAMUSCULAR; INTRAVENOUS
Status: DISCONTINUED | OUTPATIENT
Start: 2024-12-03 | End: 2024-12-03

## 2024-12-03 RX ORDER — KETOROLAC TROMETHAMINE 30 MG/ML
30 INJECTION, SOLUTION INTRAMUSCULAR; INTRAVENOUS ONCE AS NEEDED
Status: DISCONTINUED | OUTPATIENT
Start: 2024-12-03 | End: 2024-12-03 | Stop reason: HOSPADM

## 2024-12-03 RX ORDER — BACITRACIN 500 [USP'U]/G
OINTMENT TOPICAL
Status: DISCONTINUED | OUTPATIENT
Start: 2024-12-03 | End: 2024-12-03 | Stop reason: HOSPADM

## 2024-12-03 RX ORDER — HYDROMORPHONE HYDROCHLORIDE 2 MG/ML
0.4 INJECTION, SOLUTION INTRAMUSCULAR; INTRAVENOUS; SUBCUTANEOUS EVERY 10 MIN PRN
Status: DISCONTINUED | OUTPATIENT
Start: 2024-12-03 | End: 2024-12-03 | Stop reason: HOSPADM

## 2024-12-03 RX ADMIN — DEXAMETHASONE SODIUM PHOSPHATE 4 MG: 4 INJECTION, SOLUTION INTRA-ARTICULAR; INTRALESIONAL; INTRAMUSCULAR; INTRAVENOUS; SOFT TISSUE at 11:12

## 2024-12-03 RX ADMIN — ONDANSETRON 4 MG: 2 INJECTION INTRAMUSCULAR; INTRAVENOUS at 11:12

## 2024-12-03 RX ADMIN — Medication 200 MCG: at 11:12

## 2024-12-03 RX ADMIN — MIDAZOLAM HYDROCHLORIDE 2 MG: 1 INJECTION, SOLUTION INTRAMUSCULAR; INTRAVENOUS at 10:12

## 2024-12-03 RX ADMIN — PROPOFOL 200 MG: 10 INJECTION, EMULSION INTRAVENOUS at 11:12

## 2024-12-03 RX ADMIN — SUGAMMADEX 200 MG: 100 INJECTION, SOLUTION INTRAVENOUS at 12:12

## 2024-12-03 RX ADMIN — LIDOCAINE HYDROCHLORIDE 20 MG: 20 INJECTION, SOLUTION INTRAVENOUS at 11:12

## 2024-12-03 RX ADMIN — OXYCODONE HYDROCHLORIDE AND ACETAMINOPHEN 1 TABLET: 5; 325 TABLET ORAL at 01:12

## 2024-12-03 RX ADMIN — CEFAZOLIN 2 G: 330 INJECTION, POWDER, FOR SOLUTION INTRAMUSCULAR; INTRAVENOUS at 11:12

## 2024-12-03 RX ADMIN — FENTANYL CITRATE 100 MCG: 50 INJECTION, SOLUTION INTRAMUSCULAR; INTRAVENOUS at 11:12

## 2024-12-03 RX ADMIN — ROCURONIUM BROMIDE 50 MG: 10 SOLUTION INTRAVENOUS at 11:12

## 2024-12-03 NOTE — ANESTHESIA PROCEDURE NOTES
Intubation    Date/Time: 12/3/2024 11:10 AM    Performed by: Melissa Villalobos CRNA  Authorized by: Skyler Goldman MD    Intubation:     Induction:  Intravenous    Intubated:  Postinduction    Mask Ventilation:  Easy mask    Attempts:  1    Attempted By:  Student    Method of Intubation:  Video laryngoscopy    Blade:  Rincon 3    Laryngeal View Grade: Grade I - full view of cords      Difficult Airway Encountered?: Yes      Airway Device:  Oral endotracheal tube    Airway Device Size:  7.5    Style/Cuff Inflation:  Cuffed (inflated to minimal occlusive pressure)    Tube secured:  22    Secured at:  The lips    Placement Verified By:  Capnometry    Complicating Factors:  None    Findings Post-Intubation:  BS equal bilateral and atraumatic/condition of teeth unchanged

## 2024-12-03 NOTE — TRANSFER OF CARE
"Anesthesia Transfer of Care Note    Patient: Alex Reynoso    Procedure(s) Performed: Procedure(s) (LRB):  CIRCUMCISION (N/A)    Patient location: PACU    Anesthesia Type: general    Transport from OR: Transported from OR on room air with adequate spontaneous ventilation    Post pain: adequate analgesia    Post assessment: no apparent anesthetic complications and tolerated procedure well    Post vital signs: stable    Level of consciousness: responds to stimulation and awake    Nausea/Vomiting: no nausea/vomiting    Complications: none    Transfer of care protocol was followed      Last vitals: Visit Vitals  /68   Pulse 65   Temp 36.4 °C (97.6 °F) (Oral)   Resp 20   Ht 5' 6" (1.676 m)   Wt 121.1 kg (266 lb 15.6 oz)   SpO2 98%   BMI 43.09 kg/m²     "

## 2024-12-03 NOTE — DISCHARGE SUMMARY
[facility] - Periop Services  Discharge Note  Short Stay    Procedure(s) (LRB):  CIRCUMCISION (N/A)      OUTCOME: Patient tolerated treatment/procedure well without complication and is now ready for discharge.    DISPOSITION: Home or Self Care    FINAL DIAGNOSIS:  Phimosis    FOLLOWUP: In clinic    DISCHARGE INSTRUCTIONS:    Discharge Procedure Orders   Diet Adult Regular     Lifting restrictions     Notify your health care provider if you experience any of the following:  temperature >100.4     Notify your health care provider if you experience any of the following:  persistent nausea and vomiting or diarrhea     Notify your health care provider if you experience any of the following:  severe uncontrolled pain        TIME SPENT ON DISCHARGE:   15 minutes

## 2024-12-03 NOTE — ANESTHESIA POSTPROCEDURE EVALUATION
Anesthesia Post Evaluation    Patient: Alex Reynoso    Procedure(s) Performed: Procedure(s) (LRB):  CIRCUMCISION (N/A)    Final Anesthesia Type: general      Patient location during evaluation: PACU  Patient participation: Yes- Able to Participate  Level of consciousness: awake and alert and oriented  Post-procedure vital signs: reviewed and stable  Pain management: adequate  Airway patency: patent    PONV status at discharge: No PONV  Anesthetic complications: no      Cardiovascular status: blood pressure returned to baseline  Respiratory status: unassisted, spontaneous ventilation and room air  Hydration status: euvolemic  Follow-up not needed.              Vitals Value Taken Time   /75 12/03/24 1306   Temp 36.4 °C (97.5 °F) 12/03/24 1306   Pulse 67 12/03/24 1307   Resp 18 12/03/24 1314   SpO2 100 % 12/03/24 1307         Event Time   Out of Recovery 13:00:00         Pain/Sil Score: Pain Rating Prior to Med Admin: 5 (12/3/2024  1:14 PM)  Sil Score: 8 (12/3/2024  1:00 PM)           Cyclosporine Pregnancy And Lactation Text: This medication is Pregnancy Category C and it isn't know if it is safe during pregnancy. This medication is excreted in breast milk.

## 2024-12-03 NOTE — DISCHARGE INSTRUCTIONS
NO DRIVING AND NO ALCOHOL consumption FOR 24 HOURS or while taking narcotic pain medications.    Keep sites CLEAN AND DRY for 48 hours. OK to shower afterwards. Do NOT submerge incision under water. Do not apply lotions, creams, or ointments.     NO heavy lifting. DO NOT lift objects greater than 10 lbs. Your doctor will advise at your follow up appointment when to resume normal activity.     You may have received Exparel (indicated by the BLUE armband) which is an injection used to ease pain at the surgery site. This drug may cause short-term loss of feeling and motor activity in the body. This may last for up to 5 days. Please see attached for additional information.     Monitor for infection: chills, fever (100.4 F), redness or drainage at surgical site. Notify your doctor if any of these occur.     Report to your nearest ER if you experience any SUDDEN/SEVERE abdominal pain, trouble breathing, uncontrolled pain, profound weakness.     BLEEDING: if you experience uncontrollable bleeding, contact your doctor and go to ER.    NAUSEA: due to the anesthesia, you may experience nausea for up to 24 hours. If nausea and vomiting last longer, contact your doctor.     INFECTION:  watch for any signs or symptoms of infection such as chills, fever, redness or drainage at surgical site. Notify your doctor.     PAIN : take your pain medications as directed. If the pain medications are not helping, notify your doctor.

## 2024-12-03 NOTE — OP NOTE
SURGEON:  Demarcus Shaver MD    ANESTHESIA:  General.    PREOPERATIVE DIAGNOSIS(ES):  Phimosis.    POSTOPERATIVE DIAGNOSIS(ES):  Phimosis.    PROCEDURE(S)/OPERATION(S) PERFORMED:  Adult circumcision (sleeve technique).    FLUIDS:  750 mL crystalloid.    ESTIMATED BLOOD LOSS:  5 mL.    SPECIMENS:  Foreskin.    FINDINGS:  Phimosis    DRAINS:  None.    COMPLICATIONS:  None.    SUMMARY::  After informed consent was obtained, the patient was brought to the operating room and placed  in the supine position. After adequate IV sedation, the genitalia were prepped and draped in  sterile fashion. A time out was performed with the patient identified using 2 identifiers and the  procedure site was verified. A penile block was achieved in the standard fashion using 10 mL of  0.5 % marcaine and 10 mL of 1% lidocaine. After adequate local anesthesia, we marked the  proximal circumcising incision with a marking pen, and incised the skin circumferentially with a  #15 blade. The foreskin was then retracted and the inner (distal) circumcising incision was  marked with a marking pen and incised with the scalpel. The foreskin was then divided  between two straight hemostats dorsally. The remaining subcutaneous tissues were incised  with electrocautery. The foreskin was sent for permanent section. Hemostasis was achieved  with pinpoint electrocautery. The frenulotomy was reapproximated with interrupted 4-0 chromic  suture. The proximal and distal skin edges were reapproximated with interrupted 3-0 chromic  suture. A sterile dressing of Vaseline gauze, Kerlix gauze, and coban was applied. The patient  tolerated the procedure well, and was transported to the recovery room in stable condition. His  family was informed of the outcome following the procedure.      12/03/2024  10:39 AM

## 2024-12-03 NOTE — INTERVAL H&P NOTE
The patient has been examined and the H&P has been reviewed:    I concur with the findings and no changes have occurred since H&P was written.    Surgery risks, benefits and alternative options discussed and understood by patient/family.          Active Hospital Problems   No active problems to display.      Resolved Hospital Problems    Diagnosis Date Resolved POA    *Phimosis [N47.1] 12/03/2024 Yes

## 2024-12-03 NOTE — ANESTHESIA PREPROCEDURE EVALUATION
12/03/2024  Alex Reynoso is a 54 y.o., male for circumcision.      Pre-op Assessment    I have reviewed the Patient Summary Reports.     I have reviewed the Nursing Notes. I have reviewed the NPO Status.   I have reviewed the Medications.     Review of Systems  Anesthesia Hx:  No problems with previous Anesthesia             Denies Family Hx of Anesthesia complications.    Denies Personal Hx of Anesthesia complications.                    Social:  Former Smoker Tob quit 3 months ago      Hematology/Oncology:                        --  Cancer in past history:                 Oncology Comments: Prostate Cancer     Cardiovascular:  Exercise tolerance: good   Hypertension, well controlled  Past MI CAD  asymptomatic CABG/stent    Denies Angina.   Denies Orthopnea.  Denies PND.  hyperlipidemia  Denies JEROME.      Functional Capacity good / => 4 METS                         Pulmonary:      Shortness of breath                  Hepatic/GI:     GERD                Musculoskeletal:  Musculoskeletal Normal                Neurological:  Denies TIA.  Denies CVA.                                    Endocrine:        Morbid Obesity / BMI > 40  Psych:    depression                Physical Exam  General: Well nourished, Alert and Oriented    Airway:  Mallampati: IV / II  Mouth Opening: Normal  TM Distance: 4 - 6 cm  Tongue: Normal  Neck ROM: Normal ROM    Dental:  Intact  Missing most of rear molars & many premolars; denies loose teeth      Anesthesia Plan  Type of Anesthesia, risks & benefits discussed:    Anesthesia Type: MAC, Gen ETT  Intra-op Monitoring Plan: Standard ASA Monitors  Post Op Pain Control Plan: IV/PO Opioids PRN  Induction:  IV  Informed Consent: Informed consent signed with the Patient and all parties understand the risks and agree with anesthesia plan.  All questions answered.   ASA Score: 3    Ready For  Surgery From Anesthesia Perspective.     .

## 2025-02-18 ENCOUNTER — LAB VISIT (OUTPATIENT)
Dept: LAB | Facility: HOSPITAL | Age: 55
End: 2025-02-18
Attending: INTERNAL MEDICINE
Payer: MEDICARE

## 2025-02-18 DIAGNOSIS — I10 ESSENTIAL HYPERTENSION, MALIGNANT: ICD-10-CM

## 2025-02-18 DIAGNOSIS — I25.10 CORONARY ATHEROSCLEROSIS OF NATIVE CORONARY ARTERY: Primary | ICD-10-CM

## 2025-02-18 LAB
ANION GAP SERPL CALC-SCNC: 5 MEQ/L
BUN SERPL-MCNC: 18 MG/DL (ref 8.4–25.7)
CALCIUM SERPL-MCNC: 9.5 MG/DL (ref 8.4–10.2)
CHLORIDE SERPL-SCNC: 102 MMOL/L (ref 98–107)
CHOLEST SERPL-MCNC: 247 MG/DL
CHOLEST/HDLC SERPL: 7 {RATIO} (ref 0–5)
CO2 SERPL-SCNC: 29 MMOL/L (ref 22–29)
CREAT SERPL-MCNC: 1.07 MG/DL (ref 0.72–1.25)
CREAT/UREA NIT SERPL: 17
GFR SERPLBLD CREATININE-BSD FMLA CKD-EPI: >60 ML/MIN/1.73/M2
GLUCOSE SERPL-MCNC: 134 MG/DL (ref 74–100)
HDLC SERPL-MCNC: 35 MG/DL (ref 35–60)
LDLC SERPL CALC-MCNC: 172 MG/DL (ref 50–140)
POTASSIUM SERPL-SCNC: 3.6 MMOL/L (ref 3.5–5.1)
SODIUM SERPL-SCNC: 136 MMOL/L (ref 136–145)
TRIGL SERPL-MCNC: 202 MG/DL (ref 34–140)
VLDLC SERPL CALC-MCNC: 40 MG/DL

## 2025-02-18 PROCEDURE — 80048 BASIC METABOLIC PNL TOTAL CA: CPT

## 2025-02-18 PROCEDURE — 80061 LIPID PANEL: CPT

## 2025-02-18 PROCEDURE — 36415 COLL VENOUS BLD VENIPUNCTURE: CPT

## (undated) DEVICE — GAUZE VISTEC XR DTECT 16 4X4IN

## (undated) DEVICE — ANGIOTOUCH KIT

## (undated) DEVICE — SEE MEDLINE ITEM 159606

## (undated) DEVICE — PAD ELECTROSURGICAL SPL W/CORD

## (undated) DEVICE — ADHESIVE DERMABOND ADVANCED

## (undated) DEVICE — DRESSING TEGADERM 4.4X5IN

## (undated) DEVICE — CATH EXPO IMC 5FR 100CM .045IN

## (undated) DEVICE — KIT CATHGARD DBL LUMN 9FRX11.5

## (undated) DEVICE — GLOVE PROTEXIS BLUE LATEX 7

## (undated) DEVICE — DRAIN CHEST DRY SUCTION

## (undated) DEVICE — VAC WOUND DISPOSABLE PREVENA

## (undated) DEVICE — CANNULA SOFT FLOW ANG 14IN 21F

## (undated) DEVICE — DRAIN PENRS SIL STRL .25X18IN

## (undated) DEVICE — SUT CHR GUT 3-0 RB-1 27IN

## (undated) DEVICE — HEMOSTAT SURGICEL NU-KNIT 6X9

## (undated) DEVICE — PAD HEARTSTART DEFIB ADULT

## (undated) DEVICE — CARTRIDGE SILV 4CHAN 2.0-3.5MG

## (undated) DEVICE — DRAPE SLUSH WARMER WITH DISC

## (undated) DEVICE — SENSOR LOW LEVEL OXYGEN

## (undated) DEVICE — UNIT AUTOTRANSFUSION PL 450ML

## (undated) DEVICE — SUT PROLENE 6-0 C-1 30IN BL

## (undated) DEVICE — DRAPE ANGIO BRACH 38X44IN

## (undated) DEVICE — TUBING INJ M/F 1000 PSI 20IN

## (undated) DEVICE — KIT SURGICAL TURNOVER

## (undated) DEVICE — DRESSING TRANS 4X4 TEGADERM

## (undated) DEVICE — SPONGE GAUZE 16PLY 4X4

## (undated) DEVICE — SUT PROLENE 4-0 RB-1 BL MO

## (undated) DEVICE — CANNULA NASAL ORAL PRO TECH AD

## (undated) DEVICE — CATH NC EMERGE MR 3.5X20MM

## (undated) DEVICE — KIT C.A.T.S. FAST START 4/CASE

## (undated) DEVICE — Device

## (undated) DEVICE — BANDAGE CNFRM GZ STRL 1X75IN

## (undated) DEVICE — GUIDEWIRE OMNI STR TIP 185CM

## (undated) DEVICE — SOL .9NACL PF 100 ML

## (undated) DEVICE — SOL IRR SOD CHL .9% POUR

## (undated) DEVICE — APPLICATOR ENDOSCP 32CM5MM2TIP

## (undated) DEVICE — GLOVE PROTEXIS PI SYN SURG 7.5

## (undated) DEVICE — SHEATH INTRODUCER 6FR 11CM

## (undated) DEVICE — GLOVE SURG BIOGEL LATEX SZ 7.5

## (undated) DEVICE — CATH EAGLE EYE PLATINUM

## (undated) DEVICE — SUT 2/0 36IN ETHIBOND EXCE

## (undated) DEVICE — TRAY CATH FOL SIL TEMP 10 16FR

## (undated) DEVICE — DRESSING TELFA + RECT 6X10IN

## (undated) DEVICE — SOL LAC RINGERS 1000ML INJ

## (undated) DEVICE — PENCIL ELECSURG ROCKER 15FT

## (undated) DEVICE — CUP PROFEX GLASS GRADUATE 1OZ

## (undated) DEVICE — CANNULA ADULT NASAL 7FT

## (undated) DEVICE — DEVICE INDEFLATOR BASIX

## (undated) DEVICE — GUIDEWIRE RUNTHROUGH EF 180CM

## (undated) DEVICE — PAD DEFIB CADENCE ADULT R2

## (undated) DEVICE — SUT PROLENE 7-0 BV175-6

## (undated) DEVICE — CANNULA MC2 NVENT .5IN 28/36FR

## (undated) DEVICE — DRESSING TELFA + BARR 4X6IN

## (undated) DEVICE — BOWL STERILE LARGE 32OZ

## (undated) DEVICE — KIT GLIDESHEATH SLEND 6FR 10CM

## (undated) DEVICE — CATH FL 3.5 5FR

## (undated) DEVICE — APPLICATOR SURG 2 SPRY 1 PLUNG

## (undated) DEVICE — CATH SWAN GANZ 8FR HEP FREE

## (undated) DEVICE — BLOWER MISTER

## (undated) DEVICE — COVER PROBE US 5.5X58L NON LTX

## (undated) DEVICE — BAG MEDI-PLAST DECANTER C-FLOW

## (undated) DEVICE — STOPCOCK 4-WAY

## (undated) DEVICE — GUIDEWIRE EMERALD .035IN 260CM

## (undated) DEVICE — HOLDER STRIP-T SELF ADH 2X10IN

## (undated) DEVICE — KIT VALVE HEMSTAS ACCESS-9

## (undated) DEVICE — SOL ELECTROLYTE PH 7.4 500ML

## (undated) DEVICE — SUT PROLENE 7-0 BV-1 30 BLU

## (undated) DEVICE — SHEATH GLIDE SLENDER 6FR

## (undated) DEVICE — ELECTRODE BLADE INSULATED 1 IN

## (undated) DEVICE — CATH JL4 5FR

## (undated) DEVICE — TRAY SKIN SCRUB WET PREMIUM

## (undated) DEVICE — VALVE GUARDIAN II HEMOSTASIS

## (undated) DEVICE — KIT VAVD

## (undated) DEVICE — GUIDE LAUNCHER 6FR JR 4.0

## (undated) DEVICE — CATH IMPULSE 5F 100CM FR4

## (undated) DEVICE — VALVE CONTROL COPILOT

## (undated) DEVICE — SET EXT NAMIC ARTERIAL 12IN

## (undated) DEVICE — BAND TR COMP DEVICE REG 24CM

## (undated) DEVICE — PENCIL MEGADYNE E-Z CLEAN BTTN

## (undated) DEVICE — INTRODUCER SHEATH 5FR W/.035

## (undated) DEVICE — TUBING INSUFFLATOR W/ROT CONCT

## (undated) DEVICE — SET ANGIO ACIST CVI ANGIOTOUCH

## (undated) DEVICE — CATH EMPULSE ANGLED 5FR PIGTAI

## (undated) DEVICE — SYR SLIP TIP 20CC

## (undated) DEVICE — SOL PLASMALYTE PH 7.4 1000ML

## (undated) DEVICE — KNIFE OPTIMUM STR GRN 15DEG

## (undated) DEVICE — CARTRIDGE HEPARIN 2 CHNNL ACT

## (undated) DEVICE — CATH EMERGE MR 15 X 2.00

## (undated) DEVICE — KIT MANIFOLD LOW PRESS TUBING

## (undated) DEVICE — CHLORAPREP W TINT 26ML APPL

## (undated) DEVICE — ELECTRODE PATIENT RETURN DISP

## (undated) DEVICE — GLOVE PROTEXIS HYDROGEL SZ7.5

## (undated) DEVICE — SUT PROLENE 3-0 36 MHMH

## (undated) DEVICE — WIRE GUIDE SAFE-T-J .035 260CM

## (undated) DEVICE — PAD DEFIB RADIOTRANSPARENT

## (undated) DEVICE — CONTAINER SPECIMEN SCREW 4OZ

## (undated) DEVICE — INSERT STEALTH SURGICAL CLAMP

## (undated) DEVICE — SOL NACL IRR 3000ML

## (undated) DEVICE — CARTRIDGE HEPARIN DOSE

## (undated) DEVICE — PACK VARISPREED BATTERY

## (undated) DEVICE — SOL NORMAL USPCA 0.9%

## (undated) DEVICE — PLEDGET TFLN FELT 9.5X4.8 ST

## (undated) DEVICE — ELECTRODE NEEDLE 2.8IN

## (undated) DEVICE — DRESSING XEROFORM 1X8IN

## (undated) DEVICE — GUIDEWIRE INQWIRE SE 3MM JTIP

## (undated) DEVICE — CANNULA AORT ROOT VNT LN 14GA

## (undated) DEVICE — DRESSING TEGADERM 2X2 3/4

## (undated) DEVICE — BANDAGE ACE NON LATEX 3IN

## (undated) DEVICE — SUPPORT ULNA NERVE PROTECTOR

## (undated) DEVICE — SUT BONE WAX 2.5 GRMS 12/BX

## (undated) DEVICE — SUT GUT PL. 4-0 27 FS-2

## (undated) DEVICE — STOPCOCK 3-WAY

## (undated) DEVICE — SEALANT VISTASEAL FIBRIN 10ML

## (undated) DEVICE — SYS VIRTUOSAPH PLUS EVM

## (undated) DEVICE — CATH JACKY RADIAL 5FR 100CM

## (undated) DEVICE — BANDAGE COFLEX LF2 TAN 1X5YD

## (undated) DEVICE — GLOVE BIOGEL 7.5

## (undated) DEVICE — CANNULA NASAL ADULT

## (undated) DEVICE — BANDAGE ACE DOUBLE STER 6IN

## (undated) DEVICE — SUT CTD VICRYL 3-0 CR/SH

## (undated) DEVICE — KIT INTRODUCER STIFFEN MICRO

## (undated) DEVICE — CATH EMERGE MR 20 X 2.50

## (undated) DEVICE — TOWEL OR DISP STRL BLUE 4/PK

## (undated) DEVICE — GLOVE PROTEXIS HYDROGEL SZ6.5

## (undated) DEVICE — NDL EDGE JELCO 22GAX1IN 3ML

## (undated) DEVICE — CATH ALL PUR URTHL 20FR

## (undated) DEVICE — INSERT INTRACK CLAMP ULT 66MM

## (undated) DEVICE — GUIDE LAUNCHER 5FR JR 4.0

## (undated) DEVICE — SUT PROLENE 4-0 SH BLU 36IN

## (undated) DEVICE — CATH EXPO PIG ANGIO